# Patient Record
Sex: FEMALE | Race: WHITE | Employment: PART TIME | URBAN - METROPOLITAN AREA
[De-identification: names, ages, dates, MRNs, and addresses within clinical notes are randomized per-mention and may not be internally consistent; named-entity substitution may affect disease eponyms.]

---

## 2017-02-16 ENCOUNTER — HOSPITAL ENCOUNTER (OUTPATIENT)
Dept: MAMMOGRAPHY | Age: 62
Discharge: HOME | End: 2017-02-16
Attending: OBSTETRICS & GYNECOLOGY | Admitting: OBSTETRICS & GYNECOLOGY

## 2017-02-16 DIAGNOSIS — Z12.31 VISIT FOR SCREENING MAMMOGRAM: ICD-10-CM

## 2018-03-12 ENCOUNTER — HOSPITAL ENCOUNTER (OUTPATIENT)
Dept: MAMMOGRAPHY | Age: 63
Discharge: HOME | End: 2018-03-12
Attending: FAMILY MEDICINE | Admitting: FAMILY MEDICINE

## 2018-03-12 DIAGNOSIS — Z12.31 ENCOUNTER FOR SCREENING MAMMOGRAM FOR MALIGNANT NEOPLASM OF BREAST: ICD-10-CM

## 2018-05-21 ENCOUNTER — HOSPITAL ENCOUNTER (OUTPATIENT)
Dept: MRI IMAGING | Age: 63
Discharge: HOME | End: 2018-05-21

## 2018-05-21 DIAGNOSIS — M51.36 OTHER INTERVERTEBRAL DISC DEGENERATION, LUMBAR REGION: ICD-10-CM

## 2018-05-21 DIAGNOSIS — M50.223 HERNIATED NUCLEUS PULPOSUS, C6-7: ICD-10-CM

## 2018-05-21 DIAGNOSIS — M51.36 DEGENERATION OF INTERVERTEBRAL DISC OF LUMBAR REGION: ICD-10-CM

## 2018-05-21 DIAGNOSIS — M51.34 DDD (DEGENERATIVE DISC DISEASE), THORACIC: ICD-10-CM

## 2018-06-06 ENCOUNTER — HOSPITAL ENCOUNTER (OUTPATIENT)
Dept: OTHER | Age: 63
Discharge: HOME | End: 2018-06-06
Attending: NEUROLOGICAL SURGERY | Admitting: NEUROLOGICAL SURGERY

## 2018-06-06 DIAGNOSIS — M54.2 CERVICALGIA: ICD-10-CM

## 2019-02-12 ENCOUNTER — HOSPITAL ENCOUNTER (OUTPATIENT)
Dept: CT IMAGING | Age: 64
Discharge: HOME | End: 2019-02-12
Payer: COMMERCIAL

## 2019-02-12 DIAGNOSIS — Z98.1 ARTHRODESIS STATUS: ICD-10-CM

## 2019-02-12 PROCEDURE — 72125 CT NECK SPINE W/O DYE: CPT

## 2019-03-05 ENCOUNTER — HOSPITAL ENCOUNTER (OUTPATIENT)
Age: 64
Discharge: HOME | End: 2019-03-05
Payer: COMMERCIAL

## 2019-03-05 ENCOUNTER — HOSPITAL ENCOUNTER (OUTPATIENT)
Dept: GENERAL RADIOLOGY | Age: 64
Discharge: HOME | End: 2019-03-05
Payer: COMMERCIAL

## 2019-03-05 DIAGNOSIS — M54.6 THORACIC SPINE PAIN: ICD-10-CM

## 2019-03-05 PROCEDURE — 72072 X-RAY EXAM THORAC SPINE 3VWS: CPT

## 2019-03-15 ENCOUNTER — HOSPITAL ENCOUNTER (OUTPATIENT)
Dept: WOMENS IMAGING | Age: 64
Discharge: HOME | End: 2019-03-15
Payer: COMMERCIAL

## 2019-03-15 DIAGNOSIS — Z12.31 ENCOUNTER FOR SCREENING MAMMOGRAM FOR MALIGNANT NEOPLASM OF BREAST: ICD-10-CM

## 2019-03-15 PROCEDURE — 77067 SCR MAMMO BI INCL CAD: CPT

## 2019-03-15 PROCEDURE — 77063 BREAST TOMOSYNTHESIS BI: CPT

## 2019-03-19 ENCOUNTER — HOSPITAL ENCOUNTER (OUTPATIENT)
Dept: PHYSICAL THERAPY | Age: 64
Setting detail: THERAPIES SERIES
Discharge: HOME | End: 2019-03-19
Payer: COMMERCIAL

## 2019-03-19 PROCEDURE — 97162 PT EVAL MOD COMPLEX 30 MIN: CPT | Performed by: PHYSICAL THERAPIST

## 2019-03-19 PROCEDURE — 97110 THERAPEUTIC EXERCISES: CPT | Performed by: PHYSICAL THERAPIST

## 2019-03-19 PROCEDURE — 97530 THERAPEUTIC ACTIVITIES: CPT | Performed by: PHYSICAL THERAPIST

## 2019-03-22 ENCOUNTER — HOSPITAL ENCOUNTER (OUTPATIENT)
Dept: PHYSICAL THERAPY | Age: 64
Setting detail: THERAPIES SERIES
Discharge: HOME | End: 2019-03-22
Payer: COMMERCIAL

## 2019-03-22 PROCEDURE — 97140 MANUAL THERAPY 1/> REGIONS: CPT

## 2019-03-22 PROCEDURE — 97110 THERAPEUTIC EXERCISES: CPT

## 2019-03-25 ENCOUNTER — HOSPITAL ENCOUNTER (OUTPATIENT)
Dept: PHYSICAL THERAPY | Age: 64
Setting detail: THERAPIES SERIES
Discharge: HOME | End: 2019-03-25
Payer: COMMERCIAL

## 2019-03-25 PROCEDURE — 97110 THERAPEUTIC EXERCISES: CPT

## 2019-03-25 PROCEDURE — 97140 MANUAL THERAPY 1/> REGIONS: CPT

## 2019-03-29 ENCOUNTER — HOSPITAL ENCOUNTER (OUTPATIENT)
Dept: PHYSICAL THERAPY | Age: 64
Setting detail: THERAPIES SERIES
Discharge: HOME | End: 2019-03-29
Payer: COMMERCIAL

## 2019-03-29 PROCEDURE — 97140 MANUAL THERAPY 1/> REGIONS: CPT

## 2019-03-29 PROCEDURE — 97110 THERAPEUTIC EXERCISES: CPT

## 2019-04-01 ENCOUNTER — HOSPITAL ENCOUNTER (OUTPATIENT)
Dept: PHYSICAL THERAPY | Age: 64
Setting detail: THERAPIES SERIES
Discharge: HOME | End: 2019-04-01
Payer: COMMERCIAL

## 2019-04-01 ENCOUNTER — OFFICE VISIT (OUTPATIENT)
Dept: ORTHOPEDIC SURGERY | Age: 64
End: 2019-04-01
Payer: COMMERCIAL

## 2019-04-01 VITALS
DIASTOLIC BLOOD PRESSURE: 57 MMHG | HEIGHT: 68 IN | BODY MASS INDEX: 29.57 KG/M2 | SYSTOLIC BLOOD PRESSURE: 110 MMHG | RESPIRATION RATE: 10 BRPM | HEART RATE: 88 BPM | WEIGHT: 195.11 LBS

## 2019-04-01 DIAGNOSIS — M17.0 PRIMARY OSTEOARTHRITIS OF BOTH KNEES: Primary | ICD-10-CM

## 2019-04-01 DIAGNOSIS — M25.562 ACUTE PAIN OF BOTH KNEES: ICD-10-CM

## 2019-04-01 DIAGNOSIS — M22.41 CHONDROMALACIA OF BOTH PATELLAE: ICD-10-CM

## 2019-04-01 DIAGNOSIS — M22.42 CHONDROMALACIA OF BOTH PATELLAE: ICD-10-CM

## 2019-04-01 DIAGNOSIS — M25.561 ACUTE PAIN OF BOTH KNEES: ICD-10-CM

## 2019-04-01 PROCEDURE — 97140 MANUAL THERAPY 1/> REGIONS: CPT

## 2019-04-01 PROCEDURE — 97110 THERAPEUTIC EXERCISES: CPT

## 2019-04-01 PROCEDURE — MISCD110 LATERAL STABILIZER: Performed by: FAMILY MEDICINE

## 2019-04-01 PROCEDURE — 99243 OFF/OP CNSLTJ NEW/EST LOW 30: CPT | Performed by: FAMILY MEDICINE

## 2019-04-01 PROCEDURE — 20610 DRAIN/INJ JOINT/BURSA W/O US: CPT | Performed by: FAMILY MEDICINE

## 2019-04-01 RX ORDER — BETAMETHASONE SODIUM PHOSPHATE AND BETAMETHASONE ACETATE 3; 3 MG/ML; MG/ML
24 INJECTION, SUSPENSION INTRA-ARTICULAR; INTRALESIONAL; INTRAMUSCULAR; SOFT TISSUE ONCE
Status: COMPLETED | OUTPATIENT
Start: 2019-04-01 | End: 2019-04-01

## 2019-04-01 RX ORDER — MELOXICAM 15 MG/1
TABLET ORAL
COMMUNITY
Start: 2019-03-05 | End: 2019-07-03 | Stop reason: CLARIF

## 2019-04-01 RX ORDER — TOLTERODINE 2 MG/1
CAPSULE, EXTENDED RELEASE ORAL
COMMUNITY
Start: 2019-03-12 | End: 2022-07-18

## 2019-04-01 RX ORDER — TIZANIDINE HYDROCHLORIDE 2 MG/1
2 CAPSULE, GELATIN COATED ORAL
COMMUNITY
End: 2019-09-25 | Stop reason: ALTCHOICE

## 2019-04-01 RX ORDER — DICLOFENAC SODIUM 75 MG/1
75 TABLET, DELAYED RELEASE ORAL 2 TIMES DAILY
Qty: 60 TABLET | Refills: 3 | Status: SHIPPED | OUTPATIENT
Start: 2019-04-01 | End: 2019-11-13 | Stop reason: ALTCHOICE

## 2019-04-01 RX ADMIN — BETAMETHASONE SODIUM PHOSPHATE AND BETAMETHASONE ACETATE 24 MG: 3; 3 INJECTION, SUSPENSION INTRA-ARTICULAR; INTRALESIONAL; INTRAMUSCULAR; SOFT TISSUE at 09:28

## 2019-04-01 NOTE — FLOWSHEET NOTE
[facility],  [address] [facility]  Phone: [phone]  Fax [phone]        Physical Therapy Daily Treatment Note  Date:  2019    Patient Name:  Tsering Etienne    :  1955  MRN: [id number]  Restrictions/Precautions:    Medical/Treatment Diagnosis Information:  Diagnosis: M54.6 Thoracic spine  Treatment Diagnosis: dec ROM, dec strength, pain, dec function  Insurance/Certification information:  PT Insurance Information: BCBS 30 vpcy; 70/30  Physician Information:  Referring Practitioner: Dr. Ruth Booth of care signed (Y/N): faxed 3/19/19    Date of Patient follow up with Physician:     G-Code (if applicable):      Date G-Code Applied:  3/19/19  PT G-Codes  Functional Assessment Tool Used: Mod Oswestry LB  Score: 17.7%    Progress Note: [x]  Yes  []  No  Next due by: 19     Latex Allergy:  [x]NO      []YES  Preferred Language for Healthcare:   [x]English       []other:    Visit # Insurance Allowable   5 30     Pain level:  0-10/10     SUBJECTIVE:  Pt reports that she is not having the sharp stabbing pain any longer. She is still pretty sore in her R thoracic spine waking up, but it is not wrapping from the front to the back anymore either. Gets sore when she uses her arms for a long time at work. OBJECTIVE:   Observation: very tender to palpation throughout thoracic spine, sits with shoulders elevated.    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      Exercises/Interventions:   Therapeutic Ex Sets/sec Reps Notes         Trunk rotation in sitting    Thoracic flex and ext in sitting    scap retraction    shrugs    Supine pec stretch - arms at 45 30\" 3 + HEP   Lat stretch - R,L 15\" 3 + HEP   SA reach standing ball on wall R,L 5\" 10 Better tolerance than supine + HEP   Supine UE ext iso  Standing scap retraction - back to wall   5\"   10 Could not activate well  Better tolerance   B UE ER supine- back to wall 3\" 20 Yellow TT- gave orange TB for HEP   TT row 2 10 Yellow  + HEP   TT lat pull down 2 10 Yellow  + HEP                                 Pulleys flex, abd 5\"x10 ea  indep LORENZANA   Breathing/rib/palpation assessment 5'     Manual Intervention      Assess thoracic spine  ERSR   FRSL T9 MET 8' total  Also showed anatomy, explained mechanism to reduce fears of harming cervical spine   Inf scap mob supine GII   Pt reports this is a lot of pressure   Thoracic mobs 3'  GII (pt reports this is a lot of pressure)- prone   Rib mobilizations- sidelying rib 5 inhalation correction, intercostal stretch with deep breathing   attempted   STM thoracic PSM R  Vickie-scapular musclature (sidelying) x8'     MFR thoracolumbar fascia 5'     scap mobs- all 5'     NMR re-education      T-spine Ext- foam roll      Chin tucks       Diaphragmatic breathing   Difficulty performing, uses scalenes, SCM, and pec minor heavily                                   Therapeutic Exercise and NMR EXR  [x] (70786) Provided verbal/tactile cueing for activities related to strengthening, flexibility, endurance, ROM  for improvements in cervical, postural, scapular, scapulothoracic and UE control with self care, reaching, carrying, lifting, house/yardwork, driving/computer work.    [] (52988) Provided verbal/tactile cueing for activities related to improving balance, coordination, kinesthetic sense, posture, motor skill, proprioception  to assist with cervical, scapular, scapulothoracic and UE control with self care, reaching, carrying, lifting, house/yardwork, driving/computer work.     Therapeutic Activities:    [] (27528 or 33141) Provided verbal/tactile cueing for activities related to improving balance, coordination, kinesthetic sense, posture, motor skill, proprioception and motor activation to allow for proper function of cervical, scapular, scapulothoracic and UE control with self care, carrying, lifting, driving/computer work. Home Exercise Program:    [x] (63804) Reviewed/Progressed HEP activities related to strengthening, flexibility, endurance, ROM of cervical, scapular, scapulothoracic and UE control with self care, reaching, carrying, lifting, house/yardwork, driving/computer work 3/19/19 written HEP given  [] (92739) Reviewed/Progressed HEP activities related to improving balance, coordination, kinesthetic sense, posture, motor skill, proprioception of cervical, scapular, scapulothoracic and UE control with self care, reaching, carrying, lifting, house/yardwork, driving/computer work      Manual Treatments:  PROM / STM / Oscillations-Mobs:  G-I, II, III, IV (PA's, Inf., Post.)  [x] (37731) Provided manual therapy to mobilize soft tissue/joints of cervical/CT, scapular GHJ and UE for the purpose of decreasing headache, modulating pain, promoting relaxation,  increasing ROM, reducing/eliminating soft tissue swelling/inflammation/restriction, improving soft tissue extensibility and allowing for proper ROM for normal function with self care, reaching, carrying, lifting, house/yardwork, driving/computer work    Modalities:  CP x15 min to T and C spine    Charges:  Timed Code Treatment Minutes: 40   Total Treatment Minutes: 55     [] EVAL (LOW) 66208 (typically 20 minutes face-to-face)  [] EVAL (MOD) 38965 (typically 30 minutes face-to-face)  [] EVAL (HIGH) 06353 (typically 45 minutes face-to-face)  [] RE-EVAL   [x] MJ(46913) x  2   [] IONTO  [] NMR (93734) x      [] VASO  [x] Manual (68758) x  1    [] Other:  [] TA (37091) x       [] [facility] T[facility] (25501)  [] ES(attended) (55057)      [] ES (un) (40795):       GOALS:  Patient stated goal: reduction in pain and return to playing golf; Therapist goals for Patient:   Short Term Goals: To be achieved in: 2 weeks  1. Independent in HEP and progression per patient tolerance, in order to prevent re-injury.    2. Patient will have a decrease in pain to facilitate improvement in movement, function, and ADLs as indicated by Functional Deficits. Long Term Goals: To be achieved in: 10 weeks  1. Disability index score of 10% or less for the NDI to assist with reaching prior level of function. 2. Patient will demonstrate increased AROM to [facility] cervical/thoracic spine to allow for proper joint functioning as indicated by patients Functional Deficits. 3. Patient will demonstrate an increase in postural awareness and control and activation of  core stabilizers to allow for proper functional mobility as indicated by patients Functional Deficits. 4. Patient will return to all functional activities without increased symptoms or restriction. 5. Pt will be able to go through golf swing w/o full motion and min pain.(patient specific functional goal)       Progression Towards Functional goals:  [] Patient is progressing as expected towards functional goals listed. [] Progression is slowed due to complexities listed. [] Progression has been slowed due to co-morbidities. [x] Plan just implemented, too soon to assess goals progression  [] Other:     ASSESSMENT:  Pt is still sensitive to touch, but improved since beginning PT. Less time required to reduce fascial restrictions this visit. She needs cues for MT/rhomboid activation with HEP still, will likely review NV again to ensure proper form at home. Treatment/Activity Tolerance:  [] Patient tolerated treatment well [] Patient limited by fatique  [x] Patient limited by pain  [] Patient limited by other medical complications  [] Other:     Prognosis: [x] Good [] Fair  [] Poor    Patient Requires Follow-up: [x] Yes  [] No    PLAN: Breathing re-training, improve 2nd rib mobility, update HEP if supine strengthening performed correctly.    [x] Continue per plan of care [] Alter current plan (see comments)  [] Plan of care initiated [] Hold pending MD visit [] Discharge    Electronically signed by: Amparo Serrano PT , DPT, OMT-C

## 2019-04-01 NOTE — PROGRESS NOTES
appropriate. Review of Systems  Pertinent items are noted in HPI  Review of systems reviewed from Patient History Form dated on 4/1/2019 and available in the patient's chart under the Media tab. Vital Signs  Vitals:    04/01/19 0859   BP: (!) 110/57   Pulse: 88   Resp: 10       General Exam:     Constitutional: Patient is adequately groomed with no evidence of malnutrition  DTRs: Deep tendon reflexes are intact  Mental Status: The patient is oriented to time, place and person. The patient's mood and affect are appropriate. Lymphatic: The lymphatic examination bilaterally reveals all areas to be without enlargement or induration. Vascular: Examination reveals no swelling or calf tenderness. Peripheral pulses are palpable and 2+. Neurological: The patient has good coordination. There is no weakness or sensory deficit. Knee Examination  Inspection:  There is no high-grade deformity other she does have bilaterally patellofemoral crepitation and trace knee joint effusions. Palpation:  She does have tenderness over the medial and lateral patellofemoral facet bilaterally slightly worse on the right. She does have tenderness clinically. Lateral compartment of her right knee and medial compartment of her left knee with some mild crepitation. Rang of Motion:  She is stiff in the terminal 10 of extension with flexion limited to about 110-120. Hamstrings are tight. Fair quad tone. Strength: 4 out of 5 with flexion as extension. Special Tests:  She does have pain primarily reproduced with the patellar cut testing. Negative apprehension testing. She does have more pain with crepitation with lateral Jose's on the right knee and medial Jose's on the left. I do not sense a high-grade click. I do not sense high-grade instability of screening testing is fairly benign. Skin: There are no rashes, ulcerations or lesions. Distal neurovascular exam is intact.     Gait: mild altalgia and some discomfort with positional change. Reflex symmetrically preserved    Additional Comments:     Additional Examinations:  Right Lower Extremity: Examination of the right lower extremity does not show any tenderness, deformity or injury. Range of motion is unremarkable. There is no gross instability. There are no rashes, ulcerations or lesions. Strength and tone are normal.  Left Lower Extremity: Examination of the left lower extremity does not show any tenderness, deformity or injury. Range of motion is unremarkable. There is no gross instability. There are no rashes, ulcerations or lesions. Strength and tone are normal.  Examination of the biateral hip reveals intact skin. The patient demonstrates full painless range of motion with regards to flexion, abduction, internal and external rotation. There is not tenderness about the greater trochanter. There is a negative straight leg raise against resistance. Strength is 5/5 thorough out all planes. Diagnostic Test Findings: bilateral knee AP and PA weight-bearing sunrise and lateral films were obtained today and show fairly prominent nearly bone-on-bone changes to the lateral compartment of her right knee and medial compartment of her left knee with left greater than right knee patellofemoral arthropathy with spurring. Assessment :  #1.   3+ months status post progressively worsening recurrent advanced right knee lateral anterior compartment osteoarthritis with bone-on-bone changes lateral compartment right knee with synovitis   #2.  3+ months status post for grossly worsening advanced left knee medial and anterior compartment osteoarthritis of bone-on-bone changes medial compartment left knee with synovitis    Impression:  Encounter Diagnoses   Name Primary?     Primary osteoarthritis of both knees Yes    Chondromalacia of both patellae     Acute pain of both knees        Office Procedures:  Orders Placed This Encounter   Procedures   

## 2019-04-04 ENCOUNTER — HOSPITAL ENCOUNTER (OUTPATIENT)
Dept: PHYSICAL THERAPY | Age: 64
Setting detail: THERAPIES SERIES
Discharge: HOME | End: 2019-04-04
Payer: COMMERCIAL

## 2019-04-04 PROCEDURE — 97140 MANUAL THERAPY 1/> REGIONS: CPT

## 2019-04-04 PROCEDURE — G8978 MOBILITY CURRENT STATUS: HCPCS

## 2019-04-04 PROCEDURE — G8979 MOBILITY GOAL STATUS: HCPCS

## 2019-04-04 PROCEDURE — 97110 THERAPEUTIC EXERCISES: CPT

## 2019-04-04 PROCEDURE — 97161 PT EVAL LOW COMPLEX 20 MIN: CPT

## 2019-04-04 NOTE — FLOWSHEET NOTE
[facility],  [address]  Phone: [phone]  Fax [phone]    Physical Therapy Daily Treatment Note  Date:  2019    Patient Name:  Ga Pickett    :  1955  MRN: [id number]  Restrictions/Precautions:    Medical/Treatment Diagnosis Information:  · Diagnosis: M17.0 (ICD-10-CM) - Primary osteoarthritis of both knees; M22.41, M22.42 (ICD-10-CM) - Chondromalacia of both patellae; M25.561, M25.562 (ICD-10-CM) - Acute pain of both knees  · Treatment Diagnosis: M25.561, M25.562 Pain in right, left knees; M17.11, M17.12 Knee OA R,L  Insurance/Certification information:  PT Insurance Information: BCBS 30 vpcy; 70/30  Physician Information:  Referring Practitioner: Dr. Pablito Acosta of care signed (Y/N):     Date of Patient follow up with Physician:     G-Code (if applicable):      Date G-Code Applied:  19  PT G-Codes  Functional Assessment Tool Used: LEFS  Score: 25  Functional Limitation: Mobility: Walking and moving around  Mobility: Walking and Moving Around Current Status (): At least 20 percent but less than 40 percent impaired, limited or restricted  Mobility: Walking and Moving Around Goal Status ():  At least 1 percent but less than 20 percent impaired, limited or restricted    Progress Note: [x]  Yes  []  No  Next due by: Visit #10       Latex Allergy:  [x]NO      []YES  Preferred Language for Healthcare:   [x]English       []other:    Visit # Insurance Allowable Requires auth   1 30 (soft limit)    []no        [x]yes: after 30       Pain level: 0-6/10     SUBJECTIVE:  See eval    OBJECTIVE: See eval  Observation:   Test measurements:      RESTRICTIONS/PRECAUTIONS: B knee OA; cervical disc replacement 2018 C3-C7        Exercises/Interventions:     Therapeutic Ex Sets/sec Reps Notes   Pt ed: findings of exam; rec's for POC 5'     HS stretch standing 30\"x2 R,L  HEP   gastroc stretch standing  30\"x2 R,L  HEP   Rectus stretch standing- foot on chair 30\"x2 R,L  HEP   Standing hip abd 2x10 R,L  HEP UE support   Tandem balance 1' R,L  HEP   Supine h/l hip abd isometric 10\"x10  Lime TB HEP   bridge 5\"x10  HEP   S/l clamshell 2x10 R,L  HEP                     Manual Intervention      STM B distal IT band and distal rectus 10'                                   NMR re-education                                              Therapeutic Exercise and NMR EXR  [x] (46270) Provided verbal/tactile cueing for activities related to strengthening, flexibility, endurance, ROM for improvements in LE, proximal hip, and core control with self care, mobility, lifting, ambulation.  [] (75116) Provided verbal/tactile cueing for activities related to improving balance, coordination, kinesthetic sense, posture, motor skill, proprioception  to assist with LE, proximal hip, and core control in self care, mobility, lifting, ambulation and eccentric single leg control.      NMR and Therapeutic Activities:    [] (85834 or 96174) Provided verbal/tactile cueing for activities related to improving balance, coordination, kinesthetic sense, posture, motor skill, proprioception and motor activation to allow for proper function of core, proximal hip and LE with self care and ADLs  [] (90241) Gait Re-education- Provided training and instruction to the patient for proper LE, core and proximal hip recruitment and positioning and eccentric body weight control with ambulation re-education including up and down stairs     Home Exercise Program:    [x] (40384) Reviewed/Progressed HEP activities related to strengthening, flexibility, endurance, ROM of core, proximal hip and LE for functional self-care, mobility, lifting and ambulation/stair navigation   [] (96894)Reviewed/Progressed HEP activities related to improving balance, coordination, kinesthetic sense, posture, motor skill, proprioception of core, proximal hip and LE for self care, mobility, lifting, and ambulation/stair navigation      Manual Treatments:  PROM / STM / Oscillations-Mobs:  G-I, II, III, IV (PA's, Inf., Post.)  [x] (25739) Provided manual therapy to mobilize LE, proximal hip and/or LS spine soft tissue/joints for the purpose of modulating pain, promoting relaxation,  increasing ROM, reducing/eliminating soft tissue swelling/inflammation/restriction, improving soft tissue extensibility and allowing for proper ROM for normal function with self care, mobility, lifting and ambulation. Modalities:  CP x 15' B knees    Charges:  Timed Code Treatment Minutes: 30   Total Treatment Minutes: 75     [x] EVAL (LOW) 49823 (typically 20 minutes face-to-face)  [] EVAL (MOD) 08545 (typically 30 minutes face-to-face)  [] EVAL (HIGH) 83716 (typically 45 minutes face-to-face)  [] RE-EVAL     [x] SB(11282) x  1   [] IONTO  [] NMR (49284) x      [] VASO  [x] Manual (68766) x  1    [] Other:  [] TA x       [] Mech Traction (48254)  [] ES(attended) (40545)      [] ES (un) (62543):     GOALS: Patient stated goal: get back to golfing, get back to gym, walking 10,000-15,000 steps/day      Therapist goals for Patient:   Short Term Goals: To be achieved in: 2 weeks  1. Independent in HEP and progression per patient tolerance, in order to prevent re-injury. 2. Patient will have a decrease in pain to facilitate improvement in movement, function, and ADLs as indicated by Functional Deficits.     Long Term Goals: To be achieved in: 8 weeks  1. Disability index score of 15% or less for the LEFS to assist with reaching prior level of function. 2. Patient will demonstrate increased ankle AROM to 15 deg bilaterally and improve HS flexibility by 10-15 deg bilaterally to improve mechanics during gait, stair ambulation and squatting. 3. Patient will demonstrate an increase in Strength to at least 4+/5 to 5/5 for bilateral hip musculature.    4. Patient will return to squatting, kneeling, stairs, and heavy HH chores with at least 50-75% improved pain. 5. Pt will have improved balance in SLS to at least 10\" bilaterally in order to reduce risk of falling. 6. Pt will be able to DL squat to 100 degrees with <2/10 knee pain. Progression Towards Functional goals:  [] Patient is progressing as expected towards functional goals listed. [] Progression is slowed due to complexities listed. [] Progression has been slowed due to co-morbidities.   [x] Plan just implemented, too soon to assess goals progression  [] Other:     ASSESSMENT:  See eval    Treatment/Activity Tolerance:  [x] Patient tolerated treatment well [] Patient limited by fatique  [] Patient limited by pain  [] Patient limited by other medical complications  [] Other:     Prognosis: [x] Good [] Fair  [] Poor    Patient Requires Follow-up: [x] Yes  [] No    PLAN: See eval  [] Continue per plan of care [] Alter current plan (see comments)  [x] Plan of care initiated [] Hold pending MD visit [] Discharge    Electronically signed by: Chris Prasad, PT, DPT

## 2019-04-04 NOTE — PLAN OF CARE
[employer] and also working at [employer] one day/week. Relevant Medical History:B knee OA; cervical disc replacement July 2018 C3-C7; L knee arthroscopy (meniscectomy 2011). Per chart review of OV with Jonas Roblero MD on 4/1/19:  Diagnostic Test Findings: bilateral knee AP and PA weight-bearing sunrise and lateral films were obtained today and show fairly prominent nearly bone-on-bone changes to the lateral compartment of her right knee and medial compartment of her left knee with left greater than right knee patellofemoral arthropathy with spurring. Functional Disability Index:PT G-Codes  Functional Assessment Tool Used: LEFS  Score: 25  Functional Limitation: Mobility: Walking and moving around  Mobility: Walking and Moving Around Current Status (): At least 20 percent but less than 40 percent impaired, limited or restricted  Mobility: Walking and Moving Around Goal Status (): At least 1 percent but less than 20 percent impaired, limited or restricted    Pain Scale: 0-6/10  Easing factors: ice, brace, cortisone injections  Provocative factors:kneeling to take care of dogs; descending stairs, standing, walking, driving (stepping on gas and brake pedals), vaccuuming    Type: []Constant   [x]Intermittent  []Radiating []Localized []other:     Numbness/Tingling: denies    Occupation/School:[id number] 1 day; works for [employer]- 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      OBJECTIVE:     ROM LEFT RIGHT   HIP Flex     HIP Abd     HIP Ext     HIP IR     HIP ER     Knee ext 0 0   Knee Flex 135 135   Ankle PF     Ankle DF 10 10   Ankle In     Ankle Ev     Strength  LEFT RIGHT   HIP Flexors 5- 4+   HIP Abductors 4- 4-   HIP Ext NT NT   Hip ER NT NT   Knee EXT (quad) 5 with pain 5 with pain    Knee Flex (HS) 5 5   Ankle DF 5 5 Ankle PF 5 5   Ankle Inv 5 5   Ankle EV 5 5        Circumference  Mid apex  7 cm prox             Reflexes/Sensation:    [x]Dermatomes/Myotomes intact    [x]Reflexes equal and normal bilaterally- UE's trace, patella 1+ B, achilles 1+ bilaterally   [x]Other:- ankle clonus bilaterally    Joint mobility:   [x]Normal    []Hypo   []Hyper    Palpation: TTP distal and mid IT band R>L and R pes anserine    Functional Mobility/Transfers: DL squat 85 deg with anterior knee pain  SLS L 11\", R 3\" with LOB    Posture: forward shoulders and increased thoracic kyphosis    Bandages/Dressings/Incisions: none    Gait: (include devices/WB status) excessive hip drop in stance bilaterally; decreased eccentric DF control    Orthopedic Special Tests: - Rhomberg  HS 90-90 L lacking 35 deg, R lacking 30 deg                        [x] Patient history, allergies, meds reviewed. Medical chart reviewed. See intake form. Review Of Systems (ROS):  [x]Performed Review of systems (Integumentary, CardioPulmonary, Neurological) by intake and observation. Intake form has been scanned into medical record. Patient has been instructed to contact their primary care physician regarding ROS issues if not already being addressed at this time.       Co-morbidities/Complexities (which will affect course of rehabilitation)  []None           Arthritic conditions   []Rheumatoid arthritis (M05.9)  [x]Osteoarthritis (M19.91)   Cardiovascular conditions   []Hypertension (I10)  []Hyperlipidemia (E78.5)  []Angina pectoris (I20)  []Atherosclerosis (I70)   Musculoskeletal conditions   [x]Disc pathology   []Congenital spine pathologies   [x]Prior surgical intervention  []Osteoporosis (M81.8)  []Osteopenia (M85.8)   Endocrine conditions   []Hypothyroid (E03.9)  []Hyperthyroid Gastrointestinal conditions   []Constipation (V76.39)   Metabolic conditions   []Morbid obesity (E66.01)  []Diabetes type 1(E10.65) or 2 (E11.65)   []Neuropathy (G60.9)     Pulmonary conditions bending, and lifting   []Reduced ability to sleep   [x] Reduced ability or tolerance with driving and/or computer work   []Reduced ability to perform lifting, carrying tasks   [x]Reduced ability to squat   []Reduced ability to forward bend   [x]Reduced ability to ambulate prolonged functional periods/distances/surfaces   [x]Reduced ability to ascend/descend stairs   [x]Reduced ability to run, hop, cut or jump   []other:    Participation Restrictions   [x]Reduced participation in self care activities   [x]Reduced participation in home management activities   [x]Reduced participation in work activities   [x]Reduced participation in social activities. [x]Reduced participation in sport/recreation activities. Classification :    []Signs/symptoms consistent with post-surgical status including decreased ROM, strength and function.    []Signs/symptoms consistent with joint sprain/strain   []Signs/symptoms consistent with patella-femoral syndrome   [x]Signs/symptoms consistent with knee OA/hip OA   []Signs/symptoms consistent with internal derangement of knee/Hip   []Signs/symptoms consistent with functional hip weakness/NMR control      []Signs/symptoms consistent with tendinitis/tendinosis    []signs/symptoms consistent with pathology which may benefit from Dry needling      []other:      Prognosis/Rehab Potential:      []Excellent   [x]Good    []Fair   []Poor    Tolerance of evaluation/treatment:    []Excellent   [x]Good    []Fair   []Poor  Physical Therapy Evaluation Complexity Justification  [x] A history of present problem with:  [] no personal factors and/or comorbidities that impact the plan of care;  [x]1-2 personal factors and/or comorbidities that impact the plan of care  []3 personal factors and/or comorbidities that impact the plan of care  [x] An examination of body systems using standardized tests and measures addressing any of the following: body structures and functions (impairments), activity limitations, and/or participation restrictions;:  [] a total of 1-2 or more elements   [] a total of 3 or more elements   [x] a total of 4 or more elements   [x] A clinical presentation with:  [x] stable and/or uncomplicated characteristics   [] evolving clinical presentation with changing characteristics  [] unstable and unpredictable characteristics;   [x] Clinical decision making of [x] low, [] moderate, [] high complexity using standardized patient assessment instrument and/or measurable assessment of functional outcome. [x] EVAL (LOW) 18262 (typically 20 minutes face-to-face)  [] EVAL (MOD) 28163 (typically 30 minutes face-to-face)  [] EVAL (HIGH) 90149 (typically 45 minutes face-to-face)  [] RE-EVAL       PLAN:   Frequency/Duration:  1-2 days per week for 8 Weeks:  Interventions:  [x]  Therapeutic exercise including: strength training, ROM, for Lower extremity and core   [x]  NMR activation and proprioception for LE, Glutes and Core   [x]  Manual therapy as indicated for LE, Hip and spine to include: Dry Needling/IASTM, STM, PROM, Gr I-IV mobilizations, manipulation. [x] Modalities as needed that may include: thermal agents, E-stim, Biofeedback, US, iontophoresis as indicated  [x] Patient education on joint protection, postural re-education, activity modification, progression of HEP. HEP instruction: (see scanned forms)    GOALS:  Patient stated goal: get back to golfing, get back to gym, walking 10,000-15,000 steps/day     Therapist goals for Patient:   Short Term Goals: To be achieved in: 2 weeks  1. Independent in HEP and progression per patient tolerance, in order to prevent re-injury. 2. Patient will have a decrease in pain to facilitate improvement in movement, function, and ADLs as indicated by Functional Deficits. Long Term Goals: To be achieved in: 8 weeks  1. Disability index score of 15% or less for the LEFS to assist with reaching prior level of function.    2. Patient will demonstrate

## 2019-04-05 ENCOUNTER — HOSPITAL ENCOUNTER (OUTPATIENT)
Dept: PHYSICAL THERAPY | Age: 64
Setting detail: THERAPIES SERIES
Discharge: HOME | End: 2019-04-05
Payer: COMMERCIAL

## 2019-04-05 PROCEDURE — 97140 MANUAL THERAPY 1/> REGIONS: CPT

## 2019-04-05 PROCEDURE — 97110 THERAPEUTIC EXERCISES: CPT

## 2019-04-05 NOTE — FLOWSHEET NOTE
[facility], [address]  Phone: [phone]  Fax [phone]        Physical Therapy Daily Treatment Note  Date:  2019    Patient Name:  Gretchen Liang    :  1955  MRN: [id number]  Restrictions/Precautions:    Medical/Treatment Diagnosis Information:  Diagnosis: M54.6 Thoracic spine  Treatment Diagnosis: dec ROM, dec strength, pain, dec function  Insurance/Certification information:  PT Insurance Information: BCBS 30 vpcy; 70/30  Physician Information:  Referring Practitioner: Dr. Eleuterio Fajardo of care signed (Y/N): faxed 3/19/19    Date of Patient follow up with Physician:     G-Code (if applicable):      Date G-Code Applied:  3/19/19  PT G-Codes  Functional Assessment Tool Used: Mod Oswestry LB  Score: 17.7%    Progress Note: [x]  Yes  []  No  Next due by: 19     Latex Allergy:  [x]NO      []YES  Preferred Language for Healthcare:   [x]English       []other:    Visit # Insurance Allowable   6 (1 for another POC) 30     Pain level:  0-5/10     SUBJECTIVE:  Pt reports that she is not having the sharp stabbing pain any longer. She is still pretty sore in her R thoracic spine waking up, but it is not wrapping from the front to the back anymore either. Gets sore when she uses her arms for a long time at work. OBJECTIVE:   Observation: very tender to palpation throughout thoracic spine, sits with shoulders elevated.    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      Exercises/Interventions:   Therapeutic Ex Sets/sec Reps Notes         Trunk rotation in sitting    Thoracic flex and ext in sitting    scap retraction    shrugs    Supine pec stretch - arms at 45 30\" 3 + HEP   Lat stretch - R,L 15\" 3 + HEP   SA reach standing ball on wall R,L 5\" 10 Better tolerance than supine + HEP   Supine UE ext iso  Standing scap retraction - back to wall Could not cervical, scapular, scapulothoracic and UE control with self care, carrying, lifting, driving/computer work. Home Exercise Program:    [x] (27999) Reviewed/Progressed HEP activities related to strengthening, flexibility, endurance, ROM of cervical, scapular, scapulothoracic and UE control with self care, reaching, carrying, lifting, house/yardwork, driving/computer work 3/19/19 written HEP given  [] (78232) Reviewed/Progressed HEP activities related to improving balance, coordination, kinesthetic sense, posture, motor skill, proprioception of cervical, scapular, scapulothoracic and UE control with self care, reaching, carrying, lifting, house/yardwork, driving/computer work      Manual Treatments:  PROM / STM / Oscillations-Mobs:  G-I, II, III, IV (PA's, Inf., Post.)  [x] (66950) Provided manual therapy to mobilize soft tissue/joints of cervical/CT, scapular GHJ and UE for the purpose of decreasing headache, modulating pain, promoting relaxation,  increasing ROM, reducing/eliminating soft tissue swelling/inflammation/restriction, improving soft tissue extensibility and allowing for proper ROM for normal function with self care, reaching, carrying, lifting, house/yardwork, driving/computer work    Modalities:  CP x15 min to T and C spine    Charges:  Timed Code Treatment Minutes: 40   Total Treatment Minutes: 55     [] EVAL (LOW) 80050 (typically 20 minutes face-to-face)  [] EVAL (MOD) 02034 (typically 30 minutes face-to-face)  [] EVAL (HIGH) 12325 (typically 45 minutes face-to-face)  [] RE-EVAL   [x] LC(33557) x  2   [] IONTO  [] NMR (56136) x      [] VASO  [x] Manual (98223) x  1    [] Other:  [] TA (09102) x       [] [facility] (63953)  [] ES(attended) (92972)      [] ES (un) (38652):       GOALS:  Patient stated goal: reduction in pain and return to playing golf; Therapist goals for Patient:   Short Term Goals: To be achieved in: 2 weeks  1.  Independent in HEP and progression per patient tolerance, in order to prevent re-injury. 2. Patient will have a decrease in pain to facilitate improvement in movement, function, and ADLs as indicated by Functional Deficits. Long Term Goals: To be achieved in: 10 weeks  1. Disability index score of 10% or less for the NDI to assist with reaching prior level of function. 2. Patient will demonstrate [facility]/thoracic spine to allow for proper joint functioning as indicated by patients Functional Deficits. 3. Patient will demonstrate an increase in postural awareness and control and activation of  core stabilizers to allow for proper functional mobility as indicated by patients Functional Deficits. 4. Patient will return to all functional activities without increased symptoms or restriction. 5. Pt will be able to go through golf swing w/o full motion and min pain.(patient specific functional goal)       Progression Towards Functional goals:  [] Patient is progressing as expected towards functional goals listed. [x] Progression is slowed due to complexities listed. [] Progression has been slowed due to co-morbidities. [] Plan just implemented, too soon to assess goals progression  [] Other:     ASSESSMENT:  Pt is still sensitive to touch, but improved since beginning PT. Less time required to reduce fascial restrictions this visit. She needs cues for MT/rhomboid activation with HEP still, and review of purpose of scapular stabilization.  She did feel some radiating pain with prone PA mobilizations and pec stretch this visit, but it did not last.     Treatment/Activity Tolerance:  [] Patient tolerated treatment well [] Patient limited by fatique  [x] Patient limited by pain  [] Patient limited by other medical complications  [] Other:     Prognosis: [x] Good [] Fair  [] Poor    Patient Requires Follow-up: [x] Yes  [] No    PLAN: Cont scap stabilization, work on thoracic ext mobility  [x] Continue per plan of care [] Alter current plan (see comments)  [] Plan of care initiated [] Hold pending MD visit [] Discharge    Electronically signed by: Nikole Escudero PT , DPT, OMT-C

## 2019-04-08 ENCOUNTER — HOSPITAL ENCOUNTER (OUTPATIENT)
Dept: PHYSICAL THERAPY | Age: 64
Setting detail: THERAPIES SERIES
Discharge: HOME | End: 2019-04-08
Payer: COMMERCIAL

## 2019-04-08 PROCEDURE — 97110 THERAPEUTIC EXERCISES: CPT

## 2019-04-08 PROCEDURE — 97140 MANUAL THERAPY 1/> REGIONS: CPT

## 2019-04-08 NOTE — FLOWSHEET NOTE
[facility], [address]  Phone: [phone]  Fax [phone]        Physical Therapy Daily Treatment Note  Date:  2019    Patient Name:  Malaika Tracy    :  1955  MRN: [id number]  Restrictions/Precautions:    Medical/Treatment Diagnosis Information:  Diagnosis: M54.6 Thoracic spine  Treatment Diagnosis: dec ROM, dec strength, pain, dec function  Insurance/Certification information:  PT Insurance Information: BCBS 30 vpcy; 70/30  Physician Information:  Referring Practitioner: Dr. Gaurang Alford of care signed (Y/N): faxed 3/19/19    Date of Patient follow up with Physician:     G-Code (if applicable):      Date G-Code Applied:  3/19/19  PT G-Codes  Functional Assessment Tool Used: Mod Oswestry LB  Score: 17.7%    Progress Note: [x]  Yes  []  No  Next due by: 19     Latex Allergy:  [x]NO      []YES  Preferred Language for Healthcare:   [x]English       []other:    Visit # Insurance Allowable   7 (1 for another POC) 30     Pain level:  0-5/10     SUBJECTIVE:  Pt reports that she continues to be achy in the morning, but she is not having the stabbing pain. OBJECTIVE:   Observation: very tender to palpation throughout thoracic spine, sits with shoulders elevated.    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      Exercises/Interventions:   Therapeutic Ex Sets/sec Reps Notes         Trunk rotation in sitting    Thoracic flex and ext in sitting    scap retraction    shrugs    Supine pec stretch - arms at 45 30\" 3 + HEP   Lat stretch - R,L c cane 20\" 3 + HEP   SA reach standing ball on wall R,L Better tolerance than supine + HEP   Standing flexion, scaption, back to wall 2 10 Cues for cervical position, scap position   B UE ER - back to wall Red TB- gave orange TB for HEP   TT row 2 15 red  + HEP   TT lat pull down   red+ HEP   TT ER 2 10 Yellow R,L Home Exercise Program:    [x] (78627) Reviewed/Progressed HEP activities related to strengthening, flexibility, endurance, ROM of cervical, scapular, scapulothoracic and UE control with self care, reaching, carrying, lifting, house/yardwork, driving/computer work 3/19/19 written HEP given  [] (75375) Reviewed/Progressed HEP activities related to improving balance, coordination, kinesthetic sense, posture, motor skill, proprioception of cervical, scapular, scapulothoracic and UE control with self care, reaching, carrying, lifting, house/yardwork, driving/computer work      Manual Treatments:  PROM / STM / Oscillations-Mobs:  G-I, II, III, IV (PA's, Inf., Post.)  [x] (17249) Provided manual therapy to mobilize soft tissue/joints of cervical/CT, scapular GHJ and UE for the purpose of decreasing headache, modulating pain, promoting relaxation,  increasing ROM, reducing/eliminating soft tissue swelling/inflammation/restriction, improving soft tissue extensibility and allowing for proper ROM for normal function with self care, reaching, carrying, lifting, house/yardwork, driving/computer work    Modalities:  CP x15 min to T and C spine    Charges:  Timed Code Treatment Minutes: 53   Total Treatment Minutes: 68 (CP_     [] EVAL (LOW) 41286 (typically 20 minutes face-to-face)  [] EVAL (MOD) 73017 (typically 30 minutes face-to-face)  [] EVAL (HIGH) 93720 (typically 45 minutes face-to-face)  [] RE-EVAL   [x] TG(57065) x  3   [] IONTO  [] NMR (39656) x      [] VASO  [x] Manual (25739) x  1    [] Other:  [] TA (75647) x       [] [facility] (80643)  [] ES(attended) (64706)      [] ES (un) (47675):       GOALS:  Patient stated goal: reduction in pain and return to playing golf; Therapist goals for Patient:   Short Term Goals: To be achieved in: 2 weeks  1. Independent in HEP and progression per patient tolerance, in order to prevent re-injury.    2. Patient will have a decrease in pain to facilitate improvement in movement, function, and ADLs as indicated by Functional Deficits. Long Term Goals: To be achieved in: 10 weeks  1. Disability index score of 10% or less for the NDI to assist with reaching prior level of function. 2. Patient will demonstrate increased AROM to [facility] of cervical/thoracic spine to allow for proper joint functioning as indicated by patients Functional Deficits. 3. Patient will demonstrate an increase in postural awareness and control and activation of  core stabilizers to allow for proper functional mobility as indicated by patients Functional Deficits. 4. Patient will return to all functional activities without increased symptoms or restriction. 5. Pt will be able to go through golf swing w/o full motion and min pain.(patient specific functional goal)       Progression Towards Functional goals:  [] Patient is progressing as expected towards functional goals listed. [x] Progression is slowed due to complexities listed. [] Progression has been slowed due to co-morbidities. [] Plan just implemented, too soon to assess goals progression  [] Other:     ASSESSMENT:  Pt is still sensitive to touch, but improved since beginning PT. Less time required to reduce fascial restrictions this visit. She needs cues for MT/rhomboid activation instead of UT, Lev scap with UE motions, but she is understanding how to contract appropriately with less cues with each visit.      Treatment/Activity Tolerance:  [] Patient tolerated treatment well [] Patient limited by fatique  [x] Patient limited by pain  [] Patient limited by other medical complications  [] Other:     Prognosis: [x] Good [] Fair  [] Poor    Patient Requires Follow-up: [x] Yes  [] No    PLAN: Cont scap stabilization, work on thoracic ext mobility  [x] Continue per plan of care [] Alter current plan (see comments)  [] Plan of care initiated [] Hold pending MD visit [] Discharge    Electronically signed by: Kindra Chau, PT , DPT, OMT-C

## 2019-04-11 ENCOUNTER — HOSPITAL ENCOUNTER (OUTPATIENT)
Dept: PHYSICAL THERAPY | Age: 64
Setting detail: THERAPIES SERIES
Discharge: HOME | End: 2019-04-11
Payer: COMMERCIAL

## 2019-04-11 PROCEDURE — 97110 THERAPEUTIC EXERCISES: CPT

## 2019-04-11 PROCEDURE — 97140 MANUAL THERAPY 1/> REGIONS: CPT

## 2019-04-11 NOTE — FLOWSHEET NOTE
[facility], [address]  Phone: [phone]  Fax [phone]    Physical Therapy Daily Treatment Note  Date:  2019    Patient Name:  Maria Ines Forbes    :  1955  MRN: [id number]  Restrictions/Precautions:    Medical/Treatment Diagnosis Information:  · Diagnosis: M17.0 (ICD-10-CM) - Primary osteoarthritis of both knees; M22.41, M22.42 (ICD-10-CM) - Chondromalacia of both patellae; M25.561, M25.562 (ICD-10-CM) - Acute pain of both knees  · Treatment Diagnosis: M25.561, M25.562 Pain in right, left knees; M17.11, M17.12 Knee OA R,L  Insurance/Certification information:  PT Insurance Information: BCBS 30 vpcy; 70/30  Physician Information:  Referring Practitioner: Dr. Gentry Nyhan of care signed (Y/N):     Date of Patient follow up with Physician:     G-Code (if applicable):      Date G-Code Applied:  19  PT G-Codes  Functional Assessment Tool Used: LEFS  Score: 25  Functional Limitation: Mobility: Walking and moving around  Mobility: Walking and Moving Around Current Status (): At least 20 percent but less than 40 percent impaired, limited or restricted  Mobility: Walking and Moving Around Goal Status (): At least 1 percent but less than 20 percent impaired, limited or restricted    Progress Note: [x]  Yes  []  No  Next due by: Visit #10       Latex Allergy:  [x]NO      []YES  Preferred Language for Healthcare:   [x]English       []other:    Visit # Insurance Allowable Requires auth   2 (7 for another POC) 30 (soft limit)    []no        [x]yes: after 30       Pain level: 0-3/10     SUBJECTIVE:  Pt reports no soreness currently, but 3/10 when using her RLE on the gas/brake pedals driving over here.      OBJECTIVE:   Observation:TTP distal vastus lateralis bilaterally   Test measurements:      RESTRICTIONS/PRECAUTIONS: B knee OA; cervical disc replacement 2018 C3-C7        Exercises/Interventions:     Therapeutic Ex Sets/sec Reps Notes   Pt ed: findings of exam; rec's for POC      HS stretch standing 30\"x2 R,L  HEP   gastroc stretch incline board 30\"x2 R,L  HEP   Rectus stretch standing- foot on chair 30\"x2 R,L  HEP   Standing hip abd 2x10 R,L  HEP UE support   Tandem balance  SLS 1'   10\"x5 R,L  HEP   Supine h/l hip abd isometric   Lime TB HEP   Bridge c OVL at knees 5\"x10  HEP   S/l clamshell 2x10 R,L  HEP   LAQ 3\"x20     SAQ 3\"x20     Side step at 1/2 wall- OVL above knees 4x40'                 bike 3'     Manual Intervention      STM B distal IT band and distal rectus 8'     tib-fem distraction GIII + circles  15\"x3 R,L                             NMR re-education                                              Therapeutic Exercise and NMR EXR  [x] (65270) Provided verbal/tactile cueing for activities related to strengthening, flexibility, endurance, ROM for improvements in LE, proximal hip, and core control with self care, mobility, lifting, ambulation.  [] (74318) Provided verbal/tactile cueing for activities related to improving balance, coordination, kinesthetic sense, posture, motor skill, proprioception  to assist with LE, proximal hip, and core control in self care, mobility, lifting, ambulation and eccentric single leg control.      NMR and Therapeutic Activities:    [] (12962 or 69153) Provided verbal/tactile cueing for activities related to improving balance, coordination, kinesthetic sense, posture, motor skill, proprioception and motor activation to allow for proper function of core, proximal hip and LE with self care and ADLs  [] (54247) Gait Re-education- Provided training and instruction to the patient for proper LE, core and proximal hip recruitment and positioning and eccentric body weight control with ambulation re-education including up and down stairs     Home Exercise Program:    [x] (47197) Reviewed/Progressed HEP activities related to strengthening, flexibility, endurance, ROM of core, proximal hip and LE for functional self-care, mobility, lifting and ambulation/stair navigation   [] (11332)Reviewed/Progressed HEP activities related to improving balance, coordination, kinesthetic sense, posture, motor skill, proprioception of core, proximal hip and LE for self care, mobility, lifting, and ambulation/stair navigation      Manual Treatments:  PROM / STM / Oscillations-Mobs:  G-I, II, III, IV (PA's, Inf., Post.)  [x] (66870) Provided manual therapy to mobilize LE, proximal hip and/or LS spine soft tissue/joints for the purpose of modulating pain, promoting relaxation,  increasing ROM, reducing/eliminating soft tissue swelling/inflammation/restriction, improving soft tissue extensibility and allowing for proper ROM for normal function with self care, mobility, lifting and ambulation. Modalities:  CP x 15' B knees    Charges:  Timed Code Treatment Minutes: 38   Total Treatment Minutes: 56 (bike, ice)      [] EVAL (LOW) 02695 (typically 20 minutes face-to-face)  [] EVAL (MOD) 80559 (typically 30 minutes face-to-face)  [] EVAL (HIGH) 55465 (typically 45 minutes face-to-face)  [] RE-EVAL     [x] TD(72559) x  2   [] IONTO  [] NMR (75502) x      [] VASO  [x] Manual (40695) x  1    [] Other:  [] TA x       [] Mech Traction (57555)  [] ES(attended) (96781)      [] ES (un) (28481):     GOALS: Patient stated goal: get back to golfing, get back to gym, walking 10,000-15,000 steps/day      Therapist goals for Patient:   Short Term Goals: To be achieved in: 2 weeks  1. Independent in HEP and progression per patient tolerance, in order to prevent re-injury. 2. Patient will have a decrease in pain to facilitate improvement in movement, function, and ADLs as indicated by Functional Deficits.     Long Term Goals: To be achieved in: 8 weeks  1. Disability index score of 15% or less for the LEFS to assist with reaching prior level of function.    2. Patient will demonstrate

## 2019-04-12 ENCOUNTER — HOSPITAL ENCOUNTER (OUTPATIENT)
Dept: PHYSICAL THERAPY | Age: 64
Setting detail: THERAPIES SERIES
Discharge: HOME | End: 2019-04-12
Payer: COMMERCIAL

## 2019-04-12 PROCEDURE — 97140 MANUAL THERAPY 1/> REGIONS: CPT

## 2019-04-12 PROCEDURE — 97112 NEUROMUSCULAR REEDUCATION: CPT

## 2019-04-12 PROCEDURE — 97110 THERAPEUTIC EXERCISES: CPT

## 2019-04-12 NOTE — FLOWSHEET NOTE
[facility], [address]  Phone: [phone]  Fax [phone]        Physical Therapy Daily Treatment Note  Date:  2019    Patient Name:  Linda Aponte    :  1955  MRN: [id number]  Restrictions/Precautions:    Medical/Treatment Diagnosis Information:  Diagnosis: M54.6 Thoracic spine  Treatment Diagnosis: dec ROM, dec strength, pain, dec function  Insurance/Certification information:  PT Insurance Information: BCBS 30 vpcy; 70/30  Physician Information:  Referring Practitioner: Dr. Maida Gar of care signed (Y/N): faxed 3/19/19    Date of Patient follow up with Physician:     G-Code (if applicable):      Date G-Code Applied:  3/19/19  PT G-Codes  Functional Assessment Tool Used: Mod Oswestry LB  Score: 17.7%    Progress Note: [x]  Yes  []  No  Next due by: 19     Latex Allergy:  [x]NO      []YES  Preferred Language for Healthcare:   [x]English       []other:    Visit # Insurance Allowable   8 (2 for another POC) 30     Pain level:  0-3/10     SUBJECTIVE:  Pt reports that she continues to be achy in the morning, but she is not having the stabbing pain. Pain intensity is decreasing. Is eager to golf again. OBJECTIVE:   Observation: very tender to palpation throughout thoracic spine, sits with shoulders elevated.    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      Exercises/Interventions:   Therapeutic Ex Sets/sec Reps Notes         Trunk rotation in sitting    Thoracic flex and ext in sitting    scap retraction    shrugs     pec stretch - arms at 45 in anmol-stretch 30\" 3 + HEP   Lat stretch - R,L c cane + HEP   SA reach standing ball on wall R,L Better tolerance than supine + HEP   Standing B flexion, scaption, back to wall 2 10 Cues for cervical position, scap position  1#   B UE ER - back to wall Red TB- gave orange TB for HEP   TT row red + HEP   TT lat pull down   red+ HEP   TT ER Yellow R,L   B ER wall walk- lateral 2 6' Orange    Thread the needle- on wall 1 10 R,L    Cat/camel UE's on table  10x          Pulleys flex, abd 5\"x10 ea  indep LORENZANA   Breathing/rib/palpation assessment      Manual Intervention      Assess thoracic spine  ERSR   FRSL T9,T3  MET   Also showed anatomy, explained mechanism to reduce fears of harming cervical spine   Inf scap mob supine GII   Pt reports this is a lot of pressure   Thoracic mobs PA- seated 3'  GII (pt reports this is a lot of pressure)- prone   Rib mobilizations- sidelying rib 5 inhalation correction, intercostal stretch with deep breathing   attempted   STM thoracic PSM R  Vickie-scapular musclature (sidelying) x8'     MFR thoracolumbar fascia      scap mobs- all 5'     NMR re-education      T-spine Ext- foam roll      Chin tucks       Diaphragmatic breathing   Difficulty performing, uses scalenes, SCM, and pec minor heavily   D1 extension c TT R,L  D2 extension R,L 2x10   x15  Yellow TT   Squat- pelvic rotation R,L 15x  Difficulty- try with hands in doorway NV   Squat- trunk rotation R,L 15x     GCT resisted resisted R thoracic rot 2x10     GCT resisted L thoracic rot 2x10       Therapeutic Exercise and NMR EXR  [x] (54169) Provided verbal/tactile cueing for activities related to strengthening, flexibility, endurance, ROM  for improvements in cervical, postural, scapular, scapulothoracic and UE control with self care, reaching, carrying, lifting, house/yardwork, driving/computer work.    [] (24009) Provided verbal/tactile cueing for activities related to improving balance, coordination, kinesthetic sense, posture, motor skill, proprioception  to assist with cervical, scapular, scapulothoracic and UE control with self care, reaching, carrying, lifting, house/yardwork, driving/computer work.     Therapeutic Activities:    [] (08391 or ) Provided verbal/tactile cueing for activities related to improving balance, coordination, kinesthetic sense, posture, motor skill, proprioception and motor activation to allow for proper function of cervical, scapular, scapulothoracic and UE control with self care, carrying, lifting, driving/computer work.      Home Exercise Program:    [x] (55912) Reviewed/Progressed HEP activities related to strengthening, flexibility, endurance, ROM of cervical, scapular, scapulothoracic and UE control with self care, reaching, carrying, lifting, house/yardwork, driving/computer work 3/19/19 written HEP given  [] (45720) Reviewed/Progressed HEP activities related to improving balance, coordination, kinesthetic sense, posture, motor skill, proprioception of cervical, scapular, scapulothoracic and UE control with self care, reaching, carrying, lifting, house/yardwork, driving/computer work      Manual Treatments:  PROM / STM / Oscillations-Mobs:  G-I, II, III, IV (PA's, Inf., Post.)  [x] (02380) Provided manual therapy to mobilize soft tissue/joints of cervical/CT, scapular GHJ and UE for the purpose of decreasing headache, modulating pain, promoting relaxation,  increasing ROM, reducing/eliminating soft tissue swelling/inflammation/restriction, improving soft tissue extensibility and allowing for proper ROM for normal function with self care, reaching, carrying, lifting, house/yardwork, driving/computer work    Modalities:  CP x15 min to T and C spine    Charges:  Timed Code Treatment Minutes: 38   Total Treatment Minutes: 53 (C)     [] EVAL (LOW) 71227 (typically 20 minutes face-to-face)  [] EVAL (MOD) 48112 (typically 30 minutes face-to-face)  [] EVAL (HIGH) 69709 (typically 45 minutes face-to-face)  [] RE-EVAL   [x] WO(43662) x  1   [] IONTO  [x] NMR (88663) x  1   [] VASO  [x] Manual (48295) x  1    [] Other:  [] TA (11810) x       [] Mech Traction (04787)  [] ES(attended) (16950)      [] ES (un) (55548):       GOALS:  Patient stated goal: reduction in pain and return to playing golf;    Therapist goals for Patient:   Short Term Goals: To be achieved in: 2 weeks  1. Independent in HEP and progression per patient tolerance, in order to prevent re-injury. 2. Patient will have a decrease in pain to facilitate improvement in movement, function, and ADLs as indicated by Functional Deficits. Long Term Goals: To be achieved in: 10 weeks  1. Disability index score of 10% or less for the NDI to assist with reaching prior level of function. 2. Patient will demonstrate increased AROM to [facility] of cervical/thoracic spine to allow for proper joint functioning as indicated by patients Functional Deficits. 3. Patient will demonstrate an increase in postural awareness and control and activation of  core stabilizers to allow for proper functional mobility as indicated by patients Functional Deficits. 4. Patient will return to all functional activities without increased symptoms or restriction. 5. Pt will be able to go through golf swing w/o full motion and min pain.(patient specific functional goal)       Progression Towards Functional goals:  [] Patient is progressing as expected towards functional goals listed. [x] Progression is slowed due to complexities listed. [] Progression has been slowed due to co-morbidities. [] Plan just implemented, too soon to assess goals progression  [] Other:     ASSESSMENT:  Pt is improving with less pain overall and improved mobility. Better tolerance to PA thoracic spine mobilizations seated instead of prone. Was able to complete resisted backswing motion for golf without increased pain.       Treatment/Activity Tolerance:  [] Patient tolerated treatment well [] Patient limited by fatique  [x] Patient limited by pain  [] Patient limited by other medical complications  [] Other:     Prognosis: [x] Good [] Fair  [] Poor    Patient Requires Follow-up: [x] Yes  [] No    PLAN: Cont scap stabilization, work on thoracic ext mobility  [x] Continue per plan of care [] Alter current plan (see comments)  [] Plan of care initiated [] Hold pending MD visit [] Discharge    Electronically signed by: Daren Espino, PT , DPT, OMT-C

## 2019-04-15 ENCOUNTER — HOSPITAL ENCOUNTER (OUTPATIENT)
Dept: PHYSICAL THERAPY | Age: 64
Setting detail: THERAPIES SERIES
Discharge: HOME | End: 2019-04-15
Payer: COMMERCIAL

## 2019-04-15 PROCEDURE — 97140 MANUAL THERAPY 1/> REGIONS: CPT

## 2019-04-15 PROCEDURE — 97112 NEUROMUSCULAR REEDUCATION: CPT

## 2019-04-15 NOTE — FLOWSHEET NOTE
[facility], [address]  Phone: [phone]  Fax [phone]        Physical Therapy Daily Treatment Note  Date:  4/15/2019    Patient Name:  Jeronimo Jordan    :  1955  MRN: [id number]  Restrictions/Precautions:    Medical/Treatment Diagnosis Information:  Diagnosis: M54.6 Thoracic spine  Treatment Diagnosis: dec ROM, dec strength, pain, dec function  Insurance/Certification information:  PT Insurance Information: BCBS 30 vpcy; 70/30  Physician Information:  Referring Practitioner: Dr. Sullivan Self of care signed (Y/N): faxed 3/19/19    Date of Patient follow up with Physician:     G-Code (if applicable):      Date G-Code Applied:  3/19/19  PT G-Codes  Functional Assessment Tool Used: Mod Oswestry LB  Score: 17.7%    Progress Note: [x]  Yes  []  No  Next due by: 19     Latex Allergy:  [x]NO      []YES  Preferred Language for Healthcare:   [x]English       []other:    Visit # Insurance Allowable   9 - PN written (2 for another POC) 30     Pain level:  0-3/10     SUBJECTIVE:  Pt reports that she continues to be achy in the morning, but pain intensity is lower. She did have some rib pain wrapping to the front after potting some plants over the weekend. Is eager to start golfing. OBJECTIVE:   Observation: very tender to palpation throughout thoracic spine, sits with shoulders elevated.    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      Exercises/Interventions:   Therapeutic Ex Sets/sec Reps Notes          pec stretch - arms at 45 supine 60\" 3 + HEP   Lat stretch - R,L 40\" 2 + HEP   SA reach standing ball on wall R,L Better tolerance than supine + HEP   Standing B flexion, scaption, back to wall 2 10 Cues for cervical position, scap position  1#   B UE ER - back to wall Red TB- gave orange TB for HEP   TT row red  + HEP   TT lat pull down   red+ HEP TT ER 2 10 Yellow R,L   B ER c flex- supine to 90 deg 2 10 Emery TB   B ER wall walk- lateral 2 6' Orange    Open the book- s/l on table 1 10 R,L    Cat/camel UE's on table            Pulleys flex, abd 5\"x10 ea  indep LORENZANA   Breathing/rib/palpation assessment      Manual Intervention      Assess thoracic spine  ERSR   FRSL T9,T3  MET   Also showed anatomy, explained mechanism to reduce fears of harming cervical spine   Inf scap mob supine GII   Pt reports this is a lot of pressure   Thoracic mobs PA- seated 3'  GII (pt reports this is a lot of pressure)- prone   Rib mobilizations- sidelying rib 5 inhalation correction, intercostal stretch with deep breathing   attempted   STM thoracic PSM R  Vickie-scapular musclature (sidelying) x8'     MFR thoracolumbar fascia      scap mobs- all 3'     NMR re-education      T-spine Ext- foam roll      Chin tucks       Diaphragmatic breathing   Difficulty performing, uses scalenes, SCM, and pec minor heavily   D1 extension c TT R,L  D2 extension R,L 2x10   2x10  Yellow TT   Squat- pelvic rotation R,L 15x  Hands in doorway   Squat- trunk rotation R,L 15x     GCT resisted resisted R thoracic rot 2x10     GCT resisted L thoracic rot 2x10       Therapeutic Exercise and NMR EXR  [x] (50147) Provided verbal/tactile cueing for activities related to strengthening, flexibility, endurance, ROM  for improvements in cervical, postural, scapular, scapulothoracic and UE control with self care, reaching, carrying, lifting, house/yardwork, driving/computer work.    [] (50455) Provided verbal/tactile cueing for activities related to improving balance, coordination, kinesthetic sense, posture, motor skill, proprioception  to assist with cervical, scapular, scapulothoracic and UE control with self care, reaching, carrying, lifting, house/yardwork, driving/computer work.     Therapeutic Activities:    [] (27193 or 65654) Provided verbal/tactile cueing for activities related to improving balance, coordination, kinesthetic sense, posture, motor skill, proprioception and motor activation to allow for proper function of cervical, scapular, scapulothoracic and UE control with self care, carrying, lifting, driving/computer work.      Home Exercise Program:    [x] (83768) Reviewed/Progressed HEP activities related to strengthening, flexibility, endurance, ROM of cervical, scapular, scapulothoracic and UE control with self care, reaching, carrying, lifting, house/yardwork, driving/computer work 3/19/19 written HEP given  [] (46286) Reviewed/Progressed HEP activities related to improving balance, coordination, kinesthetic sense, posture, motor skill, proprioception of cervical, scapular, scapulothoracic and UE control with self care, reaching, carrying, lifting, house/yardwork, driving/computer work      Manual Treatments:  PROM / STM / Oscillations-Mobs:  G-I, II, III, IV (PA's, Inf., Post.)  [x] (33488) Provided manual therapy to mobilize soft tissue/joints of cervical/CT, scapular GHJ and UE for the purpose of decreasing headache, modulating pain, promoting relaxation,  increasing ROM, reducing/eliminating soft tissue swelling/inflammation/restriction, improving soft tissue extensibility and allowing for proper ROM for normal function with self care, reaching, carrying, lifting, house/yardwork, driving/computer work    Modalities:  CP x15 min to T and C spine    Charges:  Timed Code Treatment Minutes: 35   Total Treatment Minutes: 60 (CP, rest breaks/indep ex)     [] EVAL (LOW) 43460 (typically 20 minutes face-to-face)  [] EVAL (MOD) 35430 (typically 30 minutes face-to-face)  [] EVAL (HIGH) 07981 (typically 45 minutes face-to-face)  [] RE-EVAL   [x] EL(76409) x      [] IONTO  [x] NMR (50314) x  1   [] VASO  [x] Manual (30638) x  1    [] Other:  [] TA (86375) x       [] Mech Traction (23856)  [] ES(attended) (00715)      [] ES (un) (35280):       GOALS:  Patient stated goal: reduction in pain and return to playing golf;    Therapist goals for Patient:   Short Term Goals: To be achieved in: 2 weeks  1. Independent in HEP and progression per patient tolerance, in order to prevent re-injury. 2. Patient will have a decrease in pain to facilitate improvement in movement, function, and ADLs as indicated by Functional Deficits. Long Term Goals: To be achieved in: 10 weeks  1. Disability index score of 10% or less for the NDI to assist with reaching prior level of function. 2. Patient will demonstrate increased AROM to [facility] of cervical/thoracic spine to allow for proper joint functioning as indicated by patients Functional Deficits. 3. Patient will demonstrate an increase in postural awareness and control and activation of  core stabilizers to allow for proper functional mobility as indicated by patients Functional Deficits. 4. Patient will return to all functional activities without increased symptoms or restriction. 5. Pt will be able to go through golf swing w/o full motion and min pain.(patient specific functional goal)       Progression Towards Functional goals:  [] Patient is progressing as expected towards functional goals listed. [x] Progression is slowed due to complexities listed. [] Progression has been slowed due to co-morbidities. [] Plan just implemented, too soon to assess goals progression  [] Other:     ASSESSMENT:  Pt is improving with less pain overall and improved mobility. Better tolerance to PA thoracic spine mobilizations seated instead of prone. She is tolerating thoracic rotations without increased pain, so advised pt to try swinging her club- 50% of normal force/speed in backyard first. Still needing intermittent cues for scap control.      Treatment/Activity Tolerance:  [] Patient tolerated treatment well [] Patient limited by fatique  [x] Patient limited by pain  [] Patient limited by other medical complications  [] Other:     Prognosis: [x] Good [] Fair  [] Poor    Patient Requires Follow-up: [x] Yes  [] No    PLAN: Cont scap stabilization, work on thoracic ext mobility  [x] Continue per plan of care [] Alter current plan (see comments)  [] Plan of care initiated [] Hold pending MD visit [] Discharge    Electronically signed by: Bernard Johnson, PT , DPT, OMT-C

## 2019-04-18 ENCOUNTER — HOSPITAL ENCOUNTER (OUTPATIENT)
Dept: PHYSICAL THERAPY | Age: 64
Setting detail: THERAPIES SERIES
Discharge: HOME | End: 2019-04-18
Payer: COMMERCIAL

## 2019-04-18 PROCEDURE — 97110 THERAPEUTIC EXERCISES: CPT

## 2019-04-18 PROCEDURE — 97140 MANUAL THERAPY 1/> REGIONS: CPT

## 2019-04-18 NOTE — FLOWSHEET NOTE
session   Test measurements:  DF 15 deg  MMT hip flexion L 5/5, R 4+/5    RESTRICTIONS/PRECAUTIONS: B knee OA; cervical disc replacement July 2018 C3-C7        Exercises/Interventions:     Therapeutic Ex Sets/sec Reps Notes   Pt ed: findings of exam; rec's for POC      HS stretch standing 30\"x2 R,L  HEP   gastroc stretch incline board 30\"x2 R,L  HEP   Rectus stretch standing- foot on chair 30\"x2 R,L  HEP   Standing hip abd 2x10 R,L  HEP UE support   Tandem balance on airex  SLS 1'     HEP   Supine h/l hip abd isometric   Lime TB HEP   Bridge c OVL at knees  HEP   S/l clamshell  HEP   LAQ     SAQ     Side step GVL above knees 4x40'           See AT note      bike 3'     Manual Intervention      STM B distal IT band and distal rectus 12'     tib-fem distraction GIII + circles                               NMR re-education                                              Therapeutic Exercise and NMR EXR  [x] (34689) Provided verbal/tactile cueing for activities related to strengthening, flexibility, endurance, ROM for improvements in LE, proximal hip, and core control with self care, mobility, lifting, ambulation.  [] (59528) Provided verbal/tactile cueing for activities related to improving balance, coordination, kinesthetic sense, posture, motor skill, proprioception  to assist with LE, proximal hip, and core control in self care, mobility, lifting, ambulation and eccentric single leg control.      NMR and Therapeutic Activities:    [] (35782 or 61410) Provided verbal/tactile cueing for activities related to improving balance, coordination, kinesthetic sense, posture, motor skill, proprioception and motor activation to allow for proper function of core, proximal hip and LE with self care and ADLs  [] (55427) Gait Re-education- Provided training and instruction to the patient for proper LE, core and proximal hip recruitment and positioning and eccentric body weight control with ambulation re-education including up and down index score of 15% or less for the LEFS to assist with reaching prior level of function. 2. Patient will demonstrate increased ankle AROM to 15 deg bilaterally and improve HS flexibility by 10-15 deg bilaterally to improve mechanics during gait, stair ambulation and squatting. 3. Patient will demonstrate an increase in Strength to at least 4+/5 to 5/5 for bilateral hip musculature. 4. Patient will return to squatting, kneeling, stairs, and heavy HH chores with at least 50-75% improved pain. 5. Pt will have improved balance in SLS to at least 10\" bilaterally in order to reduce risk of falling. 6. Pt will be able to DL squat to 100 degrees with <2/10 knee pain. Progression Towards Functional goals:  [] Patient is progressing as expected towards functional goals listed. [] Progression is slowed due to complexities listed. [] Progression has been slowed due to co-morbidities. [x] Plan just implemented, too soon to assess goals progression  [] Other:     ASSESSMENT:  Pt has attended PT 3 times, including IE d/t focusing on another POC in PT. However, she has already made some strength gains in hip rotators and has improved ankle ROM. She will benefit from continued PT to improve proximal stability further. Planning to shift focus to this POC within the next 1-2 weeks, seeing pt 2x/week instead of 1x/week.      Treatment/Activity Tolerance:  [x] Patient tolerated treatment well [] Patient limited by fatique  [] Patient limited by pain  [] Patient limited by other medical complications  [] Other:     Prognosis: [x] Good [] Fair  [] Poor    Patient Requires Follow-up: [x] Yes  [] No    PLAN: Add weight for SAQ, LAQ NV, update HEP  [x] Continue per plan of care [] Alter current plan (see comments)  [] Plan of care initiated [] Hold pending MD visit [] Discharge    Electronically signed by: Fito Oliveira, PT, DPT

## 2019-04-18 NOTE — FLOWSHEET NOTE
[facility], [address] [place]  Phone: [phone]  Fax [phone]      ATHLETIC TRAINING [address]  Date:  2019    Patient Name:  May Osei    :  1955  MRN: [id number]  Restrictions/Precautions:    Medical/Treatment Diagnosis Information:  ·  B knee OA, CMP, pain  ·  B knee pain  Physician Information:   Dr. Robles Painter Post-op  8 wks  12 wks 16 wks 20 wks   24 wks                            Activity Log                                                  DOS/DOI:                                                    Date: 19    ATC communication: R>L pain, also being seen in PT for T-spine pain d/t hx of c-spine surgery (disc replacement) 2018, return to golf  Works PT at [employer], [place] Has gym membership   Bike    Elliptical    Treadmill    Airdyne        Gastroc stretch    Soleus stretch    Hamstring stretch    ITB stretch    Hip Flexor stretch    Quad stretch    Adductor stretch        Weight Shifting sp                              fp                              tp    Lateral walking (with/w/o TB)        Balance: PEP/Adina board                   SLS          Star excursion load/explode          Extremity reach UE/LE        Leg Press Oswaldo. 70# ball add 2x10                     Ecc.                      Inv. Calf Press Oswaldo. 70# 2x10                      Ecc.                        Inv.        YANELI   Flex               ABd 30# R/L 2x10              ADd              TKE 45# R/L 20x5\"              Ext        Steps Up w/PT              Up and Over               Down               Lateral               Rotation        Squats  mini                  wall                 BOSU         Lunges:  Lunge to Balance                   Balance to Lunge                   Walking        Knee Extension Bilat. Ecc.                               Inv. Hamstring Curls Bilat. Ecc.                               Inv.        Soleus Press Bilat. Ecc.                           Inv.                             Ladders                Square               Jump/Hop  Low                      Med.                      High                                                            Modality CP B knees 15'   Initials                             JLW   Time spent one on one (workers comp)    Time spent with PT assistant

## 2019-04-19 ENCOUNTER — HOSPITAL ENCOUNTER (OUTPATIENT)
Dept: PHYSICAL THERAPY | Age: 64
Setting detail: THERAPIES SERIES
Discharge: HOME | End: 2019-04-19
Payer: COMMERCIAL

## 2019-04-19 PROCEDURE — 97140 MANUAL THERAPY 1/> REGIONS: CPT

## 2019-04-19 PROCEDURE — 97112 NEUROMUSCULAR REEDUCATION: CPT

## 2019-04-19 PROCEDURE — 97110 THERAPEUTIC EXERCISES: CPT

## 2019-04-19 PROCEDURE — G8979 MOBILITY GOAL STATUS: HCPCS

## 2019-04-19 PROCEDURE — G8978 MOBILITY CURRENT STATUS: HCPCS

## 2019-04-19 NOTE — FLOWSHEET NOTE
[facility],  [address] [facility] [place]  Phone: [phone]  Fax [phone]        Physical Therapy Daily Treatment Note  Date:  2019    Patient Name:  Tsering Etienne    :  1955  MRN: [id number]  Restrictions/Precautions:    Medical/Treatment Diagnosis Information:  Diagnosis: M54.6 Thoracic spine  Treatment Diagnosis: dec ROM, dec strength, pain, dec function  Insurance/Certification information:  PT Insurance Information: BCBS 30 vpcy; 70/30  Physician Information:  Referring Practitioner: Dr. Ruth Booth of care signed (Y/N): faxed 3/19/19    Date of Patient follow up with Physician:     G-Code (if applicable):      Date G-Code Applied:  3/19/19  PT G-Codes  Functional Assessment Tool Used: Mod Oswestry LB  Score: 17.7%    Progress Note: [x]  Yes  []  No  Next due by: 19     Latex Allergy:  [x]NO      []YES  Preferred Language for Healthcare:   [x]English       []other:    Visit # Insurance Allowable   10 - PN written (2 for another POC) 30     Pain level:  0-3/10     SUBJECTIVE:  Pt reports that she feels 80% better at this time since beginning therapy. She still has achiness in the morning rated 2-3/10, but the sharp/shooting pain is much less frequent. She becomes sore at her upper thoracic spine when she uses her arms more for work, but it dissipates. She does find that ice helps her pain, but she has been icing less and doing okay with this. OBJECTIVE:   Observation: very tender to palpation throughout thoracic spine, sits with shoulders elevated.    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      Exercises/Interventions:   Therapeutic Ex Sets/sec Reps Notes   Self TPR - tennis ball on wall 5'      pec stretch - arms at 45 supine + HEP   Lat stretch - R,L + HEP   SA reach standing ball on wall R,L Better tolerance than supine + HEP   Standing B flexion, scaption, back to wall 2 10 Cues for cervical position, scap position, used mirror + HEP   B UE ER - back to wall Red TB- gave orange TB for HEP   TT row red  + HEP   TT lat pull down   red+ HEP   TT ER 2 10 Yellow R,L + HEP   B ER c flex- supine to 90 deg 2 10 [place] TB   B ER wall walk- lateral   Orange    Open the book- s/l on table NV     Prone MT, row, sh ext over SWB (on chair) 15x ea  Needed rest breaks with MT to reset shoulder blades         Pulleys flex, abd 5\"x10 ea  indep LORENZANA   Breathing/rib/palpation assessment      Manual Intervention      Assess thoracic spine  ERSR   FRSL T9,T3  MET   Also showed anatomy, explained mechanism to reduce fears of harming cervical spine   Inf scap mob supine GII   Pt reports this is a lot of pressure   Thoracic mobs PA- seated   GII (pt reports this is a lot of pressure)- prone   Rib mobilizations- sidelying rib 5 inhalation correction, intercostal stretch with deep breathing   attempted   STM thoracic PSM R  Vickie-scapular musclature (sidelying) x8'     MFR thoracolumbar fascia      scap mobs- all 3'     NMR re-education      T-spine Ext- foam roll      Chin tucks       Diaphragmatic breathing   Difficulty performing, uses scalenes, SCM, and pec minor heavily   D1 extension c TT R,L  D2 extension R,L 2x10   2x10  Yellow TT   Squat- pelvic rotation R,L 15x  Hands in doorway   Squat- trunk rotation R,L 15x  Hips on wall   GCT resisted resisted R thoracic rot x15     GCT resisted L thoracic rot x15       Therapeutic Exercise and NMR EXR  [x] (03872) Provided verbal/tactile cueing for activities related to strengthening, flexibility, endurance, ROM  for improvements in cervical, postural, scapular, scapulothoracic and UE control with self care, reaching, carrying, lifting, house/yardwork, driving/computer work.    [] (03994) Provided verbal/tactile cueing for activities related to improving balance, coordination, kinesthetic sense, posture, motor skill, proprioception  to assist with cervical, scapular, scapulothoracic and UE control with self care, reaching, carrying, lifting, house/yardwork, driving/computer work. Therapeutic Activities:    [] (32812 or 89555) Provided verbal/tactile cueing for activities related to improving balance, coordination, kinesthetic sense, posture, motor skill, proprioception and motor activation to allow for proper function of cervical, scapular, scapulothoracic and UE control with self care, carrying, lifting, driving/computer work.      Home Exercise Program:    [x] (76423) Reviewed/Progressed HEP activities related to strengthening, flexibility, endurance, ROM of cervical, scapular, scapulothoracic and UE control with self care, reaching, carrying, lifting, house/yardwork, driving/computer work 3/19/19 written HEP given  [] (26499) Reviewed/Progressed HEP activities related to improving balance, coordination, kinesthetic sense, posture, motor skill, proprioception of cervical, scapular, scapulothoracic and UE control with self care, reaching, carrying, lifting, house/yardwork, driving/computer work      Manual Treatments:  PROM / STM / Oscillations-Mobs:  G-I, II, III, IV (PA's, Inf., Post.)  [x] (73714) Provided manual therapy to mobilize soft tissue/joints of cervical/CT, scapular GHJ and UE for the purpose of decreasing headache, modulating pain, promoting relaxation,  increasing ROM, reducing/eliminating soft tissue swelling/inflammation/restriction, improving soft tissue extensibility and allowing for proper ROM for normal function with self care, reaching, carrying, lifting, house/yardwork, driving/computer work    Modalities:  CP x15 min to T and C spine    Charges:  Timed Code Treatment Minutes: 60   Total Treatment Minutes: 82 (CP, rest breaks/indep ex)     [] EVAL (LOW) 08506 (typically 20 minutes face-to-face)  [] EVAL (MOD) 70165 (typically 30 minutes face-to-face)  [] EVAL (HIGH) 07939 (typically 45 minutes with UE movements. This UT and lev scap over-use is lending to continued upper thoracic soreness after working for several hours. She does have much improved trigger points in her thoracic spine and improved PA mobility of her thoracic spine. At this time, feel that she may decrease her PT freq to 1-2x/week x 4 weeks. Treatment/Activity Tolerance:  [x] Patient tolerated treatment well [] Patient limited by fatique  [] Patient limited by pain  [] Patient limited by other medical complications  [] Other:     Prognosis: [x] Good [] Fair  [] Poor    Patient Requires Follow-up: [x] Yes  [] No    PLAN: Cont scap stabilization, decrease freq to 1x/week, but may still see 2x/week if more sore.    [x] Continue per plan of care [] Alter current plan (see comments)  [] Plan of care initiated [] Hold pending MD visit [] Discharge    Electronically signed by: Concha King, PT , DPT, OMT-C

## 2019-04-19 NOTE — PLAN OF CARE
[facility], [address], [address] [place] [address]  Phone: [phone]  Fax [phone]     Physical Therapy Re-Certification Plan of Care    Dear Referring Practitioner: Dr. Morgan Jarrett,    We had the pleasure of treating the following patient for physical therapy services at [address] [place] [address]. A summary of our findings can be found in the updated assessment below. This includes our plan of care. If you have any questions or concerns regarding these findings, please do not hesitate to contact me at the office phone number checked above. Thank you for the referral.     Physician Signature:________________________________Date:__________________  By signing above, therapists plan is approved by physician      Patient: Albaro Staples   : 1955   MRN: [id number]  Referring Physician: Referring Practitioner: Dr. Morgan Jarrett      Evaluation Date: 2019      Medical Diagnosis Information:  · Diagnosis: M17.0 (ICD-10-CM) - Primary osteoarthritis of both knees; M22.41, M22.42 (ICD-10-CM) - Chondromalacia of both patellae; M25.561, M25.562 (ICD-10-CM) - Acute pain of both knees   · Treatment Diagnosis: M25.561, M25.562 Pain in right, left knees; M17.11, M17.12 Knee OA R,L   Insurance information: PT Insurance Information: BC 30 vpcy;     Date Range:3/19/19-19  Total visits:10      G-Codes: (if applicable) PT G-Codes  Functional Assessment Tool Used: Quick DASH  Score: 9  Functional Limitation: Mobility: Walking and moving around  Mobility: Walking and Moving Around Current Status (): At least 1 percent but less than 20 percent impaired, limited or restricted  Mobility: Walking and Moving Around Goal Status (): At least 1 percent but less than 20 percent impaired, limited or restricted   Functional Index used:    SUBJECTIVE:  Pt reports that she feels 80% better at this time since beginning therapy.  She still has achiness in the morning rated 2-3/10, but the sharp/shooting pain is much less frequent. She becomes sore at her upper thoracic spine when she uses her arms more for work, but it dissipates. She does find that ice helps her pain, but she has been icing less and doing okay with this. Current Pain Scale: 0-3/10    Type: []Constant   [x]Intermitment  []Radiating []Localized  []other:     Functional Limitations: [x]Lifting/reaching []Grooming  [x]Carrying []ADL's  []Driving [x]Sports/Recreations   []Other:      OBJECTIVE:   CERV ROM       Thoracic Flexion WNL     thoracic Extension WNL             Thoracic rotation R 75%, L 50%             ROM Left Right   Shoulder Flex 154 152   Shoulder Abd 160 155   Shoulder ER T1 T1   Shoulder IR T7 T10 (improves to T7 with stretching)                   Strength  Left Right   Shoulder Flex 5 4+   Shoulder Scap 5 4+   Shoulder ER 4 4   Shoulder IR 5 5   Wrist ext and flex NT NT   Elbow ext and flex NT NT   Reflexes Left Right   C5-6 Biceps WNL WNL   C5-6 Brachioradialis WNL WNL   C7-8 Triceps WNL WNL        Joint mobility: PA mob T8-2, post R GHJ   []Normal    [x]Hypo   []Hyper    Palpation: TTP infra-spinatus, lev scap, medial scapular border    Orthopedic Tests: none    OTHER:      ASSESSMENT: Pt is improving with less pain overall and improved mobility. She has improved strength as well, but still would benefit from scapular re-training as she demonstrates R shoulder hiking with UE movements. This UT and lev scap over-use is lending to continued upper thoracic soreness after working for several hours. She does have much improved trigger points in her thoracic spine and improved PA mobility of her thoracic spine. At this time, feel that she may decrease her PT freq to 1-2x/week x 4 weeks.          Response to Treatment:   [x]Patient is responding well to treatment and improvement is noted with regards  to goals   []Patient should continue to improve in reasonable time if they continue HEP   []Patient has plateaued and is no longer responding to skilled PT intervention    []Patient is getting worse and would benefit from return to referring MD   []Patient unable to adhere to initial POC      Functional deficiencies which affect ADL's and Reduce overall functional level:     [x]decreased RC/scapular strength and neuromuscular control - Reduced overall  functional level with carrying /lifting   [x]decreased UE ROM/joint mobility- Reduced overall functional level with  carrying /lifting    []pain/difficulty with driving and/or computer work- Reduced overall functional  level    [x]pain at end of day with ADL tasks- Reduced overall functional level   [x]pain/difficulty with lifting/reaching/carrying - Reduced overall functional level  with carrying and lifting   [x]unable to perform sport/recreational activity due to pain and dysfunction   []other:       Prognosis/Rehab Potential:    []Excellent   [x]Good    []Fair   []Poor: Toleration of evaluation or treatment:    []Excellent   [x]Good    []Fair   []Poor     New or Updated Goals (if applicable):  [x] No change to goals established upon initial eval/last progress note:  New Goals:    GOALS:   Patient stated goal: reduction in pain and return to playing golf;     Therapist goals for Patient:   Short Term Goals: To be achieved in: 2 weeks  1. Independent in HEP and progression per patient tolerance, in order to prevent re-injury. met  2. Patient will have a decrease in pain to facilitate improvement in movement, function, and ADLs as indicated by Functional Deficits. progress     Long Term Goals: To be achieved in: 10 weeks  1. Disability index score of 10% or less for the NDI to assist with reaching prior level of function. Gave Q DASH in error- 9% disability for this scale  2.  Patient will demonstrate increased AROM to [facility] of cervical/thoracic spine to allow for proper joint functioning as indicated by patients Functional

## 2019-04-22 ENCOUNTER — OFFICE VISIT (OUTPATIENT)
Dept: ORTHOPEDIC SURGERY | Age: 64
End: 2019-04-22
Payer: COMMERCIAL

## 2019-04-22 ENCOUNTER — APPOINTMENT (OUTPATIENT)
Dept: PHYSICAL THERAPY | Age: 64
End: 2019-04-22
Payer: COMMERCIAL

## 2019-04-22 VITALS
BODY MASS INDEX: 29.57 KG/M2 | WEIGHT: 195.11 LBS | SYSTOLIC BLOOD PRESSURE: 103 MMHG | DIASTOLIC BLOOD PRESSURE: 58 MMHG | HEIGHT: 68 IN | HEART RATE: 85 BPM

## 2019-04-22 DIAGNOSIS — M25.561 CHRONIC PAIN OF BOTH KNEES: ICD-10-CM

## 2019-04-22 DIAGNOSIS — G89.29 CHRONIC PAIN OF BOTH KNEES: ICD-10-CM

## 2019-04-22 DIAGNOSIS — M22.42 CHONDROMALACIA OF BOTH PATELLAE: ICD-10-CM

## 2019-04-22 DIAGNOSIS — M25.562 CHRONIC PAIN OF BOTH KNEES: ICD-10-CM

## 2019-04-22 DIAGNOSIS — M22.41 CHONDROMALACIA OF BOTH PATELLAE: ICD-10-CM

## 2019-04-22 DIAGNOSIS — M17.0 BILATERAL PRIMARY OSTEOARTHRITIS OF KNEE: ICD-10-CM

## 2019-04-22 PROCEDURE — 99213 OFFICE O/P EST LOW 20 MIN: CPT | Performed by: FAMILY MEDICINE

## 2019-04-23 NOTE — PROGRESS NOTES
Chief Complaint  Knee Pain (fu jes knee, injections helped  pain is slowly coming back, feels therapy & bracing is helping)      Follow-up recurrent left knee pain with underlying left greater than right knee patellofemoral arthropathy with prominent nearly bone-on-bone changes lateral compartment of right knee and medial compartment left knee with difficulty walking and rating pseudo-buckling    History of Present Illness:  Lashonda Prasad is a 59 y.o. female who is a very pleasant white female who still works part-time at [employer] who is a very nice patient of  Dr. Bethany Yoon being seen today in Consultation from Dr. Shabana Gonzalez for Evaluation of Worsening Pain to Her Right Greater Than Left Knee. It sounds as if she may have had a previous knee arthroscopy on the left about 5-6 years ago for meniscal tear treated surgically by Dr Christiano Gomez. She does state that she was doing reasonably well up until sustaining several falls when she tripped starting in December 2018. She does not recall the exact mechanism of injury but shortly thereafter \"increasing achy pain primarily about the anterior portion of her knee. She has had swelling and her right knee has persisted and hurting her to the point where she is having pain in the range of 3-4 out of 10 at rest with more substantial 78 out of 10 pain with positional changes prolonged standing walking pivoting and going up and down stairs. Since early March 2019, her symptoms have become more consistent in nature. she did see her primary care doctor who recommended anti-inflammatories and icing and today's orthopedic consultation. She has not had recent weightbearing images. She is not really complaining of true locking or catching and may have had some mild swelling. She is not having pain at night. She is being seen today for orthopedic and sports consultation with weightbearing imaging.     She was seen initially in the office on 4/1/2019 started on aggressive conservative treatment for her knees bilaterally. She is seen back today stating that her knee symptoms initially had improved substantially following her initial phases of treatment to the point where she was nearly 100% improved for a couple of weeks but has regressed somewhat to about a 75-80% improvement. She's had 3 sessions of therapy thus far and has been reasonably compliant with her home-based exercises. She is continuing with her brace and is tolerated her diclofenac. Her ability to change positions walk distances and going up and downstairs has improved and she is not complaining of locking catching or true instability symptoms. She has tolerated her diclofenac. She is pleased with her initial progress. She would like to avoid surgical intervention if at all possible. Medical History     Patient's medications, allergies, past medical, surgical, social and family histories were reviewed and updated as appropriate. Review of Systems  Pertinent items are noted in HPI  Review of systems reviewed from Patient History Form dated on 4/1/2019 and available in the patient's chart under the Media tab. Vital Signs  Vitals:    04/22/19 0928   BP: (!) 103/58   Pulse: 85       General Exam:     Constitutional: Patient is adequately groomed with no evidence of malnutrition  DTRs: Deep tendon reflexes are intact  Mental Status: The patient is oriented to time, place and person. The patient's mood and affect are appropriate. Lymphatic: The lymphatic examination bilaterally reveals all areas to be without enlargement or induration. Vascular: Examination reveals no swelling or calf tenderness. Peripheral pulses are palpable and 2+. Neurological: The patient has good coordination. There is no weakness or sensory deficit. Knee Examination  Inspection:  There is no high-grade deformity other she does have bilaterally patellofemoral crepitation and trace knee joint effusions.     Palpation: She does have less prominent tenderness over the medial and lateral patellofemoral facet bilaterally slightly worse on the right. She does haveless prominent tenderness clinically over the Lateral compartment of her right knee and medial compartment of her left knee with some mild crepitation. Rang of Motion:  She is stiff in the terminal 5 of extension with flexion limited to about 120. Hamstrings are mildlytight. Fair quad tone. Strength: 4 out of 5 with flexion as extension. Special Tests:  She does have this prominentpain primarily reproduced with the patellar rindtesting. Negative apprehension testing. She does have less prominent pain with crepitation with lateral Jose's on the right knee and medial Jose's on the left. I do not sense a high-grade click. I do not sense high-grade instability of screening testing is fairly benign. Skin: There are no rashes, ulcerations or lesions. Distal neurovascular exam is intact. Gait: improved altalgia and less discomfort with positional change. Reflex symmetrically preserved    Additional Comments:     Additional Examinations:  Right Lower Extremity: Examination of the right lower extremity does not show any tenderness, deformity or injury. Range of motion is unremarkable. There is no gross instability. There are no rashes, ulcerations or lesions. Strength and tone are normal.  Left Lower Extremity: Examination of the left lower extremity does not show any tenderness, deformity or injury. Range of motion is unremarkable. There is no gross instability. There are no rashes, ulcerations or lesions. Strength and tone are normal.  Examination of the biateral hip reveals intact skin. The patient demonstrates full painless range of motion with regards to flexion, abduction, internal and external rotation. There is not tenderness about the greater trochanter. There is a negative straight leg raise against resistance.   Strength is 5/5 thorough out all planes. Diagnostic Test Findings: bilateral knee AP and PA weight-bearing sunrise and lateral films were obtained today and show fairly prominent nearly bone-on-bone changes to the lateral compartment of her right knee and medial compartment of her left knee with left greater than right knee patellofemoral arthropathy with spurring. Assessment :  #1.   4+ months status post substantially improved recurrent advanced right knee lateral anterior compartment osteoarthritis with bone-on-bone changes lateral compartment right knee with improving synovitis   #2.  4+ months status post improvingadvanced left knee medial and anterior compartment osteoarthritis of bone-on-bone changes medial compartment left knee with improving synovitis    Impression:  Encounter Diagnoses   Name Primary?  Chronic pain of both knees     Bilateral primary osteoarthritis of knee     Chondromalacia of both patellae        Office Procedures:  Orders Placed This Encounter   Procedures    EUFLEXXA INJ PER DOSE     RIGHT KNEE     Standing Status:   Future     Standing Expiration Date:   4/21/2020       Treatment Plan:  Treatment options were discussed with Sofia Vyas. We did once againreview her plain films and exam findings. She does have quite prominent arthritic changes to the right knee lateral compartment and left knee medial compartment does have patellofemoral arthropathy particularly on the left knee. Her right knee was previously. We discussed further workup with imaging however given her improvement over last 3 weeks, we elected to hold off on this will continue with supervised therapy. She will continue with her knee bracing as well as her diclofenac 75 mg 1 pill twice daily. We may consider Visco supplementation to her right knee but we will see if her insurance will call for this. She describes her current symptoms as tolerable but irritating. Denies mechanical or instability symptoms.   Icing

## 2019-04-29 ENCOUNTER — APPOINTMENT (OUTPATIENT)
Dept: PHYSICAL THERAPY | Age: 64
End: 2019-04-29
Payer: COMMERCIAL

## 2019-05-02 ENCOUNTER — APPOINTMENT (OUTPATIENT)
Dept: PHYSICAL THERAPY | Age: 64
End: 2019-05-02
Payer: COMMERCIAL

## 2019-05-03 ENCOUNTER — HOSPITAL ENCOUNTER (OUTPATIENT)
Dept: PHYSICAL THERAPY | Age: 64
Setting detail: THERAPIES SERIES
Discharge: HOME | End: 2019-05-03
Payer: COMMERCIAL

## 2019-05-03 PROCEDURE — 97110 THERAPEUTIC EXERCISES: CPT

## 2019-05-03 PROCEDURE — 97140 MANUAL THERAPY 1/> REGIONS: CPT

## 2019-05-06 ENCOUNTER — APPOINTMENT (OUTPATIENT)
Dept: PHYSICAL THERAPY | Age: 64
End: 2019-05-06
Payer: COMMERCIAL

## 2019-05-08 ENCOUNTER — HOSPITAL ENCOUNTER (OUTPATIENT)
Dept: PHYSICAL THERAPY | Age: 64
Setting detail: THERAPIES SERIES
Discharge: HOME | End: 2019-05-08
Payer: COMMERCIAL

## 2019-05-08 PROCEDURE — 97140 MANUAL THERAPY 1/> REGIONS: CPT

## 2019-05-08 PROCEDURE — 97110 THERAPEUTIC EXERCISES: CPT

## 2019-05-08 NOTE — FLOWSHEET NOTE
assist with cervical, scapular, scapulothoracic and UE control with self care, reaching, carrying, lifting, house/yardwork, driving/computer work. Therapeutic Activities:    [] (18684 or 70847) Provided verbal/tactile cueing for activities related to improving balance, coordination, kinesthetic sense, posture, motor skill, proprioception and motor activation to allow for proper function of cervical, scapular, scapulothoracic and UE control with self care, carrying, lifting, driving/computer work.      Home Exercise Program:    [x] (24306) Reviewed/Progressed HEP activities related to strengthening, flexibility, endurance, ROM of cervical, scapular, scapulothoracic and UE control with self care, reaching, carrying, lifting, house/yardwork, driving/computer work 3/19/19 written HEP given  [] (84333) Reviewed/Progressed HEP activities related to improving balance, coordination, kinesthetic sense, posture, motor skill, proprioception of cervical, scapular, scapulothoracic and UE control with self care, reaching, carrying, lifting, house/yardwork, driving/computer work      Manual Treatments:  PROM / STM / Oscillations-Mobs:  G-I, II, III, IV (PA's, Inf., Post.)  [x] (19624) Provided manual therapy to mobilize soft tissue/joints of cervical/CT, scapular GHJ and UE for the purpose of decreasing headache, modulating pain, promoting relaxation,  increasing ROM, reducing/eliminating soft tissue swelling/inflammation/restriction, improving soft tissue extensibility and allowing for proper ROM for normal function with self care, reaching, carrying, lifting, house/yardwork, driving/computer work    Modalities:  CP x10 min to T and C spine    Charges:  Timed Code Treatment Minutes: 40   Total Treatment Minutes: 50 (CP, rest breaks/indep ex)     [] EVAL (LOW) 80643 (typically 20 minutes face-to-face)  [] EVAL (MOD) 54256 (typically 30 minutes face-to-face)  [] EVAL (HIGH) 66419 (typically 45 minutes face-to-face)  [] RE-EVAL [x] YZ(11631) x  2   [] IONTO  [] NMR (71006) x      [] VASO  [x] Manual (05010) x  1    [] Other:  [] TA (49829) x       [] Mech Traction (50132)  [] ES(attended) (76778)      [] ES (un) (09392):       GOALS:  Patient stated goal: reduction in pain and return to playing golf; Therapist goals for Patient:   Short Term Goals: To be achieved in: 2 weeks  1. Independent in HEP and progression per patient tolerance, in order to prevent re-injury. met  2. Patient will have a decrease in pain to facilitate improvement in movement, function, and ADLs as indicated by Functional Deficits. progress    Long Term Goals: To be achieved in: 10 weeks  1. Disability index score of 10% or less for the NDI to assist with reaching prior level of function. Gave Q DASH in error- 9% disability for this scale  2. Patient will demonstrate increased AROM to [facility] of cervical/thoracic spine to allow for proper joint functioning as indicated by patients Functional Deficits. progressing  3. Patient will demonstrate an increase in postural awareness and control and activation of  core stabilizers to allow for proper functional mobility as indicated by patients Functional Deficits. Intermittent cues  4. Patient will return to all functional activities without increased symptoms or restriction. progressing  5. Pt will be able to go through golf swing w/o full motion and min pain.(patient specific functional goal)   Not met    Progression Towards Functional goals:  [x] Patient is progressing as expected towards functional goals listed. [x] Progression is slowed due to complexities listed. [] Progression has been slowed due to co-morbidities. [] Plan just implemented, too soon to assess goals progression  [] Other:     ASSESSMENT:  Pt is improving with less pain overall and improved mobility. She has improved strength as well, but still would benefit from scapular re-training as she demonstrates R shoulder hiking with UE movements.  This UT and lev scap over-use is lending to continued upper thoracic soreness after working for several hours. She does have much improved trigger points in her thoracic spine and improved PA mobility of her thoracic spine. At this time, feel that she may decrease her PT freq to 1-2x/week x 4 weeks.      Treatment/Activity Tolerance:  [x] Patient tolerated treatment well [] Patient limited by fatique  [] Patient limited by pain  [] Patient limited by other medical complications  [] Other:     Prognosis: [x] Good [] Fair  [] Poor    Patient Requires Follow-up: [x] Yes  [] No    PLAN: May see pt 1x/week over the next two weeks (can choose thoracic vs knee case PRN)  [x] Continue per plan of care [] Alter current plan (see comments)  [] Plan of care initiated [] Hold pending MD visit [] Discharge    Electronically signed by: Domingo Man, PT , DPT, OMT-C

## 2019-05-09 ENCOUNTER — HOSPITAL ENCOUNTER (OUTPATIENT)
Dept: PHYSICAL THERAPY | Age: 64
Setting detail: THERAPIES SERIES
Discharge: HOME | End: 2019-05-09
Payer: COMMERCIAL

## 2019-05-09 PROCEDURE — 97110 THERAPEUTIC EXERCISES: CPT

## 2019-05-09 PROCEDURE — 97140 MANUAL THERAPY 1/> REGIONS: CPT

## 2019-05-09 NOTE — FLOWSHEET NOTE
[facility], [address]  Phone: [phone]  Fax [phone]    Physical Therapy Daily Treatment Note  Date:  2019    Patient Name:  Leonette Spurling    :  1955  MRN: [id number]  Restrictions/Precautions:    Medical/Treatment Diagnosis Information:  · Diagnosis: M17.0 (ICD-10-CM) - Primary osteoarthritis of both knees; M22.41, M22.42 (ICD-10-CM) - Chondromalacia of both patellae; M25.561, M25.562 (ICD-10-CM) - Acute pain of both knees  · Treatment Diagnosis: M25.561, M25.562 Pain in right, left knees; M17.11, M17.12 Knee OA R,L  Insurance/Certification information:  PT Insurance Information: BC[employer] 30 vpcy; 70/30  Physician Information:  Referring Practitioner: Dr. Deric Pino of care signed (Y/N):     Date of Patient follow up with Physician:     G-Code (if applicable):      Date G-Code Applied:  19  PT G-Codes  Functional Assessment Tool Used: LEFS  Score: 25  Functional Limitation: Mobility: Walking and moving around  Mobility: Walking and Moving Around Current Status (): At least 20 percent but less than 40 percent impaired, limited or restricted  Mobility: Walking and Moving Around Goal Status (): At least 1 percent but less than 20 percent impaired, limited or restricted    Progress Note: [x]  Yes  []  No  Next due by: Visit #10       Latex Allergy:  [x]NO      []YES  Preferred Language for Healthcare:   [x]English       []other:    Visit # Insurance Allowable Requires auth   4 (12 for another POC) 30 (soft limit)    []no        [x]yes: after 30       Pain level: 0-1/10     SUBJECTIVE:  Pt reports that her knees are doing much better, has not re-started her exercise program since getting back from her trip.       OBJECTIVE:   Observation:  Test measurements:  DF 15 deg  MMT hip flexion L 5/5, R 4+/5, hip abduction 4-/5 B, hip ER 4/5 R, 5-/5 L    RESTRICTIONS/PRECAUTIONS: B knee OA; (33792) Reviewed/Progressed HEP activities related to strengthening, flexibility, endurance, ROM of core, proximal hip and LE for functional self-care, mobility, lifting and ambulation/stair navigation   [] (15411)Reviewed/Progressed HEP activities related to improving balance, coordination, kinesthetic sense, posture, motor skill, proprioception of core, proximal hip and LE for self care, mobility, lifting, and ambulation/stair navigation      Manual Treatments:  PROM / STM / Oscillations-Mobs:  G-I, II, III, IV (PA's, Inf., Post.)  [x] (44377) Provided manual therapy to mobilize LE, proximal hip and/or LS spine soft tissue/joints for the purpose of modulating pain, promoting relaxation,  increasing ROM, reducing/eliminating soft tissue swelling/inflammation/restriction, improving soft tissue extensibility and allowing for proper ROM for normal function with self care, mobility, lifting and ambulation. Modalities:  CP x 10' B knees    Charges:  Timed Code Treatment Minutes: 41   Total Treatment Minutes: 70 ( ice)      [] EVAL (LOW) 63488 (typically 20 minutes face-to-face)  [] EVAL (MOD) 79064 (typically 30 minutes face-to-face)  [] EVAL (HIGH) 43990 (typically 45 minutes face-to-face)  [] RE-EVAL     [x] WG(55882) x  3   [] IONTO  [] NMR (15695) x      [] VASO  [] Manual (62517) x       [] Other:  [] TA x       [] Mech Traction (83360)  [] ES(attended) (70631)      [] ES (un) (11193):     GOALS: Patient stated goal: get back to golfing, get back to gym, walking 10,000-15,000 steps/day      Therapist goals for Patient:   Short Term Goals: To be achieved in: 2 weeks  1. Independent in HEP and progression per patient tolerance, in order to prevent re-injury. 2. Patient will have a decrease in pain to facilitate improvement in movement, function, and ADLs as indicated by Functional Deficits.     Long Term Goals: To be achieved in: 8 weeks  1.  Disability index score of 15% or less for the LEFS to assist with reaching prior level of function. 2. Patient will demonstrate increased ankle AROM to 15 deg bilaterally and improve HS flexibility by 10-15 deg bilaterally to improve mechanics during gait, stair ambulation and squatting. 3. Patient will demonstrate an increase in Strength to at least 4+/5 to 5/5 for bilateral hip musculature. 4. Patient will return to squatting, kneeling, stairs, and heavy HH chores with at least 50-75% improved pain. 5. Pt will have improved balance in SLS to at least 10\" bilaterally in order to reduce risk of falling. 6. Pt will be able to DL squat to 100 degrees with <2/10 knee pain. Progression Towards Functional goals:  [] Patient is progressing as expected towards functional goals listed. [] Progression is slowed due to complexities listed. [] Progression has been slowed due to co-morbidities. [x] Plan just implemented, too soon to assess goals progression  [] Other:     ASSESSMENT:  Pt is doing quite well, but still lacks good strength in her hip stabilizers. Updated pt's HEP and pt will work more indep on this case. She is to be seen 1x/week over the next two weeks for her thoracic case or her knee case on a PRN basis.      Treatment/Activity Tolerance:  [x] Patient tolerated treatment well [] Patient limited by fatique  [] Patient limited by pain  [] Patient limited by other medical complications  [] Other:     Prognosis: [x] Good [] Fair  [] Poor    Patient Requires Follow-up: [x] Yes  [] No    PLAN: pt to work more indep on HEP, may be seen PRN over the next 2 weeks  [x] Continue per plan of care [] Alter current plan (see comments)  [] Plan of care initiated [] Hold pending MD visit [] Discharge    Electronically signed by: Lary Garcia, PT, DPT

## 2019-05-13 ENCOUNTER — HOSPITAL ENCOUNTER (OUTPATIENT)
Dept: PHYSICAL THERAPY | Age: 64
Setting detail: THERAPIES SERIES
Discharge: HOME | End: 2019-05-13
Payer: COMMERCIAL

## 2019-05-13 PROCEDURE — 97110 THERAPEUTIC EXERCISES: CPT

## 2019-05-13 PROCEDURE — 97140 MANUAL THERAPY 1/> REGIONS: CPT

## 2019-05-13 NOTE — FLOWSHEET NOTE
[facility], [address]  Phone: [phone]  Fax [phone]        Physical Therapy Daily Treatment Note  Date:  2019    Patient Name:  Jarrett Tan    :  1955  MRN: [id number]  Restrictions/Precautions:    Medical/Treatment Diagnosis Information:  Diagnosis: M54.6 Thoracic spine  Treatment Diagnosis: dec ROM, dec strength, pain, dec function  Insurance/Certification information:  PT Insurance Information: BCBS 30 vpcy; 70/30  Physician Information:  Referring Practitioner: Dr. Lorenzo Calles of care signed (Y/N): faxed 3/19/19    Date of Patient follow up with Physician:     G-Code (if applicable):      Date G-Code Applied:  3/19/19  PT G-Codes  Functional Assessment Tool Used: Mod Oswestry LB  Score: 17.7%    Progress Note: [x]  Yes  []  No  Next due by: 19     Latex Allergy:  [x]NO      []YES  Preferred Language for Healthcare:   [x]English       []other:    Visit # Insurance Allowable   13 (4 for another POC) 30     Pain level:  0-2/10     SUBJECTIVE:  Pt reports that she had some sharp pain when rotating a few times in her R thoracic spine and still has mild soreness in the morning. She has been working a lot and been more busy, so she hasn't been completing her HEP as much. OBJECTIVE:   Observation: very tender to palpation throughout thoracic spine, sits with shoulders elevated.    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      Exercises/Interventions:   Therapeutic Ex Sets/sec Reps Notes   Self TPR - tennis ball on wall       pec stretch - arms at 45 supine + HEP   Lat stretch - R,L + HEP   SA reach + ball f/e, add/abd R,L Better tolerance than supine + HEP   Standing B flexion, scaption, back to wall Cues for cervical position, scap position, used mirror + HEP   B UE ER + flexion 3\" 20 Orange TB   TT row red  + HEP   TT lat pull down   red+ HEP   TT ER iso walkouts 1 10 Yellow R,L + HEP   TT horizontal abduction R,L 2 10 yellow   TT flexion R,L 2 10 yellow   Standing flexion wall walk  10 yellow  TB   B ER wall walk- lateral   Orange    Open the book- s/l on table R,L 5\" 10     Prone MT, row, sh ext over SWB (on chair) 2x15 ea  2# row and ext         Pulleys flex, abd 5\"x10 ea  indep LORENZANA   Breathing/rib/palpation assessment      Manual Intervention      Shoulder IR stretch 15\"x3     Assess thoracic spine  ERSL T7, T4     Also showed anatomy, explained mechanism to reduce fears of harming cervical spine   Inf scap mob supine GII   Pt reports this is a lot of pressure   Thoracic mobs PA- seated 2'  GII (pt reports this is a lot of pressure)- prone   Rib mobilizations- sidelying rib 5 inhalation correction, intercostal stretch with deep breathing   attempted   STM thoracic PSM R  Vickie-scapular musclature (sidelying) x8'     MFR thoracolumbar fascia     scap mobs- all 3'    NMR re-education      PNF manual resisted  NV 10x ea  Mod resist   rhyth stab c ball on wall NV  Pain in t-spine/rib    Better adam   Diaphragmatic breathing   Difficulty performing, uses scalenes, SCM, and pec minor heavily   D1 extension c TT R,L  D2 extension R,L  Yellow TT   Squat- pelvic rotation R,L  Hands in doorway   Squat- trunk rotation R,L  Hips on wall   GCT resisted resisted R thoracic rot     GCT resisted L thoracic rot       Therapeutic Exercise and NMR EXR  [x] (04181) Provided verbal/tactile cueing for activities related to strengthening, flexibility, endurance, ROM  for improvements in cervical, postural, scapular, scapulothoracic and UE control with self care, reaching, carrying, lifting, house/yardwork, driving/computer work.    [] (87240) Provided verbal/tactile cueing for activities related to improving balance, coordination, kinesthetic sense, posture, motor skill, proprioception  to assist with cervical, scapular, scapulothoracic and UE control with self care, reaching, carrying, lifting, house/yardwork, driving/computer work. Therapeutic Activities:    [] (24413 or 87475) Provided verbal/tactile cueing for activities related to improving balance, coordination, kinesthetic sense, posture, motor skill, proprioception and motor activation to allow for proper function of cervical, scapular, scapulothoracic and UE control with self care, carrying, lifting, driving/computer work.      Home Exercise Program:    [x] (75319) Reviewed/Progressed HEP activities related to strengthening, flexibility, endurance, ROM of cervical, scapular, scapulothoracic and UE control with self care, reaching, carrying, lifting, house/yardwork, driving/computer work 3/19/19 written HEP given  [] (50978) Reviewed/Progressed HEP activities related to improving balance, coordination, kinesthetic sense, posture, motor skill, proprioception of cervical, scapular, scapulothoracic and UE control with self care, reaching, carrying, lifting, house/yardwork, driving/computer work      Manual Treatments:  PROM / STM / Oscillations-Mobs:  G-I, II, III, IV (PA's, Inf., Post.)  [x] (48953) Provided manual therapy to mobilize soft tissue/joints of cervical/CT, scapular GHJ and UE for the purpose of decreasing headache, modulating pain, promoting relaxation,  increasing ROM, reducing/eliminating soft tissue swelling/inflammation/restriction, improving soft tissue extensibility and allowing for proper ROM for normal function with self care, reaching, carrying, lifting, house/yardwork, driving/computer work    Modalities:  CP x10 min to T and C spine    Charges:  Timed Code Treatment Minutes: 40   Total Treatment Minutes: 55 (CP, rest breaks/indep ex)     [] EVAL (LOW) 02209 (typically 20 minutes face-to-face)  [] EVAL (MOD) 58590 (typically 30 minutes face-to-face)  [] EVAL (HIGH) 19494 (typically 45 minutes face-to-face)  [] RE-EVAL   [x] RQ(84915) x  2   [] IONTO  [] NMR (03650) x      [] VASO  [x] soreness after working for several hours. She does have much improved trigger points in her thoracic spine and improved PA mobility of her thoracic spine. At this time, feel that she may decrease her PT freq to 1-2x/week x 4 weeks.      Treatment/Activity Tolerance:  [x] Patient tolerated treatment well [] Patient limited by fatique  [] Patient limited by pain  [] Patient limited by other medical complications  [] Other:     Prognosis: [x] Good [] Fair  [] Poor    Patient Requires Follow-up: [x] Yes  [] No    PLAN: May see pt 1x/week over the next two weeks (can choose thoracic vs knee case PRN)  [x] Continue per plan of care [] Alter current plan (see comments)  [] Plan of care initiated [] Hold pending MD visit [] Discharge    Electronically signed by: Kindra Chau, PT , DPT, OMT-C

## 2019-05-16 ENCOUNTER — APPOINTMENT (OUTPATIENT)
Dept: PHYSICAL THERAPY | Age: 64
End: 2019-05-16
Payer: COMMERCIAL

## 2019-05-21 ENCOUNTER — APPOINTMENT (OUTPATIENT)
Dept: PHYSICAL THERAPY | Age: 64
End: 2019-05-21
Payer: COMMERCIAL

## 2019-05-22 ENCOUNTER — HOSPITAL ENCOUNTER (OUTPATIENT)
Dept: PHYSICAL THERAPY | Age: 64
Setting detail: THERAPIES SERIES
Discharge: HOME | End: 2019-05-22
Payer: COMMERCIAL

## 2019-05-22 ENCOUNTER — APPOINTMENT (OUTPATIENT)
Dept: PHYSICAL THERAPY | Age: 64
End: 2019-05-22
Payer: COMMERCIAL

## 2019-05-22 PROCEDURE — 97110 THERAPEUTIC EXERCISES: CPT

## 2019-05-22 PROCEDURE — 97140 MANUAL THERAPY 1/> REGIONS: CPT

## 2019-05-22 NOTE — FLOWSHEET NOTE
[facility], [address]  Phone: [phone]  Fax [phone]        Physical Therapy Daily Treatment Note  Date:  2019    Patient Name:  Nick Baldwin    :  1955  MRN: [id number]  Restrictions/Precautions:    Medical/Treatment Diagnosis Information:  Diagnosis: M54.6 Thoracic spine  Treatment Diagnosis: dec ROM, dec strength, pain, dec function  Insurance/Certification information:  PT Insurance Information: BCBS 30 vpcy; 70/  Physician Information:  Referring Practitioner: Dr. Dimitri Pearson of care signed (Y/N): faxed 3/19/19    Date of Patient follow up with Physician:     G-Code (if applicable):      Date G-Code Applied:  3/19/19  PT G-Codes  Functional Assessment Tool Used: Mod Oswestry LB  Score: 17.7%    Progress Note: [x]  Yes  []  No  Next due by: 19     Latex Allergy:  [x]NO      []YES  Preferred Language for Healthcare:   [x]English       []other:    Visit # Insurance Allowable   13 (4 for another POC) 30     Pain level:  0-2/10     SUBJECTIVE:  Pt reports that she is sore and stiff today, but it's because she worked yesterday then played 18 holes of golf, then babysat her 3 yo granddaughter. She hasn't been able to get her HEP in except for stretching. She does not feel that her rib is out of place, but that her thoracic spine is just generally tender. OBJECTIVE:   Observation: very tender to palpation throughout thoracic spine, sits with shoulders elevated.   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      Exercises/Interventions:   Therapeutic Ex Sets/sec Reps Notes   Self TPR - tennis ball on wall       pec stretch - arms at 45 supine With AT today+ HEP   Lat stretch - R,L With AT today+ HEP   SA reach + ball f/e, add/abd R,L Better tolerance than supine + HEP   Standing B flexion, scaption, back to wall Cues for cervical position, scap position, used mirror + HEP   B UE ER + flexion 3\" 20 Orange TB   TT row With AT today red  + HEP   TT lat pull down   red+ HEP With AT today   TT ER iso walkouts 1 10 Yellow R,L + HEP   TT horizontal abduction R,L 2 10 yellow   TT flexion R,L 2 10 yellow   Standing flexion wall walk  10 yellow  TB   B ER wall walk- lateral   Orange    Open the book- s/l on table R,L Resume NV?    Prone MT, row, sh ext over SWB (on chair)  Middle trap 2x15 ea  2# row and ext  1# MT   SL ER 3x10 2# R/L   Pulleys flex, abd   indep LORENZANA   Breathing/rib/palpation assessment 2' (ok today)     Manual Intervention      Shoulder IR stretch   forgot   Assess thoracic spine  ERSL T7, T4     Also showed anatomy, explained mechanism to reduce fears of harming cervical spine   Inf scap mob supine GII   Pt reports this is a lot of pressure   Thoracic mobs PA- prone 2'  GII (pt reports this is a lot of pressure)- prone   Rib mobilizations- sidelying rib 5 inhalation correction, intercostal stretch with deep breathing      STM thoracic PSM R prone  Vickie-scapular musclature (sidelying) x10'     MFR thoracolumbar fascia with deep breathing 4'    scap mobs- all 3'    NMR re-education      PNF manual resisted scap 10x ea  Mod resist   rhyth stab c ball on wall NV  Pain in t-spine/rib    Better adam   Diaphragmatic breathing   Difficulty performing, uses scalenes, SCM, and pec minor heavily   D1 extension c TT R,L  D2 extension R,L x10 ea R/L red D2 ext, green D1 ext   Squat- pelvic rotation R,L  Hands in doorway   Squat- trunk rotation R,L 15x Hips on wall   GCT resisted resisted R thoracic rot x15 Green ttubes   GCT resisted L thoracic rot x15 \" \"     Therapeutic Exercise and NMR EXR  [x] (37444) Provided verbal/tactile cueing for activities related to strengthening, flexibility, endurance, ROM  for improvements in cervical, postural, scapular, scapulothoracic and UE control with self care, reaching, carrying, lifting, house/yardwork, driving/computer work.    [] (31776) Provided verbal/tactile cueing for activities related to improving balance, coordination, kinesthetic sense, posture, motor skill, proprioception  to assist with cervical, scapular, scapulothoracic and UE control with self care, reaching, carrying, lifting, house/yardwork, driving/computer work. Therapeutic Activities:    [] (74176 or 97062) Provided verbal/tactile cueing for activities related to improving balance, coordination, kinesthetic sense, posture, motor skill, proprioception and motor activation to allow for proper function of cervical, scapular, scapulothoracic and UE control with self care, carrying, lifting, driving/computer work.      Home Exercise Program:    [x] (20333) Reviewed/Progressed HEP activities related to strengthening, flexibility, endurance, ROM of cervical, scapular, scapulothoracic and UE control with self care, reaching, carrying, lifting, house/yardwork, driving/computer work 3/19/19 written HEP given  [] (08680) Reviewed/Progressed HEP activities related to improving balance, coordination, kinesthetic sense, posture, motor skill, proprioception of cervical, scapular, scapulothoracic and UE control with self care, reaching, carrying, lifting, house/yardwork, driving/computer work      Manual Treatments:  PROM / STM / Oscillations-Mobs:  G-I, II, III, IV (PA's, Inf., Post.)  [x] (61450) Provided manual therapy to mobilize soft tissue/joints of cervical/CT, scapular GHJ and UE for the purpose of decreasing headache, modulating pain, promoting relaxation,  increasing ROM, reducing/eliminating soft tissue swelling/inflammation/restriction, improving soft tissue extensibility and allowing for proper ROM for normal function with self care, reaching, carrying, lifting, house/yardwork, driving/computer work    Modalities:  CP x10 min to T and C spine and B knees    Charges:  Timed Code Treatment Minutes: 40   Total Treatment Minutes: 55 (CP, rest breaks)     [] EVAL (LOW) 19947 (typically 20 minutes face-to-face)  [] EVAL (MOD) 52540 (typically 30 minutes face-to-face)  [] EVAL (HIGH) 60142 (typically 45 minutes face-to-face)  [] RE-EVAL   [x] CN(35847) x  2   [] IONTO  [] NMR (59150) x      [] VASO  [x] Manual (93536) x  1    [] Other:  [] TA (44389) x       [] Mech Traction (08045)  [] ES(attended) (57615)      [] ES (un) (15207):       GOALS:  Patient stated goal: reduction in pain and return to playing golf; Therapist goals for Patient:   Short Term Goals: To be achieved in: 2 weeks  1. Independent in HEP and progression per patient tolerance, in order to prevent re-injury. met  2. Patient will have a decrease in pain to facilitate improvement in movement, function, and ADLs as indicated by Functional Deficits. progress    Long Term Goals: To be achieved in: 10 weeks  1. Disability index score of 10% or less for the NDI to assist with reaching prior level of function. Gave Q DASH in error- 9% disability for this scale  2. Patient will demonstrate increased AROM to [facility] of cervical/thoracic spine to allow for proper joint functioning as indicated by patients Functional Deficits. progressing  3. Patient will demonstrate an increase in postural awareness and control and activation of  core stabilizers to allow for proper functional mobility as indicated by patients Functional Deficits. Intermittent cues  4. Patient will return to all functional activities without increased symptoms or restriction. progressing  5. Pt will be able to go through golf swing w/o full motion and min pain.(patient specific functional goal)   Not met    Progression Towards Functional goals:  [x] Patient is progressing as expected towards functional goals listed. [x] Progression is slowed due to complexities listed. [] Progression has been slowed due to co-morbidities.   [] Plan just

## 2019-05-22 NOTE — FLOWSHEET NOTE
[facility], [address]  Phone: [phone]  Fax [phone]    ATHLETIC TRAINING [address]  Date:  2019    Patient Name:  Bertram William    :  1955  MRN: [id number]  Restrictions/Precautions:    Medical/Treatment Diagnosis Information:  ·   Diagnosis: M54.6 Thoracic spine  · Treatment Diagnosis: dec ROM, dec strength, pain, dec function    Physician Information:  Dock Spatz Post-op  2wks    4 wks 6 wks 8 wks   3wks 6 wks 9 wks 12 wks                        Activity Log                                                          DOS/DOI:                                                         Date: 19   ATC Commun Golfer    Cue for Terminal Neck Extension    True Stretch  Upper/Mid/Lower Pec stretch  Lat stretch   [place]      Chop                         Chest                         ER Flip        Long/Short lever  Flex. Scap.                                 ABd.         punch        Body/Cardio Blade Flex/Ext                                    IR/ER                                    ABd/ADd        Push-up      Plus                           Wall                         Table                        Floor        Ball on Wall                Tramp        Theraband    Rows/Ext GTB 3x10 ea                         IR                          ER                          No money YTB 15x w/ball on occipital Cue for TNE                         Horiz. ABd YTB 15x w/ball on occipital Cue for TNE                         Biceps                          Triceps                          Punches                          Lat Pulldowns GTB 3x10   Cable Column   Rows                               Ext.                                Lat. Pulldown                               Biceps                               Triceps        Modality CP Thoracic BL Knees 15' Initials EP   Time spent one on one (workers comp)    Time spent with PT assistant

## 2019-05-28 ENCOUNTER — HOSPITAL ENCOUNTER (OUTPATIENT)
Dept: PHYSICAL THERAPY | Age: 64
Setting detail: THERAPIES SERIES
Discharge: HOME | End: 2019-05-28
Payer: COMMERCIAL

## 2019-05-28 ENCOUNTER — TELEPHONE (OUTPATIENT)
Dept: ORTHOPEDIC SURGERY | Age: 64
End: 2019-05-28

## 2019-05-28 PROCEDURE — 97140 MANUAL THERAPY 1/> REGIONS: CPT

## 2019-05-28 PROCEDURE — 97110 THERAPEUTIC EXERCISES: CPT

## 2019-05-28 NOTE — TELEPHONE ENCOUNTER
LVM ON DAUGHTERS VOICEMAIL WHO IS ON PATIENTS HIPPA RELASE FORM. PATIENTS VOICEMAIL BOX IS FULL. RASHID, HER SPECIALTY PHARMACY HAS TRIED TO CONTACT HER TO COLLECT CONSENT/COPAY FOR EUFLEXXA INJECTIONS. THEY HAVE NOT BEEN ABLE TO REACH PATIENT. I TOLD DAUGHTER IN MESSAGE THAT UNTIL THAT IS DONE, WE CANNOT SCHEDULE HER FOR THOSE INJECTIONS. TOLD HER TO CALL ME WITH ANY QUESTIONS, BUT I LEFT THE PHONE NUMBER FOR RASHID IN MY VOICEMAIL.

## 2019-05-28 NOTE — FLOWSHEET NOTE
[facility], [address] [place]  Phone: [phone]  Fax [phone]        Physical Therapy Daily Treatment Note  Date:  2019    Patient Name:  Tsering Etienne    :  1955  MRN: [id number]  Restrictions/Precautions:    Medical/Treatment Diagnosis Information:  Diagnosis: M54.6 Thoracic spine  Treatment Diagnosis: dec ROM, dec strength, pain, dec function  Insurance/Certification information:  PT Insurance Information: BCBS 30 vpcy; 70/30  Physician Information:  Referring Practitioner: Dr. Ruth Booth of care signed (Y/N): faxed 3/19/19    Date of Patient follow up with Physician:     G-Code (if applicable):      Date G-Code Applied:  3/19/19  PT G-Codes  Functional Assessment Tool Used: Mod Oswestry LB  Score: 17.7%    Progress Note: [x]  Yes  []  No  Next due by: 19     Latex Allergy:  [x]NO      []YES  Preferred Language for Healthcare:   [x]English       []other:    Visit # Insurance Allowable   13 (4 for another POC) 30     Pain level:  0-2/10     SUBJECTIVE:  Pt states that after LV, she did have some inc'd soreness in her R shoulder. She isn't sure if it was from PT, or from doing PT right after playing 18 holes of golf. OBJECTIVE:   Observation: pt 10 min late to tx very tender to palpation throughout thoracic spine, sits with shoulders elevated.   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      Exercises/Interventions:   Therapeutic Ex Sets/sec Reps Notes   Pt ed: theracane-give pt info NV      Self TPR - tennis ball on wall       pec stretch on 3 towel roll - arms at 45 supine 60\" 3 + HEP   Lat stretch on 3 towel roll - R,L  QL stretch (quadruped) 15\"  15\" 2  2 R/L + HEP   SA reach + ball f/e, add/abd R,L Better tolerance than supine + HEP   Standing B flexion, scaption, back to wall 2 10 Cues for cervical position, scap position, used mirror + HEP   B UE ER + flexion 3\" 20 Orange TB   TT row  red  + HEP   TT lat pull down   red+ HEP With AT today   TT ER iso walkouts Yellow R,L + HEP   TT horizontal abduction R,L yellow   TT flexion R,L yellow   Standing flexion wall walk  10 yellow  TB   B ER wall walk- lateral  2 trips OVL    Open the book- s/l on table R,L 5\" 10     Prone MT, row, sh ext over SWB (on chair)  Middle trap 2x15 ea  2# row and ext  1# MT   SL ER 3x10 2# R/L   Pulleys flex, abd   indep LORENZANA   Breathing/rib/palpation assessment 2' (ok today)     Manual Intervention      Shoulder IR stretch   forgot   Assess thoracic spine  ERSL T7, T4     Also showed anatomy, explained mechanism to reduce fears of harming cervical spine   Inf scap mob supine GII   Pt reports this is a lot of pressure    GII (pt reports this is a lot of pressure)- prone   Rib mobilizations- sidelying rib 5 inhalation correction, intercostal stretch with deep breathing      XMF biceps tendon R SH  Post GHJ mobs gr II  Inf gr III 2'  x20  x20    Fair adam   STM thoracic PSM R prone  Vickie-scapular musclature (sidelying) x10'     MFR thoracolumbar fascia with deep breathing 6'    scap mobs- all 3' Diff to relax   NMR re-education      PNF manual resisted scap 10x ea  Mod resist   rhyth stab c ball on wall NV  Pain in t-spine/rib    Better adam   Diaphragmatic breathing   Difficulty performing, uses scalenes, SCM, and pec minor heavily   D1 extension c TT R,L  D2 extension R,L x10 ea R/L NV red D2 ext, green D1 ext   Squat- pelvic rotation R,L  Hands in doorway   Squat- trunk rotation R,L 15x Hips on wall   GCT resisted resisted R thoracic rot x15 Green ttubes-sitting   GCT resisted L thoracic rot x15 \" \"-sitting     Therapeutic Exercise and NMR EXR  [x] (98601) Provided verbal/tactile cueing for activities related to strengthening, flexibility, endurance, ROM  for improvements in cervical, postural, scapular, scapulothoracic and UE control with self care, reaching, carrying, lifting, house/yardwork, driving/computer work.    [] (27903) Provided verbal/tactile cueing for activities related to improving balance, coordination, kinesthetic sense, posture, motor skill, proprioception  to assist with cervical, scapular, scapulothoracic and UE control with self care, reaching, carrying, lifting, house/yardwork, driving/computer work. Therapeutic Activities:    [] (90072 or 01732) Provided verbal/tactile cueing for activities related to improving balance, coordination, kinesthetic sense, posture, motor skill, proprioception and motor activation to allow for proper function of cervical, scapular, scapulothoracic and UE control with self care, carrying, lifting, driving/computer work.      Home Exercise Program:    [x] (71479) Reviewed/Progressed HEP activities related to strengthening, flexibility, endurance, ROM of cervical, scapular, scapulothoracic and UE control with self care, reaching, carrying, lifting, house/yardwork, driving/computer work 3/19/19 written HEP given  [] (93995) Reviewed/Progressed HEP activities related to improving balance, coordination, kinesthetic sense, posture, motor skill, proprioception of cervical, scapular, scapulothoracic and UE control with self care, reaching, carrying, lifting, house/yardwork, driving/computer work      Manual Treatments:  PROM / STM / Oscillations-Mobs:  G-I, II, III, IV (PA's, Inf., Post.)  [x] (42011) Provided manual therapy to mobilize soft tissue/joints of cervical/CT, scapular GHJ and UE for the purpose of decreasing headache, modulating pain, promoting relaxation,  increasing ROM, reducing/eliminating soft tissue swelling/inflammation/restriction, improving soft tissue extensibility and allowing for proper ROM for normal function with self care, reaching, carrying, lifting, house/yardwork, driving/computer work Modalities:  CP x15 min to T and C spine and R shoulder     Charges:  Timed Code Treatment Minutes: 45   Total Treatment Minutes: 65 (CP, rest breaks)     [] EVAL (LOW) 41401 (typically 20 minutes face-to-face)  [] EVAL (MOD) 88146 (typically 30 minutes face-to-face)  [] EVAL (HIGH) 92153 (typically 45 minutes face-to-face)  [] RE-EVAL   [x] VU(38587) x  2   [] IONTO  [] NMR (48229) x      [] VASO  [x] Manual (35317) x  1    [] Other:  [] TA (77600) x       [] Mech Traction (37231)  [] ES(attended) (02876)      [] ES (un) (10861):       GOALS:  Patient stated goal: reduction in pain and return to playing golf; Therapist goals for Patient:   Short Term Goals: To be achieved in: 2 weeks  1. Independent in HEP and progression per patient tolerance, in order to prevent re-injury. met  2. Patient will have a decrease in pain to facilitate improvement in movement, function, and ADLs as indicated by Functional Deficits. progress    Long Term Goals: To be achieved in: 10 weeks  1. Disability index score of 10% or less for the NDI to assist with reaching prior level of function. Gave Q DASH in error- 9% disability for this scale  2. Patient will demonstrate increased AROM to [facility] of cervical/thoracic spine to allow for proper joint functioning as indicated by patients Functional Deficits. progressing  3. Patient will demonstrate an increase in postural awareness and control and activation of  core stabilizers to allow for proper functional mobility as indicated by patients Functional Deficits. Intermittent cues  4. Patient will return to all functional activities without increased symptoms or restriction. progressing  5. Pt will be able to go through golf swing w/o full motion and min pain.(patient specific functional goal)   Not met    Progression Towards Functional goals:  [x] Patient is progressing as expected towards functional goals listed. [x] Progression is slowed due to complexities listed.   [] Progression has been slowed due to co-morbidities. [] Plan just implemented, too soon to assess goals progression  [] Other:     ASSESSMENT:  Pt had inc'd R shoulder pain, seemingly along biceps tendon this date, therefore held some there-ex. Pt was also late to tx, therefore did not get to all there-ex. Progressed flexibility of pecs/lats onto towel rolls and instructed pt to try with a pool noodle for home as this stretch seemed to alleviate some of her thoracic discomfort. Educate don theracane but forgot to give info at conclusion of tx.      Treatment/Activity Tolerance:  [x] Patient tolerated treatment well [] Patient limited by fatique  [] Patient limited by pain  [] Patient limited by other medical complications  [] Other:     Prognosis: [x] Good [] Fair  [] Poor    Patient Requires Follow-up: [x] Yes  [] No    PLAN: May see pt 1x/week over the next two weeks (can choose thoracic vs knee case PRN)  [x] Continue per plan of care [] Alter current plan (see comments)  [] Plan of care initiated [] Hold pending MD visit [] Discharge    Electronically signed by: Adrianna Ordonez, PT , DPT, OMT-C

## 2019-05-29 ENCOUNTER — APPOINTMENT (OUTPATIENT)
Dept: PHYSICAL THERAPY | Age: 64
End: 2019-05-29
Payer: COMMERCIAL

## 2019-06-05 ENCOUNTER — TELEPHONE (OUTPATIENT)
Dept: ORTHOPEDIC SURGERY | Age: 64
End: 2019-06-05

## 2019-06-11 ENCOUNTER — TELEPHONE (OUTPATIENT)
Dept: ORTHOPEDIC SURGERY | Age: 64
End: 2019-06-11

## 2019-06-11 NOTE — TELEPHONE ENCOUNTER
05/21/2019 EUFLEXXA  (SERIES OF 3)  BILATERAL KNEES. [address] CASE ID: [id number]. DATES: 04/21/2019 - 06/20/2019  (APPROVAL SCANNED UNDER MEDIA TAB)    MUST OBTAIN FROM [facility], [facility].    6/11/2019.   PER BETY @ [facility], DRUG WAS DELIVERED ON 6/4/19 AND SIGNED BY \"EFRAIN\" @ 10:35AM.  AP

## 2019-06-19 ENCOUNTER — OFFICE VISIT (OUTPATIENT)
Dept: ORTHOPEDIC SURGERY | Age: 64
End: 2019-06-19
Payer: COMMERCIAL

## 2019-06-19 VITALS
SYSTOLIC BLOOD PRESSURE: 68 MMHG | WEIGHT: 195.11 LBS | HEIGHT: 68 IN | BODY MASS INDEX: 29.57 KG/M2 | HEART RATE: 81 BPM | DIASTOLIC BLOOD PRESSURE: 44 MMHG

## 2019-06-19 DIAGNOSIS — M22.42 CHONDROMALACIA OF BOTH PATELLAE: ICD-10-CM

## 2019-06-19 DIAGNOSIS — M22.41 CHONDROMALACIA OF BOTH PATELLAE: ICD-10-CM

## 2019-06-19 DIAGNOSIS — M17.0 BILATERAL PRIMARY OSTEOARTHRITIS OF KNEE: Primary | ICD-10-CM

## 2019-06-19 DIAGNOSIS — G89.29 CHRONIC PAIN OF BOTH KNEES: ICD-10-CM

## 2019-06-19 DIAGNOSIS — M25.562 CHRONIC PAIN OF BOTH KNEES: ICD-10-CM

## 2019-06-19 DIAGNOSIS — M25.561 CHRONIC PAIN OF BOTH KNEES: ICD-10-CM

## 2019-06-19 PROCEDURE — 20610 DRAIN/INJ JOINT/BURSA W/O US: CPT | Performed by: FAMILY MEDICINE

## 2019-06-19 PROCEDURE — 99213 OFFICE O/P EST LOW 20 MIN: CPT | Performed by: FAMILY MEDICINE

## 2019-06-19 RX ORDER — HYALURONATE SODIUM 10 MG/ML
40 SYRINGE (ML) INTRAARTICULAR ONCE
Status: COMPLETED | OUTPATIENT
Start: 2019-06-19 | End: 2019-06-19

## 2019-06-19 RX ADMIN — Medication 40 MG: at 13:03

## 2019-06-19 NOTE — PROGRESS NOTES
Chief Complaint  Knee Pain (EUFLEXXA #1 BILATERAL KNEES)      Follow-up recurrent left knee pain with underlying left greater than right knee patellofemoral arthropathy with prominent nearly bone-on-bone changes lateral compartment of right knee and medial compartment left knee with difficulty walking and rating pseudo-buckling    History of Present Illness:  Jarrett Tan is a 59 y.o. female who is a very pleasant white female who still works part-time at [employer] who is a very nice patient of  Dr. Thu Luna being seen today in Consultation from Dr. Moreno Allen for Evaluation of Worsening Pain to Her Right Greater Than Left Knee. It sounds as if she may have had a previous knee arthroscopy on the left about 5-6 years ago for meniscal tear treated surgically by Dr Sasha Diaz. She does state that she was doing reasonably well up until sustaining several falls when she tripped starting in December 2018. She does not recall the exact mechanism of injury but shortly thereafter \"increasing achy pain primarily about the anterior portion of her knee. She has had swelling and her right knee has persisted and hurting her to the point where she is having pain in the range of 3-4 out of 10 at rest with more substantial 78 out of 10 pain with positional changes prolonged standing walking pivoting and going up and down stairs. Since early March 2019, her symptoms have become more consistent in nature. she did see her primary care doctor who recommended anti-inflammatories and icing and today's orthopedic consultation. She has not had recent weightbearing images. She is not really complaining of true locking or catching and may have had some mild swelling. She is not having pain at night. She is being seen today for orthopedic and sports consultation with weightbearing imaging. She was seen initially in the office on 4/1/2019 started on aggressive conservative treatment for her knees bilaterally.   She is seen tendon reflexes are intact  Mental Status: The patient is oriented to time, place and person. The patient's mood and affect are appropriate. Lymphatic: The lymphatic examination bilaterally reveals all areas to be without enlargement or induration. Vascular: Examination reveals no swelling or calf tenderness. Peripheral pulses are palpable and 2+. Neurological: The patient has good coordination. There is no weakness or sensory deficit. Knee Examination  Inspection:  There is no high-grade deformity other she does have bilaterally patellofemoral crepitation and trace knee joint effusions. Palpation:  She does have some persistent tenderness over the medial and lateral patellofemoral facet bilaterally slightly worse on the right. She does have less prominent tenderness clinically over the Lateral compartment of her right knee and medial compartment of her left knee with some mild crepitation. Rang of Motion:  She is stiff in the terminal 5 of extension with flexion limited to about 110-120. Hamstrings are mildlytight. Fair quad tone. Strength: 4 out of 5 with flexion as extension. Special Tests:  She does have somewhat less prominent pain primarily reproduced with the patellar grind testing. Negative apprehension testing. She does have less prominent pain with crepitation with lateral Jose's on the right knee and medial Jose's on the left. I do not sense a high-grade click. I do not sense high-grade instability of screening testing is fairly benign. Skin: There are no rashes, ulcerations or lesions. Distal neurovascular exam is intact. Gait: improved altalgia and less discomfort with positional change. Reflex symmetrically preserved    Additional Comments:     Additional Examinations:  Right Lower Extremity: Examination of the right lower extremity does not show any tenderness, deformity or injury. Range of motion is unremarkable. There is no gross instability.   There are no rashes, ulcerations or lesions. Strength and tone are normal.  Left Lower Extremity: Examination of the left lower extremity does not show any tenderness, deformity or injury. Range of motion is unremarkable. There is no gross instability. There are no rashes, ulcerations or lesions. Strength and tone are normal.  Examination of the biateral hip reveals intact skin. The patient demonstrates full painless range of motion with regards to flexion, abduction, internal and external rotation. There is not tenderness about the greater trochanter. There is a negative straight leg raise against resistance. Strength is 5/5 thorough out all planes. Diagnostic Test Findings: bilateral knee AP and PA weight-bearing sunrise and lateral films were reviewed from 4/1/2019 and show fairly prominent nearly bone-on-bone changes to the lateral compartment of her right knee and medial compartment of her left knee with left greater than right knee patellofemoral arthropathy with spurring. Assessment :  #1.   6+ months status post partially improved recurrent advanced right knee lateral anterior compartment osteoarthritis with bone-on-bone changes lateral compartment right knee with improving synovitis with right knee Euflexxa No. 1 #2.  4+ months status post improving advanced left knee medial and anterior compartment osteoarthritis of bone-on-bone changes medial compartment left knee with improving synovitis with left knee Euflexxa No. 1    Impression:  Encounter Diagnoses   Name Primary?  Bilateral primary osteoarthritis of knee Yes    Chondromalacia of both patellae     Chronic pain of both knees        Office Procedures:  Orders Placed This Encounter   Procedures    NH ARTHROCENTESIS ASPIR&/INJ MAJOR JT/BURSA W/O US       Treatment Plan:  Treatment options were discussed with Alejo Lewis. We did once again review her plain films and exam findings.   She does have quite prominent arthritic changes to the

## 2019-06-21 ENCOUNTER — TELEPHONE (OUTPATIENT)
Dept: ORTHOPEDIC SURGERY | Age: 64
End: 2019-06-21

## 2019-06-21 NOTE — TELEPHONE ENCOUNTER
Patient called in with questions regarding Chago Montoya. She states she is very sore after getting her first set of injections on Wednesday. Patient went straight to work after getting shots and made sure to get in her 10,000 steps for the day. She then did the same thing on Thursday, getting in 8,000 steps. I told patient that we usually advise people to stay somewhat active, but to not overdo it. I explained that since this is her first time trying these injections, she may have overdone it with her first round of shots. I told her to make sure she is icing her knee regularly and taking her nsaids as prescribed over the next several days. I told her to back off from some of her activities through the weekend to see if that doesn't help with her discomfort. She will call if she is still having issues next week.

## 2019-07-03 ENCOUNTER — OFFICE VISIT (OUTPATIENT)
Dept: ORTHOPEDIC SURGERY | Age: 64
End: 2019-07-03
Payer: COMMERCIAL

## 2019-07-03 VITALS — HEIGHT: 68 IN | BODY MASS INDEX: 29.57 KG/M2 | WEIGHT: 195.11 LBS

## 2019-07-03 DIAGNOSIS — M25.561 CHRONIC PAIN OF BOTH KNEES: ICD-10-CM

## 2019-07-03 DIAGNOSIS — M17.0 BILATERAL PRIMARY OSTEOARTHRITIS OF KNEE: Primary | ICD-10-CM

## 2019-07-03 DIAGNOSIS — G89.29 CHRONIC PAIN OF BOTH KNEES: ICD-10-CM

## 2019-07-03 DIAGNOSIS — M22.41 CHONDROMALACIA OF BOTH PATELLAE: ICD-10-CM

## 2019-07-03 DIAGNOSIS — M22.42 CHONDROMALACIA OF BOTH PATELLAE: ICD-10-CM

## 2019-07-03 DIAGNOSIS — M25.562 CHRONIC PAIN OF BOTH KNEES: ICD-10-CM

## 2019-07-03 PROCEDURE — 20610 DRAIN/INJ JOINT/BURSA W/O US: CPT | Performed by: FAMILY MEDICINE

## 2019-07-03 RX ORDER — HYALURONATE SODIUM 10 MG/ML
40 SYRINGE (ML) INTRAARTICULAR ONCE
Status: COMPLETED | OUTPATIENT
Start: 2019-07-03 | End: 2019-07-03

## 2019-07-03 RX ADMIN — Medication 40 MG: at 15:06

## 2019-07-17 ENCOUNTER — OFFICE VISIT (OUTPATIENT)
Dept: ORTHOPEDIC SURGERY | Age: 64
End: 2019-07-17
Payer: COMMERCIAL

## 2019-07-17 VITALS — BODY MASS INDEX: 29.57 KG/M2 | HEIGHT: 68 IN | WEIGHT: 195.11 LBS

## 2019-07-17 DIAGNOSIS — G89.29 CHRONIC PAIN OF BOTH KNEES: ICD-10-CM

## 2019-07-17 DIAGNOSIS — M17.0 BILATERAL PRIMARY OSTEOARTHRITIS OF KNEE: Primary | ICD-10-CM

## 2019-07-17 DIAGNOSIS — M22.41 CHONDROMALACIA OF BOTH PATELLAE: ICD-10-CM

## 2019-07-17 DIAGNOSIS — M25.561 CHRONIC PAIN OF BOTH KNEES: ICD-10-CM

## 2019-07-17 DIAGNOSIS — M22.42 CHONDROMALACIA OF BOTH PATELLAE: ICD-10-CM

## 2019-07-17 DIAGNOSIS — M25.562 CHRONIC PAIN OF BOTH KNEES: ICD-10-CM

## 2019-07-17 PROCEDURE — 20610 DRAIN/INJ JOINT/BURSA W/O US: CPT | Performed by: FAMILY MEDICINE

## 2019-07-17 RX ORDER — HYALURONATE SODIUM 10 MG/ML
40 SYRINGE (ML) INTRAARTICULAR ONCE
Status: COMPLETED | OUTPATIENT
Start: 2019-07-17 | End: 2019-07-17

## 2019-07-17 RX ORDER — DICLOFENAC SODIUM 75 MG/1
75 TABLET, DELAYED RELEASE ORAL 2 TIMES DAILY
Qty: 60 TABLET | Refills: 3 | Status: SHIPPED | OUTPATIENT
Start: 2019-07-17 | End: 2019-09-25

## 2019-07-17 RX ADMIN — Medication 40 MG: at 15:27

## 2019-07-17 NOTE — PROGRESS NOTES
CC:  FU Knee Osteoarthritis with Viscosupplementation       HPI: Manolo is a very pleasant 81-year-old white female who still works part-time at [employer] and is very nice patient of Dr. Tatiana Pruett who is being seen today to continue with her Euflexxa injections. She did have a little bit of increased soreness following her first Euflexxa injection last week but her second injection last week was much better and she is beginning to notice improvement in her overall pain symptoms. She is continue with her diclofenac 75 mg 1 pill twice daily. She has been reasonably compliant with her home-based exercises and has been icing. This is her first attempt at [employer]. PE: no substantial change in exam.    Assessment:  Knee Osteoarthritis with viscosupplementation      PROCEDURE NOTE:    PRE-PROCEDURE DIAGNOSIS: DJD knee    POST-PROCEDURE DIAGNOSIS: DJD knee    Injection #3 of Euflexxa bilaterally. PROCEDURE:  With the patient's permission, her bilaterally knee was prepped in standard sterile fashion with Betadine and Alcohol and the prefilled 2cc injection of Euflexxa was injected intra-articularly into the bilaterally knee via a superolateral approach without difficulty. The patient tolerated this well without difficulty. A band-aid was applied. POST-PROCEDURE INSTRUCTIONS GIVEN TO PATIENT: The patient was advised to ice the knee for 15-20 minutes to relieve any injection site related pain. FOLLOW-UP: as directed. We will continue with her diclofenac 75 mg 1 pill twice daily as well as her home-based exercise program and icing. Activity modification was discussed. Guidelines for getting back into golf was discussed. She may utilize her brace. This is her first attempt at [employer], would like to see her back in 2 to 3 months for follow-up. She will contact us in the interim with questions or concerns.

## 2019-09-25 ENCOUNTER — OFFICE VISIT (OUTPATIENT)
Dept: ORTHOPEDIC SURGERY | Age: 64
End: 2019-09-25
Payer: COMMERCIAL

## 2019-09-25 VITALS
HEIGHT: 68 IN | HEART RATE: 70 BPM | SYSTOLIC BLOOD PRESSURE: 113 MMHG | DIASTOLIC BLOOD PRESSURE: 69 MMHG | WEIGHT: 195.11 LBS | BODY MASS INDEX: 29.57 KG/M2

## 2019-09-25 DIAGNOSIS — M22.41 CHONDROMALACIA OF BOTH PATELLAE: ICD-10-CM

## 2019-09-25 DIAGNOSIS — M22.42 CHONDROMALACIA OF BOTH PATELLAE: ICD-10-CM

## 2019-09-25 DIAGNOSIS — M25.562 CHRONIC PAIN OF BOTH KNEES: ICD-10-CM

## 2019-09-25 DIAGNOSIS — G89.29 CHRONIC PAIN OF BOTH KNEES: ICD-10-CM

## 2019-09-25 DIAGNOSIS — M17.0 BILATERAL PRIMARY OSTEOARTHRITIS OF KNEE: Primary | ICD-10-CM

## 2019-09-25 DIAGNOSIS — M25.561 CHRONIC PAIN OF BOTH KNEES: ICD-10-CM

## 2019-09-25 PROCEDURE — 99213 OFFICE O/P EST LOW 20 MIN: CPT | Performed by: FAMILY MEDICINE

## 2019-09-25 NOTE — PROGRESS NOTES
any tenderness, deformity or injury. Range of motion is unremarkable. There is no gross instability. There are no rashes, ulcerations or lesions. Strength and tone are normal.        Diagnostic Test Findings:       Assessment & Plan:    Encounter Diagnoses   Name Primary?  Bilateral primary osteoarthritis of knee Yes    Chondromalacia of both patellae     Chronic pain of both knees        No orders of the defined types were placed in this encounter. Treatment Plan:  Treatment options were discussed Justino Love. We did review her previous plain films and exam findings. She once again is known to have significant degenerative changes to the medial compartment on the left and lateral compartment on the right with patellofemoral arthropathy but clinically at this point is doing reasonably well. 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 Icing and activity modification was discussed. She will continue with her home-based exercise program.  She does believe the Euflexxa helped her substantially and would be eligible in about 4 months to get this once again which she will consider. Icing and activity modification was discussed. She will contact us in the interim with questions or concerns. This dictation was performed with a verbal recognition program (DRAGON) and it was checked for errors. It is possible that there are still dictated errors within this office note. If so, please bring any errors to my attention for an addendum. All efforts were made to ensure that this office note is accurate.

## 2019-11-13 ENCOUNTER — OFFICE VISIT (OUTPATIENT)
Dept: ORTHOPEDIC SURGERY | Age: 64
End: 2019-11-13
Payer: COMMERCIAL

## 2019-11-13 VITALS — BODY MASS INDEX: 29.1 KG/M2 | WEIGHT: 192 LBS | HEIGHT: 68 IN

## 2019-11-13 DIAGNOSIS — G89.29 CHRONIC PAIN OF BOTH KNEES: ICD-10-CM

## 2019-11-13 DIAGNOSIS — M25.561 CHRONIC PAIN OF BOTH KNEES: ICD-10-CM

## 2019-11-13 DIAGNOSIS — M25.562 CHRONIC PAIN OF BOTH KNEES: ICD-10-CM

## 2019-11-13 DIAGNOSIS — M22.42 CHONDROMALACIA OF BOTH PATELLAE: ICD-10-CM

## 2019-11-13 DIAGNOSIS — M17.0 BILATERAL PRIMARY OSTEOARTHRITIS OF KNEE: Primary | ICD-10-CM

## 2019-11-13 DIAGNOSIS — M22.41 CHONDROMALACIA OF BOTH PATELLAE: ICD-10-CM

## 2019-11-13 PROCEDURE — 99214 OFFICE O/P EST MOD 30 MIN: CPT | Performed by: FAMILY MEDICINE

## 2019-11-13 RX ORDER — DICLOFENAC SODIUM 75 MG/1
75 TABLET, DELAYED RELEASE ORAL 2 TIMES DAILY WITH MEALS
Qty: 60 TABLET | Refills: 3 | Status: SHIPPED | OUTPATIENT
Start: 2019-11-13 | End: 2020-09-16

## 2019-11-14 ENCOUNTER — TELEPHONE (OUTPATIENT)
Dept: ORTHOPEDIC SURGERY | Age: 64
End: 2019-11-14

## 2019-11-20 ENCOUNTER — OFFICE VISIT (OUTPATIENT)
Dept: ORTHOPEDIC SURGERY | Age: 64
End: 2019-11-20
Payer: COMMERCIAL

## 2019-11-20 VITALS — WEIGHT: 192.02 LBS | HEIGHT: 68 IN | BODY MASS INDEX: 29.1 KG/M2

## 2019-11-20 DIAGNOSIS — M22.42 CHONDROMALACIA OF BOTH PATELLAE: ICD-10-CM

## 2019-11-20 DIAGNOSIS — G89.29 CHRONIC PAIN OF BOTH KNEES: ICD-10-CM

## 2019-11-20 DIAGNOSIS — S83.271A COMPLEX TEAR OF LATERAL MENISCUS OF RIGHT KNEE AS CURRENT INJURY, INITIAL ENCOUNTER: ICD-10-CM

## 2019-11-20 DIAGNOSIS — M22.41 CHONDROMALACIA OF BOTH PATELLAE: ICD-10-CM

## 2019-11-20 DIAGNOSIS — M25.561 CHRONIC PAIN OF BOTH KNEES: ICD-10-CM

## 2019-11-20 DIAGNOSIS — M17.0 BILATERAL PRIMARY OSTEOARTHRITIS OF KNEE: Primary | ICD-10-CM

## 2019-11-20 DIAGNOSIS — M25.562 CHRONIC PAIN OF BOTH KNEES: ICD-10-CM

## 2019-11-20 PROCEDURE — 20610 DRAIN/INJ JOINT/BURSA W/O US: CPT | Performed by: FAMILY MEDICINE

## 2019-11-20 PROCEDURE — 99213 OFFICE O/P EST LOW 20 MIN: CPT | Performed by: FAMILY MEDICINE

## 2019-11-20 RX ORDER — BETAMETHASONE SODIUM PHOSPHATE AND BETAMETHASONE ACETATE 3; 3 MG/ML; MG/ML
24 INJECTION, SUSPENSION INTRA-ARTICULAR; INTRALESIONAL; INTRAMUSCULAR; SOFT TISSUE ONCE
Status: COMPLETED | OUTPATIENT
Start: 2019-11-20 | End: 2019-11-20

## 2019-11-20 RX ADMIN — BETAMETHASONE SODIUM PHOSPHATE AND BETAMETHASONE ACETATE 24 MG: 3; 3 INJECTION, SUSPENSION INTRA-ARTICULAR; INTRALESIONAL; INTRAMUSCULAR; SOFT TISSUE at 14:14

## 2019-12-18 DIAGNOSIS — M25.562 CHRONIC PAIN OF BOTH KNEES: ICD-10-CM

## 2019-12-18 DIAGNOSIS — M25.561 CHRONIC PAIN OF BOTH KNEES: ICD-10-CM

## 2019-12-18 DIAGNOSIS — G89.29 CHRONIC PAIN OF BOTH KNEES: ICD-10-CM

## 2019-12-18 DIAGNOSIS — M22.42 CHONDROMALACIA OF BOTH PATELLAE: ICD-10-CM

## 2019-12-18 DIAGNOSIS — M17.0 BILATERAL PRIMARY OSTEOARTHRITIS OF KNEE: Primary | ICD-10-CM

## 2019-12-18 DIAGNOSIS — M22.41 CHONDROMALACIA OF BOTH PATELLAE: ICD-10-CM

## 2020-01-13 ENCOUNTER — TELEPHONE (OUTPATIENT)
Dept: ORTHOPEDIC SURGERY | Age: 65
End: 2020-01-13

## 2020-01-13 NOTE — TELEPHONE ENCOUNTER
LVM FOR PATIENT THAT WE RECEIVED HER EUFLEXXA MEDICATION IN THE MAIL FROM THE [facility]. TOLD HER WE COULD SCHEDULE HER FOR NEXT TWO INJECTIONS WHEN SHE IS IN OFFICE FOR NEXT APPT ON 1/22 OR SHE COULD CALL ME DIRECTLY [phone] TO SCHEDULE THEM AHEAD OF TIME.

## 2020-01-20 ENCOUNTER — TELEPHONE (OUTPATIENT)
Dept: ORTHOPEDIC SURGERY | Age: 65
End: 2020-01-20

## 2020-01-20 NOTE — TELEPHONE ENCOUNTER
12/28/2019  EUFLEXXA  (SERIES OF 3)   BILATERAL KNEE. [facility].      12/28/2019  APPROVED CASE #  [id number]. DATES: 11/30/2019 - 01/29/2020. PER FAX FROM [facility]. DRUG WILL BE FILLED AND DELIVERED BY [facility]. 1/20/2020. DRUG SHIPPED ON 1/9/2020 AND WAS DELIVERED VIA [facility] TO THE [facility] OFFICE ON 1/10/2020 AND WAS RECEIVED BY \"ALEJANDRO\" @ 9:44AM, PER KOFFI @ [facility].   AP

## 2020-01-21 NOTE — PROGRESS NOTES
Chief Complaint  Knee Pain (Euflexxa #1 Bilateral Knees)    Hodan Rosales is a 62 y.o. female who is a very pleasant white female who still works part-time at [employer] who is a very nice patient of  Dr. Reji Franklin is being seen by today for recurrent worsening of her right knee pain with underlying osteoarthritis. He is known to have grade IV chondromalacia to the weightbearing portion of her lateral compartment of her knee as well as partial pseudo-extrusion of the lateral meniscus with a chronic complex flap tear with synovitis      History of Present Illness for Follow Up Patient:      Leonarda Scruggs is being seen in follow up today to review MRI results for ongoing right knee pain. Leonarda Scruggs is 12 weeks out from aggravation of right knee after a twisting injury. The patient rates their current pain as a 5 out of 10 on the pain scale but can increase with activities such as going from a seated to standing position or having to go up and down stairs. Treatment to date includes: rest, ice, NSAID- Diclofeac, physical therapy, home exercises, knee bracing, cortisone injection on 4/1/2019 and her first attempt at visco supplementation completed on 7/17/2019 to treat this problem. Her MRI was obtained at [facility] on 11/15/2019 did show evidence of grade IV chondromalacia of the weightbearing and posterior nonweightbearing portion of the lateral compartment with full-thickness chondral loss and some stress edema but no evidence of insufficiency fracture. She did have a lateral meniscal pseudo-extrusion with complex flap tear to the anterior horn to posterior root with chronic notch synovitis and ACL inflammation likely related to her twisting injury about a month ago. Denies locking catching or true instability symptoms and she would like to avoid surgery if at all possible. Prior to her twisting injury 3 months ago, she states her knee was doing very well after completing Visco supplementation in July 2019 bilaterally. She did get a benefit from her previous round of Visco supplementation has been considering this once again. His mom also been working on her home-based exercise program.            Attest: I have reviewed and attest the documentation of the HPI documented by my . I will make any changes if necessary. Enc Date: 1/22/2020  Time: 2:35 PM  Provider: Sal Santiago MD        Social History     Tobacco Use    Smoking status: Former Smoker     Last attempt to quit: 12/8/2004     Years since quitting: 15.1    Smokeless tobacco: Never Used   Substance Use Topics    Alcohol use: Yes     Comment: monthly    Drug use: No        Review of Systems  Pertinent items are noted in HPI  Review of systems reviewed from Patient History Form dated on 1/22/2020 and available in the patient's chart under the Media tab. Vital Signs     Ht 5' 7.99\" (1.727 m)   Wt 192 lb 0.3 oz (87.1 kg)   BMI 29.20 kg/m²       General Exam:   Constitutional: Patient is adequately groomed with no evidence of malnutrition  DTRs: Deep tendon reflexes are intact  Mental Status: The patient is oriented to time, place and person. The patient's mood and affect are appropriate. Lymphatic: The lymphatic examination bilaterally reveals all areas to be without enlargement or induration. Vascular: Examination reveals no swelling or calf tenderness. Peripheral pulses are palpable and 2+. Neurological: The patient has good coordination. There is no weakness or sensory deficit.       Knee Examination  Inspection:  There is no high-grade deformity other she does have bilaterally patellofemoral crepitation and trace knee joint effusions.     Palpation:  She does have ongoing tenderness over the medial and lateral patellofemoral facet bilaterally primarily on the right knee today.  This does seem to be more anterior in nature today. She does have less prominent tenderness clinically over the Lateral compartment of her right knee and and posterior nonweightbearing lateral femorotibial    compartment with full-thickness chondral loss and stress osseous edema.  Partial    pseudoextrusion of the lateral meniscus with complex flap tear from anterior horn to posterior    horn. 2. Chronic notch synovitis with inflamed ACL.       Thank you for the opportunity to provide your interpretation.               Riccardo Stark M.D.       A: Guero 11/18/2019 8:43 AM   T: REINA 11/18/2019 8:11 AM         Assessment & Plan:    Encounter Diagnoses   Name Primary?  Bilateral primary osteoarthritis of knee Yes    Chondromalacia of both patellae     Chronic pain of both knees        Orders Placed This Encounter   Procedures    DC ARTHROCENTESIS ASPIR&/INJ MAJOR JT/BURSA W/O US         Treatment Plan:  Treatment options were discussed with Gabo Vazquez. She does have quite prominent arthritic changes to the right knee lateral compartment and left knee medial compartment does have patellofemoral arthropathy particularly on the left knee compared with right knee. Baltazar Mcintosh was doing reasonably well following completion of her Visco supplementation series in July 2019 up until stepping in a hole at the end of October 2019 injuring her right knee which has responded fairly well to conservative treatment. We previously discussed surgical intervention however with her primarily grade 4 lateral compartment arthritic change and complex tear laterally, I suspect that arthroscopic debridement and chondroplasty would provide little in the way of longstanding relief. After discussion of pros and cons of Visco supplementation, she did receive her first injection of Euflexxa to her knees bilaterally. This was performed using the standard prefilled 2 cc syringe. She will continue with her diclofenac 75 mg 1 pill twice daily as well as her knee brace and patella protection program.  She will be seen back each next 2 weeks to finish up her Euflexxa series bilaterally.   She will contact us in the interim with questions or concerns.            This dictation was performed with a verbal recognition program (DRAGON) and it was checked for errors. It is possible that there are still dictated errors within this office note. If so, please bring any errors to my attention for an addendum. All efforts were made to ensure that this office note is accurate.

## 2020-01-22 ENCOUNTER — OFFICE VISIT (OUTPATIENT)
Dept: ORTHOPEDIC SURGERY | Age: 65
End: 2020-01-22
Payer: COMMERCIAL

## 2020-01-22 VITALS — WEIGHT: 192.02 LBS | BODY MASS INDEX: 29.1 KG/M2 | HEIGHT: 68 IN

## 2020-01-22 PROCEDURE — 99213 OFFICE O/P EST LOW 20 MIN: CPT | Performed by: FAMILY MEDICINE

## 2020-01-22 PROCEDURE — 20610 DRAIN/INJ JOINT/BURSA W/O US: CPT | Performed by: FAMILY MEDICINE

## 2020-01-22 RX ORDER — HYALURONATE SODIUM 10 MG/ML
40 SYRINGE (ML) INTRAARTICULAR ONCE
Status: COMPLETED | OUTPATIENT
Start: 2020-01-22 | End: 2020-01-22

## 2020-01-22 RX ADMIN — Medication 40 MG: at 13:22

## 2020-01-29 ENCOUNTER — OFFICE VISIT (OUTPATIENT)
Dept: ORTHOPEDIC SURGERY | Age: 65
End: 2020-01-29
Payer: COMMERCIAL

## 2020-01-29 VITALS — BODY MASS INDEX: 29.1 KG/M2 | HEIGHT: 68 IN | WEIGHT: 192.02 LBS

## 2020-01-29 PROCEDURE — 20610 DRAIN/INJ JOINT/BURSA W/O US: CPT | Performed by: FAMILY MEDICINE

## 2020-01-29 RX ORDER — HYALURONATE SODIUM 10 MG/ML
40 SYRINGE (ML) INTRAARTICULAR ONCE
Status: COMPLETED | OUTPATIENT
Start: 2020-01-29 | End: 2020-01-29

## 2020-01-29 RX ADMIN — Medication 40 MG: at 13:52

## 2020-02-05 ENCOUNTER — OFFICE VISIT (OUTPATIENT)
Dept: ORTHOPEDIC SURGERY | Age: 65
End: 2020-02-05
Payer: COMMERCIAL

## 2020-02-05 VITALS — BODY MASS INDEX: 29.1 KG/M2 | HEIGHT: 68 IN | WEIGHT: 192.02 LBS

## 2020-02-05 PROCEDURE — 20610 DRAIN/INJ JOINT/BURSA W/O US: CPT | Performed by: FAMILY MEDICINE

## 2020-02-05 RX ORDER — HYALURONATE SODIUM 10 MG/ML
40 SYRINGE (ML) INTRAARTICULAR ONCE
Status: COMPLETED | OUTPATIENT
Start: 2020-02-05 | End: 2020-02-05

## 2020-02-05 RX ADMIN — Medication 40 MG: at 13:37

## 2020-02-05 NOTE — PROGRESS NOTES
CC:  FU Knee Osteoarthritis with Viscosupplementation       HPI: Taz Olson is a very pleasant 26-year-old white female who still works part-time at [employer] who is a recreational golfer as well and is very nice patient of Dr. Hortencia López who is being seen today to continue with her Euflexxa injections. She did have a little bit of increased soreness following her first 2 Euflexxa injections over the last couple of weeks and has noticed a great deal of improvement since starting Visco supplementation. She is continue with her diclofenac 75 mg 1 pill twice daily. She has been reasonably compliant with her home-based exercises and has been icing. This is her second attempt at [facility]. PE: no substantial change in exam.    Assessment:  Knee Osteoarthritis with viscosupplementation      PROCEDURE NOTE:    PRE-PROCEDURE DIAGNOSIS: DJD knee    POST-PROCEDURE DIAGNOSIS: DJD knee    Injection #3 of Euflexxa bilaterally. PROCEDURE:  With the patient's permission, her bilaterally knee was prepped in standard sterile fashion with Betadine and Alcohol and the prefilled 2cc injection of Euflexxa was injected intra-articularly into the bilaterally knee via a superolateral approach without difficulty. The patient tolerated this well without difficulty. A band-aid was applied. POST-PROCEDURE INSTRUCTIONS GIVEN TO PATIENT: The patient was advised to ice the knee for 15-20 minutes to relieve any injection site related pain. FOLLOW-UP: as directed. We will continue with her diclofenac 75 mg 1 pill twice daily as well as her home-based exercise program and icing. Activity modification was discussed. Guidelines for getting back into golf was discussed. She may utilize her brace. She is aware that she can have repeat Visco supplementation in 6 months if necessary or potentially even an interim steroid injection if needed. She will contact us in the interim with questions or concerns.

## 2020-08-14 ENCOUNTER — TELEPHONE (OUTPATIENT)
Dept: ORTHOPEDIC SURGERY | Age: 65
End: 2020-08-14

## 2020-09-09 ENCOUNTER — TELEPHONE (OUTPATIENT)
Dept: ORTHOPEDIC SURGERY | Age: 65
End: 2020-09-09

## 2020-09-09 NOTE — TELEPHONE ENCOUNTER
LVM for patient that euflexxa injections have been approved for BILATERAL knees. Received medication from specialty pharmacy in mail and ready to schedule her for shots. Told patient they could call central scheduling at [phone] at their convenience to schedule those appointments.  Also told them if they had any problems or questions, they could call me directly at [phone]

## 2020-09-16 RX ORDER — DICLOFENAC SODIUM 75 MG/1
TABLET, DELAYED RELEASE ORAL
Qty: 60 TABLET | Refills: 2 | Status: SHIPPED | OUTPATIENT
Start: 2020-09-16

## 2020-10-14 ENCOUNTER — TELEPHONE (OUTPATIENT)
Dept: ORTHOPEDIC SURGERY | Age: 65
End: 2020-10-14

## 2020-10-21 ENCOUNTER — OFFICE VISIT (OUTPATIENT)
Dept: ORTHOPEDIC SURGERY | Age: 65
End: 2020-10-21
Payer: COMMERCIAL

## 2020-10-21 VITALS — WEIGHT: 192 LBS | HEIGHT: 68 IN | BODY MASS INDEX: 29.1 KG/M2

## 2020-10-21 PROCEDURE — 99214 OFFICE O/P EST MOD 30 MIN: CPT | Performed by: FAMILY MEDICINE

## 2020-10-21 PROCEDURE — 20610 DRAIN/INJ JOINT/BURSA W/O US: CPT | Performed by: FAMILY MEDICINE

## 2020-10-21 RX ORDER — HYALURONATE SODIUM 10 MG/ML
40 SYRINGE (ML) INTRAARTICULAR ONCE
Status: COMPLETED | OUTPATIENT
Start: 2020-10-21 | End: 2020-10-21

## 2020-10-21 RX ORDER — DICLOFENAC SODIUM 75 MG/1
75 TABLET, DELAYED RELEASE ORAL 2 TIMES DAILY
Qty: 60 TABLET | Refills: 3 | Status: SHIPPED | OUTPATIENT
Start: 2020-10-21 | End: 2021-11-15

## 2020-10-21 RX ADMIN — Medication 40 MG: at 13:07

## 2020-10-28 ENCOUNTER — OFFICE VISIT (OUTPATIENT)
Dept: ORTHOPEDIC SURGERY | Age: 65
End: 2020-10-28
Payer: COMMERCIAL

## 2020-10-28 VITALS — HEIGHT: 68 IN | WEIGHT: 192 LBS | BODY MASS INDEX: 29.1 KG/M2

## 2020-10-28 PROCEDURE — 20610 DRAIN/INJ JOINT/BURSA W/O US: CPT | Performed by: FAMILY MEDICINE

## 2020-10-28 RX ORDER — HYALURONATE SODIUM 10 MG/ML
40 SYRINGE (ML) INTRAARTICULAR ONCE
Status: COMPLETED | OUTPATIENT
Start: 2020-10-28 | End: 2020-10-28

## 2020-10-28 RX ADMIN — Medication 40 MG: at 10:50

## 2020-10-28 NOTE — PROGRESS NOTES
CC:  FU Knee Osteoarthritis with Viscosupplementation       HPI: Dayne Pantoja is a very pleasant 36-year-old white female who still works part-time at [employer] for [employer] 3 days/week and is very nice patient of Dr. Michelle Riley who is being seen today to continue with her Euflexxa injections. She is doing very well at this time and is here for her second Euflexxa injection. Her overall pain is doing much better but she does have some discomfort if she is carrying like him up and down stairs. Certainly this is tolerable at most only 1-2 out of 10. She is continue with her diclofenac 75 mg 1 pill twice daily. This is her third round of viscosupplementation that she is attempting and it has helped her substantially. Once again she has done an excellent job losing weight and has lost 50 pounds over the past year. PE: no substantial change in exam.    Assessment:  Knee Osteoarthritis with viscosupplementation      PROCEDURE NOTE:    PRE-PROCEDURE DIAGNOSIS: DJD knee    POST-PROCEDURE DIAGNOSIS: DJD knee    Injection #2 of Euflexxa bilaterally. PROCEDURE:  With the patient's permission, her bilaterally knee was prepped in standard sterile fashion with Betadine and Alcohol and the prefilled 2cc injection of Euflexxa was injected intra-articularly into the bilaterally knee via a superolateral approach without difficulty. The patient tolerated this well without difficulty. A band-aid was applied. POST-PROCEDURE INSTRUCTIONS GIVEN TO PATIENT: The patient was advised to ice the knee for 15-20 minutes to relieve any injection site related pain. FOLLOW-UP: as directed. We will continue with her diclofenac 75 mg 1 pill twice daily as well as her home-based exercise program and icing. Activity modification was discussed. Golfing guidelines were discussed once again. She may utilize her brace. She will be seen back next week to complete her Euflexxa series.   This is her third attempt at [facility]. She will contact us in the interim with questions or concerns.

## 2020-10-29 ENCOUNTER — HOSPITAL ENCOUNTER (OUTPATIENT)
Dept: WOMENS IMAGING | Age: 65
Discharge: HOME | End: 2020-10-29
Payer: COMMERCIAL

## 2020-10-29 PROCEDURE — 77067 SCR MAMMO BI INCL CAD: CPT

## 2020-10-30 ENCOUNTER — TELEPHONE (OUTPATIENT)
Dept: WOMENS IMAGING | Age: 65
End: 2020-10-30

## 2020-11-03 PROBLEM — R07.9 CHEST PAIN: Status: RESOLVED | Noted: 2020-11-03 | Resolved: 2020-11-03

## 2020-11-04 ENCOUNTER — OFFICE VISIT (OUTPATIENT)
Dept: ORTHOPEDIC SURGERY | Age: 65
End: 2020-11-04
Payer: COMMERCIAL

## 2020-11-04 ENCOUNTER — HOSPITAL ENCOUNTER (OUTPATIENT)
Dept: WOMENS IMAGING | Age: 65
End: 2020-11-04
Payer: COMMERCIAL

## 2020-11-04 ENCOUNTER — HOSPITAL ENCOUNTER (OUTPATIENT)
Dept: WOMENS IMAGING | Age: 65
Discharge: HOME | End: 2020-11-04
Payer: COMMERCIAL

## 2020-11-04 VITALS — HEIGHT: 68 IN | WEIGHT: 192 LBS | BODY MASS INDEX: 29.1 KG/M2

## 2020-11-04 PROCEDURE — G0279 TOMOSYNTHESIS, MAMMO: HCPCS

## 2020-11-04 PROCEDURE — 20610 DRAIN/INJ JOINT/BURSA W/O US: CPT | Performed by: FAMILY MEDICINE

## 2020-11-04 RX ORDER — HYALURONATE SODIUM 10 MG/ML
40 SYRINGE (ML) INTRAARTICULAR ONCE
Status: COMPLETED | OUTPATIENT
Start: 2020-11-04 | End: 2020-11-04

## 2020-11-04 RX ADMIN — Medication 40 MG: at 11:09

## 2020-11-04 NOTE — PROGRESS NOTES
CC:  FU Knee Osteoarthritis with Viscosupplementation       HPI: Kira Clark is a very pleasant 52-year-old white female who still works part-time at [employer] for [employer] 3 days/week and is very nice patient of Dr. Brett Snider who is being seen today to continue with her Euflexxa injections. She is doing very well at this time and is here for her second Euflexxa injection. Her overall pain is doing much better but she does have some discomfort if she is carrying like him up and down stairs. Certainly this is tolerable at most only 1-2 out of 10. She is continue with her diclofenac 75 mg 1 pill twice daily. This is her third round of viscosupplementation that she is attempting and it has helped her substantially. Once again she has done an excellent job losing weight and has lost 50 pounds over the past year. PE: no substantial change in exam.    Assessment:  Knee Osteoarthritis with viscosupplementation      PROCEDURE NOTE:    PRE-PROCEDURE DIAGNOSIS: DJD knee    POST-PROCEDURE DIAGNOSIS: DJD knee    Injection #3 of Euflexxa bilaterally. PROCEDURE:  With the patient's permission, her bilaterally knee was prepped in standard sterile fashion with Betadine and Alcohol and the prefilled 2cc injection of Euflexxa was injected intra-articularly into the bilaterally knee via a superolateral approach without difficulty. The patient tolerated this well without difficulty. A band-aid was applied. POST-PROCEDURE INSTRUCTIONS GIVEN TO PATIENT: The patient was advised to ice the knee for 15-20 minutes to relieve any injection site related pain. FOLLOW-UP: as directed. We will continue with her diclofenac 75 mg 1 pill twice daily as well as her home-based exercise program and icing. Activity modification was discussed. Golfing guidelines were discussed once again. She may utilize her brace. She is aware that she can have repeat viscosupplementation in 6 months. She will contact us in the interim with questions or concerns.

## 2021-02-24 ENCOUNTER — HOSPITAL ENCOUNTER (OUTPATIENT)
Dept: PHYSICAL THERAPY | Age: 66
Setting detail: THERAPIES SERIES
Discharge: HOME | End: 2021-02-24
Payer: COMMERCIAL

## 2021-02-24 PROCEDURE — 97110 THERAPEUTIC EXERCISES: CPT | Performed by: PHYSICAL THERAPIST

## 2021-02-24 PROCEDURE — 97140 MANUAL THERAPY 1/> REGIONS: CPT | Performed by: PHYSICAL THERAPIST

## 2021-02-24 PROCEDURE — 97161 PT EVAL LOW COMPLEX 20 MIN: CPT | Performed by: PHYSICAL THERAPIST

## 2021-02-24 NOTE — PLAN OF CARE
[facility], [facility]  [address] [place] [address], [address] [place] [address]  Phone: [phone]  Fax [phone]   Physical Therapy Certification    Dear Referring Practitioner: aZmzam Ramos NP,    We had the pleasure of evaluating the following patient for physical therapy services at [address] [place] [address]. A summary of our findings can be found in the initial assessment below. This includes our plan of care. If you have any questions or concerns regarding these findings, please do not hesitate to contact me at the office phone number checked above. Thank you for the referral.       Physician Signature:_______________________________Date:__________________  By signing above (or electronic signature), therapists plan is approved by physician    Patient: Shelia Weiss   : 1955   MRN: [id number]  Referring Physician: Referring Practitioner: Zamzam Ramos NP      Evaluation Date: 2021      Medical Diagnosis Information:  Diagnosis: cervical radiculopathy M54.12   Treatment Diagnosis: cervical radiculopathy M54.12                                         Insurance information: PT Insurance Information: [place] 30 visits, no auth 80/20 co-ins      Precautions/ Contra-indications: anterior cervical discectomy and fusion of C4-5, C5-6 and C6-7 in 2018 with Dr Will Letters Triggered by Intake questionnaire (Past 2 wk assessment):   [x] No, Questionnaire did not trigger screening.   [] Yes, Patient intake triggered further evaluation      [] C-SSRS Screening completed  [] PCP notified via Plan of Care  [] Emergency services notified     Latex Allergy:  [x]NO      []YES  Preferred Language for Healthcare:   [x]English       []other:    SUBJECTIVE: Patient stated complaint: Pt reports onset of neck and L arm pain and tingling without known MANOJ approx 3 months ago.   She does have Hx cervical discectomy and fusion C4-5-6-7 in 2018 and was in a cervical collar for 3 months following. She did not have and follow up PT at the time. Finally saw NP on 2/22/21 and was placed on steroid pack that she started yesterday. She does not want to have to undergo another surgery.     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  Functional Disability Index: NDI 38% disability    Height: 5'8\" Weight: 178  Pain Scale: 6-10/10  Easing factors: changing position, sometimes heat or ice  Provocative factors: sleeping, self care, position changes, lifting, standing, walking, driving, looking down, reading, overhead activity     Type: [x]Constant   []Intermittent  []Radiating []Localized []other:     Numbness/Tingling: L forearm and in to 4th/5th fingers    Occupation/School: part time with [employer] and caring for [employer]     Living Status/Prior Level of Function: Independent with ADLs and IADLs, enjoys walking, caring for grandkids and golf,  Lives alone with her dog in a home with stairs    OBJECTIVE:     CERV ROM LEFT RIGHT   Cervical Flexion 35 w/ incr radic    Cervical Extension 33 CTJ pain    Cervical SB 10 w/ pain 20    Cervical rotation 40 46        ROM Left Right   Shoulder scaption 145 135   Shoulder Abd     Shoulder ER     Shoulder IR               Strength  Left Right   UE mytomes 5/5 except triceps 4/5 5/5                            Reflexes Left Right   C5-6 Biceps 2+ 2+   C5-6 Brachioradialis 2+ 2+   C7-8 Triceps 2+ 2+     Reflexes/Sensation:    [x]Dermatomes/Myotomes intact    [x]Reflexes equal and normal bilaterally LE reflexes 1+ B   []Other: (-) clonus, hoffmans    Joint mobility:    []Normal    [x]Hypo--hypo upper thoracic spine, R 1st rib   []Hyper    Palpation: tenderness B UT, LS, scalenes, SP at C6-7-T1    Functional Mobility/Transfers: indep with log roll maneuver for supine<-->sit    Posture: mild fwd head, rounded shoulders B    Bandages/Dressings/Incisions: NA    Gait: (include devices/WB status): WNL     Orthopedic Special Tests: mild + vertebral artery to L (mild blurry vision), (-) to R                       [x] Patient history, allergies, meds reviewed. Medical chart reviewed. See intake form. Review Of Systems (ROS):  [x]Performed Review of systems (Integumentary, CardioPulmonary, Neurological) by intake and observation. Intake form has been scanned into medical record. Patient has been instructed to contact their primary care physician regarding ROS issues if not already being addressed at this time. Co-morbidities/Complexities (which will affect course of rehabilitation):   []None           Arthritic conditions   []Rheumatoid arthritis (M05.9)  [x]Osteoarthritis (M19.91)   Cardiovascular conditions   []Hypertension (I10)  []Hyperlipidemia (E78.5)  []Angina pectoris (I20)  []Atherosclerosis (I70)  []CVA Musculoskeletal conditions   [x]Disc pathology   []Congenital spine pathologies   [x]Prior surgical intervention  []Osteoporosis (M81.8)  []Osteopenia (M85.8)   Endocrine conditions   []Hypothyroid (E03.9)  []Hyperthyroid Gastrointestinal conditions   []Constipation (B54.43)   Metabolic conditions   []Morbid obesity (E66.01)  []Diabetes type 1(E10.65) or 2 (E11.65)   []Neuropathy (G60.9)     Pulmonary conditions   []Asthma (J45)  []Coughing   []COPD (J44.9)   Psychological Disorders  []Anxiety (F41.9)  []Depression (F32.9)   []Other:   []Other:          Barriers to/and or personal factors that will affect rehab potential:              []Age  []Sex   []Smoker              []Motivation/Lack of Motivation                        [x]Co-Morbidities--prior multi-level cervical fusion              []Cognitive Function, education/learning barriers              []Environmental, home barriers              []profession/work barriers  []past PT/medical experience  []other:  Justification:     Falls Risk Assessment (30 days):   [x] Falls Risk assessed and no intervention required.   [] Falls Risk assessed and pain with headaches (cervicogenic)    [x]Signs/symptoms consistent with nerve root involvement including myotome & dermatome dysfunction   []sign/symptoms consistent with facet dysfunction of cervical and thoracic spine    []signs/symptoms consistent suggesting central cord compression/UMN syndromes   [x]signs/symptoms consistent with discogenic cervical pain   []signs/symptoms consistent with rib dysfunction   []signs/symptoms consistent with postural dysfunction   []signs/symptoms consistent with shoulder pathology    []signs/symptoms consistent with post-surgical status including decreased ROM, strength and function.    []signs/symptoms consistent with pathology which may benefit from Dry Needling   []signs/symptoms which may limit the use of advanced manual therapy techniques: (Elevated CV risk profile, recent trauma, intolerance to end range positions, prior TIA, visual issues, UE neurological compromise )     Prognosis/Rehab Potential:      []Excellent   [x]Good    []Fair   []Poor    Tolerance of evaluation/treatment:    []Excellent   [x]Good    []Fair   []Poor    Physical Therapy Evaluation Complexity Justification  [x] A history of present problem with:  [] no personal factors and/or comorbidities that impact the plan of care;  [x]1-2 personal factors and/or comorbidities that impact the plan of care  []3 personal factors and/or comorbidities that impact the plan of care  [x] An examination of body systems using standardized tests and measures addressing any of the following: body structures and functions (impairments), activity limitations, and/or participation restrictions;:  [x] a total of 1-2 or more elements   [] a total of 3 or more elements   [] a total of 4 or more elements   [x] A clinical presentation with:  [x] stable and/or uncomplicated characteristics   [] evolving clinical presentation with changing characteristics  [] unstable and unpredictable characteristics;   [x] Clinical decision making of [x] low, [] moderate, [] high complexity using standardized patient assessment instrument and/or measurable assessment of functional outcome. [x] EVAL (LOW) 46939 (typically 20 minutes face-to-face)  [] EVAL (MOD) 81740 (typically 30 minutes face-to-face)  [] EVAL (HIGH) 10491 (typically 45 minutes face-to-face)  [] RE-EVAL     PLAN:   Frequency/Duration:  1-2 days per week for 8 Weeks:  Interventions:  [x]  Therapeutic exercise including: strength training, ROM, for cervical spine,scapula, core and Upper extremity, including postural re-education. [x]  NMR activation and proprioception for Deep cervical flexors, periscapular and RC muscles and Core, including postural re-education. [x]  Manual therapy as indicated for C/T spine, ribs, Soft tissue to include: Dry Needling/IASTM, STM, PROM, Gr I-IV mobilizations, manipulation. [x] Modalities as needed that may include: thermal agents, E-stim, Biofeedback, US, iontophoresis as indicated  [x] Patient education on joint protection, postural re-education, activity modification, progression of HEP. HEP instruction: see flowsheet for [address] [place] [address]    GOALS:  Patient stated goal: decreased pain with daily activities  [] Progressing: [] Met: [] Not Met: [] Adjusted    Therapist goals for Patient:   Short Term Goals: To be achieved in: 2 weeks  1. Independent in HEP and progression per patient tolerance, in order to prevent re-injury. [] Progressing: [] Met: [] Not Met: [] Adjusted  2. Patient will have a decrease in pain to facilitate improvement in movement, function, and ADLs as indicated by Functional Deficits. [] Progressing: [] Met: [] Not Met: [] Adjusted    Long Term Goals: To be achieved in: 8 weeks  1. Disability index score of 20% or less for the NDI to assist with reaching prior level of function. [] Progressing: [] Met: [] Not Met: [] Adjusted  2.  Patient will demonstrate increased AROM to [facility] of cervical/thoracic spine to allow for proper 98.1

## 2021-03-02 ENCOUNTER — HOSPITAL ENCOUNTER (OUTPATIENT)
Dept: PHYSICAL THERAPY | Age: 66
Setting detail: THERAPIES SERIES
Discharge: HOME | End: 2021-03-02
Payer: COMMERCIAL

## 2021-03-02 PROCEDURE — 97140 MANUAL THERAPY 1/> REGIONS: CPT

## 2021-03-02 PROCEDURE — 97110 THERAPEUTIC EXERCISES: CPT

## 2021-03-02 NOTE — FLOWSHEET NOTE
[facility], [address]  Phone: [phone]  Fax [phone]        Date:  3/2/2021    Patient Name:  Pippa Mcknight    :  1955  MRN: [id number]  Restrictions/Precautions:    Medical/Treatment Diagnosis Information:  · Diagnosis: cervical radiculopathy M54.12  · Treatment Diagnosis: cervical radiculopathy Q81.42  Insurance/Certification information:  PT Insurance Information: [place] 30 visits, no auth 80/20 co-ins  Physician Information:  Referring Practitioner: Cleotilde Pallas, NP  Has the plan of care been signed (Y/N):        []  Yes  [x]  No     Date of Patient follow up with Physician: 6 weeks      Is this a Progress Report:     []  Yes  [x]  No        If Yes:  Date Range for reporting period:  Beginnin21  Ending:     Progress report will be due (10 Rx or 30 days whichever is less): 59       Recertification will be due (POC Duration  / 90 days whichever is less): 8 weeks      Visit # Insurance Allowable Auth Required   In-person 1 30 []  Yes []  No    Telehealth   []  Yes []  No    Total            Functional Scale: NDI 38%    Date assessed:  21      Therapy Diagnosis/Practice Pattern:F, conservative      Number of Comorbidities:  []0     [x]1-2    []3+    Latex Allergy:  [x]NO      []YES  Preferred Language for Healthcare:   [x]English       []other:      Pain level:  6-10/10     SUBJECTIVE:  Pt states she felt no worse after IE but no change yet. She is having some tingling in her L forearm and hand entering PT. She is very stressed and knows this aggravates her symptoms. She can't get comfortable sleeping at night despite trying 3 pillows over the past few mos. She used to sleep on her L side but now mostly on her R d/t L arm pain.     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 Pt ed:   Supine pillow position (create cervical supp/roll with pillow but allow shoulders to \"rest\" off pillow; sidelying: work on neutral C-spine position without SB (can look at self in mirror or phone camera to get idea.)  Squatting/lifting cardboxes for work: neutral c-spine \"chin tuck\" instead of looking up/down repetitively from squatted position on ground (try chair, stool, etc to sit rather than kneel);will need demo and practice reps NV       x10'    Manual Intervention (96574) 25' total    STM B UT, LS. Scalenes, SO, B cerv PSs  SO release    Upper thoracic PA's-supine approx T5-4-3-2 x12'    1'x2    3\"x3 ea           Attempted each segment gr III but too sore, worse c/o tingling into L C5-6 derms so stopped   Post GHJ mob gr III R, L   PROM flex 15\"x4 ea  5\"x5 ea                        NMR re-education (15934)  CUES NEEDED                                                Therapeutic Activity (43696)          Demo lifting mechanics, simulate neutral c-spine posture for looking up/down from seated/squatted position NV                              Therapeutic Exercise and NMR EXR  [x] (86099) Provided verbal/tactile cueing for activities related to strengthening, flexibility, endurance, ROM  for improvements in scapular, scapulothoracic and UE control with self care, reaching, carrying, lifting, house/yardwork, driving/computer work.     [x] (77405) Provided verbal/tactile cueing for activities related to improving balance, coordination, kinesthetic sense, posture, motor skill, proprioception  to assist with  scapular, scapulothoracic and UE control with self care, reaching, carrying, lifting, house/yardwork, driving/computer work. Therapeutic Activities:    [] (59471 or 09980) Provided verbal/tactile cueing for activities related to improving balance, coordination, kinesthetic sense, posture, motor skill, proprioception and motor activation to allow for proper function of scapular, scapulothoracic and UE control with self care, carrying, lifting, driving/computer work.      Home Exercise Program:    [x] (09932) Reviewed/Progressed HEP activities related to strengthening, flexibility, endurance, ROM of scapular, scapulothoracic and UE control with self care, reaching, carrying, lifting, house/yardwork, driving/computer work  [] (60637) Reviewed/Progressed HEP activities related to improving balance, coordination, kinesthetic sense, posture, motor skill, proprioception of scapular, scapulothoracic and UE control with self care, reaching, carrying, lifting, house/yardwork, driving/computer work      Manual Treatments:  PROM / STM / Oscillations-Mobs:  G-I, II, III, IV (PA's, Inf., Post.)  [x] (18449) Provided manual therapy to mobilize soft tissue/joints of cervical/CT, scapular GHJ and UE for the purpose of modulating pain, promoting relaxation,  increasing ROM, reducing/eliminating soft tissue swelling/inflammation/restriction, improving soft tissue extensibility and allowing for proper ROM for normal function with self care, reaching, carrying, lifting, house/yardwork, driving/computer work    Modalities:  MHP to TL spine and c-spine c10 min long-sitting on wedge (2 pillows to help keep c-spine relaxed but in neutral)    Charges:  Timed Code Treatment Minutes: 45   Total Treatment Minutes: 55 (heat)     BWC time in/time out:     [] EVAL (LOW) 04948   [] EVAL (MOD) 59351   [] EVAL (HIGH) 17792   [] RE-EVAL     [x] RE(97795) Plan just implemented, too soon to assess goals progression  [] Other:     ASSESSMENT:  Pt had worse paresthesia in LUE with attempted upper to mid t-spine mobs (supine) so stopped, but light SOBR alleviated these symptoms, and simply laying supine without pillow under scaps to allow more neutral neck position was enough to abolish these s/s during tx. Added t-spine mobility via towel roll; pt adam well at neck/shoulders but lower to mid t-spine became sore after ~5 min. She will attempt to add this for HEP but will need to start slowly. Can add N flossing NV if radicular s/s are improving. Overall Progression Towards Functional goals/ Treatment Progress Update:  [] Patient is progressing as expected towards functional goals listed. [] Progression is slowed due to complexities/Impairments listed. [] Progression has been slowed due to co-morbidities.   [x] Plan just implemented, too soon to assess goals progression <30days   [] Goals require adjustment due to lack of progress  [] Patient is not progressing as expected and requires additional follow up with physician  [] Other    Prognosis for POC: [x] Good [] Fair  [] Poor      Patient requires continued skilled intervention: [x] Yes  [] No    Treatment/Activity Tolerance:  [x] Patient able to complete treatment  [] Patient limited by fatigue  [] Patient limited by pain    [] Patient limited by other medical complications  [] Other:         Return to Play: (if applicable)   []  Stage 1: Intro to Strength   []  Stage 2: Return to Run and Strength   []  Stage 3: Return to Jump and Strength   []  Stage 4: Dynamic Strength and Agility   []  Stage 5: Sport Specific Training     []  Ready to Return to Play, Meets All Above Stages   []  Not Ready for Return to Sports   Comments:                               PLAN:  [x] Continue per plan of care [] Alter current plan (see comments above)  [] Plan of care initiated [] Hold pending MD visit [] Discharge      Electronically signed by:  Betsy Bhat, PT , DPT  Note: If patient does not return for scheduled/ recommended follow up visits, this note will serve as a discharge from care along with most recent update on progress. Access Code: [email or web address]   URL: [email or web address]   Date: 02/24/2021   Prepared by: Elizabeth Space     Exercises   · Supine Cervical Retraction with Towel - 10 reps - 1-2 sets - 2 seconds hold - 2x daily - 7x weekly   · Supine Scapular Retraction - 10 reps - 1-2 sets - 2 seconds hold - 2x daily - 7x weekly   · Seated Scapular Retraction - 10 reps - 1-2 sets - 2 seconds hold - 2x daily - 7x weekly

## 2021-03-05 ENCOUNTER — HOSPITAL ENCOUNTER (OUTPATIENT)
Dept: PHYSICAL THERAPY | Age: 66
Setting detail: THERAPIES SERIES
Discharge: HOME | End: 2021-03-05
Payer: COMMERCIAL

## 2021-03-05 PROCEDURE — 97110 THERAPEUTIC EXERCISES: CPT

## 2021-03-05 PROCEDURE — 97140 MANUAL THERAPY 1/> REGIONS: CPT

## 2021-03-05 NOTE — FLOWSHEET NOTE
[facility], [address] [place]  Phone: [phone]  Fax [phone]        Date:  3/5/2021    Patient Name:  Deborah Dominique    :  1955  MRN: [id number]  Restrictions/Precautions:    Medical/Treatment Diagnosis Information:  · Diagnosis: cervical radiculopathy M54.12  · Treatment Diagnosis: cervical radiculopathy D23.89  Insurance/Certification information:  PT Insurance Information: [place] 30 visits, no auth 80/20 co-ins  Physician Information:  Referring Practitioner: Darrell Siemens, NP  Has the plan of care been signed (Y/N):        []  Yes  [x]  No     Date of Patient follow up with Physician: 6 weeks      Is this a Progress Report:     []  Yes  [x]  No        If Yes:  Date Range for reporting period:  Beginnin21  Ending:     Progress report will be due (10 Rx or 30 days whichever is less): 30       Recertification will be due (POC Duration  / 90 days whichever is less): 8 weeks      Visit # Insurance Allowable Auth Required   In-person 1 30 []  Yes []  No    Telehealth   []  Yes []  No    Total            Functional Scale: NDI 38%    Date assessed:  21      Therapy Diagnosis/Practice Pattern:F, conservative      Number of Comorbidities:  []0     [x]1-2    []3+    Latex Allergy:  [x]NO      []YES  Preferred Language for Healthcare:   [x]English       []other:      Pain level:  6-10/10     SUBJECTIVE:  Pt states she felt slightly more sore in mid to lower thoracic spine after LV, but ok today (baseline.) She enters PT with some tingling in L lat arm, forearm and whole L hand).     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    Exercises/Interventions:     Therapeutic Ex (69129) Sets/sec/reps Notes/CUES   Supine chin tuck  HEP   Seated cerv retrac + scap set  Seated no money 2\" x10   HEP no N/T     Supine 2 towel roll vert  With pec S  With pec minor \"butterfly\" S  With scap pro/retrac  With lat S \"backstroke\" 1'  30\"x2  15\"x2  x10  10\"x5 alt R/L ea Not as sore today in lower t-spine   Supine N flossing: median bias  x20 R, L Did supine for neutral neck/unloaded  (can give for HEP if no worse at NV)   practice seated posture,  \" standing posture at wall,   \" Sitting \"squat to lift\" box with neutral c-spine to mimic work duties NV                                  Pt ed:   Supine pillow position (create cervical supp/roll with pillow but allow shoulders to \"rest\" off pillow; sidelying: work on neutral C-spine position without SB (can look at self in mirror or phone camera to get idea.)  Squatting/lifting cardboxes for work: neutral c-spine \"chin tuck\" instead of looking up/down repetitively from squatted position on ground (try chair, stool, etc to sit rather than kneel); Manual Intervention (44155) 30' total    STM B UT, LS. Scalenes, SO, B cerv PSs  SO release     x15'    1'x2                  Post GHJ mob gr III R, L   PROM flex 15\"x4 ea  5\"x5 ea    Prone T-spine mobs gr I-II approx T4-T10  STM thoracic PSs 10' Sore R T9-10 but adam stil                  NMR re-education (18825)  CUES NEEDED                                                Therapeutic Activity (79919)                                         Therapeutic Exercise and NMR EXR  [x] (86011) Provided verbal/tactile cueing for activities related to strengthening, flexibility, endurance, ROM  for improvements in scapular, scapulothoracic and UE control with self care, reaching, carrying, lifting, house/yardwork, driving/computer work.     [x] (48310) Provided verbal/tactile cueing for activities related to improving balance, coordination, kinesthetic sense, posture, motor skill, proprioception  to assist with  scapular, scapulothoracic and UE control with self care, reaching, carrying, GOALS:  Patient stated goal: decreased pain with daily activities  [] Progressing: [] Met: [] Not Met: [] Adjusted    Therapist goals for Patient:   Short Term Goals: To be achieved in: 2 weeks  1. Independent in HEP and progression per patient tolerance, in order to prevent re-injury. [] Progressing: [] Met: [] Not Met: [] Adjusted  2. Patient will have a decrease in pain to facilitate improvement in movement, function, and ADLs as indicated by Functional Deficits. [] Progressing: [] Met: [] Not Met: [] Adjusted    Long Term Goals: To be achieved in: 8 weeks  1. Disability index score of 20% or less for the NDI to assist with reaching prior level of function. [] Progressing: [] Met: [] Not Met: [] Adjusted  2. Patient will demonstrate increased AROM to [facility] of cervical/thoracic spine to allow for proper joint functioning as indicated by patients Functional Deficits. [] Progressing: [] Met: [] Not Met: [] Adjusted  3. Patient will demonstrate an increase in postural awareness and control and activation of  Deep cervical stabilizers to allow for proper functional mobility as indicated by patients Functional Deficits. [] Progressing: [] Met: [] Not Met: [] Adjusted   4. Patient will return to turning head to check blind spots when driving without increased symptoms or restriction. [] Progressing: [] Met: [] Not Met: [] Adjusted  5. Pt will be indep with lifting up to 20# with good mechanics in order to safely  grandkids and card boxes as needed for work. [] Progressing: [] Met: [] Not Met: [] Adjusted     Progression Towards Functional goals:  [] Patient is progressing as expected towards functional goals listed. [] Progression is slowed due to complexities listed. [] Progression has been slowed due to co-morbidities.   [x] Plan just implemented, too soon to assess goals progression  [] Other:     ASSESSMENT:  Pt able to tolerate prone position for gentle mobs, and following this, she was more comfortable in supine with chest stretching on towel roll. She needs continued education and reassurance that her pain patterns are result of long-standing mm imbalance, fact that she didn't have time to build up stability of CT spine/scaps following her neck fusion, and fact that she could improve her body mechanics and posture with all her activities currently. Will work to add in functional examples for home and work, and need to remind pt that inc'd lifting (cards for work/grandchildren) can also be aggravating her s/s. She did tolerate supine N flossing and reported no N/T in L hand leaving PT. Overall Progression Towards Functional goals/ Treatment Progress Update:  [] Patient is progressing as expected towards functional goals listed. [] Progression is slowed due to complexities/Impairments listed. [] Progression has been slowed due to co-morbidities.   [x] Plan just implemented, too soon to assess goals progression <30days   [] Goals require adjustment due to lack of progress  [] Patient is not progressing as expected and requires additional follow up with physician  [] Other    Prognosis for POC: [x] Good [] Fair  [] Poor      Patient requires continued skilled intervention: [x] Yes  [] No    Treatment/Activity Tolerance:  [x] Patient able to complete treatment  [] Patient limited by fatigue  [] Patient limited by pain    [] Patient limited by other medical complications  [] Other:         Return to Play: (if applicable)   []  Stage 1: Intro to Strength   []  Stage 2: Return to Run and Strength   []  Stage 3: Return to Jump and Strength   []  Stage 4: Dynamic Strength and Agility   []  Stage 5: Sport Specific Training     []  Ready to Return to Play, Meets All Above Stages   []  Not Ready for Return to Sports   Comments:                               PLAN:  [x] Continue per plan of care [] Alter current plan (see comments above)  [] Plan of care initiated [] Hold pending MD visit [] Discharge      Electronically signed by:  Lionel Paulino, PT , DPT  Note: If patient does not return for scheduled/ recommended follow up visits, this note will serve as a discharge from care along with most recent update on progress. Access Code: [email or web address]   URL: [email or web address]   Date: 02/24/2021   Prepared by: Marta Colorado     Exercises   · Supine Cervical Retraction with Towel - 10 reps - 1-2 sets - 2 seconds hold - 2x daily - 7x weekly   · Supine Scapular Retraction - 10 reps - 1-2 sets - 2 seconds hold - 2x daily - 7x weekly   · Seated Scapular Retraction - 10 reps - 1-2 sets - 2 seconds hold - 2x daily - 7x weekly

## 2021-03-08 ENCOUNTER — HOSPITAL ENCOUNTER (OUTPATIENT)
Dept: PHYSICAL THERAPY | Age: 66
Setting detail: THERAPIES SERIES
Discharge: HOME | End: 2021-03-08
Payer: COMMERCIAL

## 2021-03-08 PROCEDURE — 97110 THERAPEUTIC EXERCISES: CPT | Performed by: PHYSICAL THERAPIST

## 2021-03-08 PROCEDURE — 97140 MANUAL THERAPY 1/> REGIONS: CPT | Performed by: PHYSICAL THERAPIST

## 2021-03-08 NOTE — FLOWSHEET NOTE
[facility], [address]  Phone: [phone]  Fax [phone]        Date:  3/8/2021    Patient Name:  Satya Singh    :  1955  MRN: [id number]  Restrictions/Precautions:    Medical/Treatment Diagnosis Information:  · Diagnosis: cervical radiculopathy M54.12  · Treatment Diagnosis: cervical radiculopathy M61.58  Insurance/Certification information:  PT Insurance Information: [place] 30 visits, no auth 80/20 co-ins  Physician Information:  Referring Practitioner: Delmi Salas NP  Has the plan of care been signed (Y/N):        [x]  Yes  []  No     Date of Patient follow up with Physician: 6 weeks      Is this a Progress Report:     []  Yes  [x]  No        If Yes:  Date Range for reporting period:  Beginnin21  Ending:     Progress report will be due (10 Rx or 30 days whichever is less): 83       Recertification will be due (POC Duration  / 90 days whichever is less): 8 weeks      Visit # Insurance Allowable Auth Required   In-person 4 30 []  Yes []  No    Telehealth   []  Yes []  No    Total            Functional Scale: NDI 38%    Date assessed:  21      Therapy Diagnosis/Practice Pattern:F, conservative      Number of Comorbidities:  []0     [x]1-2    []3+    Latex Allergy:  [x]NO      []YES  Preferred Language for Healthcare:   [x]English       []other:      Pain level:  eval 6-10/10 Current pain 5-6/10    SUBJECTIVE:  Pt reports she was trying to do the towel stretch at home and started to hove thoracic pain. Not sure if she was doing something incorrectly?     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    Exercises/Interventions:     Therapeutic Ex (50868) Sets/sec/reps Notes/CUES   Supine chin tuck  HEP   Seated cerv retrac + scap set  Seated no money 2\" x10   HEP no N/T     Supine 2 towel roll vert  With pec S  With pec minor \"butterfly\" S  With scap pro/retrac  With lat S \"backstroke\"  W/ B UE overhead w/ TA 1'  30\"x2  15\"x2  x10  10\"x5 alt R/L ea  x3--t-spine pain Not as sore today in lower t-spine   Supine N flossing: median bias  x20 R, L Did supine for neutral neck/unloaded  (can give for HEP if no worse at NV)   Prone scap set w/ mini row x15         practice seated posture,  \" standing posture at wall,   \" Sitting \"squat to lift\" box with neutral c-spine to mimic work duties x1'  NV  NV  NV    Standing mini squat  Standing mini squat w/ TT row x10  RTT x10 Hip hinge cues and decr fwd knee position                            Pt ed:     Squatting/lifting cardboxes for work: neutral c-spine \"chin tuck\" instead of looking up/down repetitively from squatted position on ground, try kneeling pad vs trying to kneel on hard floor (l); placement for towel roll w/ supine HEP and engaging abs to minimize thoracic extension                       x10'    Manual Intervention (33627) 25' total    STM B UT, LS. Scalenes, SO, B cerv PSs  SO release     x12'    1'x2                  Post GHJ mob gr III R, L   PROM flex 15\"x4 ea  5\"x5 ea    Prone T-spine mobs gr I-II approx T2-T10  STM thoracic PSs 8' Sore R T9-10 but adam stil                  NMR re-education (91241)  CUES NEEDED                                                Therapeutic Activity (32673)                                         Therapeutic Exercise and NMR EXR  [x] (09772) Provided verbal/tactile cueing for activities related to strengthening, flexibility, endurance, ROM  for improvements in scapular, scapulothoracic and UE control with self care, reaching, carrying, lifting, house/yardwork, driving/computer work.     [x] (31673) Provided verbal/tactile cueing for activities related to improving balance, coordination, kinesthetic sense, posture, motor skill, proprioception  to assist with scapular, scapulothoracic and UE control with self care, reaching, carrying, lifting, house/yardwork, driving/computer work. Therapeutic Activities:    [] (90899 or 71687) Provided verbal/tactile cueing for activities related to improving balance, coordination, kinesthetic sense, posture, motor skill, proprioception and motor activation to allow for proper function of scapular, scapulothoracic and UE control with self care, carrying, lifting, driving/computer work.      Home Exercise Program:    [x] (47479) Reviewed/Progressed HEP activities related to strengthening, flexibility, endurance, ROM of scapular, scapulothoracic and UE control with self care, reaching, carrying, lifting, house/yardwork, driving/computer work  [] (68098) Reviewed/Progressed HEP activities related to improving balance, coordination, kinesthetic sense, posture, motor skill, proprioception of scapular, scapulothoracic and UE control with self care, reaching, carrying, lifting, house/yardwork, driving/computer work      Manual Treatments:  PROM / STM / Oscillations-Mobs:  G-I, II, III, IV (PA's, Inf., Post.)  [x] (09650) Provided manual therapy to mobilize soft tissue/joints of cervical/CT, scapular GHJ and UE for the purpose of modulating pain, promoting relaxation,  increasing ROM, reducing/eliminating soft tissue swelling/inflammation/restriction, improving soft tissue extensibility and allowing for proper ROM for normal function with self care, reaching, carrying, lifting, house/yardwork, driving/computer work    Modalities:  MHP to TL spine and c-spine x10 min long-sitting on wedge (2 pillows to help keep c-spine relaxed but in neutral)    Charges:  Timed Code Treatment Minutes: 45   Total Treatment Minutes: 55 (heat)     [facility] time in/time out:     [] EVAL (LOW) 37016   [] EVAL (MOD) 28024   [] EVAL (HIGH) 87574   [] RE-EVAL     [x] IE(76458) x 1    [] IONTO  [] NMR (22070) x     [] VASO  [x] Manual (77193) x  2    [] Other:  [] TA x [] [facility] Traction (31137)  [] ES(attended) (33348)      [] ES (un) (30142):     GOALS:  Patient stated goal: decreased pain with daily activities  [] Progressing: [] Met: [] Not Met: [] Adjusted    Therapist goals for Patient:   Short Term Goals: To be achieved in: 2 weeks  1. Independent in HEP and progression per patient tolerance, in order to prevent re-injury. [] Progressing: [] Met: [] Not Met: [] Adjusted  2. Patient will have a decrease in pain to facilitate improvement in movement, function, and ADLs as indicated by Functional Deficits. [] Progressing: [] Met: [] Not Met: [] Adjusted    Long Term Goals: To be achieved in: 8 weeks  1. Disability index score of 20% or less for the NDI to assist with reaching prior level of function. [] Progressing: [] Met: [] Not Met: [] Adjusted  2. Patient will demonstrate increased AROM to [facility] of cervical/thoracic spine to allow for proper joint functioning as indicated by patients Functional Deficits. [] Progressing: [] Met: [] Not Met: [] Adjusted  3. Patient will demonstrate an increase in postural awareness and control and activation of  Deep cervical stabilizers to allow for proper functional mobility as indicated by patients Functional Deficits. [] Progressing: [] Met: [] Not Met: [] Adjusted   4. Patient will return to turning head to check blind spots when driving without increased symptoms or restriction. [] Progressing: [] Met: [] Not Met: [] Adjusted  5. Pt will be indep with lifting up to 20# with good mechanics in order to safely  grandkids and card boxes as needed for work. [] Progressing: [] Met: [] Not Met: [] Adjusted     Progression Towards Functional goals:  [] Patient is progressing as expected towards functional goals listed. [] Progression is slowed due to complexities listed. [] Progression has been slowed due to co-morbidities.   [x] Plan just implemented, too soon to assess goals progression  [] Other:     ASSESSMENT: difficulty with progression to seated and standing TE with cues as noted above. Mild increase in tinging L UE with initial seated posture due to UT compensations. Difficulty with hip hinge and knee position with initiated squatting w/ and w/o TB row. Able to initiate prone scap work without radic sx reported. Overall Progression Towards Functional goals/ Treatment Progress Update:  [] Patient is progressing as expected towards functional goals listed. [] Progression is slowed due to complexities/Impairments listed. [] Progression has been slowed due to co-morbidities. [x] Plan just implemented, too soon to assess goals progression <30days   [] Goals require adjustment due to lack of progress  [] Patient is not progressing as expected and requires additional follow up with physician  [] Other    Prognosis for POC: [x] Good [] Fair  [] Poor    Patient requires continued skilled intervention: [x] Yes  [] No    Treatment/Activity Tolerance:  [x] Patient able to complete treatment  [] Patient limited by fatigue  [] Patient limited by pain    [] Patient limited by other medical complications  [] Other:     Return to Play: (if applicable)   []  Stage 1: Intro to Strength   []  Stage 2: Return to Run and Strength   []  Stage 3: Return to Jump and Strength   []  Stage 4: Dynamic Strength and Agility   []  Stage 5: Sport Specific Training     []  Ready to Return to Play, Meets All Above Stages   []  Not Ready for Return to Sports   Comments:                       PLAN:  [x] Continue per plan of care [] Alter current plan (see comments above)  [] Plan of care initiated [] Hold pending MD visit [] Discharge      Electronically signed by:  Harley Carbone PT ,   Note: If patient does not return for scheduled/ recommended follow up visits, this note will serve as a discharge from care along with most recent update on progress. Access Code: [email or web address]   URL: [email or web address]   Date: 02/24/2021 Prepared by: Pleasant Dandy     Exercises   · Supine Cervical Retraction with Towel - 10 reps - 1-2 sets - 2 seconds hold - 2x daily - 7x weekly   · Supine Scapular Retraction - 10 reps - 1-2 sets - 2 seconds hold - 2x daily - 7x weekly   · Seated Scapular Retraction - 10 reps - 1-2 sets - 2 seconds hold - 2x daily - 7x weekly

## 2021-03-10 ENCOUNTER — HOSPITAL ENCOUNTER (OUTPATIENT)
Dept: PHYSICAL THERAPY | Age: 66
Setting detail: THERAPIES SERIES
Discharge: HOME | End: 2021-03-10
Payer: COMMERCIAL

## 2021-03-10 PROCEDURE — 97140 MANUAL THERAPY 1/> REGIONS: CPT | Performed by: PHYSICAL THERAPIST

## 2021-03-10 PROCEDURE — 97110 THERAPEUTIC EXERCISES: CPT | Performed by: PHYSICAL THERAPIST

## 2021-03-10 NOTE — FLOWSHEET NOTE
[facility], [address] [place]  Phone: [phone]  Fax [phone]        Date:  3/10/2021    Patient Name:  Pippa Mcknight    :  1955  MRN: [id number]  Restrictions/Precautions:    Medical/Treatment Diagnosis Information:  · Diagnosis: cervical radiculopathy M54.12  · Treatment Diagnosis: cervical radiculopathy E41.53  Insurance/Certification information:  PT Insurance Information: [place] 30 visits, no auth 80/20 co-ins  Physician Information:  Referring Practitioner: Cleotilde Pallas, NP  Has the plan of care been signed (Y/N):        [x]  Yes  []  No     Date of Patient follow up with Physician: 6 weeks      Is this a Progress Report:     []  Yes  [x]  No        If Yes:  Date Range for reporting period:  Beginnin21  Ending:     Progress report will be due (10 Rx or 30 days whichever is less):        Recertification will be due (POC Duration  / 90 days whichever is less): 8 weeks      Visit # Insurance Allowable Auth Required   In-person 5 30 []  Yes []  No    Telehealth   []  Yes []  No    Total            Functional Scale: NDI 38%    Date assessed:  21      Therapy Diagnosis/Practice Pattern:F, conservative      Number of Comorbidities:  []0     [x]1-2    []3+    Latex Allergy:  [x]NO      []YES  Preferred Language for Healthcare:   [x]English       []other:      Pain level:  eval 6-10/10 Current pain 7/10 neck, 8/10 LB    SUBJECTIVE:  Pt reports she felt fine following LV, but then had to do a lot of lifting boxes at work and then watched her 2 y/o and 6 mo/o grandchildren yesterday, including lifting both at the same time at one point, and she is really hurting today. She is getting antsy to try golfing with the weather getting nicer.     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    Exercises/Interventions:     Therapeutic Ex (46861) Sets/sec/reps Notes/CUES   Supine chin tuck  HEP   Seated cerv retrac + scap set  Seated no money 2\" x10   HEP no N/T     Supine 2 towel roll vert  With pec S  With pec minor \"butterfly\" S  With scap pro/retrac  With lat S \"backstroke\"  W/ B UE overhead w/ TA  All for HEP   Supine N flossing: median bias  x20 R, L Did supine for neutral neck/unloaded  (can give for HEP if no worse at [place])   Prone scap set w/ mini row  Prone scap set w/ sm B shoulder ext 2x10  x15 VTC's for form  VTC's for form        practice seated posture,  \" standing posture at wall,   \" Sitting \"squat to lift\" box with neutral c-spine to mimic work duties   NV  NV  NV    Standing mini squat  Standing mini squat w/ TT row  TT mini row w/o squat  TT high row  TT anti rotation side step 2 steps x10  RTT x10  RTT x10  RTT x15  RTT x10 R/x7 L Hip hinge cues and decr fwd knee position      N/T L chest and UE w/ step to L so stopped early                            Pt ed:     Asking for assistance with lifting at work vs lifting heavy boxes indep, Squatting/lifting cardboxes for work: neutral c-spine \"chin tuck\" instead of looking up/down repetitively from squatted position on ground,  (l);                        x10'    Manual Intervention (67341) 30' total    STM B UT, LS.  Scalenes, SO, B cerv PSs  SO release     x12'    1'x2                  Post GHJ mob gr III R, L   PROM flex 15\"x4 ea      Prone T-spine mobs gr I-II approx T2-T10  STM thoracic PSs 8' Sore R T9-10 but adam stil   STM R>L lumbar x6'              NMR re-education (49260)  CUES NEEDED                                                Therapeutic Activity (65383)                                         Therapeutic Exercise and NMR EXR  [x] (26896) Provided verbal/tactile cueing for activities related to strengthening, flexibility, endurance, ROM  for improvements in scapular, scapulothoracic and UE control with self care, reaching, carrying, lifting, house/yardwork, driving/computer work. [x] (52741) Provided verbal/tactile cueing for activities related to improving balance, coordination, kinesthetic sense, posture, motor skill, proprioception  to assist with  scapular, scapulothoracic and UE control with self care, reaching, carrying, lifting, house/yardwork, driving/computer work. Therapeutic Activities:    [] (59592 or 58902) Provided verbal/tactile cueing for activities related to improving balance, coordination, kinesthetic sense, posture, motor skill, proprioception and motor activation to allow for proper function of scapular, scapulothoracic and UE control with self care, carrying, lifting, driving/computer work.      Home Exercise Program:    [x] (40532) Reviewed/Progressed HEP activities related to strengthening, flexibility, endurance, ROM of scapular, scapulothoracic and UE control with self care, reaching, carrying, lifting, house/yardwork, driving/computer work  [] (76731) Reviewed/Progressed HEP activities related to improving balance, coordination, kinesthetic sense, posture, motor skill, proprioception of scapular, scapulothoracic and UE control with self care, reaching, carrying, lifting, house/yardwork, driving/computer work      Manual Treatments:  PROM / STM / Oscillations-Mobs:  G-I, II, III, IV (PA's, Inf., Post.)  [x] (63110) Provided manual therapy to mobilize soft tissue/joints of cervical/CT, scapular GHJ and UE for the purpose of modulating pain, promoting relaxation,  increasing ROM, reducing/eliminating soft tissue swelling/inflammation/restriction, improving soft tissue extensibility and allowing for proper ROM for normal function with self care, reaching, carrying, lifting, house/yardwork, driving/computer work    Modalities:  MHP to TL spine and c-spine x10 min long-sitting on wedge (2 pillows to help keep c-spine relaxed but in neutral)    Charges:  Timed Code Treatment Minutes: 50   Total Treatment Minutes: 60 (heat)     [facility] time in/time out:     [] EVAL (LOW) 41096   [] EVAL (MOD) 71309   [] EVAL (HIGH) 67136   [] RE-EVAL     [x] DB(72920) x 1    [] IONTO  [] NMR (61853) x     [] VASO  [x] Manual (52027) x  2    [] Other:  [] TA x      [] Mech Traction (01794)  [] ES(attended) (47489)      [] ES (un) (48370):     GOALS:  Patient stated goal: decreased pain with daily activities  [] Progressing: [] Met: [] Not Met: [] Adjusted    Therapist goals for Patient:   Short Term Goals: To be achieved in: 2 weeks  1. Independent in HEP and progression per patient tolerance, in order to prevent re-injury. [] Progressing: [] Met: [] Not Met: [] Adjusted  2. Patient will have a decrease in pain to facilitate improvement in movement, function, and ADLs as indicated by Functional Deficits. [] Progressing: [] Met: [] Not Met: [] Adjusted    Long Term Goals: To be achieved in: 8 weeks  1. Disability index score of 20% or less for the NDI to assist with reaching prior level of function. [] Progressing: [] Met: [] Not Met: [] Adjusted  2. Patient will demonstrate increased AROM to [facility] of cervical/thoracic spine to allow for proper joint functioning as indicated by patients Functional Deficits. [] Progressing: [] Met: [] Not Met: [] Adjusted  3. Patient will demonstrate an increase in postural awareness and control and activation of  Deep cervical stabilizers to allow for proper functional mobility as indicated by patients Functional Deficits. [] Progressing: [] Met: [] Not Met: [] Adjusted   4. Patient will return to turning head to check blind spots when driving without increased symptoms or restriction. [] Progressing: [] Met: [] Not Met: [] Adjusted  5.  Pt will be indep with lifting up to 20# with good mechanics in order to safely  grandkids and card boxes as needed for work. [] Progressing: [] Met: [] Not Met: [] Adjusted     Progression Towards Functional goals:  [] Patient is progressing as expected towards functional goals listed. [] Progression is slowed due to complexities listed. [] Progression has been slowed due to co-morbidities. [x] Plan just implemented, too soon to assess goals progression  [] Other:     ASSESSMENT:  Increased tightness and pain reported entering clinic, which did improve some following manuals. Pt continues to struggle with progression to standing TE due to reported tingling in L UE. Improved head posture when standing, but ongoing cues for squat form and limited due to R>L knee OA. Overall Progression Towards Functional goals/ Treatment Progress Update:  [] Patient is progressing as expected towards functional goals listed. [] Progression is slowed due to complexities/Impairments listed. [] Progression has been slowed due to co-morbidities.   [x] Plan just implemented, too soon to assess goals progression <30days   [] Goals require adjustment due to lack of progress  [] Patient is not progressing as expected and requires additional follow up with physician  [] Other    Prognosis for POC: [x] Good [] Fair  [] Poor    Patient requires continued skilled intervention: [x] Yes  [] No    Treatment/Activity Tolerance:  [x] Patient able to complete treatment  [] Patient limited by fatigue  [] Patient limited by pain    [] Patient limited by other medical complications  [] Other:     Return to Play: (if applicable)   []  Stage 1: Intro to Strength   []  Stage 2: Return to Run and Strength   []  Stage 3: Return to Jump and Strength   []  Stage 4: Dynamic Strength and Agility   []  Stage 5: Sport Specific Training     []  Ready to Return to Play, Meets All Above Stages   []  Not Ready for Return to Sports   Comments:                       PLAN:  [x] Continue per plan of care [] Alter current plan (see comments above)  [] Plan of care initiated [] Hold pending MD visit [] Discharge      Electronically signed by:  Ghanshyam Wen PT ,   Note: If patient does not return for scheduled/ recommended follow up visits, this note will serve as a discharge from care along with most recent update on progress. Access Code: [email or web address]   URL: [email or web address]   Date: 02/24/2021   Prepared by: Ghanshyam Wen     Exercises   · Supine Cervical Retraction with Towel - 10 reps - 1-2 sets - 2 seconds hold - 2x daily - 7x weekly   · Supine Scapular Retraction - 10 reps - 1-2 sets - 2 seconds hold - 2x daily - 7x weekly   · Seated Scapular Retraction - 10 reps - 1-2 sets - 2 seconds hold - 2x daily - 7x weekly

## 2021-03-15 ENCOUNTER — HOSPITAL ENCOUNTER (OUTPATIENT)
Dept: PHYSICAL THERAPY | Age: 66
Setting detail: THERAPIES SERIES
Discharge: HOME | End: 2021-03-15
Payer: COMMERCIAL

## 2021-03-15 PROCEDURE — 97140 MANUAL THERAPY 1/> REGIONS: CPT

## 2021-03-15 PROCEDURE — 97110 THERAPEUTIC EXERCISES: CPT

## 2021-03-15 NOTE — FLOWSHEET NOTE
[facility], [address] [place]  Phone: [phone]  Fax [phone]        Date:  3/15/2021    Patient Name:  Melba Padilla    :  1955  MRN: [id number]  Restrictions/Precautions:    Medical/Treatment Diagnosis Information:  · Diagnosis: cervical radiculopathy M54.12  · Treatment Diagnosis: cervical radiculopathy C08.12  Insurance/Certification information:  PT Insurance Information: [place] 30 visits, no auth 80/20 co-ins  Physician Information:  Referring Practitioner: Cat Figueroa NP  Has the plan of care been signed (Y/N):        [x]  Yes  []  No     Date of Patient follow up with Physician: 6 weeks      Is this a Progress Report:     []  Yes  [x]  No        If Yes:  Date Range for reporting period:  Beginnin21  Ending:     Progress report will be due (10 Rx or 30 days whichever is less): 3/15/89       Recertification will be due (POC Duration  / 90 days whichever is less): 8 weeks      Visit # Insurance Allowable Auth Required   In-person 6 30 []  Yes []  No    Telehealth   []  Yes []  No    Total            Functional Scale: NDI 38%    Date assessed:  21      Therapy Diagnosis/Practice Pattern:F, conservative      Number of Comorbidities:  []0     [x]1-2    []3+    Latex Allergy:  [x]NO      []YES  Preferred Language for Healthcare:   [x]English       []other:      Pain level:  eval 6-10/10 Current pain 7/10 neck, 8/10 LB    SUBJECTIVE:  Pt reports that she continues to be very sore in her low back and her n/t increased after LV.  She cannot have a cervical MRI until she has completed 6 weeks of PT.     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    Exercises/Interventions:     Therapeutic Ex (68464) Sets/sec/reps Notes/CUES   Supine chin tuck 5\" x10 HEP   Seated cerv retrac + scap set  supine no money RTB 2\" x10  5\" x10 HEP no N/T     Standing DNF c small play ball 5\"x10 Cues not to flex fully   Supine 2 towel roll vert  With pec S  With pec minor \"butterfly\" S  With scap pro/retrac  With lat S \"backstroke\"  W/ B UE overhead w/ TA  All for HEP   Supine N flossing: median bias  x20 R,  Did supine for neutral neck/unloaded  (can give for HEP if no worse at NV)   Prone scap set w/ mini row  Prone scap set w/ sm B shoulder ext  VTC's for form  VTC's for form        practice seated posture,  \" standing posture at wall,   \" Sitting \"squat to lift\" box with neutral c-spine to mimic work duties   NV  NV  NV    Standing mini squat  Standing mini squat w/ TT row  TT mini row w/o squat  TT high row  TT anti rotation side step 2 steps x10    RTT 2x10  RTT x15   Hip hinge cues and decr fwd knee position      N/T L chest and UE w/ step to L so stopped early                            Pt ed:     Asking for assistance with lifting at work vs lifting heavy boxes indep, Squatting/lifting cardboxes for work: neutral c-spine \"chin tuck\" instead of looking up/down repetitively from squatted position on ground,  (l); Reviewed x 2'    Manual Intervention (90330) 25' total    STM B UT, LS.  Scalenes, SO, B cerv PSs  SO release     x12'    1'x2                  Post GHJ mob gr III R, L   PROM flex  scap mobs L- raven inf glide and inf/medial for UT and lev scap stretch   10xea    Prone T-spine mobs gr I-II approx T2-T10  STM thoracic PSs 8' Sore R T9-10 but adam stil   STM R>L lumbar     2nd rib mob L 15\"x3 Poor adam, very sore   C2 rotation mobilization R,L 15\"x3 ea    NMR re-education (08287)  CUES NEEDED                                                Therapeutic Activity (25667)                                         Therapeutic Exercise and NMR EXR  [x] (20928) Provided verbal/tactile cueing for activities related to strengthening, flexibility, endurance, ROM  for improvements in scapular, scapulothoracic and UE control with self care, reaching, carrying, lifting, house/yardwork, driving/computer work. [x] (46299) Provided verbal/tactile cueing for activities related to improving balance, coordination, kinesthetic sense, posture, motor skill, proprioception  to assist with  scapular, scapulothoracic and UE control with self care, reaching, carrying, lifting, house/yardwork, driving/computer work. Therapeutic Activities:    [] (19511 or 85004) Provided verbal/tactile cueing for activities related to improving balance, coordination, kinesthetic sense, posture, motor skill, proprioception and motor activation to allow for proper function of scapular, scapulothoracic and UE control with self care, carrying, lifting, driving/computer work.      Home Exercise Program:    [x] (58322) Reviewed/Progressed HEP activities related to strengthening, flexibility, endurance, ROM of scapular, scapulothoracic and UE control with self care, reaching, carrying, lifting, house/yardwork, driving/computer work  [] (96193) Reviewed/Progressed HEP activities related to improving balance, coordination, kinesthetic sense, posture, motor skill, proprioception of scapular, scapulothoracic and UE control with self care, reaching, carrying, lifting, house/yardwork, driving/computer work      Manual Treatments:  PROM / STM / Oscillations-Mobs:  G-I, II, III, IV (PA's, Inf., Post.)  [x] (84098) Provided manual therapy to mobilize soft tissue/joints of cervical/CT, scapular GHJ and UE for the purpose of modulating pain, promoting relaxation,  increasing ROM, reducing/eliminating soft tissue swelling/inflammation/restriction, improving soft tissue extensibility and allowing for proper ROM for normal function with self care, reaching, carrying, lifting, house/yardwork, driving/computer work    Modalities:  MHP to TL spine and c-spine x15 min long-sitting on wedge (2 pillows to help keep c-spine relaxed but in neutral)    Charges:  Timed Code Treatment Minutes: 43   Total Treatment Minutes: 60 (heat, rest breaks)     [facility] time in/time out:     [] EVAL (LOW) 66849   [] EVAL (MOD) 12941   [] EVAL (HIGH) 72931   [] RE-EVAL     [x] EW(91860) x 1    [] IONTO  [] NMR (09061) x     [] VASO  [x] Manual (55195) x  2    [] Other:  [] TA x      [] Mech Traction (65730)  [] ES(attended) (39459)      [] ES (un) (01437):     GOALS:  Patient stated goal: decreased pain with daily activities  [] Progressing: [] Met: [] Not Met: [] Adjusted    Therapist goals for Patient:   Short Term Goals: To be achieved in: 2 weeks  1. Independent in HEP and progression per patient tolerance, in order to prevent re-injury. [] Progressing: [] Met: [] Not Met: [] Adjusted  2. Patient will have a decrease in pain to facilitate improvement in movement, function, and ADLs as indicated by Functional Deficits. [] Progressing: [] Met: [] Not Met: [] Adjusted    Long Term Goals: To be achieved in: 8 weeks  1. Disability index score of 20% or less for the NDI to assist with reaching prior level of function. [] Progressing: [] Met: [] Not Met: [] Adjusted  2. Patient will demonstrate increased AROM to [facility] of cervical/thoracic spine to allow for proper joint functioning as indicated by patients Functional Deficits. [] Progressing: [] Met: [] Not Met: [] Adjusted  3. Patient will demonstrate an increase in postural awareness and control and activation of  Deep cervical stabilizers to allow for proper functional mobility as indicated by patients Functional Deficits.   [] Progressing: [] Met: [] Not Met: [] Adjusted   4. Patient will return to turning head to check blind spots when driving without increased symptoms or restriction. [] Progressing: [] Met: [] Not Met: [] Adjusted  5. Pt will be indep with lifting up to 20# with good mechanics in order to safely  grandkids and card boxes as needed for work. [] Progressing: [] Met: [] Not Met: [] Adjusted     Progression Towards Functional goals:  [] Patient is progressing as expected towards functional goals listed. [] Progression is slowed due to complexities listed. [] Progression has been slowed due to co-morbidities. [x] Plan just implemented, too soon to assess goals progression  [] Other:     ASSESSMENT:  Pt had aggravation of n/t with testing of UE motion and cervical motion. Was very sore with light STM to lev scap and MT's. Reported a good stretch with scapular mobilizations in this area. No aggravation of n/t with DNF activation or supine strengthening, but had aggravation of n/t with TB row standing after ~15 reps. Overall Progression Towards Functional goals/ Treatment Progress Update:  [] Patient is progressing as expected towards functional goals listed. [] Progression is slowed due to complexities/Impairments listed. [] Progression has been slowed due to co-morbidities.   [x] Plan just implemented, too soon to assess goals progression <30days   [] Goals require adjustment due to lack of progress  [] Patient is not progressing as expected and requires additional follow up with physician  [] Other    Prognosis for POC: [x] Good [] Fair  [] Poor    Patient requires continued skilled intervention: [x] Yes  [] No    Treatment/Activity Tolerance:  [x] Patient able to complete treatment  [] Patient limited by fatigue  [] Patient limited by pain    [] Patient limited by other medical complications  [] Other:     Return to Play: (if applicable)   []  Stage 1: Intro to Strength   []  Stage 2: Return to Run and Strength   []  Stage 3: Return to Jump and Strength   []  Stage 4: Dynamic Strength and Agility   []  Stage 5: Sport Specific Training     []  Ready to Return to Play, Meets All Above Stages   []  Not Ready for Return to Sports   Comments:                       PLAN:contiue cervical and scapular stabilization  [x] Continue per plan of care [] Alter current plan (see comments above)  [] Plan of care initiated [] Hold pending MD visit [] Discharge      Electronically signed by:  Tiffany Grossman, PT , DPT  Note: If patient does not return for scheduled/ recommended follow up visits, this note will serve as a discharge from care along with most recent update on progress. Access Code: [email or web address]: [employer]/Date: 03/15/2021Prepared by: Nikita Santoro   Supine Cervical Retraction with Towel - 2 x daily - 7 x weekly - 10 reps - 1-2 sets - 2 seconds hold   Supine Scapular Retraction - 2 x daily - 7 x weekly - 10 reps - 1-2 sets - 2 seconds hold   Seated Scapular Retraction - 2 x daily - 7 x weekly - 10 reps - 1-2 sets - 2 seconds hold   Supine Bilateral Shoulder External Rotation with Resistance - 1 x daily - 7 x weekly - 10 reps - 5s hold

## 2021-03-17 ENCOUNTER — HOSPITAL ENCOUNTER (OUTPATIENT)
Dept: PHYSICAL THERAPY | Age: 66
Setting detail: THERAPIES SERIES
Discharge: HOME | End: 2021-03-17
Payer: COMMERCIAL

## 2021-03-17 PROCEDURE — 97140 MANUAL THERAPY 1/> REGIONS: CPT | Performed by: PHYSICAL THERAPIST

## 2021-03-17 PROCEDURE — 97110 THERAPEUTIC EXERCISES: CPT | Performed by: PHYSICAL THERAPIST

## 2021-03-17 NOTE — FLOWSHEET NOTE
[facility],  [address]  Phone: [phone]  Fax [phone]        Date:  3/17/2021    Patient Name:  Christoph Austin    :  1955  MRN: [id number]  Restrictions/Precautions:    Medical/Treatment Diagnosis Information:  · Diagnosis: cervical radiculopathy M54.12  · Treatment Diagnosis: cervical radiculopathy I41.38  Insurance/Certification information:  PT Insurance Information: [place] 30 visits, no auth 80/20 co-ins  Physician Information:  Referring Practitioner: Regina Lester NP  Has the plan of care been signed (Y/N):        [x]  Yes  []  No     Date of Patient follow up with Physician: 6 weeks      Is this a Progress Report:     []  Yes  [x]  No        If Yes:  Date Range for reporting period:  Beginnin21  Ending:     Progress report will be due (10 Rx or 30 days whichever is less): 21       Recertification will be due (POC Duration  / 90 days whichever is less): 8 weeks      Visit # Insurance Allowable Auth Required   In-person 7 30 []  Yes []  No    Telehealth   []  Yes []  No    Total            Functional Scale: NDI 38%    Date assessed:  21      Therapy Diagnosis/Practice Pattern:F, conservative      Number of Comorbidities:  []0     [x]1-2    []3+    Latex Allergy:  [x]NO      []YES  Preferred Language for Healthcare:   [x]English       []other:      Pain level:  eval 6-10/10 Current pain 7/10 neck, 8/10 LB    SUBJECTIVE:  Pt reports continued high pain levels in her neck and back, that do not change much with PT.  NT is the same in the L arm and chest.  She finished her month of steroids last Wednesday without much notable improvements.      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    Exercises/Interventions:     Therapeutic Ex (95530) Sets/sec/reps Notes/CUES   Supine chin tuck 5\" x10 HEP   Seated cerv retrac + scap set  supine no money RTB  Supine TB shoulder ext w/ TA/WY hip abd  Supine SA punches 2\" x10  5\" x10  RTT 2x10    2x10 B 0# HEP no N/T  Cues for upper abs/decr thoracic compensations    Form cues   Standing DNF c small play ball  Cues not to flex fully   Supine 2 towel roll vert  With pec S  With pec minor \"butterfly\" S  With scap pro/retrac  With lat S \"backstroke\"  W/ B UE overhead w/ TA  All for HEP   Supine N flossing: median bias  x20 R,  Did supine for neutral neck/unloaded  (can give for HEP if no worse at NV)   Prone scap set w/ mini row  Prone scap set w/ sm B shoulder ext 2x10 decr VTC's for form  VTC's for form        practice seated posture,  \" standing posture at wall,   \" Sitting \"squat to lift\" box with neutral c-spine to mimic work duties   NV  NV  NV    Standing mini squat  Standing mini squat w/ TT row  TT mini row w/o squat  TT high row  TT anti rotation side step 2 steps x10       Hip hinge cues and decr fwd knee position      N/T L chest and UE w/ step to L so stopped early                            Pt ed: POC and MD follow up due to minimal progress to date    Asking for assistance with lifting at work vs lifting heavy boxes indep, Squatting/lifting cardboxes for work: neutral c-spine \"chin tuck\" instead of looking up/down repetitively from squatted position on ground,  (l);    x3'                    '    Manual Intervention (15649) 27' total    STM B UT, LS.  Scalenes, SO, B cerv PSs, STM L pec +pec minor S  SO release     x15'    1'x2                  Post GHJ mob gr III R, L   PROM flex  scap mobs L- raven inf glide and inf/medial for UT and lev scap stretch   a    Prone T-spine mobs gr I-II approx T2-T10  STM thoracic PSs 8' Sore R T9-10 but adam stil   STM R>L lumbar     2nd rib mob L  Poor adam, very sore   C2 rotation mobilization R,L 15\"x3 ea    NMR re-education (26426)  CUES NEEDED                                                Therapeutic Activity (88821)                                         Therapeutic Exercise and NMR EXR  [x] (03943) Provided verbal/tactile cueing for activities related to strengthening, flexibility, endurance, ROM  for improvements in scapular, scapulothoracic and UE control with self care, reaching, carrying, lifting, house/yardwork, driving/computer work. [x] (37629) Provided verbal/tactile cueing for activities related to improving balance, coordination, kinesthetic sense, posture, motor skill, proprioception  to assist with  scapular, scapulothoracic and UE control with self care, reaching, carrying, lifting, house/yardwork, driving/computer work. Therapeutic Activities:    [] (27578 or 52471) Provided verbal/tactile cueing for activities related to improving balance, coordination, kinesthetic sense, posture, motor skill, proprioception and motor activation to allow for proper function of scapular, scapulothoracic and UE control with self care, carrying, lifting, driving/computer work.      Home Exercise Program:    [x] (05945) Reviewed/Progressed HEP activities related to strengthening, flexibility, endurance, ROM of scapular, scapulothoracic and UE control with self care, reaching, carrying, lifting, house/yardwork, driving/computer work  [] (17410) Reviewed/Progressed HEP activities related to improving balance, coordination, kinesthetic sense, posture, motor skill, proprioception of scapular, scapulothoracic and UE control with self care, reaching, carrying, lifting, house/yardwork, driving/computer work      Manual Treatments:  PROM / STM / Oscillations-Mobs:  G-I, II, III, IV (PA's, Inf., Post.)  [x] (99292) Provided manual therapy to mobilize soft tissue/joints of cervical/CT, scapular GHJ and UE for the purpose of modulating pain, promoting relaxation,  increasing ROM, reducing/eliminating soft tissue swelling/inflammation/restriction, improving soft tissue extensibility and allowing for proper ROM for normal function with self care, reaching, carrying, lifting, house/yardwork, driving/computer work    Modalities:  MHP to TL spine and c-spine x15 min long-sitting on wedge (2 pillows to help keep c-spine relaxed but in neutral)    Charges:  Timed Code Treatment Minutes: 45   Total Treatment Minutes: 60 (heat, position transitions)     [facility] time in/time out:     [] EVAL (LOW) 38557   [] EVAL (MOD) 36530   [] EVAL (HIGH) 71870   [] RE-EVAL     [x] TH(88714) x 1    [] IONTO  [] NMR (20267) x     [] VASO  [x] Manual (91233) x  2    [] Other:  [] TA x      [] Mech Traction (86678)  [] ES(attended) (04597)      [] ES (un) (82078):     GOALS:  Patient stated goal: decreased pain with daily activities  [] Progressing: [] Met: [] Not Met: [] Adjusted    Therapist goals for Patient:   Short Term Goals: To be achieved in: 2 weeks  1. Independent in HEP and progression per patient tolerance, in order to prevent re-injury. [] Progressing: [] Met: [] Not Met: [] Adjusted  2. Patient will have a decrease in pain to facilitate improvement in movement, function, and ADLs as indicated by Functional Deficits. [] Progressing: [] Met: [] Not Met: [] Adjusted    Long Term Goals: To be achieved in: 8 weeks  1. Disability index score of 20% or less for the NDI to assist with reaching prior level of function. [] Progressing: [] Met: [] Not Met: [] Adjusted  2. Patient will demonstrate increased AROM to [facility] of cervical/thoracic spine to allow for proper joint functioning as indicated by patients Functional Deficits. [] Progressing: [] Met: [] Not Met: [] Adjusted  3.  Patient will demonstrate an increase in postural awareness and control and activation of  Deep cervical stabilizers to allow for proper functional mobility as indicated by patients Functional Deficits. [] Progressing: [] Met: [] Not Met: [] Adjusted   4. Patient will return to turning head to check blind spots when driving without increased symptoms or restriction. [] Progressing: [] Met: [] Not Met: [] Adjusted  5. Pt will be indep with lifting up to 20# with good mechanics in order to safely  grandkids and card boxes as needed for work. [] Progressing: [] Met: [] Not Met: [] Adjusted     Progression Towards Functional goals:  [] Patient is progressing as expected towards functional goals listed. [x] Progression is slowed due to complexities listed. [] Progression has been slowed due to co-morbidities. [] Plan just implemented, too soon to assess goals progression  [] Other:     ASSESSMENT:  Kept TE to supine and prone due to difficulty with standing progressions due to reported increased pain and NT with prior visits. Decreased UT cues with prone row. Cues for upper ab control to decrease thoracic compensations in supine decreased mid-back soreness. Pt encouraged to follow up with MD in the next week or two due to minimal progress to date and difficulty with progression to seated/standing TE due to increased NT and pain in gravity loaded positions. Overall Progression Towards Functional goals/ Treatment Progress Update:  [] Patient is progressing as expected towards functional goals listed. [x] Progression is slowed due to complexities/Impairments listed. [] Progression has been slowed due to co-morbidities.   [] Plan just implemented, too soon to assess goals progression <30days   [] Goals require adjustment due to lack of progress  [] Patient is not progressing as expected and requires additional follow up with physician  [] Other    Prognosis for POC: [x] Good [] Fair  [] Poor    Patient requires continued skilled intervention: [x] Yes  [] No    Treatment/Activity Tolerance:  [x] Patient able to complete treatment  [] Patient limited by fatigue  [] Patient limited by pain    [] Patient limited by other medical complications  [] Other:     Return to Play: (if applicable)   []  Stage 1: Intro to Strength   []  Stage 2: Return to Run and Strength   []  Stage 3: Return to Jump and Strength   []  Stage 4: Dynamic Strength and Agility   []  Stage 5: Sport Specific Training     []  Ready to Return to Play, Meets All Above Stages   []  Not Ready for Return to Sports   Comments:                       PLAN:contiue cervical and scapular stabilization 1-2x/week, check in w/ MD regarding medication and/or next steps  [x] Continue per plan of care [] Alter current plan (see comments above)  [] Plan of care initiated [] Hold pending MD visit [] Discharge      Electronically signed by:  Yasmani Hanks PT , DPT  Note: If patient does not return for scheduled/ recommended follow up visits, this note will serve as a discharge from care along with most recent update on progress. Access Code: [email or web address]: [employer]/Date: 03/15/2021Prepared by: Nikita Santoro   Supine Cervical Retraction with Towel - 2 x daily - 7 x weekly - 10 reps - 1-2 sets - 2 seconds hold   Supine Scapular Retraction - 2 x daily - 7 x weekly - 10 reps - 1-2 sets - 2 seconds hold   Seated Scapular Retraction - 2 x daily - 7 x weekly - 10 reps - 1-2 sets - 2 seconds hold   Supine Bilateral Shoulder External Rotation with Resistance - 1 x daily - 7 x weekly - 10 reps - 5s hold

## 2021-03-19 ENCOUNTER — APPOINTMENT (OUTPATIENT)
Dept: PHYSICAL THERAPY | Age: 66
End: 2021-03-19
Payer: COMMERCIAL

## 2021-03-23 ENCOUNTER — HOSPITAL ENCOUNTER (OUTPATIENT)
Dept: PHYSICAL THERAPY | Age: 66
Setting detail: THERAPIES SERIES
Discharge: HOME | End: 2021-03-23
Payer: COMMERCIAL

## 2021-03-23 NOTE — FLOWSHEET NOTE
[facility],  [address], [address] [place] [address]        Physical Therapy  Cancellation/No-show Note  Patient Name:  Saadia Adams  :  1955   Date:  3/23/2021  Cancelled visits to date: 0  No-shows to date: 1    For today's appointment patient:  []  Cancelled  []  Rescheduled appointment  [x]  No-show     Reason given by patient:  []  Patient ill  []  Conflicting appointment  []  No transportation    []  Conflict with work  []  No reason given  [x]  Other: pt called to follow up and she had the wrong appt time in her mind. R/S for Friday.     Comments:      Electronically signed by:  Elizabeth Diaz PT

## 2021-03-26 ENCOUNTER — HOSPITAL ENCOUNTER (OUTPATIENT)
Dept: PHYSICAL THERAPY | Age: 66
Setting detail: THERAPIES SERIES
Discharge: HOME | End: 2021-03-26
Payer: COMMERCIAL

## 2021-03-26 PROCEDURE — G0283 ELEC STIM OTHER THAN WOUND: HCPCS

## 2021-03-26 PROCEDURE — 97140 MANUAL THERAPY 1/> REGIONS: CPT

## 2021-03-26 NOTE — PROGRESS NOTES
[facility], [address], [address] [place] [address]  Phone: [phone]  Fax [phone]     Physical Therapy Re-Certification Plan of Care    Dear  Mandy Talley NP,    We had the pleasure of treating the following patient for physical therapy services at [address] [place] [address]. A summary of our findings can be found in the updated assessment below. This includes our plan of care. If you have any questions or concerns regarding these findings, please do not hesitate to contact me at the office phone number checked above. Thank you for the referral.     Physician Signature:________________________________Date:__________________  By signing above, therapists plan is approved by physician      Patient: Al Nogueira   : 1955   MRN: [id number]  Referring Physician:   Mandy Talley NP     Evaluation Date: 3/26/2021      Medical Diagnosis Information:  ·   cervical radiculopathy M54.12   ·   cervical radiculopathy M54.12  Insurance information:  [employer]    Date Range: 21-3/25/21  Total visits: 8    G-Codes: (if applicable)     Functional Scale: NDI 38%                                      Date assessed:  21  NDI 48%                                                                                  3/221                      SUBJECTIVE: Pt reports near constant tingling in the back of her arm down to her palm and shooting pain into her chest, pain at the medial border of her scapula. She has been very active this week- babysitting 4 days, helping her parents, helped a friend by taking out her trash and bringing in food and water, and she has been working at night and work always aggravates her symptoms. She is frustrated because she does not seem to be making progress- her symptoms continue to interfere with her ADL's and her job.      Current Pain Scale: 7-8/10    Type: X Constant   -Intermitment  X Radiating -Localized  -other:     Functional Limitations: X Lifting/reaching -Grooming X Carrying X ADL's  X Driving -Sports/Recreations   -Other:      OBJECTIVE:   CERV ROM LEFT RIGHT   Cervical Flexion 30     Cervical Extension 20 CTJ pain- incr     Cervical SB 5 w/ pain 10    Cervical rotation To mid clavicle To ACJ           ROM Left Right   Shoulder scaption 145 135   Shoulder Abd  prod N/T     Shoulder ER       Shoulder IR          UE mytomes 5/5 except triceps 4/5 5/5                                           Reflexes Left Right   C5-6 Biceps 2+ 2+   C5-6 Brachioradialis 2+ 2+   C7-8 Triceps 2+ 2+         Joint mobility:    -Normal    X Hypo OA joint L, 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 She cannot tolerate manual therapy interventions and has difficulty with progression to seated/standing TE due to increased NT and pain in gravity loaded positions.  Today's session was stopped short d/t all activities causing increased n/t in her LUE, pain in the scapula, or shooting pain in her chest.      Response to Treatment:   -Patient is responding well to treatment and improvement is noted with regards  to goals   -Patient should continue to improve in reasonable time if they continue HEP   -Patient has plateaued and is no longer responding to skilled PT intervention    X Patient is getting worse and would benefit from return to referring MD   -Patient unable to adhere to initial POC      Functional deficiencies which affect ADL's and reduce overall function:        XNoted cervical/thoracic/GHJ/joint hypomobility- Reduced overall functional level with carrying /lifting    -Noted Headache pain with/without dizziness - reduced overall function   -Decreased cervical/upper thoracic mobility- proper joint functioning as indicated by patients Functional Deficits. []? Progressing: []? Met: [x]? Not Met: []? Adjusted  3. Patient will demonstrate an increase in postural awareness and control and activation of  Deep cervical stabilizers to allow for proper functional mobility as indicated by patients Functional Deficits. [x]? Progressing: []? Met: []? Not Met: []? Adjusted   4. Patient will return to turning head to check blind spots when driving without increased symptoms or restriction. []? Progressing: []? Met: [x]? Not Met: []? Adjusted  5. Pt will be indep with lifting up to 20# with good mechanics in order to safely  grandkids and card boxes as needed for work. []? Progressing: []? Met: [x]? Not Met: []? Adjusted     Rehab Potential:   -Excellent   - Good  - Fair   X Poor    Plan of Care:  X Continue Current Therapy Intervention    Frequency/Duration:  1 days per week for 2 Weeks: in order to qualify for imaging for cervical/thoracic spine  HEP instruction:    1. Therapeutic exercsise including: strength training, ROM, NMR and proprioception for the shoulder, cervical, and upper extremity  2. Manual therapy as indicated including Dry Needling/IASTM, STM, PROM, Gr I-IV mobilizations, spinal mobilization/manipulation. 3. Modalities as needed including: thermal agents, E-stim, US, iontophoresis as indicated. 4. Patient education on spine protection, activity modification, progression of HEP.       Electronically signed by:  Dequan St PT, DPT

## 2021-03-26 NOTE — FLOWSHEET NOTE
regarding MRI approval (when is considered 6 weeks)    Asking for assistance with lifting at work vs lifting heavy boxes indep, Squatting/lifting cardboxes for work: neutral c-spine \"chin tuck\" instead of looking up/down repetitively from squatted position on ground, using step stool instead of reaching higher at work. 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  (l);    x5'                    '    Manual Intervention (84204) 27' total    STM B UT, LS. Scalenes, SO, B cerv PSs, STM L pec   SO release  1st rib mobs L   x15'    1'x2                  Post GHJ mob gr III R, L   PROM flex  scap mobs L- arven inf glide and inf/medial for UT and lev scap stretch   5\"x5 eaa    Prone T-spine mobs gr I-II approx T2-T10  STM thoracic PSs  Sore R T9-10 but adam stil   STM R>L lumbar     2nd rib mob L  Poor adam, very sore   OA nodding  contract-relax L rotation  15\"x3 ea  15\"x3    Supine thoracic \"distraction\" T2-4  \" sidelyiing 20\"x5    NMR re-education (38209)  CUES NEEDED                                                Therapeutic Activity (31595)                                         Therapeutic Exercise and NMR EXR  [x] (73204) Provided verbal/tactile cueing for activities related to strengthening, flexibility, endurance, ROM  for improvements in scapular, scapulothoracic and UE control with self care, reaching, carrying, lifting, house/yardwork, driving/computer work. [x] (60063) Provided verbal/tactile cueing for activities related to improving balance, coordination, kinesthetic sense, posture, motor skill, proprioception  to assist with  scapular, scapulothoracic and UE control with self care, reaching, carrying, lifting, house/yardwork, driving/computer work.     Therapeutic Activities:    [] (50585 or 30828) Provided verbal/tactile cueing for activities related to improving balance, coordination, kinesthetic sense, posture, motor skill, proprioception and motor activation to allow for proper function of scapular, scapulothoracic and UE control with self care, carrying, lifting, driving/computer work. Home Exercise Program:    [x] (22650) Reviewed/Progressed HEP activities related to strengthening, flexibility, endurance, ROM of scapular, scapulothoracic and UE control with self care, reaching, carrying, lifting, house/yardwork, driving/computer work  [] (22226) Reviewed/Progressed HEP activities related to improving balance, coordination, kinesthetic sense, posture, motor skill, proprioception of scapular, scapulothoracic and UE control with self care, reaching, carrying, lifting, house/yardwork, driving/computer work      Manual Treatments:  PROM / STM / Oscillations-Mobs:  G-I, II, III, IV (PA's, Inf., Post.)  [x] (99557) Provided manual therapy to mobilize soft tissue/joints of cervical/CT, scapular GHJ and UE for the purpose of modulating pain, promoting relaxation,  increasing ROM, reducing/eliminating soft tissue swelling/inflammation/restriction, improving soft tissue extensibility and allowing for proper ROM for normal function with self care, reaching, carrying, lifting, house/yardwork, driving/computer work    Modalities:  MHP to TL spine and c-spine x15 min long-sitting on wedge (2 pillows to help keep c-spine relaxed but in neutral) + IFC L scapula    Charges:  Timed Code Treatment Minutes: 30   Total Treatment Minutes: 50 (heat, position transitions)     BWC time in/time out:     [] EVAL (LOW) 40067   [] EVAL (MOD) 90880   [] EVAL (HIGH) 17645   [] RE-EVAL     [x] QG(37163) x     [] IONTO  [] NMR (34395) x     [] VASO  [x] Manual (74985) x  2    [] Other:  [] TA x      [] Mech Traction (12461)  [] ES(attended) (02371)      [] ES (un) (80061):     GOALS:  Patient stated goal: decreased pain with daily activities  [] Progressing: [] Met: [x] Not Met: [] Adjusted    Therapist goals for Patient:   Short Term Goals: To be achieved in: 2 weeks  1.  Independent in HEP and progression per patient tolerance, in order to prevent re-injury. [] Progressing: [x] Met: [] Not Met: [] Adjusted  2. Patient will have a decrease in pain to facilitate improvement in movement, function, and ADLs as indicated by Functional Deficits. [] Progressing: [] Met: [x] Not Met: [] Adjusted    Long Term Goals: To be achieved in: 8 weeks  1. Disability index score of 20% or less for the NDI to assist with reaching prior level of function. [] Progressing: [] Met: [x] Not Met: [] Adjusted  2. Patient will demonstrate increased AROM to [facility] of cervical/thoracic spine to allow for proper joint functioning as indicated by patients Functional Deficits. [] Progressing: [] Met: [x] Not Met: [] Adjusted  3. Patient will demonstrate an increase in postural awareness and control and activation of  Deep cervical stabilizers to allow for proper functional mobility as indicated by patients Functional Deficits. [x] Progressing: [] Met: [] Not Met: [] Adjusted   4. Patient will return to turning head to check blind spots when driving without increased symptoms or restriction. [] Progressing: [] Met: [x] Not Met: [] Adjusted  5. Pt will be indep with lifting up to 20# with good mechanics in order to safely  grandkids and card boxes as needed for work. [] Progressing: [] Met: [x] Not Met: [] Adjusted     Progression Towards Functional goals:  [] Patient is progressing as expected towards functional goals listed. [x] Progression is slowed due to complexities listed. [] Progression has been slowed due to co-morbidities. [] Plan just implemented, too soon to assess goals progression  [] Other:     ASSESSMENT:    Pt encouraged to follow up with MD in the next week or two due to minimal progress to date. She cannot tolerate manual therapy interventions and has difficulty with progression to seated/standing TE due to increased NT and pain in gravity loaded positions.  Today's session was stopped short d/t all activities causing increased n/t in her LUE, pain in the scapula, or shooting pain in her chest.     Overall Progression Towards Functional goals/ Treatment Progress Update:  [] Patient is progressing as expected towards functional goals listed. [x] Progression is slowed due to complexities/Impairments listed. [] Progression has been slowed due to co-morbidities. [] Plan just implemented, too soon to assess goals progression <30days   [] Goals require adjustment due to lack of progress  [] Patient is not progressing as expected and requires additional follow up with physician  [] Other    Prognosis for POC: [x] Good [] Fair  [] Poor    Patient requires continued skilled intervention: [x] Yes  [] No    Treatment/Activity Tolerance:  [x] Patient able to complete treatment  [] Patient limited by fatigue  [] Patient limited by pain    [] Patient limited by other medical complications  [] Other:     Return to Play: (if applicable)   []  Stage 1: Intro to Strength   []  Stage 2: Return to Run and Strength   []  Stage 3: Return to Jump and Strength   []  Stage 4: Dynamic Strength and Agility   []  Stage 5: Sport Specific Training     []  Ready to Return to Play, Meets All Above Stages   []  Not Ready for Return to Sports   Comments:                       PLAN:contiue cervical and scapular stabilization 1-2x/week, check in w/ MD regarding medication and/or next steps  [x] Continue per plan of care [] Alter current plan (see comments above)  [] Plan of care initiated [] Hold pending MD visit [] Discharge      Electronically signed by:  Tiffany Grossman, PT , DPT  Note: If patient does not return for scheduled/ recommended follow up visits, this note will serve as a discharge from care along with most recent update on progress. Access Code: [email or web address]: [employer]/Date: 03/15/2021Prepared by: Nikita Santoro   Supine Cervical Retraction with Towel - 2 x daily - 7 x weekly - 10 reps -

## 2021-03-31 ENCOUNTER — APPOINTMENT (OUTPATIENT)
Dept: PHYSICAL THERAPY | Age: 66
End: 2021-03-31
Payer: COMMERCIAL

## 2021-04-02 ENCOUNTER — HOSPITAL ENCOUNTER (OUTPATIENT)
Dept: PHYSICAL THERAPY | Age: 66
Setting detail: THERAPIES SERIES
Discharge: HOME | End: 2021-04-02
Payer: COMMERCIAL

## 2021-04-02 ENCOUNTER — APPOINTMENT (OUTPATIENT)
Dept: PHYSICAL THERAPY | Age: 66
End: 2021-04-02
Payer: COMMERCIAL

## 2021-04-02 PROCEDURE — 97140 MANUAL THERAPY 1/> REGIONS: CPT

## 2021-04-02 PROCEDURE — 97110 THERAPEUTIC EXERCISES: CPT

## 2021-04-02 NOTE — FLOWSHEET NOTE
[facility],  [address] [place]  Phone: [phone]  Fax [phone]        Date:  2021    Patient Name:  Maribel Calix    :  1955  MRN: [id number]  Restrictions/Precautions:    Medical/Treatment Diagnosis Information:  · Diagnosis: cervical radiculopathy M54.12  · Treatment Diagnosis: cervical radiculopathy N48.56  Insurance/Certification information:  PT Insurance Information: [place] 30 visits, no auth 80/ co-ins  Physician Information:  Referring Practitioner: Lester Bonilla NP  Has the plan of care been signed (Y/N):        [x]  Yes  []  No     Date of Patient follow up with Physician: 6 weeks      Is this a Progress Report:     []  Yes  [x]  No        If Yes:  Date Range for reporting period:  Beginnin21  Ending: 3/26/21    Progress report will be due (10 Rx or 30 days whichever is less):     Recertification will be due (POC Duration  / 90 days whichever is less): 8 weeks      Visit # Insurance Allowable Auth Required   In-person 9 30 []  Yes []  No    Telehealth   []  Yes []  No    Total            Functional Scale: NDI 38%    Date assessed:  21  NDI 48%      3/26/21      Therapy Diagnosis/Practice Pattern:F, conservative      Number of Comorbidities:  []0     [x]1-2    []3+    Latex Allergy:  [x]NO      []YES  Preferred Language for Healthcare:   [x]English       []other:       Pain level:  eval 6-10/10 Current pain 7/10 neck, 8/10 LB    SUBJECTIVE:  Pt no pain in the mid-back today, but tingling from her inner arm down into her hand. She has intermittent tingling into the chest still. She has not called her doctor or her insurance company.      OBJECTIVE:    Observation:   Test measurements:    CERV ROM LEFT RIGHT   Cervical Flexion 30 increase NT     Cervical Extension 20 CTJ pain- incr     Cervical SB 20 with increase LUE tingling 20   Cervical rotation To mid clavicle To ACJ         ROM Left Right   Shoulder scaption 145 135   Shoulder Abd  prod N/T     Shoulder ER       Shoulder IR         UE mytomes 5/5 except wrist ext 4/5, thumb ext 4/5  5/5                                           Reflexes Left Right   C5-6 Biceps 2+ 2+   C5-6 Brachioradialis 2+ 2+   C7-8 Triceps 2+ 2+       RESTRICTIONS/PRECAUTIONS: anterior cervical discectomy and fusion of C4-5, C5-6 and C6-7 in 2018 with Dr Jericho Barboza, prior Hx thoracic rib vs disc 2019    Exercises/Interventions:     Therapeutic Ex (83683) Sets/sec/reps Notes/CUES   Supine chin tuck HEP   Seated cerv retrac + scap set  supine no money YTB  Supine TB shoulder ext w/ TA/MI hip abd  Supine SA punches 5\" x10YTT 2x10 R,L [id number] B 0#HEP no N/T  Cues for upper abs/decr thoracic compensations    Form cues   Standing DNF c small play ball  Produced pain in scapula   Supine 2 towel roll vert  With pec S  With pec minor \"butterfly\" S  With scap pro/retrac  With lat S \"backstroke\"  W/ B UE overhead w/ TA 30\"x2 All for HEP   Supine N flossing: median bias   Did supine for neutral neck/unloaded  (can give for HEP if no worse at NV)   Prone scap set w/ mini row  Prone scap set w/ sm B shoulder ext  decr VTC's for form  VTC's for form        practice seated posture,  \" standing posture at wall,   \" Sitting \"squat to lift\" box with neutral c-spine to mimic work duties   NV  NV  NV    Standing mini squat  Standing mini squat w/ TT row  TT mini row w/o squat  TT high row  TT anti rotation side step 2 steps  Hip hinge cues and decr fwd knee position      N/T L chest and UE w/ step to L so stopped early        SWB thoracic cat/camel in kneeling 10x HEP             Pulleys flexion R,L     Pt ed: POC and MD follow up due to minimal progress to date, call insu company regarding MRI approval (when is considered 6 weeks)    Asking for assistance with lifting at work vs lifting heavy boxes indep, Squatting/lifting cardboxes for work: neutral c-spine \"chin tuck\" instead of looking up/down repetitively from squatted position on ground, using step stool instead of reaching higher at work. 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  (l);    x5'                    '    Manual Intervention (11876) 15' total    STM B UT, LS. Scalenes, SO, B cerv PSs, STM L pec   SO release  1st rib mobs L                     Post GHJ mob gr III R, L   PROM flex  scap mobs L- raven inf glide and inf/medial for UT and lev scap stretch   5\"x5 eaa    Prone T-spine mobs gr I-II approx T5-T10 central, unilateral R, L T5-10  STM thoracic PSs 8' Sore R T9-10 but adam stil   STM R>L lumbar     2nd rib mob L  Poor adam, very sore   OA nodding  contract-relax L rotation      Seated thoracic ext mob     Supine thoracic \"distraction\" T2-4  \" sidelyiing     NMR re-education (07864)  CUES NEEDED                                                Therapeutic Activity (66070)                                         Therapeutic Exercise and NMR EXR  [x] (60517) Provided verbal/tactile cueing for activities related to strengthening, flexibility, endurance, ROM  for improvements in scapular, scapulothoracic and UE control with self care, reaching, carrying, lifting, house/yardwork, driving/computer work. [x] (64745) Provided verbal/tactile cueing for activities related to improving balance, coordination, kinesthetic sense, posture, motor skill, proprioception  to assist with  scapular, scapulothoracic and UE control with self care, reaching, carrying, lifting, house/yardwork, driving/computer work. Therapeutic Activities:    [] (05183 or 77244) Provided verbal/tactile cueing for activities related to improving balance, coordination, kinesthetic sense, posture, motor skill, proprioception and motor activation to allow for proper function of scapular, scapulothoracic and UE control with self care, carrying, lifting, driving/computer work.      Home Exercise Program:    [x] (59870) Reviewed/Progressed HEP activities related to strengthening, flexibility, endurance, ROM of scapular, scapulothoracic and UE control with self care, reaching, carrying, lifting, house/yardwork, driving/computer work  [] (55156) Reviewed/Progressed HEP activities related to improving balance, coordination, kinesthetic sense, posture, motor skill, proprioception of scapular, scapulothoracic and UE control with self care, reaching, carrying, lifting, house/yardwork, driving/computer work      Manual Treatments:  PROM / STM / Oscillations-Mobs:  G-I, II, III, IV (PA's, Inf., Post.)  [x] (65324) Provided manual therapy to mobilize soft tissue/joints of cervical/CT, scapular GHJ and UE for the purpose of modulating pain, promoting relaxation,  increasing ROM, reducing/eliminating soft tissue swelling/inflammation/restriction, improving soft tissue extensibility and allowing for proper ROM for normal function with self care, reaching, carrying, lifting, house/yardwork, driving/computer work    Modalities:  MHP to TL spine and c-spine x15 min long-sitting on wedge (2 pillows to help keep c-spine relaxed but in neutral) + IFC L scapula    Charges:  Timed Code Treatment Minutes: 30   Total Treatment Minutes: 50 (heat, position transitions)     BWC time in/time out:     [] EVAL (LOW) 64670   [] EVAL (MOD) 56278   [] EVAL (HIGH) 91824   [] RE-EVAL     [x] SR(51454) x  1   [] IONTO  [] NMR (64111) x     [] VASO  [x] Manual (25571) x  1    [] Other:  [] TA x      [] Mech Traction (80335)  [] ES(attended) (74491)      [] ES (un) (38623):     GOALS:  Patient stated goal: decreased pain with daily activities  [] Progressing: [] Met: [x] Not Met: [] Adjusted    Therapist goals for Patient:   Short Term Goals: To be achieved in: 2 weeks  1. Independent in HEP and progression per patient tolerance, in order to prevent re-injury. [] Progressing: [x] Met: [] Not Met: [] Adjusted  2.  Patient will have a decrease in pain to facilitate improvement in movement, function, and ADLs as indicated by Functional Deficits. [] Progressing: [] Met: [x] Not Met: [] Adjusted    Long Term Goals: To be achieved in: 8 weeks  1. Disability index score of 20% or less for the NDI to assist with reaching prior level of function. [] Progressing: [] Met: [x] Not Met: [] Adjusted  2. Patient will demonstrate increased AROM to [facility] of cervical/thoracic spine to allow for proper joint functioning as indicated by patients Functional Deficits. [] Progressing: [] Met: [x] Not Met: [] Adjusted  3. Patient will demonstrate an increase in postural awareness and control and activation of  Deep cervical stabilizers to allow for proper functional mobility as indicated by patients Functional Deficits. [x] Progressing: [] Met: [] Not Met: [] Adjusted   4. Patient will return to turning head to check blind spots when driving without increased symptoms or restriction. [] Progressing: [] Met: [x] Not Met: [] Adjusted  5. Pt will be indep with lifting up to 20# with good mechanics in order to safely  grandkids and card boxes as needed for work. [] Progressing: [] Met: [x] Not Met: [] Adjusted     Progression Towards Functional goals:  [] Patient is progressing as expected towards functional goals listed. [x] Progression is slowed due to complexities listed. [] Progression has been slowed due to co-morbidities. [] Plan just implemented, too soon to assess goals progression  [] Other:     ASSESSMENT:    Pt encouraged to follow up with MD in the next week or two due to minimal progress to date. She will also call her insurance company to inquire about her MRI. Worked on her t-spine only with MT as recently she has just had worsening of n/t symptoms with cervical MT. She only reported an increase in n/t with position changes from supine to sit and from transitioning from the floor for thoracic mobility.  However, her symptoms were not affected positively by weekly - 10 reps - 1-2 sets - 2 seconds hold   Seated Scapular Retraction - 2 x daily - 7 x weekly - 10 reps - 1-2 sets - 2 seconds hold   Supine Bilateral Shoulder External Rotation with Resistance - 1 x daily - 7 x weekly - 10 reps - 5s hold   Sidelying Thoracic Rotation with Open Book - 1 x daily - 7 x weekly - 10 reps - 5s hold   Kneeling Thoracic Extension Stretch with Swiss Ball - 1 x daily - 7 x weekly - 10 reps

## 2021-04-05 DIAGNOSIS — M22.41 CHONDROMALACIA OF BOTH PATELLAE: ICD-10-CM

## 2021-04-05 DIAGNOSIS — G89.29 CHRONIC PAIN OF BOTH KNEES: ICD-10-CM

## 2021-04-05 DIAGNOSIS — M25.562 CHRONIC PAIN OF BOTH KNEES: ICD-10-CM

## 2021-04-05 DIAGNOSIS — M17.0 BILATERAL PRIMARY OSTEOARTHRITIS OF KNEE: Primary | ICD-10-CM

## 2021-04-05 DIAGNOSIS — M22.42 CHONDROMALACIA OF BOTH PATELLAE: ICD-10-CM

## 2021-04-05 DIAGNOSIS — M25.561 CHRONIC PAIN OF BOTH KNEES: ICD-10-CM

## 2021-04-07 ENCOUNTER — APPOINTMENT (OUTPATIENT)
Dept: PHYSICAL THERAPY | Age: 66
End: 2021-04-07
Payer: COMMERCIAL

## 2021-05-10 ENCOUNTER — TELEPHONE (OUTPATIENT)
Dept: ORTHOPEDIC SURGERY | Age: 66
End: 2021-05-10

## 2021-05-19 ENCOUNTER — OFFICE VISIT (OUTPATIENT)
Dept: ORTHOPEDIC SURGERY | Age: 66
End: 2021-05-19
Payer: COMMERCIAL

## 2021-05-19 DIAGNOSIS — M17.0 BILATERAL PRIMARY OSTEOARTHRITIS OF KNEE: Primary | ICD-10-CM

## 2021-05-19 DIAGNOSIS — M22.42 CHONDROMALACIA OF BOTH PATELLAE: ICD-10-CM

## 2021-05-19 DIAGNOSIS — G89.29 CHRONIC PAIN OF BOTH KNEES: ICD-10-CM

## 2021-05-19 DIAGNOSIS — M25.562 CHRONIC PAIN OF BOTH KNEES: ICD-10-CM

## 2021-05-19 DIAGNOSIS — M25.561 CHRONIC PAIN OF BOTH KNEES: ICD-10-CM

## 2021-05-19 DIAGNOSIS — M22.41 CHONDROMALACIA OF BOTH PATELLAE: ICD-10-CM

## 2021-05-19 PROCEDURE — 20610 DRAIN/INJ JOINT/BURSA W/O US: CPT | Performed by: FAMILY MEDICINE

## 2021-05-19 RX ORDER — HYALURONATE SODIUM 10 MG/ML
40 SYRINGE (ML) INTRAARTICULAR ONCE
Status: COMPLETED | OUTPATIENT
Start: 2021-05-19 | End: 2021-05-19

## 2021-05-19 RX ADMIN — Medication 40 MG: at 08:31

## 2021-05-19 NOTE — PROGRESS NOTES
Chief Complaint  Knee Pain (EUFLEXXA #1 Delphina Cables)    Kimberley Welch is a 60 y.o. 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 Ronan Espinozaz is being seen by today for recurrent worsening of her right knee pain with underlying osteoarthritis. He is known to have grade IV chondromalacia to the weightbearing portion of her lateral compartment of her knee as well as partial pseudo-extrusion of the lateral meniscus with a chronic complex flap tear with synovitis      History of Present Illness for Follow Up Patient:      Aquilino Diaz is being seen in for follow up today to review MRI results for ongoing right knee pain. Aquilino Diaz is 12 weeks out from aggravation of right knee after a twisting injury. The patient rates their current pain as a 5 out of 10 on the pain scale but can increase with activities such as going from a seated to standing position or having to go up and down stairs. Treatment to date includes: rest, ice, NSAID- Diclofeac, physical therapy, home exercises, knee bracing, cortisone injection on 4/1/2019 and her first attempt at visco supplementation completed on 7/17/2019 to treat this problem. Her MRI was obtained at [facility] on 11/15/2019 did show evidence of grade IV chondromalacia of the weightbearing and posterior nonweightbearing portion of the lateral compartment with full-thickness chondral loss and some stress edema but no evidence of insufficiency fracture. She did have a lateral meniscal pseudo-extrusion with complex flap tear to the anterior horn to posterior root with chronic notch synovitis and ACL inflammation likely related to her twisting injury about a month ago. Denies locking catching or true instability symptoms and she would like to avoid surgery if at all possible.   Prior to her twisting injury 3 months ago, she states her knee was doing very well after completing Visco supplementation in July 2019 bilaterally. She did get a benefit from her previous round of Visco supplementation has been considering this once again. His mom also been working on her home-based exercise program.     Makenna Kemp was last seen in the office on 2/5/2020 was continued viscosupplementation to her knees bilaterally. She is done outstanding up until a few weeks ago when she began noticed increasing pain and discomfort to the anterior portion of her knees. There is no interim history of injury or no activity prior to coming symptomatic. Most of her pain is retropatellar in nature. She has done a great job with weight loss losing 45 pounds via the keto diet this year which has helped her knee substantially. She does continue with her diclofenac and has been working on her exercise program.  She does have more of an achy discomfort with overuse at 3 to 4-10 but at times does not have any pain at all and is sleeping comfortably. Denies locking catching or instability symptoms. She would be interested in repeating Visco supplementation which is helped her substantially in the past.  She would like to avoid surgery if at all possible. Makenna Kemp was last seen in the office on 11/4/2021 completed viscosupplementation to her knees bilaterally with Euflexxa. She was doing very well but over the last few weeks since the end of April 2021, she has noticed increasing discomfort primarily the anterior portion of her knees. There is no history of injury or new activity prior to becoming symptomatic although she has been having quite a bit of difficulty with her neck and back and is seeing the physicians at [address] [place] [address] for this who had previously done surgery on her. Is in the process of MRI work-ups and possible epidurals and is trying to do her home-based exercises. The soreness in her back may have altered her gait which she believes is causing soreness into her knees.   Pain symptoms range between a 2-4 out of 10 with stair climbing distance and walking and reports no rest or night pain of significance. She is try to do her home-based exercise program and is continue with her diclofenac. Attest: I have reviewed and attest the documentation of the HPI documented by my . I will make any changes if necessary. Enc Date: 2021  Time: 9:04 AM  Provider: Fnata Tate MD        Social History     Tobacco Use    Smoking status: Former Smoker     Quit date: 2004     Years since quittin.4    Smokeless tobacco: Never Used   Substance Use Topics    Alcohol use: Yes     Comment: monthly    Drug use: No        Review of Systems  Pertinent items are noted in HPI  Review of systems reviewed from Patient History Form dated on 2020 and available in the patient's chart under the Media tab. Vital Signs     There were no vitals taken for this visit. General Exam:   Constitutional: Patient is adequately groomed with no evidence of malnutrition  DTRs: Deep tendon reflexes are intact  Mental Status: The patient is oriented to time, place and person. The patient's mood and affect are appropriate. Lymphatic: The lymphatic examination bilaterally reveals all areas to be without enlargement or induration. Vascular: Examination reveals no swelling or calf tenderness. Peripheral pulses are palpable and 2+. Neurological: The patient has good coordination. There is no weakness or sensory deficit.       Knee Examination  Inspection:  There is no high-grade deformity other she does have bilaterally patellofemoral crepitation and trace knee joint effusions.     Palpation:  She does have only mild tenderness over the medial and lateral patellofemoral facet bilaterally primarily on the right knee today.  This does seem to be more anterior in nature today. She does have less prominent tenderness clinically over the Lateral compartment of her right knee and medial compartment of her left knee with some mild crepitation.     Rang of Motion:  She is stiff in the terminal 5 of extension with flexion limited to about 110-120.  Hamstrings are mildly tight.  Fair quad tone.     Strength: 4 out of 5 with flexion as extension.     Special Tests:  She does have mild recurrent pain primarily reproduced with the patellar grind testing on the right today.  Negative apprehension testing.  She does have moderate pain with crepitation with lateral Jose's on the right knee and medial Jose's on the left.  I do not sense a high-grade click.  I do not sense high-grade instability of screening testing is fairly benign.     Skin: There are no rashes, ulcerations or lesions.  Distal neurovascular exam is intact.     Gait: Reasonable gait with less pain with positional change.     Reflex symmetrically preserved     Additional Comments:      Additional Examinations:  Right Lower Extremity: Examination of the right lower extremity does not show any tenderness, deformity or injury.  Range of motion is unremarkable.  There is no gross instability.  There are no rashes, ulcerations or lesions.  Strength and tone are normal.  Left Lower Extremity: Examination of the left lower extremity does not show any tenderness, deformity or injury.  Range of motion is unremarkable.  There is no gross instability.  There are no rashes, ulcerations or lesions.  Strength and tone are normal.  Examination of the biateral hip reveals intact skin.  The patient demonstrates full painless range of motion with regards to flexion, abduction, internal and external rotation.  There is not tenderness about the greater trochanter. America Callander is a negative straight leg raise against resistance.  Strength is 5/5 thorough out all planes.        Diagnostic Test Findings: bilateral knee AP and PA weight-bearing sunrise and lateral films were reviewed from 4/1/2019 and show fairly prominent nearly bone-on-bone changes to the lateral compartment of her right knee and medial compartment of her left knee with left greater than right knee patellofemoral arthropathy with spurring.     MRI right knee obtained at [facility] 11/15/2019 as listed above  Narrative   Site: [employer] [place] #: [id number] #: [id number] Procedure: MR Right Knee w/o Contrast ; Reason for Exam: dx;MMT, OSTEOARTHRITIS, CHONDROMALACIA OF BOTH PATELLA   This document is confidential medical information.  Unauthorized disclosure or use of this information is prohibited by law. If you are not the intended recipient of this document, please advise us by calling immediately [phone].       [employer] NADIA AguirreEddie Castro [address]           Patient Name: Karel Cancino   Case ID: [id number]   Patient : 1955   Referring Physician: Radha Marroquin MD   Exam Date: 11/15/2019   Exam Description: MR Right Knee w/o Contrast            HISTORY:  Evaluate meniscal tear.       TECHNICAL FACTORS:  Long- and short-axis fat- and water-weighted images were performed.       COMPARISON:  None.       FINDINGS:  No acute fracture.  Full-thickness chondral loss of weightbearing and posterior    nonweightbearing lateral femorotibial compartment with stress osseous edema.  Pseudoextruded    and partially macerated lateral meniscus with complex flap tear from anterior horn to posterior    horn.  Medial meniscus intact.  No high-grade chondromalacia in the medial femorotibial    compartment.       Moderate ACL inflammation.  PCL intact.  Medial and lateral collateral ligament complexes    intact.       Mild lateral subluxation of patella.  No high-grade chondromalacia in the patellofemoral    articulation.  Quadriceps and patellar tendons intact.       Moderate-large amount of joint effusion.  Moderate gastrocnemius/semimembranous reflection cyst    with partial dehiscence.  No acute muscle strain.       CONCLUSION:   1. Grade 4 chondromalacia of weightbearing and posterior nonweightbearing lateral femorotibial    compartment with full-thickness chondral loss and stress osseous edema.  Partial    pseudoextrusion of the lateral meniscus with complex flap tear from anterior horn to posterior    horn. 2. Chronic notch synovitis with inflamed ACL.       Thank you for the opportunity to provide your interpretation.               Riccardo Stark M.D.       A: Guero 11/18/2019 8:43 AM   T: REINA 11/18/2019 8:11 AM         Assessment & Plan:    Encounter Diagnoses   Name Primary?  Bilateral primary osteoarthritis of knee Yes    Chondromalacia of both patellae     Chronic pain of both knees        Orders Placed This Encounter   Procedures    79614 - CT DRAIN/INJECT LARGE JOINT/BURSA         Treatment Plan:  Treatment options were discussed with Roney Gomez. She does have quite prominent arthritic changes to the right knee lateral compartment and left knee medial compartment does have patellofemoral arthropathy particularly on the left knee compared with right knee. Sulaiman Fournier was doing reasonably well following completion of her Visco supplementation series completed on 11/4/2020 but more recently has become a little bit achy. She does not believe her current symptoms require steroid injection after discussion of pros and cons of Visco supplementation, she did receive her first injection of Euflexxa to her knees bilaterally. This was performed using a standard prefilled 2 cc syringe. She was given a refill on her diclofenac 75 mg 1 pill twice daily use of her knee brace and home-based exercises. Once again she is done an outstanding job with weight loss losing 45 pounds this year with use of the keto diet. Continued attempts at weight loss were encouraged. She will be seen back each next 2 weeks to finish up her Euflexxa injection series. She is fairly opposed to any type of surgical intervention and continues to deny mechanical symptoms.   She once again is also in the process of being worked up for low back and neck pain at [facility]. He has had previous cervical fusions. She will be seen back each next 2 weeks but will contact us in the interim with questions or concerns.          This dictation was performed with a verbal recognition program (DRAGON) and it was checked for errors. It is possible that there are still dictated errors within this office note. If so, please bring any errors to my attention for an addendum. All efforts were made to ensure that this office note is accurate.

## 2021-05-20 NOTE — TELEPHONE ENCOUNTER
05/20/2021  PER LACIE @ [facility] , DRUG WAS DELIVERED VIA UPS TO THE [facility] OFFICE ON 5/14/2021 @ 9:46AM AND SIGNED BY \"VALERI\".   AP

## 2021-05-26 ENCOUNTER — OFFICE VISIT (OUTPATIENT)
Dept: ORTHOPEDIC SURGERY | Age: 66
End: 2021-05-26
Payer: COMMERCIAL

## 2021-05-26 DIAGNOSIS — M22.42 CHONDROMALACIA OF BOTH PATELLAE: ICD-10-CM

## 2021-05-26 DIAGNOSIS — M22.41 CHONDROMALACIA OF BOTH PATELLAE: ICD-10-CM

## 2021-05-26 DIAGNOSIS — G89.29 CHRONIC PAIN OF BOTH KNEES: ICD-10-CM

## 2021-05-26 DIAGNOSIS — M25.561 CHRONIC PAIN OF BOTH KNEES: ICD-10-CM

## 2021-05-26 DIAGNOSIS — M25.562 CHRONIC PAIN OF BOTH KNEES: ICD-10-CM

## 2021-05-26 DIAGNOSIS — M17.0 BILATERAL PRIMARY OSTEOARTHRITIS OF KNEE: Primary | ICD-10-CM

## 2021-05-26 PROCEDURE — 20610 DRAIN/INJ JOINT/BURSA W/O US: CPT | Performed by: FAMILY MEDICINE

## 2021-05-26 RX ORDER — HYALURONATE SODIUM 10 MG/ML
40 SYRINGE (ML) INTRAARTICULAR ONCE
Status: COMPLETED | OUTPATIENT
Start: 2021-05-26 | End: 2021-05-26

## 2021-05-26 RX ADMIN — Medication 40 MG: at 08:30

## 2021-05-26 NOTE — PROGRESS NOTES
CC:  FU Knee Osteoarthritis with Viscosupplementation       HPI: Handy Wayne is a very pleasant 46-year-old white female who still works part-time at [employer] for 1calendar 3 days/week and is very nice patient of Dr. Gogo Matson who is being seen today to continue with her Euflexxa injections. She is doing very well at this time and is here for her second Euflexxa injection. Her overall pain is doing much better but she does have some discomfort if she is carrying like him up and down stairs. Certainly this is tolerable at most only 1-2 out of 10. She is continue with her diclofenac 75 mg 1 pill twice daily. This is her fourth round of viscosupplementation that she is attempting and it has helped her substantially. Once again she has done an excellent job losing weight and has lost 50 pounds over the past year. PE: no substantial change in exam.    Assessment:  Knee Osteoarthritis with viscosupplementation      PROCEDURE NOTE:    PRE-PROCEDURE DIAGNOSIS: DJD knee    POST-PROCEDURE DIAGNOSIS: DJD knee    Injection #2 of Euflexxa bilaterally. PROCEDURE:  With the patient's permission, her bilaterally knee was prepped in standard sterile fashion with Betadine and Alcohol and the prefilled 2cc injection of Euflexxa was injected intra-articularly into the bilaterally knee via a superolateral approach without difficulty. The patient tolerated this well without difficulty. A band-aid was applied. POST-PROCEDURE INSTRUCTIONS GIVEN TO PATIENT: The patient was advised to ice the knee for 15-20 minutes to relieve any injection site related pain. FOLLOW-UP: as directed. We will continue with her diclofenac 75 mg 1 pill twice daily as well as her home-based exercise program and icing. Activity modification was discussed. Golfing guidelines were discussed once again. She may utilize her brace. She will be seen back next week to complete her Euflexxa series.   This is her third attempt at [employer]. She will contact us in the interim with questions or concerns.

## 2021-06-02 ENCOUNTER — OFFICE VISIT (OUTPATIENT)
Dept: ORTHOPEDIC SURGERY | Age: 66
End: 2021-06-02
Payer: COMMERCIAL

## 2021-06-02 VITALS — BODY MASS INDEX: 29.1 KG/M2 | HEIGHT: 68 IN | WEIGHT: 192 LBS

## 2021-06-02 DIAGNOSIS — M25.562 CHRONIC PAIN OF BOTH KNEES: ICD-10-CM

## 2021-06-02 DIAGNOSIS — M25.561 CHRONIC PAIN OF BOTH KNEES: ICD-10-CM

## 2021-06-02 DIAGNOSIS — M17.0 BILATERAL PRIMARY OSTEOARTHRITIS OF KNEE: Primary | ICD-10-CM

## 2021-06-02 DIAGNOSIS — G89.29 CHRONIC PAIN OF BOTH KNEES: ICD-10-CM

## 2021-06-02 DIAGNOSIS — M22.42 CHONDROMALACIA OF BOTH PATELLAE: ICD-10-CM

## 2021-06-02 DIAGNOSIS — M22.41 CHONDROMALACIA OF BOTH PATELLAE: ICD-10-CM

## 2021-06-02 PROCEDURE — 20610 DRAIN/INJ JOINT/BURSA W/O US: CPT | Performed by: FAMILY MEDICINE

## 2021-06-02 RX ORDER — HYALURONATE SODIUM 10 MG/ML
40 SYRINGE (ML) INTRAARTICULAR ONCE
Status: COMPLETED | OUTPATIENT
Start: 2021-06-02 | End: 2021-06-02

## 2021-06-02 RX ORDER — DICLOFENAC SODIUM 75 MG/1
75 TABLET, DELAYED RELEASE ORAL 2 TIMES DAILY
Qty: 60 TABLET | Refills: 3 | Status: SHIPPED | OUTPATIENT
Start: 2021-06-02 | End: 2021-11-15

## 2021-06-02 RX ADMIN — Medication 40 MG: at 09:46

## 2021-06-02 NOTE — PROGRESS NOTES
intervals. This is her third attempt at [employer]. She will contact us in the interim with questions or concerns.

## 2021-10-29 ENCOUNTER — HOSPITAL ENCOUNTER (OUTPATIENT)
Age: 66
Discharge: HOME | End: 2021-10-29
Payer: MEDICARE

## 2021-10-29 ENCOUNTER — HOSPITAL ENCOUNTER (OUTPATIENT)
Dept: GENERAL RADIOLOGY | Age: 66
Discharge: HOME | End: 2021-10-29
Payer: MEDICARE

## 2021-10-29 DIAGNOSIS — M54.50 RIGHT LOW BACK PAIN, UNSPECIFIED CHRONICITY, UNSPECIFIED WHETHER SCIATICA PRESENT: ICD-10-CM

## 2021-10-29 PROCEDURE — 72100 X-RAY EXAM L-S SPINE 2/3 VWS: CPT

## 2021-11-01 ENCOUNTER — HOSPITAL ENCOUNTER (OUTPATIENT)
Dept: PHYSICAL THERAPY | Age: 66
Setting detail: THERAPIES SERIES
Discharge: HOME | End: 2021-11-01
Payer: MEDICARE

## 2021-11-01 PROCEDURE — 97161 PT EVAL LOW COMPLEX 20 MIN: CPT | Performed by: PHYSICAL THERAPIST

## 2021-11-01 PROCEDURE — 97110 THERAPEUTIC EXERCISES: CPT | Performed by: PHYSICAL THERAPIST

## 2021-11-01 NOTE — FLOWSHEET NOTE
[facility],  [address]  Phone: [phone]  Fax [phone]    Physical Therapy Treatment Note/ Progress Report:           Date:  2021    Patient Name:  Marcia Baumann    :  1955  MRN: [id number]  Restrictions/Precautions:    Medical/Treatment Diagnosis Information:  · Diagnosis: M54.50 Lumbar pain with radiculopathy down R leg. · Treatment Diagnosis: M54.50 Lumbar pain with radiculopathy down R leg. Insurance/Certification information:  PT Insurance Information: Medicare  Physician Information:  Referring Practitioner: Tasha Crowell DO  Has the plan of care been signed (Y/N):        []  Yes  [x]  No     Date of Patient follow up with Physician: No future visit scheduled.        Is this a Progress Report:     []  Yes  [x]  No        If Yes:  Date Range for reporting period:  Beginning 21  Ending    Progress report will be due (10 Rx or 30 days whichever is less):        Recertification will be due (POC Duration  / 90 days whichever is less): 21         Visit # Insurance Allowable Auth Required   In-person 1 BMN []  Yes []  No    Telehealth   []  Yes []  No    Total            Functional Scale: Modified Oswestry 18% disability   Date assessed:  21      Therapy Diagnosis/Practice Pattern: F      Number of Comorbidities:  []0     [x]1-2    []3+    Latex Allergy:  [x]NO      []YES  Preferred Language for Healthcare:   [x]English       []other:      Pain level:  9/10    SUBJECTIVE:  See eval    OBJECTIVE: See eval   Observation:    Test measurements:      RESTRICTIONS/PRECAUTIONS: Osteoarthritis  Working at [employer]    Exercises/Interventions:     Therapeutic Ex (67783) Sets/sec Notes/CUES   Hook-lying TA contraction 5'' x 10 HEP, tactile cues to initiate and maintain contraction, minimize ppt   Piriformis S 30'' x 2, R/L HEP, cues to decrease ROM for pain   Supine SKTC  Supine DKTB 20'' x 2, R/L  20'' x 1 HEP  HEP   Supine Lower Trunk Rotation on SB 2 x 10 HEP   Supine marches w/ TA 2 x 10, R/L HEP                       Pt ed: eval findings, HEP, HP/CP at home, exercise/golfing with pain, anatomy of spine 10'              Manual Intervention (94219) 2'    PA mobs on R sacral base 2' Increased pain                            NMR re-education (32025)  CUES NEEDED                                                    Therapeutic Exercise and NMR EXR  [x] (21327) Provided verbal/tactile cueing for activities related to strengthening, flexibility, endurance, ROM  for improvements in proximal hip and core control with self care, mobility, lifting and ambulation.  [] (07191) Provided verbal/tactile cueing for activities related to improving balance, coordination, kinesthetic sense, posture, motor skill, proprioception  to assist with core control in self care, mobility, lifting, and ambulation.      Therapeutic Activities:    [] (42759 or 76861) Provided verbal/tactile cueing for activities related to improving balance, coordination, kinesthetic sense, posture, motor skill, proprioception and motor activation to allow for proper function  with self care and ADLs  [] (47701) Provided training and instruction to the patient for proper core and proximal hip recruitment and positioning with ambulation re-education     Home Exercise Program:    [x] (45377) Reviewed/Progressed HEP activities related to strengthening, flexibility, endurance, ROM of core, proximal hip and LE for functional self-care, mobility, lifting and ambulation   [] (64900) Reviewed/Progressed HEP activities related to improving balance, coordination, kinesthetic sense, posture, motor skill, proprioception of core, proximal hip and LE for self care, mobility, lifting, and ambulation      Manual Treatments:  PROM / STM / Oscillations-Mobs:  G-I, II, III, IV (PA's, Inf., Post.)  [x] (14803) Provided manual therapy to mobilize proximal hip and LS spine soft tissue/joints for the purpose of modulating pain, promoting relaxation,  increasing ROM, reducing/eliminating soft tissue swelling/inflammation/restriction, improving soft tissue extensibility and allowing for proper ROM for normal function with self care, mobility, lifting and ambulation. Modalities:       Charges  Timed Code Treatment Minutes: 30   Total Treatment Minutes: 50, eval, HP     Medicare total (add KX at $2000)  156.23  11/01/21       [x] EVAL (LOW) 05043   [] EVAL (MOD) 73015   [] EVAL (HIGH) 95472   [] RE-EVAL     [x] BJ(79877) x2     [] IONTO  [] NMR (34876) x     [] VASO  [] Manual (91446) x     [] Other:  [] TA x      [] Mech Traction (94263)  [] ES(attended) (08705)     [] ES (un) (31791):     Goals:   Patient stated goal: Pt would like to get back to golfing and taking care of grandchildren without pain or restriction. [] Progressing: [] Met: [] Not Met: [] Adjusted    Therapist goals for Patient:   Short Term Goals: To be achieved in: 2 weeks  1. Independent in HEP and progression per patient tolerance, in order to prevent re-injury. [] Progressing: [] Met: [] Not Met: [] Adjusted  2. Patient will have a decrease in pain to facilitate improvement in movement, function, and ADLs as indicated by Functional Deficits. [] Progressing: [] Met: [] Not Met: [] Adjusted      Long Term Goals: To be achieved in: 6 weeks  1. Disability index score of 8% or less for the Modified Oswestry to assist with reaching prior level of function. [] Progressing: [] Met: [] Not Met: [] Adjusted  2. Patient will demonstrate increased AROM with lumbar ext to 20 deg without pain and lumbar side bending to symmetrically reaching knee joint line in order to be able to perform bed mobility and transfers without an increase in pain. [] Progressing: [] Met: [] Not Met: [] Adjusted  3.  Patient will demonstrate an increase in Strength with hip flex, ext and abd to 5/5 in order to be able to ascend/descend stairs and get into and out of a car without pain or restriction. [] Progressing: [] Met: [] Not Met: [] Adjusted  4. Patient will be able to perform 10 squats without pain in order to be able to pick something up off of the ground without pain or restriction. [] Progressing: [] Met: [] Not Met: [] Adjusted    Progression Towards Functional goals:  [] Patient is progressing as expected towards functional goals listed. [] Progression is slowed due to complexities listed. [] Progression has been slowed due to co-morbidities. [x] Plan just implemented, too soon to assess goals progression  [] Other:     Overall Progression Towards Functional goals/ Treatment Progress Update:  [] Patient is progressing as expected towards functional goals listed. [] Progression is slowed due to complexities/Impairments listed. [] Progression has been slowed due to co-morbidities.   [x] Plan just implemented, too soon to assess goals progression <30days   [] Goals require adjustment due to lack of progress  [] Patient is not progressing as expected and requires additional follow up with physician  [] Other    Prognosis for POC: [x] Good [] Fair  [] Poor      Patient requires continued skilled intervention: [x] Yes  [] No    Treatment/Activity Tolerance:  [x] Patient able to complete treatment  [] Patient limited by fatigue  [] Patient limited by pain    [] Patient limited by other medical complications  [] Other:     ASSESSMENT: see judy    Return to Play: (if applicable)   []  Stage 1: Intro to Strength   []  Stage 2: Return to Run and Strength   []  Stage 3: Return to Jump and Strength   []  Stage 4: Dynamic Strength and Agility   []  Stage 5: Sport Specific Training     []  Ready to Return to Play, Meets All Above Stages   []  Not Ready for Return to Sports   Comments:                         PLAN: See judy  [] Continue per plan of care [] Alter current plan (see comments above)  [x] Plan of care initiated [] Hold pending MD visit [] Discharge      Electronically signed by:  Pamela Figueroa, PT   MAXWELL Pittman  Therapist was present, directed the patient's care, made skilled judgement, and was responsible for assessment and treatment of the patient. Note: If patient does not return for scheduled/ recommended follow up visits, this note will serve as a discharge from care along with most recent update on progress. Access Code: [email or web address]  URL: [email or web address]  Date: 11/01/2021  Prepared by: Pamela Figueroa    Exercises  Supine Transversus Abdominis Bracing - Hands on Stomach - 1 x daily - 7 x weekly - 2 sets - 10 reps - 5 sec hold  Supine Figure 4 Piriformis Stretch - 1 x daily - 7 x weekly - 3 sets - 30 sec hold  Supine Single Knee to Chest Stretch - 1 x daily - 7 x weekly - 2 sets - 10 reps - 20 sec hold  Supine Double Knee to Chest - 1 x daily - 7 x weekly - 2 sets - 10 reps  Supine Lower Trunk Rotation with Swiss Ball - 1 x daily - 7 x weekly - 2 sets - 10 reps  Supine March - 1 x daily - 7 x weekly - 2 sets - 10 reps

## 2021-11-01 NOTE — PLAN OF CARE
[facility], [address]  Phone: [phone]  Fax [phone]    Physical Therapy Certification    Dear Referring Practitioner: Rick Layne DO,    We had the pleasure of evaluating the following patient for physical therapy services at [address] [place] [address]. A summary of our findings can be found in the initial assessment below. This includes our plan of care. If you have any questions or concerns regarding these findings, please do not hesitate to contact me at the office phone number checked above. Thank you for the referral.       Physician Signature:_______________________________Date:__________________  By signing above (or electronic signature), therapists plan is approved by physician    Patient: Nate Inman   : 1955   MRN: [id number]  Referring Physician: Referring Practitioner: Rick Layne DO      Evaluation Date: 2021      Medical Diagnosis Information:  Diagnosis: M54.50 Lumbar pain with radiculopathy down R leg. Treatment Diagnosis: M54.50 Lumbar pain with radiculopathy down R leg. Insurance information: PT Insurance Information: Medicare       Precautions/ Contra-indications: Osteoarthritis    C-SSRS Triggered by Intake questionnaire (Past 2 wk assessment):   [x] No, Questionnaire did not trigger screening.   [] Yes, Patient intake triggered further evaluation      [] C-SSRS Screening completed  [] PCP notified via Plan of Care  [] Emergency services notified     Latex Allergy:  [x]NO      []YES  Preferred Language for Healthcare:   [x]English       []other:    SUBJECTIVE: Patient stated complaint: Pt reports that she has been having LB pain for more than a year and it has been progressively getting worse. Pt notes she is having radicular symptoms going into both LEs, past the knee.  Pt notes she is getting shooting pain when she sits for prolonged periods of time. Pt reports that the LB pain is there most of the time, but is the worse with prolonged sitting, ascending/descending stairs and bending forward. Pt notes that she saw MD and x-rays were taken. Pt is unable to go golfing, take care of her grandchildren and perform bed mobility/transfers without pain. Pt has been using pain medications and ice at home sometimes, which slightly helps. Relevant Medical History: Osteoarthritis, cervical ACDF+fusion C4-5-6-7 2018 w/ Dr Dacia Cheung   Functional Disability Index/G-Codes:   Modified Oswestry 18% disability, assessed 11/01/21    Height: 5'8 BMI: 29.19  Pain Scale: 9/10  Easing factors: Laying down and using heat/ice helps with the pain  Provocative factors: Prolonged sitting, bending forward    Type: []Constant   [x]Intermittent  []Radiating []Localized []other:     Numbness/Tingling: Pt has NT bilat into LEs.      Occupation/School: [employer]    Living Status/Prior Level of Function: Independent with ADLs and IADLs, golf, taking care of grandchildren    OBJECTIVE:     ROM L R   Trunk FLEX 86 but never fully gets out of lumbar lordosis, LBP    LUMBAR EXT 12, with pain in LB    Sidebend Asymmetrical, can reach longterm down quad before pain Asymmetrical, can reach to joint line of knee   Rotation      LEFT RIGHT   HIP Flex     HIP Abd     HIP ER     HIP IR     Knee Flex     Knee ext     Hamstring FLEX + +   Piriformis  ++ ++             Strength  LEFT RIGHT   MfA     TrA     HIP Flexors 4+ 4- w/ pain   HIP Abductors 5- 5-   HIP ext  (Glute Max) 5- 4+   Hip IR     Knee EXT (quad) 5- 5-   Knee Flex (HS) 5- 4+     Reflexes/Sensation:    [x]Dermatomes/Myotomes intact    []UE Reflexes  NT    []Normal []Hypo      []Hyper   [x]LE Reflexes      []Normal [x]Hypo      []Hyper   []Babinski/Clonus/Hoffmans: NT   []Other:    Joint mobility:    []Normal    [x]Hypo- R sacral base hypomobile when compared to L sacral base, limited in nutation   []Hyper    Palpation: Pt tender over R PSIS/sacral base, L piriformis     Functional Mobility/Transfers: Pt is able to perform bed mobility and transfers indep, but with increased pain and time. Posture: Forward head, rounded shoulders, increased thoracic kyphosis, decreased lumbar lordosis, L convex curve scoliosis    Bandages/Dressings/Incisions: None    Gait: (include devices/WB status) Pt does not use ADs when ambulating. L knee decreased TKE in stance and varus shift    Orthopedic Special Tests: Repeated lumbar flexion: increased pain, but not radicular symptoms; Repeated lumbar extension: increased pain, more compared to flexion, but no increase in radicular symptoms; Long axis traction: increased pain bilat; SLR: neural tension started around 90 deg bilat. [x] Patient history, allergies, meds reviewed. Medical chart reviewed. See intake form. Review Of Systems (ROS):  [x]Performed Review of systems (Integumentary, CardioPulmonary, Neurological) by intake and observation. Intake form has been scanned into medical record. Patient has been instructed to contact their primary care physician regarding ROS issues if not already being addressed at this time.       Co-morbidities/Complexities (which will affect course of rehabilitation):  []None           Arthritic conditions   []Rheumatoid arthritis (M05.9)  [x]Osteoarthritis (M19.91)   Cardiovascular conditions   []Hypertension (I10)  []Hyperlipidemia (E78.5)  []Angina pectoris (I20)  []Atherosclerosis (I70)   Musculoskeletal conditions   [x]Disc pathology   []Congenital spine pathologies   [x]Prior surgical intervention  []Osteoporosis (M81.8)  []Osteopenia (M85.8)   Endocrine conditions   []Hypothyroid (E03.9)  []Hyperthyroid Gastrointestinal conditions   []Constipation (E59.86)   Metabolic conditions   []Morbid obesity (E66.01)  []Diabetes type 1(E10.65) or 2 (E11.65)   []Neuropathy (G60.9)     Pulmonary conditions to ascend/descend stairs   []other:       Participation Restrictions   [x]Reduced participation in self care activities   [x]Reduced participation in home management activities   [x]Reduced participation in work activities   [x]Reduced participation in social activities. [x]Reduced participation in sport/recreation activities. Classification:   [x]Signs/symptoms consistent with Lumbar instability/stabilization subgroup. []Signs/symptoms consistent with Lumbar mobilization/manipulation subgroup, myotomes and dermatomes intact. Meets manipulation criteria. []Signs/symptoms consistent with Lumbar direction specific/centralization subgroup   []Signs/symptoms consistent with Lumbar traction subgroup     []Signs/symptoms consistent with lumbar facet dysfunction   [x]Signs/symptoms consistent with lumbar stenosis type dysfunction   []Signs/symptoms consistent with nerve root involvement including myotome & dermatome dysfunction   []Signs/symptoms consistent with post-surgical status including: decreased ROM, strength and function.    []signs/symptoms consistent with pathology which may benefit from Dry needling     []other:      Prognosis/Rehab Potential:      []Excellent   [x]Good    []Fair   []Poor    Tolerance of evaluation/treatment:    []Excellent   [x]Good    []Fair   []Poor  Physical Therapy Evaluation Complexity Justification  [x] A history of present problem with:  [] no personal factors and/or comorbidities that impact the plan of care;  [x]1-2 personal factors and/or comorbidities that impact the plan of care  []3 personal factors and/or comorbidities that impact the plan of care  [x] An examination of body systems using standardized tests and measures addressing any of the following: body structures and functions (impairments), activity limitations, and/or participation restrictions;:  [x] a total of 1-2 or more elements   [] a total of 3 or more elements   [] a total of 4 or more elements   [x] A clinical presentation with:  [x] stable and/or uncomplicated characteristics   [] evolving clinical presentation with changing characteristics  [] unstable and unpredictable characteristics;   [x] Clinical decision making of [x] low, [] moderate, [] high complexity using standardized patient assessment instrument and/or measurable assessment of functional outcome. [x] EVAL (LOW) 26790 (typically 20 minutes face-to-face)  [] EVAL (MOD) 95381 (typically 30 minutes face-to-face)  [] EVAL (HIGH) 88363 (typically 45 minutes face-to-face)  [] RE-EVAL         PLAN: Begin PT focusing on: proximal hip mobilizations, LB mobs, LB core activation, proximal hip activation, and HEP    Frequency/Duration:  1-2 days per week for 4-6 Weeks:  Interventions:  [x]  Therapeutic exercise including: strength training, ROM, for LE, Glutes and core   [x]  NMR activation and proprioception for glutes , LE and Core   [x]  Manual therapy as indicated for Hip complex, LE and spine to include: Dry Needling/IASTM, STM, PROM, Gr I-IV mobilizations, manipulation. [x]  Modalities as needed that may include: thermal agents, E-stim, Biofeedback, US, iontophoresis as indicated  [x]  Patient education on joint protection, postural re-education, activity modification, progression of HEP. HEP instruction: see bottom of form    GOALS:  Patient stated goal: Pt would like to get back to golfing and taking care of grandchildren without pain or restriction. [] Progressing: [] Met: [] Not Met: [] Adjusted    Therapist goals for Patient:   Short Term Goals: To be achieved in: 2 weeks  1. Independent in HEP and progression per patient tolerance, in order to prevent re-injury. [] Progressing: [] Met: [] Not Met: [] Adjusted  2. Patient will have a decrease in pain to facilitate improvement in movement, function, and ADLs as indicated by Functional Deficits. [] Progressing: [] Met: [] Not Met: [] Adjusted      Long Term Goals:  To be achieved in: 6 weeks  1. Disability index score of 8% or less for the Modified Oswestry to assist with reaching prior level of function. [] Progressing: [] Met: [] Not Met: [] Adjusted  2. Patient will demonstrate increased AROM with lumbar ext to 20 deg without pain and lumbar side bending to symmetrically reaching knee joint line in order to be able to perform bed mobility and transfers without an increase in pain. [] Progressing: [] Met: [] Not Met: [] Adjusted  3. Patient will demonstrate an increase in Strength with hip flex, ext and abd to 5/5 in order to be able to ascend/descend stairs and get into and out of a car without pain or restriction. [] Progressing: [] Met: [] Not Met: [] Adjusted  4. Patient will be able to perform 10 squats without pain in order to be able to pick something up off of the ground without pain or restriction. [] Progressing: [] Met: [] Not Met: [] Adjusted       Electronically signed by:  Pool Fernandez, PT   Roberto Titus, [facility]  Therapist was present, directed the patient's care, made skilled judgement, and was responsible for assessment and treatment of the patient. Access Code: [email or web address]  URL: [email or web address]  Date: 11/01/2021  Prepared by: Pool Fernandez    Exercises  Supine Transversus Abdominis Bracing - Hands on Stomach - 1 x daily - 7 x weekly - 2 sets - 10 reps - 5 sec hold  Supine Figure 4 Piriformis Stretch - 1 x daily - 7 x weekly - 3 sets - 30 sec hold  Supine Single Knee to Chest Stretch - 1 x daily - 7 x weekly - 2 sets - 10 reps - 20 sec hold  Supine Double Knee to Chest - 1 x daily - 7 x weekly - 2 sets - 10 reps  Supine Lower Trunk Rotation with Swiss Ball - 1 x daily - 7 x weekly - 2 sets - 10 reps  Supine March - 1 x daily - 7 x weekly - 2 sets - 10 reps

## 2021-11-03 ENCOUNTER — HOSPITAL ENCOUNTER (OUTPATIENT)
Dept: PHYSICAL THERAPY | Age: 66
Setting detail: THERAPIES SERIES
Discharge: HOME | End: 2021-11-03
Payer: MEDICARE

## 2021-11-03 PROCEDURE — 97140 MANUAL THERAPY 1/> REGIONS: CPT | Performed by: PHYSICAL THERAPIST

## 2021-11-03 PROCEDURE — 97110 THERAPEUTIC EXERCISES: CPT | Performed by: PHYSICAL THERAPIST

## 2021-11-03 NOTE — FLOWSHEET NOTE
[facility], [address]  Phone: [phone]  Fax [phone]    Physical Therapy Treatment Note/ Progress Report:           Date:  11/3/2021    Patient Name:  Marcia Baumann    :  1955  MRN: [id number]  Restrictions/Precautions:    Medical/Treatment Diagnosis Information:  · Diagnosis: M54.50 Lumbar pain with radiculopathy down R leg. · Treatment Diagnosis: M54.50 Lumbar pain with radiculopathy down R leg. Insurance/Certification information:  PT Insurance Information: Medicare  Physician Information:  Referring Practitioner: Tasha Crowell DO  Has the plan of care been signed (Y/N):        []  Yes  [x]  No     Date of Patient follow up with Physician: No future visit scheduled. Is this a Progress Report:     []  Yes  [x]  No        If Yes:  Date Range for reporting period:  Beginning 21  Ending    Progress report will be due (10 Rx or 30 days whichever is less):        Recertification will be due (POC Duration  / 90 days whichever is less): 21         Visit # Insurance Allowable Auth Required   In-person 2  W Paul Colbert []  Yes []  No    Telehealth   []  Yes []  No    Total            Functional Scale: Modified Oswestry 18% disability   Date assessed:  21      Therapy Diagnosis/Practice Pattern: F      Number of Comorbidities:  []0     [x]1-2    []3+    Latex Allergy:  [x]NO      []YES  Preferred Language for Healthcare:   [x]English       []other:      Pain level:  9/10    SUBJECTIVE:  Pt reports that she is having an increase in pain and soreness, but is attributing this to being very active yesterday at work. Pt reported that she was on her feet for most of the day and had to carry boxes around, which increased her symptoms. Pt does report having radicular symptoms down into BLE. Pt notes she has not been doing exercises at home due to the pain.      OBJECTIVE: See activation to allow for proper function  with self care and ADLs  [] (96546) Provided training and instruction to the patient for proper core and proximal hip recruitment and positioning with ambulation re-education     Home Exercise Program:    [x] (99608) Reviewed/Progressed HEP activities related to strengthening, flexibility, endurance, ROM of core, proximal hip and LE for functional self-care, mobility, lifting and ambulation   [] (57949) Reviewed/Progressed HEP activities related to improving balance, coordination, kinesthetic sense, posture, motor skill, proprioception of core, proximal hip and LE for self care, mobility, lifting, and ambulation      Manual Treatments:  PROM / STM / Oscillations-Mobs:  G-I, II, III, IV (PA's, Inf., Post.)  [x] (50740) Provided manual therapy to mobilize proximal hip and LS spine soft tissue/joints for the purpose of modulating pain, promoting relaxation,  increasing ROM, reducing/eliminating soft tissue swelling/inflammation/restriction, improving soft tissue extensibility and allowing for proper ROM for normal function with self care, mobility, lifting and ambulation. Modalities:   CP x 10 min to lumbar    Charges  Timed Code Treatment Minutes: 40   Total Treatment Minutes: 50, CP     Medicare total (add KX at $2000)  241.57  11/03/21       [] EVAL (LOW) 42560   [] EVAL (MOD) 45371   [] EVAL (HIGH) 49236   [] RE-EVAL     [x] PEREZ(76989) x2     [] IONTO  [] NMR (91638) x     [] VASO  [x] Manual (63458) x1     [] Other:  [] TA x      [] Mech Traction (84890)  [] ES(attended) (73343)     [] ES (un) (45587):     Goals:   Patient stated goal: Pt would like to get back to golfing and taking care of grandchildren without pain or restriction. [] Progressing: [] Met: [] Not Met: [] Adjusted    Therapist goals for Patient:   Short Term Goals: To be achieved in: 2 weeks  1. Independent in HEP and progression per patient tolerance, in order to prevent re-injury.    [] Progressing: [] Met: [] Not Met: [] Adjusted  2. Patient will have a decrease in pain to facilitate improvement in movement, function, and ADLs as indicated by Functional Deficits. [] Progressing: [] Met: [] Not Met: [] Adjusted      Long Term Goals: To be achieved in: 6 weeks  1. Disability index score of 8% or less for the Modified Oswestry to assist with reaching prior level of function. [] Progressing: [] Met: [] Not Met: [] Adjusted  2. Patient will demonstrate increased AROM with lumbar ext to 20 deg without pain and lumbar side bending to symmetrically reaching knee joint line in order to be able to perform bed mobility and transfers without an increase in pain. [] Progressing: [] Met: [] Not Met: [] Adjusted  3. Patient will demonstrate an increase in Strength with hip flex, ext and abd to 5/5 in order to be able to ascend/descend stairs and get into and out of a car without pain or restriction. [] Progressing: [] Met: [] Not Met: [] Adjusted  4. Patient will be able to perform 10 squats without pain in order to be able to pick something up off of the ground without pain or restriction. [] Progressing: [] Met: [] Not Met: [] Adjusted    Progression Towards Functional goals:  [] Patient is progressing as expected towards functional goals listed. [] Progression is slowed due to complexities listed. [] Progression has been slowed due to co-morbidities. [x] Plan just implemented, too soon to assess goals progression  [] Other:     Overall Progression Towards Functional goals/ Treatment Progress Update:  [] Patient is progressing as expected towards functional goals listed. [] Progression is slowed due to complexities/Impairments listed. [] Progression has been slowed due to co-morbidities.   [x] Plan just implemented, too soon to assess goals progression <30days   [] Goals require adjustment due to lack of progress  [] Patient is not progressing as expected and requires additional follow up with physician  [] Other    Prognosis for POC: [x] Good [] Fair  [] Poor      Patient requires continued skilled intervention: [x] Yes  [] No    Treatment/Activity Tolerance:  [x] Patient able to complete treatment  [] Patient limited by fatigue  [] Patient limited by pain    [] Patient limited by other medical complications  [] Other:     ASSESSMENT: Pt was able to perform all exercises today, but had moderate complaints of pain and discomfort. Pt could not tolerate prone for long without an increase in pain and symptoms. Pt also could not complete mini squats, which were held due to pain. Pt needed increased cues on the SB to maintain proper posture and during heel taps to maintain neutral pelvis. Pt requested CP today after therapy. Return to Play: (if applicable)   []  Stage 1: Intro to Strength   []  Stage 2: Return to Run and Strength   []  Stage 3: Return to Jump and Strength   []  Stage 4: Dynamic Strength and Agility   []  Stage 5: Sport Specific Training     []  Ready to Return to Play, Meets All Above Stages   []  Not Ready for Return to Sports   Comments:                         PLAN: See eval  [x] Continue per plan of care [] Alter current plan (see comments above)  [] Plan of care initiated [] Hold pending MD visit [] Discharge      Electronically signed by:  Magda Reyna, PT   MAXWELL George  Therapist was present, directed the patient's care, made skilled judgement, and was responsible for assessment and treatment of the patient. Note: If patient does not return for scheduled/ recommended follow up visits, this note will serve as a discharge from care along with most recent update on progress. Access Code: [email or web address]  URL: [email or web address]  Date: 11/01/2021  Prepared by: Magda Reyna    Exercises  Supine Transversus Abdominis Bracing - Hands on Stomach - 1 x daily - 7 x weekly - 2 sets - 10 reps - 5 sec hold  Supine Figure 4 Piriformis Stretch - 1 x daily - 7 x weekly - 3 sets - 30 sec hold  Supine Single Knee to Chest Stretch - 1 x daily - 7 x weekly - 2 sets - 10 reps - 20 sec hold  Supine Double Knee to Chest - 1 x daily - 7 x weekly - 2 sets - 10 reps  Supine Lower Trunk Rotation with Swiss Ball - 1 x daily - 7 x weekly - 2 sets - 10 reps  Supine March - 1 x daily - 7 x weekly - 2 sets - 10 reps

## 2021-11-08 ENCOUNTER — HOSPITAL ENCOUNTER (OUTPATIENT)
Dept: PHYSICAL THERAPY | Age: 66
Setting detail: THERAPIES SERIES
Discharge: HOME | End: 2021-11-08
Payer: MEDICARE

## 2021-11-08 PROCEDURE — 97140 MANUAL THERAPY 1/> REGIONS: CPT | Performed by: PHYSICAL THERAPIST

## 2021-11-08 PROCEDURE — 97110 THERAPEUTIC EXERCISES: CPT | Performed by: PHYSICAL THERAPIST

## 2021-11-08 NOTE — FLOWSHEET NOTE
[facility], [address]  Phone: [phone]  Fax [phone]    Physical Therapy Treatment Note/ Progress Report:           Date:  2021    Patient Name:  Fabian Cedeño    :  1955  MRN: [id number]  Restrictions/Precautions:    Medical/Treatment Diagnosis Information:  · Diagnosis: M54.50 Lumbar pain with radiculopathy down R leg. · Treatment Diagnosis: M54.50 Lumbar pain with radiculopathy down R leg. Insurance/Certification information:  PT Insurance Information: Medicare  Physician Information:  Referring Practitioner: Gayle Wayne DO  Has the plan of care been signed (Y/N):        []  Yes  [x]  No     Date of Patient follow up with Physician: No future visit scheduled. Is this a Progress Report:     []  Yes  [x]  No        If Yes:  Date Range for reporting period:  Beginning 21  Ending    Progress report will be due (10 Rx or 30 days whichever is less):        Recertification will be due (POC Duration  / 90 days whichever is less): 21         Visit # Insurance Allowable Auth Required   In-person 3  [address] []  Yes []  No    Telehealth   []  Yes []  No    Total            Functional Scale: Modified Oswestry 18% disability   Date assessed:  21      Therapy Diagnosis/Practice Pattern: F      Number of Comorbidities:  []0     [x]1-2    []3+    Latex Allergy:  [x]NO      []YES  Preferred Language for Healthcare:   [x]English       []other:      Pain level:      SUBJECTIVE:  Pt reports that she feels the same as the last visit, no increase or decrease in pain or symptoms. Pt notes she has not been doing HEP consistently due to working and a busy weekend. Pt notes she was out on her feet a lot, which could be the reason as to why she was pretty painful over the weekend.      OBJECTIVE: See eval   Observation:    Test measurements:      RESTRICTIONS/PRECAUTIONS: Osteoarthritis  Working at [employer]    Exercises/Interventions:     Therapeutic Ex (66640) Sets/sec Notes/CUES   Hook-lying TA contraction  HEP, tactile cues to initiate and maintain contraction, minimize ppt   Piriformis S 30'' x 2, R/L HEP, cues to decrease ROM for pain   Supine SKTC  Supine SKTC w/ ITB bias  Supine DKTC w/ SB w/ TA 30'' x 2, R/L  30'' x 2, R/L   HEP    HEP, tactile cues for TA contraction   Supine Lower Trunk Rotation on SB  HEP   Supine marches w/ TA 2 x 10, R/L HEP   Hooklying Clams GTB 5'' x 2 x 10    Hip Abd Iso w/ Bridge 3'' x 2 x 10         Mini Squats Unable to perform b/c of pain   Standing Hip Abd/Ext 10x ea, R/L Cues to avoid leaning   Heel Raises 2 x 10 Knee extension cues   Heel Taps  Cues to maintain neutral pelvis   Resisted Walking 5x, forward/backward              Pt ed:  HEP, exercise/golfing with pain,  2'              Manual Intervention (19467) 12'    PA mobs on R sacral base 4' Increased pain   STM to R piriformis, glutes, QL, paraspinals 8' Try pillow under hips NV                       NMR re-education (68452)  CUES NEEDED   LAQ on SB  Marches on SB  SBA, BUE support for both   [employer] Press w/ GTB  Anti Rotation Press w/ step outs 2 x 10, R/L  2 x 10, R/L Cues to avoid trunk rotation                                          Therapeutic Exercise and NMR EXR  [x] (32475) Provided verbal/tactile cueing for activities related to strengthening, flexibility, endurance, ROM  for improvements in proximal hip and core control with self care, mobility, lifting and ambulation. [x] (41103) Provided verbal/tactile cueing for activities related to improving balance, coordination, kinesthetic sense, posture, motor skill, proprioception  to assist with core control in self care, mobility, lifting, and ambulation.      Therapeutic Activities:    [] (32448 or 03401) Provided verbal/tactile cueing for activities related to improving balance, coordination, kinesthetic sense, order to prevent re-injury. [] Progressing: [] Met: [] Not Met: [] Adjusted  2. Patient will have a decrease in pain to facilitate improvement in movement, function, and ADLs as indicated by Functional Deficits. [] Progressing: [] Met: [] Not Met: [] Adjusted      Long Term Goals: To be achieved in: 6 weeks  1. Disability index score of 8% or less for the Modified Oswestry to assist with reaching prior level of function. [] Progressing: [] Met: [] Not Met: [] Adjusted  2. Patient will demonstrate increased AROM with lumbar ext to 20 deg without pain and lumbar side bending to symmetrically reaching knee joint line in order to be able to perform bed mobility and transfers without an increase in pain. [] Progressing: [] Met: [] Not Met: [] Adjusted  3. Patient will demonstrate an increase in Strength with hip flex, ext and abd to 5/5 in order to be able to ascend/descend stairs and get into and out of a car without pain or restriction. [] Progressing: [] Met: [] Not Met: [] Adjusted  4. Patient will be able to perform 10 squats without pain in order to be able to pick something up off of the ground without pain or restriction. [] Progressing: [] Met: [] Not Met: [] Adjusted    Progression Towards Functional goals:  [] Patient is progressing as expected towards functional goals listed. [] Progression is slowed due to complexities listed. [] Progression has been slowed due to co-morbidities. [x] Plan just implemented, too soon to assess goals progression  [] Other:     Overall Progression Towards Functional goals/ Treatment Progress Update:  [] Patient is progressing as expected towards functional goals listed. [] Progression is slowed due to complexities/Impairments listed. [] Progression has been slowed due to co-morbidities.   [x] Plan just implemented, too soon to assess goals progression <30days   [] Goals require adjustment due to lack of progress  [] Patient is not progressing as expected and 10 reps - 5 sec hold  Supine Figure 4 Piriformis Stretch - 1 x daily - 7 x weekly - 3 sets - 30 sec hold  Supine Single Knee to Chest Stretch - 1 x daily - 7 x weekly - 2 sets - 10 reps - 20 sec hold  Supine Double Knee to Chest - 1 x daily - 7 x weekly - 2 sets - 10 reps  Supine Lower Trunk Rotation with Swiss Ball - 1 x daily - 7 x weekly - 2 sets - 10 reps  Supine March - 1 x daily - 7 x weekly - 2 sets - 10 reps

## 2021-11-12 ENCOUNTER — HOSPITAL ENCOUNTER (OUTPATIENT)
Dept: PHYSICAL THERAPY | Age: 66
Setting detail: THERAPIES SERIES
Discharge: HOME | End: 2021-11-12
Payer: MEDICARE

## 2021-11-12 PROCEDURE — 97110 THERAPEUTIC EXERCISES: CPT

## 2021-11-12 PROCEDURE — 97140 MANUAL THERAPY 1/> REGIONS: CPT

## 2021-11-12 PROCEDURE — 97112 NEUROMUSCULAR REEDUCATION: CPT

## 2021-11-12 NOTE — FLOWSHEET NOTE
[facility], [address]  Phone: [phone]  Fax [phone]    Physical Therapy Treatment Note/ Progress Report:           Date:  2021    Patient Name:  Renelle Romberg    :  1955  MRN: [id number]  Restrictions/Precautions:    Medical/Treatment Diagnosis Information:  · Diagnosis: M54.50 Lumbar pain with radiculopathy down R leg. · Treatment Diagnosis: M54.50 Lumbar pain with radiculopathy down R leg. Insurance/Certification information:  PT Insurance Information: Medicare  Physician Information:  Referring Practitioner: Domingo Muñoz DO  Has the plan of care been signed (Y/N):        []  Yes  [x]  No     Date of Patient follow up with Physician: No future visit scheduled. Is this a Progress Report:     []  Yes  [x]  No         If Yes:  Date Range for reporting period:  Beginning 21   Ending    Progress report will be due (10 Rx or 30 days whichever is less): 15/69/33       Recertification will be due (POC Duration  / 90 days whichever is less): 21         Visit # Insurance Allowable Auth Required   In-person [address] []  Yes []  No    Telehealth   []  Yes []  No    Total            Functional Scale: Modified Oswestry 18% disability   Date assessed:  21      Therapy Diagnosis/Practice Pattern: F      Number of Comorbidities:  []0     [x]1-2    []3+    Latex Allergy:  [x]NO      []YES  Preferred Language for Healthcare:   [x]English       []other:      Pain level:      SUBJECTIVE:  Pt notes that she has still been having a lot of pain on her L side of lumbar and hip. She reports that she has been working longer shifts due to the holidays and is on her feet the entire duration of the shift. Pt feels like there has not been much improvement. She has been trying to perform HEP consistently, but reports she does not always do them due to her being busy and in pain.     OBJECTIVE:  Observation:    Test measurements:      RESTRICTIONS/PRECAUTIONS: Osteoarthritis  Working at [employer]    Exercises/Interventions:     Therapeutic Ex (05622) Sets/sec Notes/CUES   Hook-lying TA contraction  HEP, tactile cues to initiate and maintain contraction, minimize ppt   Piriformis S 30'' x 2, R/L HEP, cues to decrease ROM for pain   Supine SKTC  Supine SKTC w/ ITB bias  Supine DKTC w/ SB w/ TA 30'' x 2, R/L  30'' x 2, R/L   HEP    HEP, tactile cues for TA contraction   Supine Lower Trunk Rotation on SB  HEP   Supine marches w/ TA w/ pressure cuff 3 x 10 HEP   Hooklying Clams GTB 5'' x 2 x 10    Hip Abd Iso w/ Bridge 3'' x 2 x 10    Prone press ups w/ sacral PA mob 1 x 10    Mini Squats Unable to perform b/c of pain   Standing Hip Abd/Ext 2 x 10 ea, R/L Cues to avoid leaning   Heel Raises  Knee extension cues   Heel Taps  Cues to maintain neutral pelvis   Resisted Walking               Pt ed:  HEP, exercise/golfing with pain,                Manual Intervention (42993) 14'    PA mobs on R sacral base 4' Increased pain   STM to R piriformis, glutes, QL, paraspinals 8' Try pillow under hips NV   Quad S 2'                   NMR re-education (59082)  CUES NEEDED   LAQ on SB  Marches on SB 2 x 10  2 x 10 SBA, UE support for both   Palloff Press w/ GTB  Anti Rotation Press w/ step outs 2 x 10, R/L   2 x 10, R/L Cues to avoid trunk rotation                                          Therapeutic Exercise and NMR EXR  [x] (36957) Provided verbal/tactile cueing for activities related to strengthening, flexibility, endurance, ROM  for improvements in proximal hip and core control with self care, mobility, lifting and ambulation. [x] (54452) Provided verbal/tactile cueing for activities related to improving balance, coordination, kinesthetic sense, posture, motor skill, proprioception  to assist with core control in self care, mobility, lifting, and ambulation.      Therapeutic Activities:    [] (37120 or 08180) Provided verbal/tactile cueing for activities related to improving balance, coordination, kinesthetic sense, posture, motor skill, proprioception and motor activation to allow for proper function  with self care and ADLs  [] (70475) Provided training and instruction to the patient for proper core and proximal hip recruitment and positioning with ambulation re-education     Home Exercise Program:    [x] (64674) Reviewed/Progressed HEP activities related to strengthening, flexibility, endurance, ROM of core, proximal hip and LE for functional self-care, mobility, lifting and ambulation   [] (94132) Reviewed/Progressed HEP activities related to improving balance, coordination, kinesthetic sense, posture, motor skill, proprioception of core, proximal hip and LE for self care, mobility, lifting, and ambulation      Manual Treatments:  PROM / STM / Oscillations-Mobs:  G-I, II, III, IV (PA's, Inf., Post.)  [x] (94929) Provided manual therapy to mobilize proximal hip and LS spine soft tissue/joints for the purpose of modulating pain, promoting relaxation,  increasing ROM, reducing/eliminating soft tissue swelling/inflammation/restriction, improving soft tissue extensibility and allowing for proper ROM for normal function with self care, mobility, lifting and ambulation. Modalities:   CP x 10 min to lumbar    Charges  Timed Code Treatment Minutes: 40   Total Treatment Minutes: 50, CP     Medicare total (add KX at $2000)  416.86  11/12/21       [] EVAL (LOW) 33305   [] EVAL (MOD) 80296   [] EVAL (HIGH) 58091   [] RE-EVAL      [x] DD(15189) x1     [] IONTO  [x] NMR (59070) x1     [] VASO  [x] Manual (65422) x1     [] Other:  [] TA x      [] Mech Traction (85422)  [] ES(attended) (63144)     [] ES (un) (89969):     Goals:   Patient stated goal: Pt would like to get back to golfing and taking care of grandchildren without pain or restriction.    [] Progressing: [] Met: [] Not Met: [] Adjusted    Therapist goals for Patient:   Short Term Goals: To be achieved in: 2 weeks  1. Independent in HEP and progression per patient tolerance, in order to prevent re-injury. [] Progressing: [] Met: [] Not Met: [] Adjusted  2. Patient will have a decrease in pain to facilitate improvement in movement, function, and ADLs as indicated by Functional Deficits. [] Progressing: [] Met: [] Not Met: [] Adjusted      Long Term Goals: To be achieved in: 6 weeks  1. Disability index score of 8% or less for the Modified Oswestry to assist with reaching prior level of function. [] Progressing: [] Met: [] Not Met: [] Adjusted  2. Patient will demonstrate increased AROM with lumbar ext to 20 deg without pain and lumbar side bending to symmetrically reaching knee joint line in order to be able to perform bed mobility and transfers without an increase in pain. [] Progressing: [] Met: [] Not Met: [] Adjusted  3. Patient will demonstrate an increase in Strength with hip flex, ext and abd to 5/5 in order to be able to ascend/descend stairs and get into and out of a car without pain or restriction. [] Progressing: [] Met: [] Not Met: [] Adjusted  4. Patient will be able to perform 10 squats without pain in order to be able to pick something up off of the ground without pain or restriction. [] Progressing: [] Met: [] Not Met: [] Adjusted    Progression Towards Functional goals:  [] Patient is progressing as expected towards functional goals listed. [] Progression is slowed due to complexities listed. [] Progression has been slowed due to co-morbidities. [x] Plan just implemented, too soon to assess goals progression  [] Other:     Overall Progression Towards Functional goals/ Treatment Progress Update:  [] Patient is progressing as expected towards functional goals listed. [] Progression is slowed due to complexities/Impairments listed. [] Progression has been slowed due to co-morbidities.   [x] Plan just implemented, too soon to assess goals Access Code: [email or web address]  URL: [email or web address]  Date: 11/01/2021  Prepared by: Yovani Martinez    Exercises  Supine Transversus Abdominis Bracing - Hands on Stomach - 1 x daily - 7 x weekly - 2 sets - 10 reps - 5 sec hold  Supine Figure 4 Piriformis Stretch - 1 x daily - 7 x weekly - 3 sets - 30 sec hold  Supine Single Knee to Chest Stretch - 1 x daily - 7 x weekly - 2 sets - 10 reps - 20 sec hold  Supine Double Knee to Chest - 1 x daily - 7 x weekly - 2 sets - 10 reps  Supine Lower Trunk Rotation with Swiss Ball - 1 x daily - 7 x weekly - 2 sets - 10 reps  Supine March - 1 x daily - 7 x weekly - 2 sets - 10 reps

## 2021-11-15 ENCOUNTER — OFFICE VISIT (OUTPATIENT)
Dept: ORTHOPEDIC SURGERY | Age: 66
End: 2021-11-15
Payer: MEDICARE

## 2021-11-15 ENCOUNTER — HOSPITAL ENCOUNTER (OUTPATIENT)
Dept: PHYSICAL THERAPY | Age: 66
Setting detail: THERAPIES SERIES
Discharge: HOME | End: 2021-11-15
Payer: MEDICARE

## 2021-11-15 VITALS — BODY MASS INDEX: 28.88 KG/M2 | WEIGHT: 184 LBS | HEIGHT: 67 IN

## 2021-11-15 DIAGNOSIS — M22.42 CHONDROMALACIA OF BOTH PATELLAE: ICD-10-CM

## 2021-11-15 DIAGNOSIS — M47.816 LUMBAR SPONDYLOSIS: ICD-10-CM

## 2021-11-15 DIAGNOSIS — M25.561 CHRONIC PAIN OF BOTH KNEES: ICD-10-CM

## 2021-11-15 DIAGNOSIS — M79.604 BILATERAL LEG PAIN: ICD-10-CM

## 2021-11-15 DIAGNOSIS — M22.41 CHONDROMALACIA OF BOTH PATELLAE: ICD-10-CM

## 2021-11-15 DIAGNOSIS — M54.50 ACUTE BILATERAL LOW BACK PAIN, UNSPECIFIED WHETHER SCIATICA PRESENT: ICD-10-CM

## 2021-11-15 DIAGNOSIS — M25.562 CHRONIC PAIN OF BOTH KNEES: ICD-10-CM

## 2021-11-15 DIAGNOSIS — M79.605 BILATERAL LEG PAIN: ICD-10-CM

## 2021-11-15 DIAGNOSIS — M17.0 BILATERAL PRIMARY OSTEOARTHRITIS OF KNEE: Primary | ICD-10-CM

## 2021-11-15 DIAGNOSIS — G89.29 CHRONIC PAIN OF BOTH KNEES: ICD-10-CM

## 2021-11-15 PROCEDURE — 99214 OFFICE O/P EST MOD 30 MIN: CPT | Performed by: FAMILY MEDICINE

## 2021-11-15 PROCEDURE — 97140 MANUAL THERAPY 1/> REGIONS: CPT | Performed by: PHYSICAL THERAPIST

## 2021-11-15 PROCEDURE — G8417 CALC BMI ABV UP PARAM F/U: HCPCS | Performed by: FAMILY MEDICINE

## 2021-11-15 PROCEDURE — 1090F PRES/ABSN URINE INCON ASSESS: CPT | Performed by: FAMILY MEDICINE

## 2021-11-15 PROCEDURE — 1123F ACP DISCUSS/DSCN MKR DOCD: CPT | Performed by: FAMILY MEDICINE

## 2021-11-15 PROCEDURE — G8484 FLU IMMUNIZE NO ADMIN: HCPCS | Performed by: FAMILY MEDICINE

## 2021-11-15 PROCEDURE — 97110 THERAPEUTIC EXERCISES: CPT | Performed by: PHYSICAL THERAPIST

## 2021-11-15 PROCEDURE — 3017F COLORECTAL CA SCREEN DOC REV: CPT | Performed by: FAMILY MEDICINE

## 2021-11-15 PROCEDURE — L0625 LO FLEX L1-BELOW L5 PRE OTS: HCPCS | Performed by: FAMILY MEDICINE

## 2021-11-15 PROCEDURE — 1036F TOBACCO NON-USER: CPT | Performed by: FAMILY MEDICINE

## 2021-11-15 PROCEDURE — 4040F PNEUMOC VAC/ADMIN/RCVD: CPT | Performed by: FAMILY MEDICINE

## 2021-11-15 PROCEDURE — 20610 DRAIN/INJ JOINT/BURSA W/O US: CPT | Performed by: FAMILY MEDICINE

## 2021-11-15 PROCEDURE — G8400 PT W/DXA NO RESULTS DOC: HCPCS | Performed by: FAMILY MEDICINE

## 2021-11-15 PROCEDURE — G8428 CUR MEDS NOT DOCUMENT: HCPCS | Performed by: FAMILY MEDICINE

## 2021-11-15 RX ORDER — BUPIVACAINE HYDROCHLORIDE 2.5 MG/ML
4 INJECTION, SOLUTION INFILTRATION; PERINEURAL ONCE
Status: COMPLETED | OUTPATIENT
Start: 2021-11-15 | End: 2021-11-15

## 2021-11-15 RX ORDER — LIDOCAINE HYDROCHLORIDE 10 MG/ML
2 INJECTION, SOLUTION INFILTRATION; PERINEURAL ONCE
Status: COMPLETED | OUTPATIENT
Start: 2021-11-15 | End: 2021-11-15

## 2021-11-15 RX ORDER — BETAMETHASONE SODIUM PHOSPHATE AND BETAMETHASONE ACETATE 3; 3 MG/ML; MG/ML
24 INJECTION, SUSPENSION INTRA-ARTICULAR; INTRALESIONAL; INTRAMUSCULAR; SOFT TISSUE ONCE
Status: COMPLETED | OUTPATIENT
Start: 2021-11-15 | End: 2021-11-15

## 2021-11-15 RX ORDER — METHYLPREDNISOLONE 4 MG/1
TABLET ORAL
Qty: 21 KIT | Refills: 0 | Status: ON HOLD | OUTPATIENT
Start: 2021-11-15 | End: 2022-01-06 | Stop reason: ALTCHOICE

## 2021-11-15 RX ADMIN — BUPIVACAINE HYDROCHLORIDE 10 MG: 2.5 INJECTION, SOLUTION INFILTRATION; PERINEURAL at 13:45

## 2021-11-15 RX ADMIN — LIDOCAINE HYDROCHLORIDE 2 ML: 10 INJECTION, SOLUTION INFILTRATION; PERINEURAL at 13:46

## 2021-11-15 RX ADMIN — BETAMETHASONE SODIUM PHOSPHATE AND BETAMETHASONE ACETATE 24 MG: 3; 3 INJECTION, SUSPENSION INTRA-ARTICULAR; INTRALESIONAL; INTRAMUSCULAR; SOFT TISSUE at 13:45

## 2021-11-15 NOTE — FLOWSHEET NOTE
[facility], [address] [place]  Phone: [phone]  Fax [phone]    Physical Therapy Treatment Note/ Progress Report:           Date:  11/15/2021    Patient Name:  Pete Paulino    :  1955  MRN: [id number]  Restrictions/Precautions:    Medical/Treatment Diagnosis Information:  · Diagnosis: M54.50 Lumbar pain with radiculopathy down R leg. · Treatment Diagnosis: M54.50 Lumbar pain with radiculopathy down R leg. Insurance/Certification information:  PT Insurance Information: Medicare  Physician Information:  Referring Practitioner: Desmond Rubinstein, DO  Has the plan of care been signed (Y/N):        []  Yes  [x]  No     Date of Patient follow up with Physician: No future visit scheduled. Is this a Progress Report:     []  Yes  [x]  No         If Yes:  Date Range for reporting period:  Beginning 21   Ending    Progress report will be due (10 Rx or 30 days whichever is less):        Recertification will be due (POC Duration  / 90 days whichever is less): 21         Visit # Insurance Allowable Auth Required   In-person 5 [facility] []  Yes []  No    Telehealth   []  Yes []  No    Total            Functional Scale: Modified Oswestry 18% disability   Date assessed:  21      Therapy Diagnosis/Practice Pattern: F      Number of Comorbidities:  []0     [x]1-2    []3+    Latex Allergy:  [x]NO      []YES  Preferred Language for Healthcare:   [x]English       []other:      Pain level:      SUBJECTIVE:  Pt reports that she is feeling about the same as the LV. She is still having a lot of pain at the end of the day or the end of the shift. Pt saw Dr. Hugh Dance before therapy today and was told to get an MRI. Pt was also given back brace to start wearing during her shift to try and give her better support.  She has still been trying to perform HEP consistently, but reports she does not always do them due to her being busy and in pain. She is still working and also caring for her elderly parents. OBJECTIVE:    Observation:    Test measurements:      RESTRICTIONS/PRECAUTIONS: Osteoarthritis  Working at [employer]    Exercises/Interventions:     Therapeutic Ex (30028) Sets/sec Notes/CUES   Hook-lying TA contraction  HEP, tactile cues to initiate and maintain contraction, minimize ppt   Piriformis S 30'' x 2, R/L HEP, cues to decrease ROM for pain   Supine SKTC  Supine SKTC w/ ITB bias  Supine DKTC w/ SB w/ TA    HEP    HEP, tactile cues for TA contraction   Supine Lower Trunk Rotation on SB  HEP   Supine marches w/ TA w/ pressure cuff 3 x 10 HEP   Hooklying Clams GTB 5'' x 2 x 10    Hip Abd Iso w/ Bridge 5'' x 2 x 10 GTB    Prone press ups w/ sacral PA mob     Mini Squats Unable to perform b/c of pain   Standing Hip Abd/Ext 2 x 10 ea, R/L Cues to avoid leaning   Heel Raises  Knee extension cues   Heel Taps  Cues to maintain neutral pelvis   Resisted Walking 5x, forward/backward, side to side, 10# Cues for smaller steps when leading with L LE coming in   6'' forward step ups 15x, R/L         Pt ed:  HEP, exercise/golfing with pain,                Manual Intervention (02046) 10'    PA mobs on R sacral base 4' Increased pain   STM to R piriformis, glutes, QL, paraspinals 6'  pillow under hips   Cruz Silva '                   NMR re-education (66408) CUES NEEDED   LAQ on SB  Marches on SB SBA, UE support for both   Palloff Press w/ GTB  Anti Rotation Press w/ step outs 2 x 10, R/L   2 x 10, R/L Cues to avoid trunk rotation   Cone Pickups 15x R/L                                      Therapeutic Exercise and NMR EXR  [x] (17351) Provided verbal/tactile cueing for activities related to strengthening, flexibility, endurance, ROM  for improvements in proximal hip and core control with self care, mobility, lifting and ambulation.   [x] (29584) Provided verbal/tactile cueing for activities related to improving balance, Goals:   Patient stated goal: Pt would like to get back to golfing and taking care of grandchildren without pain or restriction. [] Progressing: [] Met: [] Not Met: [] Adjusted    Therapist goals for Patient:   Short Term Goals: To be achieved in: 2 weeks  1. Independent in HEP and progression per patient tolerance, in order to prevent re-injury. [x] Progressing: [] Met: [] Not Met: [] Adjusted  2. Patient will have a decrease in pain to facilitate improvement in movement, function, and ADLs as indicated by Functional Deficits. [] Progressing: [] Met: [] Not Met: [] Adjusted      Long Term Goals: To be achieved in: 6 weeks  1. Disability index score of 8% or less for the Modified Oswestry to assist with reaching prior level of function. [] Progressing: [] Met: [] Not Met: [] Adjusted  2. Patient will demonstrate increased AROM with lumbar ext to 20 deg without pain and lumbar side bending to symmetrically reaching knee joint line in order to be able to perform bed mobility and transfers without an increase in pain. [] Progressing: [] Met: [] Not Met: [] Adjusted  3. Patient will demonstrate an increase in Strength with hip flex, ext and abd to 5/5 in order to be able to ascend/descend stairs and get into and out of a car without pain or restriction. [] Progressing: [] Met: [] Not Met: [] Adjusted  4. Patient will be able to perform 10 squats without pain in order to be able to pick something up off of the ground without pain or restriction. [] Progressing: [] Met: [] Not Met: [] Adjusted    Progression Towards Functional goals:  [] Patient is progressing as expected towards functional goals listed. [] Progression is slowed due to complexities listed. [] Progression has been slowed due to co-morbidities.   [x] Plan just implemented, too soon to assess goals progression  [] Other:     Overall Progression Towards Functional goals/ Treatment Progress Update:  [] Patient is progressing as expected towards functional goals listed. [] Progression is slowed due to complexities/Impairments listed. [] Progression has been slowed due to co-morbidities. [x] Plan just implemented, too soon to assess goals progression <30days   [] Goals require adjustment due to lack of progress  [] Patient is not progressing as expected and requires additional follow up with physician  [] Other    Prognosis for POC: [x] Good [] Fair  [] Poor      Patient requires continued skilled intervention: [x] Yes  [] No    Treatment/Activity Tolerance:  [x] Patient able to complete treatment  [] Patient limited by fatigue  [] Patient limited by pain    [] Patient limited by other medical complications  [] Other:     ASSESSMENT: Pt still had multiple complaints of pain throughout exercises today. Pt was able to perform all exercises presented to her, but became fatigued and requested to just ice her back and knees after the step ups. Pt became fatigued after the resisted walking and needed SBA, specifically when leading with her L LE in side stepping. Pt was able to progress to some standing exercises, as well as increase the resistance in resisted walking. Return to Play: (if applicable)   []  Stage 1: Intro to Strength   []  Stage 2: Return to Run and Strength   []  Stage 3: Return to Jump and Strength   []  Stage 4: Dynamic Strength and Agility   []  Stage 5: Sport Specific Training     []  Ready to Return to Play, Meets All Above Stages   []  Not Ready for Return to Sports   Comments:                         PLAN:   [x] Continue per plan of care [] Alter current plan (see comments above)  [] Plan of care initiated [] Hold pending MD visit [] Discharge      Electronically signed by:  Arianna Gordon PT,   Felisha Pi, SPT  Therapist was present, directed the patient's care, made skilled judgement, and was responsible for assessment and treatment of the patient.         Note: If patient does not return for scheduled/ recommended follow up visits, this note will serve as a discharge from care along with most recent update on progress. Access Code: [email or web address]  URL: [email or web address]  Date: 11/01/2021  Prepared by: Willie Ardon    Exercises  Supine Transversus Abdominis Bracing - Hands on Stomach - 1 x daily - 7 x weekly - 2 sets - 10 reps - 5 sec hold  Supine Figure 4 Piriformis Stretch - 1 x daily - 7 x weekly - 3 sets - 30 sec hold  Supine Single Knee to Chest Stretch - 1 x daily - 7 x weekly - 2 sets - 10 reps - 20 sec hold  Supine Double Knee to Chest - 1 x daily - 7 x weekly - 2 sets - 10 reps  Supine Lower Trunk Rotation with Swiss Ball - 1 x daily - 7 x weekly - 2 sets - 10 reps  Supine March - 1 x daily - 7 x weekly - 2 sets - 10 reps

## 2021-11-15 NOTE — PROGRESS NOTES
Chief Complaint  Lower Back Pain (CK BILATERAL KNEES/ LUMBAR SPINE PAIN)    Mesfin Rasmussen is a 60 y.o. 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 Payton Franklin is being seen by today for recurrent worsening of her right knee pain with underlying osteoarthritis. He is known to have grade IV chondromalacia to the weightbearing portion of her lateral compartment of her knee as well as partial pseudo-extrusion of the lateral meniscus with a chronic complex flap tear with synovitis we last completed viscosupplementation to her knees bilaterally on 6/2/2021. Patient also being evaluated for persistent mechanical low back pain with bilateral leg pain today. History of Present Illness for Follow Up Patient:      Jing Melgoza is being seen in for follow up today to review MRI results for ongoing right knee pain. Jing Melgoza is 12 weeks out from aggravation of right knee after a twisting injury. The patient rates their current pain as a 5 out of 10 on the pain scale but can increase with activities such as going from a seated to standing position or having to go up and down stairs. Treatment to date includes: rest, ice, NSAID- Diclofeac, physical therapy, home exercises, knee bracing, cortisone injection on 4/1/2019 and her first attempt at visco supplementation completed on 7/17/2019 to treat this problem. Her MRI was obtained at [facility] on 11/15/2019 did show evidence of grade IV chondromalacia of the weightbearing and posterior nonweightbearing portion of the lateral compartment with full-thickness chondral loss and some stress edema but no evidence of insufficiency fracture.   She did have a lateral meniscal pseudo-extrusion with complex flap tear to the anterior horn to posterior root with chronic notch synovitis and ACL inflammation likely related to her twisting injury about a month ago. Denies locking catching or true instability symptoms and she would like to avoid surgery if at all possible. Prior to her twisting injury 3 months ago, she states her knee was doing very well after completing Visco supplementation in July 2019 bilaterally. She did get a benefit from her previous round of Visco supplementation has been considering this once again. His mom also been working on her home-based exercise program.     Alberto Purvis was last seen in the office on 2/5/2020 was continued viscosupplementation to her knees bilaterally. She is done outstanding up until a few weeks ago when she began noticed increasing pain and discomfort to the anterior portion of her knees. There is no interim history of injury or no activity prior to coming symptomatic. Most of her pain is retropatellar in nature. She has done a great job with weight loss losing 45 pounds via the keto diet this year which has helped her knee substantially. She does continue with her diclofenac and has been working on her exercise program.  She does have more of an achy discomfort with overuse at 3 to 4-10 but at times does not have any pain at all and is sleeping comfortably. Denies locking catching or instability symptoms. She would be interested in repeating Visco supplementation which is helped her substantially in the past.  She would like to avoid surgery if at all possible. Alberto Purvis was last seen in the office on 11/4/2021 completed viscosupplementation to her knees bilaterally with Euflexxa. She was doing very well but over the last few weeks since the end of April 2021, she has noticed increasing discomfort primarily the anterior portion of her knees. There is no history of injury or new activity prior to becoming symptomatic although she has been having quite a bit of difficulty with her neck and back and is seeing the physicians at [address] [place] [address] for this who had previously done surgery on her.   Is in the process of MRI work-ups and possible epidurals and is trying to do her home-based exercises. The soreness in her back may have altered her gait which she believes is causing soreness into her knees. Pain symptoms range between a 2-4 out of 10 with stair climbing distance and walking and reports no rest or night pain of significance. She is try to do her home-based exercise program and is continue with her diclofenac. Matt Subramanian was last seen in the office on 6/2/2021 when we finished up bilateral knee Euflexxa for her knees. Did reasonably well throughout the summer but has become a little bit more sore and achy with regard to her knees. There is no interim history of injury and she is trying to perform her home-based exercise program. She does utilize her braces at times and has continued with her diclofenac. Her greater complaint is increasing mechanical back pain. She has been experiencing worsening back pain chronically but over the last month or so she has had increasing pain. There is no history of injury no activity prior to becoming symptomatic. She does complain of primarily pain in a bandlike fashion in her lower back with pain rating down into her legs primarily in the L5 distribution but suspicious for stenosis. She recently saw Dr. Chaya Valentin in the office who sent her for an x-ray of her lumbar spine which did show underlying lumbar degenerative disc changes and spondylitic spurring. She does fortunately not complain of neurogenic bowel or bladder symptoms and has had 4 sessions of therapy thus far which is been reasonably ineffective. She is being seen today for orthopedic and sports consultation with review of her recent imaging and treatment recommendations. Attest: I have reviewed and attest the documentation of the HPI documented by my . I will make any changes if necessary.      Enc Date: 11/15/2021  Time: 6:36 PM  Provider: Zulma Desir MD        Social History     Tobacco Use    Smoking status: Former Smoker     Quit date: 2004     Years since quittin.9    Smokeless tobacco: Never Used   Substance Use Topics    Alcohol use: Yes     Comment: monthly    Drug use: No        Review of Systems  Pertinent items are noted in HPI  Review of systems reviewed from Patient History Form dated on 2020 and available in the patient's chart under the Media tab. Vital Signs     Ht 5' 7\" (1.702 m)   Wt 184 lb (83.5 kg)   BMI 28.82 kg/m²       General Exam:   Constitutional: Patient is adequately groomed with no evidence of malnutrition  DTRs: Deep tendon reflexes are intact  Mental Status: The patient is oriented to time, place and person. The patient's mood and affect are appropriate. Lymphatic: The lymphatic examination bilaterally reveals all areas to be without enlargement or induration. Vascular: Examination reveals no swelling or calf tenderness. Peripheral pulses are palpable and 2+. Neurological: The patient has good coordination. There is no weakness or sensory deficit.       Knee Examination  Inspection:  There is no high-grade deformity other she does have bilaterally patellofemoral crepitation and trace knee joint effusions.     Palpation:  She does have now moderate tenderness over the medial and lateral patellofemoral facet bilaterally primarily on the right knee today.  This does seem to be more anterior in nature today. She does have less prominent tenderness clinically over the Lateral compartment of her right knee and medial compartment of her left knee with some mild crepitation.     Rang of Motion:  She is stiff in the terminal 5 of extension with flexion limited to about 110-120.  Hamstrings are mildly tight.  Fair quad tone.     Strength: 4 out of 5 with flexion as extension.     Special Tests:  She does have moderate recurrent pain primarily reproduced with the patellar grind testing on the right today.  Negative apprehension testing.  She does have moderate pain with crepitation with lateral Jose's on the right knee and medial Jose's on the left.  I do not sense a high-grade click.  I do not sense high-grade instability of screening testing is fairly benign.     Skin: There are no rashes, ulcerations or lesions. Distal neurovascular exam is intact.     Gait: Reasonable gait with less pain with positional change.     Reflex symmetrically preserved     Additional Comments: Lumbar spine evaluation does reveal reasonable overall alignment. No high-grade scoliosis. She does have clinical tenderness lumbar paraspinals left really worse than right. He does have lower facet tenderness and tenderness to the central gluteal region bilaterally. She can only forward flex about 30 to 40 degrees and does have painful extension in the range of 6-7 out of 10. This does produce some gluteal pain and pain to the lateral thighs but does not seem to radiate below the knees although she has had this primarily in the L5 distribution previously. There does not appear to be focal lower extremity motor deficits. Equivocal straight leg raising on the right and left.  DTRs 1+ knees and ankles.     Additional Examinations:  Right Lower Extremity: Examination of the right lower extremity does not show any tenderness, deformity or injury.  Range of motion is unremarkable.  There is no gross instability.  There are no rashes, ulcerations or lesions.  Strength and tone are normal.  Left Lower Extremity: Examination of the left lower extremity does not show any tenderness, deformity or injury.  Range of motion is unremarkable.  There is no gross instability.  There are no rashes, ulcerations or lesions.  Strength and tone are normal.  Examination of the biateral hip reveals intact skin.  The patient demonstrates full painless range of motion with regards to flexion, abduction, internal and external rotation.  There is not tenderness about the greater trochanter. Aravindery Mar is a negative straight leg raise against resistance.  Strength is 5/5 thorough out all planes.        Diagnostic Test Findings: bilateral knee AP and PA weight-bearing sunrise and lateral films were reviewed from 2019 and show fairly prominent nearly bone-on-bone changes to the lateral compartment of her right knee and medial compartment of her left knee with left greater than right knee patellofemoral arthropathy with spurring.         MRI right knee obtained at [facility] 11/15/2019 as listed above  Narrative   Site: [facility] [facility] #: [id number] #: [id number] Procedure: MR Right Knee w/o Contrast ; Reason for Exam: dx;MMT, OSTEOARTHRITIS, CHONDROMALACIA OF BOTH PATELLA   This document is confidential medical information.  Unauthorized disclosure or use of this information is prohibited by law. If you are not the intended recipient of this document, please advise us by calling immediately [phone].       [facility]   [address]           Patient Name: Julissa Phillip   Case ID: [id number]   Patient : 1955   Referring Physician: Hubert Montenegro MD   Exam Date: 11/15/2019   Exam Description: MR Right Knee w/o Contrast            HISTORY:  Evaluate meniscal tear.       TECHNICAL FACTORS:  Long- and short-axis fat- and water-weighted images were performed.       COMPARISON:  None.       FINDINGS:  No acute fracture.  Full-thickness chondral loss of weightbearing and posterior    nonweightbearing lateral femorotibial compartment with stress osseous edema.  Pseudoextruded    and partially macerated lateral meniscus with complex flap tear from anterior horn to posterior    horn.  Medial meniscus intact.  No high-grade chondromalacia in the medial femorotibial    compartment.       Moderate ACL inflammation.  PCL intact.  Medial and lateral collateral ligament complexes    intact.       Mild lateral subluxation of patella.  No high-grade chondromalacia in the patellofemoral articulation.  Quadriceps and patellar tendons intact.       Moderate-large amount of joint effusion.  Moderate gastrocnemius/semimembranous reflection cyst    with partial dehiscence.  No acute muscle strain.       CONCLUSION:   1. Grade 4 chondromalacia of weightbearing and posterior nonweightbearing lateral femorotibial    compartment with full-thickness chondral loss and stress osseous edema.  Partial    pseudoextrusion of the lateral meniscus with complex flap tear from anterior horn to posterior    horn.    2. Chronic notch synovitis with inflamed ACL.       Thank you for the opportunity to provide your interpretation.               Riccardo Stark M.D.       A: Guero 11/18/2019 8:43 AM   T: REINA 11/18/2019 8:11 AM      AP and lateral lumbar spine plain films were reviewed from Dr. Jac Guillen 10/29/2021 as listed above     EXAMINATION:   THREE XRAY VIEWS OF THE LUMBAR SPINE       10/29/2021 10:55 am       COMPARISON:   MRI lumbar spine May 21, 2018 and priors including lumbar spine radiographs   May 15, 2014.       HISTORY:   ORDERING SYSTEM PROVIDED HISTORY: Right low back pain, unspecified   chronicity, unspecified whether sciatica present       FINDINGS:   There is mild convex left curvature of the upper lumbar spine.  Lumbar   vertebra otherwise demonstrate normal height and alignment.  No fracture,   subluxation, or suspicious osseous lesion identified.  There is severe disc   height loss at L1-L2 with disc height loss and degenerative endplate   spurring.  Moderate disc height loss is seen at L2-L3 and L3-L4.  Mild disc   height loss is seen at the remainder lumbar levels.  There is multilevel mild   anterior endplate hypertrophic spurring.  There is multilevel facet   arthrosis.  Soft tissues are without acute process.  Degenerative changes   have progressed compared to prior, most pronounced at L1-L2.           Impression   Progressive multilevel degenerative changes which are most severe at L1-L2   where there is mild convex left curvature.  No acute osseous abnormality.              Assessment & Plan:    Encounter Diagnoses   Name Primary?  Bilateral primary osteoarthritis of knee Yes    Chondromalacia of both patellae     Chronic pain of both knees     Acute bilateral low back pain, unspecified whether sciatica present     Bilateral leg pain     Lumbar spondylosis        Orders Placed This Encounter   Procedures    MRI LUMBAR SPINE WO CONTRAST     Standing Status:   Future     Standing Expiration Date:   12/15/2021     Scheduling Instructions:      [address] [phone]      Fax # [phone]            AUTH: No pre-cert required     Order Specific Question:   Reason for exam:     Answer:   r/o L4-L5, L5-S1 hnp, spinal stenosis, spondyloysis    VT ARTHROCENTESIS ASPIR&/INJ MAJOR JT/BURSA W/O US    Bird and Denilson Extensor Lumbosacral Support Brace (Warm and Form)     Patient was prescribed a Bird and Denilson Extensor Lumbosacral Support Brace with a pocket. This orthosis is required for the following reasons:    Reduce pain by restricting mobility of the trunk  Facilitate healing following an injury to the spine or related soft tissues  Support weak spinal muscles    The patient was educated and fit by a healthcare professional with expert knowledge and specialization in brace application while under the direct supervision of the treating physician. Verbal and written instructions for the use of and application of this item were provided. They were instructed to contact the office immediately should the brace result in increased pain, decreased sensation, increased swelling or worsening of the condition. Treatment Plan:  Treatment options were discussed with Remington Obando. We did review her previous knee plain films, recent lumbar spine plain film imaging and current exam findings.  She does have quite prominent arthritic changes to the right knee lateral compartment and left knee medial compartment does have patellofemoral arthropathy particularly on the left knee compared with right knee. Edmund Mcqueen was doing reasonably well following completion of her Visco supplementation series completed on on 6/2/2021 but more recently has become a little bit achy. After discussing options, she did have steroid injections performed to her knees bilaterally today using 2 cc of Celestone, 2 cc of Marcaine, 1 cc Xylocaine. She will continue with her diclofenac 75 mg 1 pill twice daily. She will continue with her knee bracing and home-based exercises. She once again has lost 45 to 50 pounds and has done a good job keeping this off with dieting. We may consider viscosupplementation in a few weeks. Her more pressing concern recently has been increasing mechanical back pain with bilateral leg pain and does have degenerative changes on her plain films. I like for her to have an MRI to evaluate for bilateral L4-5/L5-S1 disc herniation with foraminal stenosis. I would like for her to continue with physical therapy for her lumbar spine we did place her in a warm-and-form belt. We will see her back post imaging. She will contact us in the interim with questions or concerns. CC: . Dr. Jose Ramon Tong      This dictation was performed with a verbal recognition program [facility] ) and it was checked for errors. It is possible that there are still dictated errors within this office note. If so, please bring any errors to my attention for an addendum. All efforts were made to ensure that this office note is accurate.

## 2021-11-19 ENCOUNTER — HOSPITAL ENCOUNTER (OUTPATIENT)
Dept: MRI IMAGING | Age: 66
Discharge: HOME | End: 2021-11-19
Payer: MEDICARE

## 2021-11-19 ENCOUNTER — HOSPITAL ENCOUNTER (OUTPATIENT)
Dept: PHYSICAL THERAPY | Age: 66
Setting detail: THERAPIES SERIES
Discharge: HOME | End: 2021-11-19
Payer: MEDICARE

## 2021-11-19 DIAGNOSIS — G89.29 CHRONIC PAIN OF BOTH KNEES: ICD-10-CM

## 2021-11-19 DIAGNOSIS — M22.41 CHONDROMALACIA OF BOTH PATELLAE: ICD-10-CM

## 2021-11-19 DIAGNOSIS — M25.562 CHRONIC PAIN OF BOTH KNEES: ICD-10-CM

## 2021-11-19 DIAGNOSIS — M54.50 ACUTE BILATERAL LOW BACK PAIN, UNSPECIFIED WHETHER SCIATICA PRESENT: ICD-10-CM

## 2021-11-19 DIAGNOSIS — M79.604 BILATERAL LEG PAIN: ICD-10-CM

## 2021-11-19 DIAGNOSIS — M79.605 BILATERAL LEG PAIN: ICD-10-CM

## 2021-11-19 DIAGNOSIS — M47.816 LUMBAR SPONDYLOSIS: ICD-10-CM

## 2021-11-19 DIAGNOSIS — M25.561 CHRONIC PAIN OF BOTH KNEES: ICD-10-CM

## 2021-11-19 DIAGNOSIS — M17.0 BILATERAL PRIMARY OSTEOARTHRITIS OF KNEE: ICD-10-CM

## 2021-11-19 DIAGNOSIS — M22.42 CHONDROMALACIA OF BOTH PATELLAE: ICD-10-CM

## 2021-11-19 PROCEDURE — 97110 THERAPEUTIC EXERCISES: CPT

## 2021-11-19 PROCEDURE — 97140 MANUAL THERAPY 1/> REGIONS: CPT

## 2021-11-19 PROCEDURE — 72148 MRI LUMBAR SPINE W/O DYE: CPT

## 2021-11-19 NOTE — FLOWSHEET NOTE
[facility],  [address]  Phone: [phone]  Fax [phone]    Physical Therapy Treatment Note/ Progress Report:           Date:  2021    Patient Name:  Yumiko Woodward    :  1955  MRN: [id number]  Restrictions/Precautions:    Medical/Treatment Diagnosis Information:  · Diagnosis: M54.50 Lumbar pain with radiculopathy down R leg. · Treatment Diagnosis: M54.50 Lumbar pain with radiculopathy down R leg. Insurance/Certification information:  PT Insurance Information: Medicare  Physician Information:  Referring Practitioner: Kevin Green DO  Has the plan of care been signed (Y/N):        []  Yes  [x]  No     Date of Patient follow up with Physician: No future visit scheduled. Is this a Progress Report:     []  Yes  [x]  No         If Yes:  Date Range for reporting period:  Beginning 21   Ending    Progress report will be due (10 Rx or 30 days whichever is less):        Recertification will be due (POC Duration  / 90 days whichever is less): 21         Visit # Insurance Allowable Auth Required   In-person [address] []  Yes []  No    Telehealth   []  Yes []  No    Total            Functional Scale: Modified Oswestry 18% disability   Date assessed:  21      Therapy Diagnosis/Practice Pattern: F      Number of Comorbidities:  []0     [x]1-2    []3+    Latex Allergy:  [x]NO      []YES  Preferred Language for Healthcare:   [x]English       []other:      Pain level:      SUBJECTIVE:  Pt notes that she has been pretty busy this morning and on her feet a lot, which has caused her to have increased soreness in her back. Pt had an MRI taken this morning and have not heard about the results quite yet. Pt notes that she was not able to perform stretching this morning, which could have led to her increase in soreness or tightness.     OBJECTIVE:    Observation:    Test measurements: RESTRICTIONS/PRECAUTIONS: Osteoarthritis  Working at [employer]    Exercises/Interventions:     Therapeutic Ex (35110) Sets/sec Notes/CUES   Hook-lying TA contraction  HEP, tactile cues to initiate and maintain contraction, minimize ppt   Piriformis S 30'' x 2, R/L HEP, cues to decrease ROM for pain   Supine SKTC  Supine SKTC w/ ITB bias  Supine DKTC w/ SB w/ TA   2 x 10 HEP    HEP, tactile cues for TA contraction   Supine Lower Trunk Rotation on SB 2 x 10 HEP   Supine marches w/ TA w/ pressure cuff 3 x 10 HEP   Hooklying Clams GTB 5'' x 2 x 10    Hip Abd Iso w/ Bridge 5'' x 2 x 10 GTB    Prone press ups w/ sacral PA mob     Mini Squats Unable to perform b/c of pain   Standing Hip Abd/Ext  Cues to avoid leaning   Heel Raises  Knee extension cues   Heel Taps  Cues to maintain neutral pelvis   Resisted Walking 5x, forward/backward, side to side, 10# Cues for smaller steps when leading with L LE coming in   6'' forward step ups 2  x10, R/L         Pt ed:  HEP, exercise/golfing with pain,                Manual Intervention (11467) 10'    PA mobs on R sacral base 5' Increased pain   STM to R piriformis, glutes, QL, paraspinals 5'  pillow under hips   Jerral Prima '                   NMR re-education (17850) CUES NEEDED   LAQ on SB  Marches on SB SBA, UE support for both   [employer] Press w/ GTB  Anti Rotation Press w/ step outs Cues to avoid trunk rotation   Cone Pickups w/ lunge  10x R/L    Half kneeling chops, reverse chops at CC 5#, 10x each, R/L, airex for comfort Cues to follow hands with eyes, more rotation                                Therapeutic Exercise and NMR EXR  [x] (32695) Provided verbal/tactile cueing for activities related to strengthening, flexibility, endurance, ROM  for improvements in proximal hip and core control with self care, mobility, lifting and ambulation.   [x] (13506) Provided verbal/tactile cueing for activities related to improving balance, coordination, kinesthetic sense, posture, motor skill, proprioception  to assist with core control in self care, mobility, lifting, and ambulation. Therapeutic Activities:    [] (24669 or 17200) Provided verbal/tactile cueing for activities related to improving balance, coordination, kinesthetic sense, posture, motor skill, proprioception and motor activation to allow for proper function  with self care and ADLs  [] (79707) Provided training and instruction to the patient for proper core and proximal hip recruitment and positioning with ambulation re-education     Home Exercise Program:    [x] (13679) Reviewed/Progressed HEP activities related to strengthening, flexibility, endurance, ROM of core, proximal hip and LE for functional self-care, mobility, lifting and ambulation   [] (35604) Reviewed/Progressed HEP activities related to improving balance, coordination, kinesthetic sense, posture, motor skill, proprioception of core, proximal hip and LE for self care, mobility, lifting, and ambulation      Manual Treatments:  PROM / STM / Oscillations-Mobs:  G-I, II, III, IV (PA's, Inf., Post.)  [x] (69027) Provided manual therapy to mobilize proximal hip and LS spine soft tissue/joints for the purpose of modulating pain, promoting relaxation,  increasing ROM, reducing/eliminating soft tissue swelling/inflammation/restriction, improving soft tissue extensibility and allowing for proper ROM for normal function with self care, mobility, lifting and ambulation.      Modalities:   CP x 10 min to lumbar     Charges  Timed Code Treatment Minutes: 40   Total Treatment Minutes: 55, CP     Medicare total (add KX at $2000)  587.54  11/19/21       [] EVAL (LOW) 91151   [] EVAL (MOD) 17810   [] EVAL (HIGH) 22590   [] RE-EVAL      [x] KA(95403) x2     [] IONTO  [] NMR (72708) x     [] VASO  [x] Manual (20602) x1     [] Other:  [] TA x      [] Mech Traction (01767)  [] ES(attended) (67555)     [] ES (un) (96619):     Goals:   Patient stated goal: Pt would like to get back to golfing and taking care of grandchildren without pain or restriction. [] Progressing: [] Met: [] Not Met: [] Adjusted    Therapist goals for Patient:   Short Term Goals: To be achieved in: 2 weeks  1. Independent in HEP and progression per patient tolerance, in order to prevent re-injury. [x] Progressing: [] Met: [] Not Met: [] Adjusted  2. Patient will have a decrease in pain to facilitate improvement in movement, function, and ADLs as indicated by Functional Deficits. [] Progressing: [] Met: [] Not Met: [] Adjusted      Long Term Goals: To be achieved in: 6 weeks  1. Disability index score of 8% or less for the Modified Oswestry to assist with reaching prior level of function. [] Progressing: [] Met: [] Not Met: [] Adjusted  2. Patient will demonstrate increased AROM with lumbar ext to 20 deg without pain and lumbar side bending to symmetrically reaching knee joint line in order to be able to perform bed mobility and transfers without an increase in pain. [] Progressing: [] Met: [] Not Met: [] Adjusted  3. Patient will demonstrate an increase in Strength with hip flex, ext and abd to 5/5 in order to be able to ascend/descend stairs and get into and out of a car without pain or restriction. [] Progressing: [] Met: [] Not Met: [] Adjusted  4. Patient will be able to perform 10 squats without pain in order to be able to pick something up off of the ground without pain or restriction. [] Progressing: [] Met: [] Not Met: [] Adjusted    Progression Towards Functional goals:  [] Patient is progressing as expected towards functional goals listed. [] Progression is slowed due to complexities listed. [] Progression has been slowed due to co-morbidities. [x] Plan just implemented, too soon to assess goals progression  [] Other:     Overall Progression Towards Functional goals/ Treatment Progress Update:  [] Patient is progressing as expected towards functional goals listed.     [] Progression is slowed due to complexities/Impairments listed. [] Progression has been slowed due to co-morbidities. [x] Plan just implemented, too soon to assess goals progression <30days   [] Goals require adjustment due to lack of progress  [] Patient is not progressing as expected and requires additional follow up with physician  [] Other    Prognosis for POC: [x] Good [] Fair  [] Poor      Patient requires continued skilled intervention: [x] Yes  [] No    Treatment/Activity Tolerance:  [x] Patient able to complete treatment  [] Patient limited by fatigue  [] Patient limited by pain    [] Patient limited by other medical complications  [] Other:     ASSESSMENT: Pt was able to complete exercises with decreased complaints of pain this visit when compared to LV. Pt did have difficulty breathing after performing the resisted walking that she credited to mask use and steroids that she is currently on. Pt requested to end exercise and ice her back when she started to have difficulty breathing. Pt was able to catch her breath once sitting upright on the table and had no issues upon leaving the clinic. In regards to exercise, pt was able to perform cone pickups with lunges and half kneeling chops in order to simulate some activity at her job.        Return to Play: (if applicable)   []  Stage 1: Intro to Strength   []  Stage 2: Return to Run and Strength   []  Stage 3: Return to Jump and Strength   []  Stage 4: Dynamic Strength and Agility   []  Stage 5: Sport Specific Training     []  Ready to Return to Play, Meets All Above Stages   []  Not Ready for Return to Sports   Comments:                         PLAN:   [x] Continue per plan of care [] Alter current plan (see comments above)  [] Plan of care initiated [] Hold pending MD visit [] Discharge      Electronically signed by:  Raheem Echevarria, PT, DPT  Kendra Humphrey, SPT  Therapist was present, directed the patient's care, made skilled judgement, and was responsible for assessment and treatment of the patient. Note: If patient does not return for scheduled/ recommended follow up visits, this note will serve as a discharge from care along with most recent update on progress. Access Code: [email or web address]  URL: [email or web address]  Date: 11/01/2021  Prepared by: Yovani Martinez    Exercises  Supine Transversus Abdominis Bracing - Hands on Stomach - 1 x daily - 7 x weekly - 2 sets - 10 reps - 5 sec hold  Supine Figure 4 Piriformis Stretch - 1 x daily - 7 x weekly - 3 sets - 30 sec hold  Supine Single Knee to Chest Stretch - 1 x daily - 7 x weekly - 2 sets - 10 reps - 20 sec hold  Supine Double Knee to Chest - 1 x daily - 7 x weekly - 2 sets - 10 reps  Supine Lower Trunk Rotation with Swiss Ball - 1 x daily - 7 x weekly - 2 sets - 10 reps  Supine March - 1 x daily - 7 x weekly - 2 sets - 10 reps

## 2021-11-22 ENCOUNTER — OFFICE VISIT (OUTPATIENT)
Dept: ORTHOPEDIC SURGERY | Age: 66
End: 2021-11-22
Payer: COMMERCIAL

## 2021-11-22 ENCOUNTER — HOSPITAL ENCOUNTER (OUTPATIENT)
Dept: PHYSICAL THERAPY | Age: 66
Setting detail: THERAPIES SERIES
Discharge: HOME | End: 2021-11-22
Payer: MEDICARE

## 2021-11-22 VITALS — WEIGHT: 184 LBS | BODY MASS INDEX: 28.88 KG/M2 | HEIGHT: 67 IN

## 2021-11-22 DIAGNOSIS — M22.41 CHONDROMALACIA OF BOTH PATELLAE: ICD-10-CM

## 2021-11-22 DIAGNOSIS — M54.16 LUMBAR RADICULITIS: ICD-10-CM

## 2021-11-22 DIAGNOSIS — G89.29 CHRONIC PAIN OF BOTH KNEES: ICD-10-CM

## 2021-11-22 DIAGNOSIS — M51.36 DDD (DEGENERATIVE DISC DISEASE), LUMBAR: Primary | ICD-10-CM

## 2021-11-22 DIAGNOSIS — M17.0 BILATERAL PRIMARY OSTEOARTHRITIS OF KNEE: ICD-10-CM

## 2021-11-22 DIAGNOSIS — M54.50 LUMBAR PAIN: ICD-10-CM

## 2021-11-22 DIAGNOSIS — M25.561 CHRONIC PAIN OF BOTH KNEES: ICD-10-CM

## 2021-11-22 DIAGNOSIS — M25.562 CHRONIC PAIN OF BOTH KNEES: ICD-10-CM

## 2021-11-22 DIAGNOSIS — M22.42 CHONDROMALACIA OF BOTH PATELLAE: ICD-10-CM

## 2021-11-22 PROBLEM — M51.369 DDD (DEGENERATIVE DISC DISEASE), LUMBAR: Status: ACTIVE | Noted: 2021-11-22

## 2021-11-22 PROCEDURE — 3017F COLORECTAL CA SCREEN DOC REV: CPT | Performed by: FAMILY MEDICINE

## 2021-11-22 PROCEDURE — G8400 PT W/DXA NO RESULTS DOC: HCPCS | Performed by: FAMILY MEDICINE

## 2021-11-22 PROCEDURE — 1090F PRES/ABSN URINE INCON ASSESS: CPT | Performed by: FAMILY MEDICINE

## 2021-11-22 PROCEDURE — 4040F PNEUMOC VAC/ADMIN/RCVD: CPT | Performed by: PHYSICIAN ASSISTANT

## 2021-11-22 PROCEDURE — 99213 OFFICE O/P EST LOW 20 MIN: CPT | Performed by: FAMILY MEDICINE

## 2021-11-22 PROCEDURE — 1036F TOBACCO NON-USER: CPT | Performed by: PHYSICIAN ASSISTANT

## 2021-11-22 PROCEDURE — 4040F PNEUMOC VAC/ADMIN/RCVD: CPT | Performed by: FAMILY MEDICINE

## 2021-11-22 PROCEDURE — 1123F ACP DISCUSS/DSCN MKR DOCD: CPT | Performed by: FAMILY MEDICINE

## 2021-11-22 PROCEDURE — 97110 THERAPEUTIC EXERCISES: CPT | Performed by: PHYSICAL THERAPIST

## 2021-11-22 PROCEDURE — 1036F TOBACCO NON-USER: CPT | Performed by: FAMILY MEDICINE

## 2021-11-22 PROCEDURE — G8427 DOCREV CUR MEDS BY ELIG CLIN: HCPCS | Performed by: FAMILY MEDICINE

## 2021-11-22 PROCEDURE — 99204 OFFICE O/P NEW MOD 45 MIN: CPT | Performed by: PHYSICIAN ASSISTANT

## 2021-11-22 PROCEDURE — G8484 FLU IMMUNIZE NO ADMIN: HCPCS | Performed by: FAMILY MEDICINE

## 2021-11-22 PROCEDURE — 1123F ACP DISCUSS/DSCN MKR DOCD: CPT | Performed by: PHYSICIAN ASSISTANT

## 2021-11-22 PROCEDURE — G8484 FLU IMMUNIZE NO ADMIN: HCPCS | Performed by: PHYSICIAN ASSISTANT

## 2021-11-22 PROCEDURE — 97140 MANUAL THERAPY 1/> REGIONS: CPT | Performed by: PHYSICAL THERAPIST

## 2021-11-22 PROCEDURE — G8417 CALC BMI ABV UP PARAM F/U: HCPCS | Performed by: FAMILY MEDICINE

## 2021-11-22 PROCEDURE — G8417 CALC BMI ABV UP PARAM F/U: HCPCS | Performed by: PHYSICIAN ASSISTANT

## 2021-11-22 PROCEDURE — G8427 DOCREV CUR MEDS BY ELIG CLIN: HCPCS | Performed by: PHYSICIAN ASSISTANT

## 2021-11-22 PROCEDURE — 3017F COLORECTAL CA SCREEN DOC REV: CPT | Performed by: PHYSICIAN ASSISTANT

## 2021-11-22 PROCEDURE — 1090F PRES/ABSN URINE INCON ASSESS: CPT | Performed by: PHYSICIAN ASSISTANT

## 2021-11-22 PROCEDURE — G8400 PT W/DXA NO RESULTS DOC: HCPCS | Performed by: PHYSICIAN ASSISTANT

## 2021-11-22 NOTE — PROGRESS NOTES
New Patient: SPINE    11/22/2021     CHIEF COMPLAINT:    Chief Complaint   Patient presents with    New Patient     NEW PATIENT LUMBAR PAIN REFER BY JIMMIE       HISTORY OF PRESENT ILLNESS:              The patient is a 73 y.o. female history of melanoma, referred by Dr. Maria De Jesus Sneed MD for low back and right leg pain. She reports a 1 year history of aching low back pain radiating into the right buttock, posterior thigh/calf to the foot with numbness. Her symptoms are typically increased with sitting or at nighttime. She reports some relief with changing position. Conservative care includes MDP, diclofenac, knee injections, physical therapy. She also recently had an evaluation with [facility] for her thoracic spine and underwent a T-KAMILLE over the summer with some improvement of her thoracic discomfort. She has a history of lumbar KAMILLE's years prior. She has a prior cervical fusion with Dr. Los Clinton. At times she reports some leg weakness. She denies any recent bowel or bladder dysfunction or saddle anesthesia. She denies any recent fevers chills or infections.   Current/Past Treatment:   · Physical Therapy: Yes  · Chiropractic:     · Injection: Thoracic KAMILLE 2021--[place], remote lumbar KAMILLE's  Medications:            NSAIDS: Diclofenac            Muscle relaxer:   Zanaflex, past            Steriods:   Oral steroids x2 this year            Neuropathic medications:              Opioids:            Other:   · Surgery/Consult: Remote cervical fusion, Dr. 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    Past Medical History: Medical history form was reviewed today & scanned into the media tab  Past Medical History:   Diagnosis Date    Arthritis     Cancer ([address]     skin-back    DVT (deep venous thrombosis) ([address]     11/2015 - on Eliquis    Hearing decreased     Hypoglycemia     Melanoma ([address]     Overactive bladder     Reflux     Wears glasses       Past Surgical History:     Past Surgical History:   Procedure Laterality Date    APPENDECTOMY      HYSTEROSCOPY  7/15    d and c    KNEE ARTHROSCOPY  12/14/11    left    NECK SURGERY      benign tumor    TONSILLECTOMY      TUBAL LIGATION      VARICOSE VEIN SURGERY       Current Medications:     Current Outpatient Medications:     methylPREDNISolone (MEDROL DOSEPACK) 4 MG tablet, Take by mouth as directed., Disp: 21 kit, Rfl: 0    diclofenac (VOLTAREN) 75 MG EC tablet, TAKE ONE TABLET BY MOUTH TWICE A DAY, Disp: 60 tablet, Rfl: 2    tolterodine (DETROL LA) 2 MG extended release capsule, , Disp: , Rfl:     atorvastatin (LIPITOR) 20 MG tablet, Take 1 tablet by mouth daily, Disp: 30 tablet, Rfl: 3  Allergies:  Vibramycin [doxycycline calcium]  Social History:    reports that she quit smoking about 16 years ago. She has never used smokeless tobacco. She reports current alcohol use. She reports that she does not use drugs. Family History:   Family History   Problem Relation Age of Onset    Heart Disease Mother         irregular heartbeat    Heart Disease Father     Heart Attack Paternal Grandfather     Diabetes Maternal Grandfather        REVIEW OF SYSTEMS: Full ROS reviewed & scanned into chart  CONSTITUTIONAL: Denies unexplained weight loss, fevers   SKIN: Denies active skin conditions   ENT: Denies dizziness, nosebleeds  RESPIRATORY: Denies current dyspnea, difficulty breathing  CARDIOVASCULAR: Denies chest pain   NEUROLOGICAL: Denies stroke, unsteady gait or progressive weakness  PSYCHOLOGICAL: Denies anxiety, depression   HEMATOLOGIC: Denies blood disorders, admits skin cancer  ENDOCRINE: Denies excessive thirst, urination, heat/cold  GI: Denies ulcer, nausea, vomiting, diarrhea   : Denies bowel or bladder incontinence. Admits frequent urination        PHYSICAL EXAM:    Vitals: Height 5' 7\" (1.702 m), weight 184 lb (83.5 kg).   Pain score 3/10    GENERAL EXAM:  · General Apparence: Patient is adequately groomed of motion is full. There is no gross instability. There are no rashes, ulcerations or lesions. Strength and tone are normal.    Diagnostic Testing:    Lumbar MRI scan report independently reviewed from November 2021 showing multilevel varying degrees of mild to moderate central and foraminal stenosis, multilevel facet arthropathy. L5 hemangioma    Per [facility] notes: Cervical spine: C7-T1: Subtle anterolisthesis of C7 on T1 with a shallow disc bulge contributes to mild spinal canal stenosis. Facet arthropathy encroaches upon the neural foramina resulting in moderate foraminal narrowing.         T1-T2: Grade 1 anterolisthesis of T1 on T2 with a disc bulge and facet arthropathy results in mild-moderate spinal canal stenosis. Facet arthropathy encroaches upon the neural foramina resulting in mild-moderate left and moderate foraminal narrowing. Impression:  1) Chronic LBP, right lumbar radiculitis  2) Multilevel mild-mod central & foraminal stenosis  3) Chronic thoracic pain-improved s/p KAMILLE-[facility]  4) T1-2 spondy w/mild-mod CS  5) Remote cervical fusion--Dr. Floyd Brar  6) Knee pain--underlying OA, Dr. Hugh Dance  7) H/o melanoma      Plan:   1) We reviewed her lumbar MRI scan. We discussed right L5-S1 LESI #1. Procedure risk and benefits were discussed. Pamphlet provided for more information.   She will call directly when ready to schedule (either [address] [place] [address] or [facility])    2) PT for above, HEP provided    3) All questions answered    4) F/u 2wks after [address] Boat[address] PA-C, MPAS  Board Certified by the [address]

## 2021-11-22 NOTE — PROGRESS NOTES
Chief Complaint  Lower Back Pain (MRI results )    Yumiko Woodward is a 60 y.o. 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 Benita Franklin is being seen by today for recurrent worsening of her right knee pain with underlying osteoarthritis. He is known to have grade IV chondromalacia to the weightbearing portion of her lateral compartment of her knee as well as partial pseudo-extrusion of the lateral meniscus with a chronic complex flap tear with synovitis we last completed viscosupplementation to her knees bilaterally on 6/2/2021. Patient also being evaluated for persistent mechanical low back pain with bilateral leg pain today. History of Present Illness for Follow Up Patient:      Maay Cabezas is being seen in for follow up today to review MRI results for ongoing right knee pain. Maya Cabezas is 12 weeks out from aggravation of right knee after a twisting injury. The patient rates their current pain as a 5 out of 10 on the pain scale but can increase with activities such as going from a seated to standing position or having to go up and down stairs. Treatment to date includes: rest, ice, NSAID- Diclofeac, physical therapy, home exercises, knee bracing, cortisone injection on 4/1/2019 and her first attempt at visco supplementation completed on 7/17/2019 to treat this problem. Her MRI was obtained at [facility] on 11/15/2019 did show evidence of grade IV chondromalacia of the weightbearing and posterior nonweightbearing portion of the lateral compartment with full-thickness chondral loss and some stress edema but no evidence of insufficiency fracture. She did have a lateral meniscal pseudo-extrusion with complex flap tear to the anterior horn to posterior root with chronic notch synovitis and ACL inflammation likely related to her twisting injury about a month ago.   Denies locking catching or true instability symptoms and she would like to avoid surgery if at all possible. Prior to her twisting injury 3 months ago, she states her knee was doing very well after completing Visco supplementation in July 2019 bilaterally. She did get a benefit from her previous round of Visco supplementation has been considering this once again. His mom also been working on her home-based exercise program.     Mendoza Sullivan was last seen in the office on 2/5/2020 was continued viscosupplementation to her knees bilaterally. She is done outstanding up until a few weeks ago when she began noticed increasing pain and discomfort to the anterior portion of her knees. There is no interim history of injury or no activity prior to coming symptomatic. Most of her pain is retropatellar in nature. She has done a great job with weight loss losing 45 pounds via the keto diet this year which has helped her knee substantially. She does continue with her diclofenac and has been working on her exercise program.  She does have more of an achy discomfort with overuse at 3 to 4-10 but at times does not have any pain at all and is sleeping comfortably. Denies locking catching or instability symptoms. She would be interested in repeating Visco supplementation which is helped her substantially in the past.  She would like to avoid surgery if at all possible. Mendoza Sullivan was last seen in the office on 11/4/2021 completed viscosupplementation to her knees bilaterally with Euflexxa. She was doing very well but over the last few weeks since the end of April 2021, she has noticed increasing discomfort primarily the anterior portion of her knees. There is no history of injury or new activity prior to becoming symptomatic although she has been having quite a bit of difficulty with her neck and back and is seeing the physicians at [facility] for this who had previously done surgery on her.   Is in the process of MRI work-ups and possible epidurals and is trying to do her home-based exercises. The soreness in her back may have altered her gait which she believes is causing soreness into her knees. Pain symptoms range between a 2-4 out of 10 with stair climbing distance and walking and reports no rest or night pain of significance. She is try to do her home-based exercise program and is continue with her diclofenac. Daren Anand was last seen in the office on 6/2/2021 when we finished up bilateral knee Euflexxa for her knees. Did reasonably well throughout the summer but has become a little bit more sore and achy with regard to her knees. There is no interim history of injury and she is trying to perform her home-based exercise program. She does utilize her braces at times and has continued with her diclofenac. Her greater complaint is increasing mechanical back pain. She has been experiencing worsening back pain chronically but over the last month or so she has had increasing pain. There is no history of injury no activity prior to becoming symptomatic. She does complain of primarily pain in a bandlike fashion in her lower back with pain rating down into her legs primarily in the L5 distribution but suspicious for stenosis. She recently saw Dr. Chloe Radford in the office who sent her for an x-ray of her lumbar spine which did show underlying lumbar degenerative disc changes and spondylitic spurring. She does fortunately not complain of neurogenic bowel or bladder symptoms and has had 4 sessions of therapy thus far which is been reasonably ineffective. She is being seen today for orthopedic and sports consultation with review of her recent imaging and treatment recommendations. We last saw Daren Anand in the office on 11/15/2021 initially for follow-up on her knee osteoarthritis but also for new onset worsening mechanical low back pain. We did send her for an updated MRI of her lumbar spine which was obtained on 11/19/2021.   This did show multilevel disc protrusions most prominently resulting in severe right-sided foraminal narrowing at L1 to although she did have by foraminal stenosis at multiple levels. She states that her actual knee symptoms may be doing much better and she is actually having worsening pain in her back difficulty walking most consistent with stenosis. As noted previously she did have previous cervical surgery with Dr. Scott Harvey at [facility] but they have really have never addressed her lower lumbar spine. She is trying to work on her stretching program and has had temporary success with epidurals in the past.  While her knees are somewhat achy the recent injections helped her knees but not so much her back and leg symptoms which are her greatest concern. She has been enrolled in physical therapy and is wearing during her brace and does have worsening pain in the morning and difficulty walking distances. She does deny neurogenic bowel bladder symptoms or mechanical/instability symptoms involving her knees. She last completed viscosupplementation to her knees on 2021. Attest: I have reviewed and attest the documentation of the HPI documented by my . I will make any changes if necessary. Enc Date: 2021  Time: 11:40 AM  Provider: Emerald Rodriguez MD        Social History     Tobacco Use    Smoking status: Former Smoker     Quit date: 2004     Years since quittin.9    Smokeless tobacco: Never Used   Substance Use Topics    Alcohol use: Yes     Comment: monthly    Drug use: No        Review of Systems  Pertinent items are noted in HPI  Review of systems reviewed from Patient History Form dated on 2020 and available in the patient's chart under the Media tab. Vital Signs     There were no vitals taken for this visit. General Exam:   Constitutional: Patient is adequately groomed with no evidence of malnutrition  DTRs: Deep tendon reflexes are intact  Mental Status:  The patient is and tenderness to the central gluteal region bilaterally. She can only forward flex about 30 to 40 degrees and does have painful extension in the range of 6-7 out of 10. This does produce some gluteal pain and pain to the lateral thighs but does not seem to radiate below the knees although she has had this primarily in the L5 distribution previously. There does not appear to be focal lower extremity motor deficits. Equivocal straight leg raising on the right and left.  DTRs 1+ knees and ankles.     Additional Examinations:  Right Lower Extremity: Examination of the right lower extremity does not show any tenderness, deformity or injury.  Range of motion is unremarkable.  There is no gross instability.  There are no rashes, ulcerations or lesions.  Strength and tone are normal.  Left Lower Extremity: Examination of the left lower extremity does not show any tenderness, deformity or injury.  Range of motion is unremarkable. Honora Glad is no gross instability.  There are no rashes, ulcerations or lesions.  Strength and tone are normal.  Examination of the biateral hip reveals intact skin.  The patient demonstrates full painless range of motion with regards to flexion, abduction, internal and external rotation.  There is not tenderness about the greater trochanter. Honora Glad is a negative straight leg raise against resistance.  Strength is 5/5 thorough out all planes.        Diagnostic Test Findings: bilateral knee AP and PA weight-bearing sunrise and lateral films were reviewed from 4/1/2019 and show fairly prominent nearly bone-on-bone changes to the lateral compartment of her right knee and medial compartment of her left knee with left greater than right knee patellofemoral arthropathy with spurring.         MRI right knee obtained at [address] [place] [address] 11/15/2019 as listed above  Narrative   Site: [facility] #: [id number] #: [id number] Procedure: MR Right Knee w/o Contrast ; Reason for Exam: dx;MMT, OSTEOARTHRITIS, CHONDROMALACIA OF BOTH PATELLA   This document is confidential medical information.  Unauthorized disclosure or use of this information is prohibited by law. If you are not the intended recipient of this document, please advise us by calling immediately [phone].       [facility] [address]           Patient Name: Jeanette Del Toro   Case ID: [id number]   Patient : 1955   Referring Physician: Concha Blevins MD   Exam Date: 11/15/2019   Exam Description: MR Right Knee w/o Contrast            HISTORY:  Evaluate meniscal tear.       TECHNICAL FACTORS:  Long- and short-axis fat- and water-weighted images were performed.       COMPARISON:  None.       FINDINGS:  No acute fracture.  Full-thickness chondral loss of weightbearing and posterior    nonweightbearing lateral femorotibial compartment with stress osseous edema.  Pseudoextruded    and partially macerated lateral meniscus with complex flap tear from anterior horn to posterior    horn.  Medial meniscus intact.  No high-grade chondromalacia in the medial femorotibial    compartment.       Moderate ACL inflammation.  PCL intact.  Medial and lateral collateral ligament complexes    intact.       Mild lateral subluxation of patella.  No high-grade chondromalacia in the patellofemoral    articulation.  Quadriceps and patellar tendons intact.       Moderate-large amount of joint effusion.  Moderate gastrocnemius/semimembranous reflection cyst    with partial dehiscence.  No acute muscle strain.       CONCLUSION:   1. Grade 4 chondromalacia of weightbearing and posterior nonweightbearing lateral femorotibial    compartment with full-thickness chondral loss and stress osseous edema.  Partial    pseudoextrusion of the lateral meniscus with complex flap tear from anterior horn to posterior    horn.    2. Chronic notch synovitis with inflamed ACL.       Thank you for the opportunity to provide your interpretation.               Riccardo RONEY Stark       A: Guero 11/18/2019 8:43 AM   T: REINA 11/18/2019 8:11 AM      Lumbar MRI scan obtained 11/19/2020 as listed above  Narrative   EXAMINATION:   MRI OF THE LUMBAR SPINE WITHOUT CONTRAST, 11/19/2021 9:56 am       TECHNIQUE:   Multiplanar multisequence MRI of the lumbar spine was performed without the   administration of intravenous contrast.       COMPARISON:   Lumbar radiographs from 10/29/2021 and lumbar MRI from 05/21/2018       HISTORY:   ORDERING SYSTEM PROVIDED HISTORY: Bilateral primary osteoarthritis of knee   TECHNOLOGIST PROVIDED HISTORY:   Reason for exam:->r/o L4-L5, L5-S1 hnp, spinal stenosis, spondyloysis   Reason for Exam: Chronic lower lumbar pain for many years. She did take a   hard fall back in January when she slipped on ice. Today she reports severe   right lower lumbar pain and buttock pain. She has severe pain, tingling, and   weakness in both legs. When she goes from sitting for one hour  to standing   she cannot gain enough strength to stand back up. Acuity: Chronic   Type of Exam: Subsequent/Follow-up       FINDINGS:   BONES/ALIGNMENT: There is a levoscoliosis centered at L1-L2.  No listhesis.    Vertebral heights are normal.  There are Modic type 1 endplate changes at   T6-E4.  T1 hypointense, T2 hyperintense intraosseous lesion within the L5   vertebral body is stable since 2018 likely representing a benign atypical   hemangioma.       SPINAL CORD: The conus terminates normally.       SOFT TISSUES: No paraspinal mass identified.       L1-L2: Severe right-sided degenerative disc disease with endplate   irregularity and sclerosis.  Right eccentric disc bulge and osteophytic   ridging causes severe right foraminal stenosis, mild thecal sac stenosis and   mild left foraminal stenosis.       L2-L3: Disc bulge causes mild thecal sac and mild bilateral foraminal   stenosis.       L3-L4: Disc bulge, facet and ligament flavum hypertrophy cause mild thecal   sac and mild bilateral with an L5-S1 radiculitis. We will see her back in a few weeks to consider viscosupplementation of her knees internal for her back care to [facility]. She will contact us in the interim with questions or concerns. CC: . Dr. Leidy Montalvo      This dictation was performed with a verbal recognition program [facility] ) and it was checked for errors. It is possible that there are still dictated errors within this office note. If so, please bring any errors to my attention for an addendum. All efforts were made to ensure that this office note is accurate.

## 2021-11-22 NOTE — FLOWSHEET NOTE
[facility], [address] [place]  Phone: [phone]  Fax [phone]    Physical Therapy Treatment Note/ Progress Report:           Date:  2021    Patient Name:  Ifeoma Cheek    :  1955  MRN: [id number]  Restrictions/Precautions:    Medical/Treatment Diagnosis Information:  · Diagnosis: M54.50 Lumbar pain with radiculopathy down R leg. · Treatment Diagnosis: M54.50 Lumbar pain with radiculopathy down R leg. Insurance/Certification information:  PT Insurance Information: Medicare  Physician Information:  Referring Practitioner: Cralos Moreau DO  Has the plan of care been signed (Y/N):        []  Yes  [x]  No     Date of Patient follow up with Physician: No future visit scheduled. Is this a Progress Report:     []  Yes  [x]  No         If Yes:  Date Range for reporting period:  Beginning 21   Ending    Progress report will be due (10 Rx or 30 days whichever is less):        Recertification will be due (POC Duration  / 90 days whichever is less): 21         Visit # Insurance Allowable Auth Required   In-person 7 MC []  Yes []  No    Telehealth   []  Yes []  No    Total            Functional Scale: Modified Oswestry 18% disability   Date assessed:  21      Therapy Diagnosis/Practice Pattern: F      Number of Comorbidities:  []0     [x]1-2    []3+    Latex Allergy:  [x]NO      []YES  Preferred Language for Healthcare:   [x]English       []other:      Pain level:      SUBJECTIVE:  Pt had a visit with MD this morning and was told about findings of MRI. Pt was referred to receive epidural, but has not scheduled yet. Pt notes she is/has been in increased pain, especially over the weekend. Pt notes her knees have been bothering her much more recently and it has made working her job very tough to do. Pt notes she has not been performing HEP due to the pain.      OBJECTIVE:  Observation:    Test measurements:      RESTRICTIONS/PRECAUTIONS: Osteoarthritis  Working at [employer]    Exercises/Interventions:     Therapeutic Ex (71857) Sets/sec Notes/CUES   Hook-lying TA contraction  HEP, tactile cues to initiate and maintain contraction, minimize ppt   Piriformis S 30'' x 2, R/L HEP, cues to decrease ROM for pain   Supine SKTC  Supine SKTC w/ ITB bias  Supine DKTC w/ SB w/ TA   2 x 10 HEP    HEP, tactile cues for TA contraction   Supine Lower Trunk Rotation on SB 2 x 10 HEP   Supine marches w/ TA w/ pressure cuff  HEP   Hooklying Clams GTB 5'' x 2 x 10    Hip Abd Iso w/ Bridge 5'' x 2 x 10 GTB    Prone press ups w/ sacral PA mob     Mini Squats Unable to perform b/c of pain   Standing Hip Abd/Ext 2 x 10 ea, R/L Cues to avoid leaning   Heel Raises 2 x 10 Knee extension cues   Heel Taps  Cues to maintain neutral pelvis   Resisted Walking  Cues for smaller steps when leading with L LE coming in   6'' forward step ups 2  x10, R/L    4'' lateral step ups 2 x 10, R/L    TB rows, ext GTB 2 x 10, R/L    TruStretching:  HS S  Hip Flexor S  Trunk S   30'' x 1, R/L  30'' x 1, R/L  30''         Pt ed:  HEP, exercise/golfing with pain,                Manual Intervention (34545) 10'    PA mobs on R sacral base 5' Increased pain   STM to R piriformis, glutes, QL, paraspinals 5'  pillow under hips   Brice Egan '                   NMR re-education (59549) CUES NEEDED   LAQ on SB  Marches on SB SBA, UE support for both   Palloff Press w/ GTB  Anti Rotation Press w/ step outs 2 x 10, R/LCues to avoid trunk rotation   Cone Pickups w/ lunge  10x R/L    Half kneeling chops, reverse chops at CC  Cues to follow hands with eyes, more rotation                                Therapeutic Exercise and NMR EXR  [x] (69470) Provided verbal/tactile cueing for activities related to strengthening, flexibility, endurance, ROM  for improvements in proximal hip and core control with self care, mobility, lifting and ambulation. [x] (19882) Provided verbal/tactile cueing for activities related to improving balance, coordination, kinesthetic sense, posture, motor skill, proprioception  to assist with core control in self care, mobility, lifting, and ambulation. Therapeutic Activities:    [] (28732 or 93302) Provided verbal/tactile cueing for activities related to improving balance, coordination, kinesthetic sense, posture, motor skill, proprioception and motor activation to allow for proper function  with self care and ADLs  [] (82667) Provided training and instruction to the patient for proper core and proximal hip recruitment and positioning with ambulation re-education     Home Exercise Program:    [x] (92351) Reviewed/Progressed HEP activities related to strengthening, flexibility, endurance, ROM of core, proximal hip and LE for functional self-care, mobility, lifting and ambulation   [] (46377) Reviewed/Progressed HEP activities related to improving balance, coordination, kinesthetic sense, posture, motor skill, proprioception of core, proximal hip and LE for self care, mobility, lifting, and ambulation      Manual Treatments:  PROM / STM / Oscillations-Mobs:  G-I, II, III, IV (PA's, Inf., Post.)  [x] (80249) Provided manual therapy to mobilize proximal hip and LS spine soft tissue/joints for the purpose of modulating pain, promoting relaxation,  increasing ROM, reducing/eliminating soft tissue swelling/inflammation/restriction, improving soft tissue extensibility and allowing for proper ROM for normal function with self care, mobility, lifting and ambulation.      Modalities:   CP x 10 min to lumbar and bilat knees, HP to neck/shoulders    Charges  Timed Code Treatment Minutes: 40   Total Treatment Minutes: 50, CP     Medicare total (add KX at $2000)  672.88  11/22/21       [] EVAL (LOW) 50208   [] EVAL (MOD) 61895   [] EVAL (HIGH) 42357   [] RE-EVAL      [x] KX(63017) x2     [] IONTO  [] NMR (19656) x     [] VASO  [x] Manual (68110) x1     [] Other:  [] TA x      [] Mech Traction (19779)  [] ES(attended) (81772)     [] ES (un) (60243):     Goals:   Patient stated goal: Pt would like to get back to golfing and taking care of grandchildren without pain or restriction. [] Progressing: [] Met: [] Not Met: [] Adjusted    Therapist goals for Patient:   Short Term Goals: To be achieved in: 2 weeks  1. Independent in HEP and progression per patient tolerance, in order to prevent re-injury. [x] Progressing: [] Met: [] Not Met: [] Adjusted  2. Patient will have a decrease in pain to facilitate improvement in movement, function, and ADLs as indicated by Functional Deficits. [] Progressing: [] Met: [] Not Met: [] Adjusted    Long Term Goals: To be achieved in: 6 weeks  1. Disability index score of 8% or less for the Modified Oswestry to assist with reaching prior level of function. [] Progressing: [] Met: [] Not Met: [] Adjusted  2. Patient will demonstrate increased AROM with lumbar ext to 20 deg without pain and lumbar side bending to symmetrically reaching knee joint line in order to be able to perform bed mobility and transfers without an increase in pain. [] Progressing: [] Met: [] Not Met: [] Adjusted  3. Patient will demonstrate an increase in Strength with hip flex, ext and abd to 5/5 in order to be able to ascend/descend stairs and get into and out of a car without pain or restriction. [] Progressing: [] Met: [] Not Met: [] Adjusted  4. Patient will be able to perform 10 squats without pain in order to be able to pick something up off of the ground without pain or restriction. [] Progressing: [] Met: [] Not Met: [] Adjusted    Progression Towards Functional goals:  [] Patient is progressing as expected towards functional goals listed. [] Progression is slowed due to complexities listed. [] Progression has been slowed due to co-morbidities.   [x] Plan just implemented, too soon to assess goals progression  [] Other: Overall Progression Towards Functional goals/ Treatment Progress Update:  [] Patient is progressing as expected towards functional goals listed. [] Progression is slowed due to complexities/Impairments listed. [] Progression has been slowed due to co-morbidities. [x] Plan just implemented, too soon to assess goals progression <30days   [] Goals require adjustment due to lack of progress  [] Patient is not progressing as expected and requires additional follow up with physician  [] Other    Prognosis for POC: [x] Good [] Fair  [] Poor      Patient requires continued skilled intervention: [x] Yes  [] No    Treatment/Activity Tolerance:  [x] Patient able to complete treatment  [] Patient limited by fatigue  [] Patient limited by pain    [] Patient limited by other medical complications  [] Other:     ASSESSMENT: Pt had increased pain in her back and bilat knees during most of the exercises performed today. Step ups and lateral step ups provoked pain in her knees, specifically her L knee. Anti-rotation with step outs and hip abd and ext in standing provoked pain in her back. Pt noted stretching at the [facility] gave her some relief. Pt requested ice  On her LB and knees today and HP on her shoulders for 10 minutes.        Return to Play: (if applicable)   []  Stage 1: Intro to Strength   []  Stage 2: Return to Run and Strength   []  Stage 3: Return to Jump and Strength   []  Stage 4: Dynamic Strength and Agility   []  Stage 5: Sport Specific Training     []  Ready to Return to Play, Meets All Above Stages   []  Not Ready for Return to Sports   Comments:                         PLAN:   [x] Continue per plan of care [] Alter current plan (see comments above)  [] Plan of care initiated [] Hold pending MD visit [] Discharge    Electronically signed by:  Katerin Solis, PT,   Crescencio Ramos, [facility]  Therapist was present, directed the patient's care, made skilled judgement, and was responsible for assessment and treatment of the patient. Note: If patient does not return for scheduled/ recommended follow up visits, this note will serve as a discharge from care along with most recent update on progress. Access Code: [email or web address]  URL: [email or web address]  Date: 11/01/2021  Prepared by: Michael Spicer    Exercises  Supine Transversus Abdominis Bracing - Hands on Stomach - 1 x daily - 7 x weekly - 2 sets - 10 reps - 5 sec hold  Supine Figure 4 Piriformis Stretch - 1 x daily - 7 x weekly - 3 sets - 30 sec hold  Supine Single Knee to Chest Stretch - 1 x daily - 7 x weekly - 2 sets - 10 reps - 20 sec hold  Supine Double Knee to Chest - 1 x daily - 7 x weekly - 2 sets - 10 reps  Supine Lower Trunk Rotation with Swiss Ball - 1 x daily - 7 x weekly - 2 sets - 10 reps  Supine March - 1 x daily - 7 x weekly - 2 sets - 10 reps

## 2021-11-26 ENCOUNTER — APPOINTMENT (OUTPATIENT)
Dept: PHYSICAL THERAPY | Age: 66
End: 2021-11-26
Payer: MEDICARE

## 2021-11-29 ENCOUNTER — TELEPHONE (OUTPATIENT)
Dept: ORTHOPEDIC SURGERY | Age: 66
End: 2021-11-29

## 2021-11-29 ENCOUNTER — HOSPITAL ENCOUNTER (OUTPATIENT)
Dept: PHYSICAL THERAPY | Age: 66
Setting detail: THERAPIES SERIES
Discharge: HOME | End: 2021-11-29
Payer: MEDICARE

## 2021-11-29 DIAGNOSIS — M54.50 LUMBAR PAIN: ICD-10-CM

## 2021-11-29 DIAGNOSIS — M54.16 LUMBAR RADICULITIS: ICD-10-CM

## 2021-11-29 DIAGNOSIS — M51.36 DDD (DEGENERATIVE DISC DISEASE), LUMBAR: Primary | ICD-10-CM

## 2021-11-29 PROCEDURE — 97110 THERAPEUTIC EXERCISES: CPT | Performed by: PHYSICAL THERAPIST

## 2021-11-29 PROCEDURE — 97140 MANUAL THERAPY 1/> REGIONS: CPT | Performed by: PHYSICAL THERAPIST

## 2021-11-29 NOTE — PROGRESS NOTES
[facility], [address]  Phone: [phone]  Fax [phone]    Physical Therapy Treatment Note/ Progress Report:           Date:  2021    Patient Name:  Anne Cole    :  1955  MRN: [id number]  Restrictions/Precautions:    Medical/Treatment Diagnosis Information:  · Diagnosis: M54.50 Lumbar pain with radiculopathy down R leg. · Treatment Diagnosis: M54.50 Lumbar pain with radiculopathy down R leg. Insurance/Certification information:  PT Insurance Information: Medicare  Physician Information:  Referring Practitioner: Jamia Cruz DO/Dr Kristal Valdez  Has the plan of care been signed (Y/N):        []  Yes  [x]  No     Date of Patient follow up with Physician: No future visit scheduled. Is this a Progress Report:     []  Yes  [x]  No         If Yes:  Date Range for reporting period:  Beginning 21   Ending 21    Progress report will be due (10 Rx or 30 days whichever is less):        Recertification will be due (POC Duration  / 90 days whichever is less): 21         Visit # Insurance Allowable Auth Required   In-person 8 MC []  Yes []  No    Telehealth   []  Yes []  No    Total            Functional Scale: Modified Oswestry 18% disability   Date assessed:  21      Therapy Diagnosis/Practice Pattern: F      Number of Comorbidities:  []0     [x]1-2    []3+    Latex Allergy:  [x]NO      []YES  Preferred Language for Healthcare:   [x]English       []other:      Pain level: eval 9/10 Current pain 2-3/10 average    SUBJECTIVE:  Pt reports the exercises do seem to be helping and she is noticing some decreases in her pain when she does them. She continues to be quite busy with work and caring for her parents, which still causes increased pain. She states she hasn't been wearing her back brace much because it is more uncomfortable than it helps.     OBJECTIVE:  Observation:    Test measurements:  MMT hip ext 4- R/4 L, hip abd 4 R/4+ L both w/ groin pain    RESTRICTIONS/PRECAUTIONS: Osteoarthritis  Working at [employer]    Exercises/Interventions:     Therapeutic Ex (04720) Sets/sec Notes/CUES   Hook-lying TA contraction  HEP, tactile cues to initiate and maintain contraction, minimize ppt   Piriformis S 30'' x 3, R/L HEP, cues to decrease ROM for pain   Supine SKTC  Supine SKTC w/ ITB bias  Supine DKTC w/ SB w/ TA   2 x 10 HEP    HEP, tactile cues for TA contraction   Supine Lower Trunk Rotation on SB 2 x 10 HEP   Supine marches w/ TA w/ pressure cuff  HEP   Hooklying Clams    Hip Abd Iso w/ Bridge 5'' x 3 x 10 GTB Cue R post hip activation   Prone press ups w/ sacral PA mob     Mini Squats 15x1 hand support, form cues to avoid ant shift   Standing Hip Abd/Ext GVL @ knees x10 ea, R/L Cues to avoid leaning   Heel Raises 2 x 10 Knee extension cues   Heel Taps  Cues to maintain neutral pelvis   Resisted Walking  Cues for smaller steps when leading with L LE coming in   6'' forward step ups    4'' lateral step ups    TB rows, ext GTB on SB 2 x 10, R/L    TruStretching:  HS S  Hip Flexor S  Trunk S   30'' x 1, R/L  30'' x 1, R/L  30''         Pt ed:  HEP, exercise/golfing with pain,                Manual Intervention (01518) 10'    PA mobs on R sacral base/distraction 4'    STM to R piriformis, glutes, QL, paraspinals 6'  pillow under hips   Cruz Silva '                   NMR re-education (27685) CUES NEEDED   LAQ on SB  Marches on SB SBA, UE support for both   Palloff Press w/ GTB  Anti Rotation Press w/ step outs 2 x 10, R/LCues to avoid trunk rotation   Cone Pickups w/ lunge      Half kneeling chops, reverse chops at CC  Cues to follow hands with eyes, more rotation   CC 1/2 lunge bent over single arm row 10# x15 R/L opp hand support on bar                           Therapeutic Exercise and NMR EXR  [x] (34726) Provided verbal/tactile cueing for activities related to strengthening, flexibility, endurance, ROM  for improvements in proximal hip and core control with self care, mobility, lifting and ambulation. [x] (94947) Provided verbal/tactile cueing for activities related to improving balance, coordination, kinesthetic sense, posture, motor skill, proprioception  to assist with core control in self care, mobility, lifting, and ambulation. Therapeutic Activities:    [] (83480 or 99118) Provided verbal/tactile cueing for activities related to improving balance, coordination, kinesthetic sense, posture, motor skill, proprioception and motor activation to allow for proper function  with self care and ADLs  [] (31144) Provided training and instruction to the patient for proper core and proximal hip recruitment and positioning with ambulation re-education     Home Exercise Program:    [x] (85488) Reviewed/Progressed HEP activities related to strengthening, flexibility, endurance, ROM of core, proximal hip and LE for functional self-care, mobility, lifting and ambulation   [] (31700) Reviewed/Progressed HEP activities related to improving balance, coordination, kinesthetic sense, posture, motor skill, proprioception of core, proximal hip and LE for self care, mobility, lifting, and ambulation      Manual Treatments:  PROM / STM / Oscillations-Mobs:  G-I, II, III, IV (PA's, Inf., Post.)  [x] (13106) Provided manual therapy to mobilize proximal hip and LS spine soft tissue/joints for the purpose of modulating pain, promoting relaxation,  increasing ROM, reducing/eliminating soft tissue swelling/inflammation/restriction, improving soft tissue extensibility and allowing for proper ROM for normal function with self care, mobility, lifting and ambulation.      Modalities:   CP x 10 min to lumbar and bilat knees, HP to neck/shoulders    Charges  Timed Code Treatment Minutes: 40   Total Treatment Minutes: 50, CP     Medicare total (add KX at $2000)  758.22  11/29/21       [] EVAL (LOW) 97463   [] EVAL (MOD) 31993   [] EVAL (HIGH) 99357   [] RE-EVAL      [x] FG(60012) x2   29.21  [] IONTO  [] NMR (25949) x      [] VASO  [x] Manual (26185) x1    26.92  [] Other:  [] TA x       [] Mech Traction (23405)  [] ES(attended) (40485)      [] ES (un) (55889):     Goals:   Patient stated goal: Pt would like to get back to golfing and taking care of grandchildren without pain or restriction. [] Progressing: [] Met: [] Not Met: [] Adjusted    Therapist goals for Patient:   Short Term Goals: To be achieved in: 2 weeks  1. Independent in HEP and progression per patient tolerance, in order to prevent re-injury. [] Progressing: [x] Met: [] Not Met: [] Adjusted  2. Patient will have a decrease in pain to facilitate improvement in movement, function, and ADLs as indicated by Functional Deficits. [] Progressing: [x] Met: [] Not Met: [] Adjusted    Long Term Goals: To be achieved in: 6 weeks  1. Disability index score of 8% or less for the Modified Oswestry to assist with reaching prior level of function. [] Progressing: [] Met: [] Not Met: [] Adjusted  2. Patient will demonstrate increased AROM with lumbar ext to 20 deg without pain and lumbar side bending to symmetrically reaching knee joint line in order to be able to perform bed mobility and transfers without an increase in pain. [x] Progressing: [] Met: [] Not Met: [] Adjusted  3. Patient will demonstrate an increase in Strength with hip flex, ext and abd to 5/5 in order to be able to ascend/descend stairs and get into and out of a car without pain or restriction. [x] Progressing: [] Met: [] Not Met: [] Adjusted  4. Patient will be able to perform 10 squats without pain in order to be able to pick something up off of the ground without pain or restriction. [x] Progressing: [] Met: [] Not Met: [] Adjusted    Progression Towards Functional goals:  [] Patient is progressing as expected towards functional goals listed.     [] Progression is slowed due to complexities listed. [] Progression has been slowed due to co-morbidities. [x] Plan just implemented, too soon to assess goals progression  [] Other:     Overall Progression Towards Functional goals/ Treatment Progress Update:  [] Patient is progressing as expected towards functional goals listed. [] Progression is slowed due to complexities/Impairments listed. [] Progression has been slowed due to co-morbidities. [x] Plan just implemented, too soon to assess goals progression <30days   [] Goals require adjustment due to lack of progress  [] Patient is not progressing as expected and requires additional follow up with physician  [] Other    Prognosis for POC: [x] Good [] Fair  [] Poor      Patient requires continued skilled intervention: [x] Yes  [] No    Treatment/Activity Tolerance:  [x] Patient able to complete treatment  [] Patient limited by fatigue  [] Patient limited by pain    [] Patient limited by other medical complications  [] Other:     ASSESSMENT: Pt is progressing with her pain levels, strength and LE flexibility. She continues to have difficulty squatting and bending, which she needs to do for work and continued functional strength and mobility deficits that will benefit from continued PT to address.      Return to Play: (if applicable)   []  Stage 1: Intro to Strength   []  Stage 2: Return to Run and Strength   []  Stage 3: Return to Jump and Strength   []  Stage 4: Dynamic Strength and Agility   []  Stage 5: Sport Specific Training     []  Ready to Return to Play, Meets All Above Stages   []  Not Ready for Return to Sports   Comments:                         PLAN:   [x] Continue per plan of care [] Alter current plan (see comments above)  [] Plan of care initiated [] Hold pending MD visit [] Discharge    Electronically signed by:  James Pandey PT,   Paxton Monroy, [facility]  Therapist was present, directed the patient's care, made skilled judgement, and was responsible for assessment and treatment of the patient. Note: If patient does not return for scheduled/ recommended follow up visits, this note will serve as a discharge from care along with most recent update on progress. Access Code: [email or web address]  URL: [email or web address]  Date: 11/01/2021  Prepared by: Michael Spicer    Exercises  Supine Transversus Abdominis Bracing - Hands on Stomach - 1 x daily - 7 x weekly - 2 sets - 10 reps - 5 sec hold  Supine Figure 4 Piriformis Stretch - 1 x daily - 7 x weekly - 3 sets - 30 sec hold  Supine Single Knee to Chest Stretch - 1 x daily - 7 x weekly - 2 sets - 10 reps - 20 sec hold  Supine Double Knee to Chest - 1 x daily - 7 x weekly - 2 sets - 10 reps  Supine Lower Trunk Rotation with Swiss Ball - 1 x daily - 7 x weekly - 2 sets - 10 reps  Supine March - 1 x daily - 7 x weekly - 2 sets - 10 reps

## 2021-11-29 NOTE — TELEPHONE ENCOUNTER
Called & spoke with the patient who would like to proceed with getting an injection from Dr. Taylor Beth. Patient was asked the following questions:  Blood thinners including ASA: NO  DM - NO  Hx of Glaucoma - NO  Pacemaker/Defibrillator - NO  Abx - NO  Open Wounds/Infections - NO    The pain is the same as what she was seen for in the office on 11/22/2021. Christy Ugarte will put in the order for for Dr. Taylor Beth, patient was informed to follow up 2 weeks after the injection.

## 2021-12-03 ENCOUNTER — HOSPITAL ENCOUNTER (OUTPATIENT)
Dept: PHYSICAL THERAPY | Age: 66
Setting detail: THERAPIES SERIES
Discharge: HOME | End: 2021-12-03
Payer: MEDICARE

## 2021-12-03 PROCEDURE — 97110 THERAPEUTIC EXERCISES: CPT

## 2021-12-03 PROCEDURE — 97530 THERAPEUTIC ACTIVITIES: CPT

## 2021-12-03 NOTE — FLOWSHEET NOTE
[facility],  [address]  Phone: [phone]  Fax [phone]    Physical Therapy Treatment Note/ Progress Report:           Date:  12/3/2021    Patient Name:  Marcia Baumann    :  1955  MRN: [id number]  Restrictions/Precautions:    Medical/Treatment Diagnosis Information:  · Diagnosis: M54.50 Lumbar pain with radiculopathy down R leg. · Treatment Diagnosis: M54.50 Lumbar pain with radiculopathy down R leg. Insurance/Certification information:  PT Insurance Information: Medicare  Physician Information:  Referring Practitioner: Tasha Crowell DO/Dr Jasmeet Lyons  Has the plan of care been signed (Y/N):        []  Yes  [x]  No     Date of Patient follow up with Physician: No future visit scheduled. Is this a Progress Report:     []  Yes  [x]  No         If Yes:  Date Range for reporting period:  Beginning 21   Ending 21    Progress report will be due (10 Rx or 30 days whichever is less):        Recertification will be due (POC Duration  / 90 days whichever is less): 21         Visit # Insurance Allowable Auth Required   In-person [address] []  Yes []  No    Telehealth   []  Yes []  No    Total            Functional Scale: Modified Oswestry 18% disability   Date assessed:  21      Therapy Diagnosis/Practice Pattern: F      Number of Comorbidities:  []0     [x]1-2    []3+    Latex Allergy:  [x]NO      []YES  Preferred Language for Healthcare:   [x]English       []other:      Pain level: eval 9/10 Current pain 2-3/10 average    SUBJECTIVE:  Pt states she overdid it with work, caring for her parents and babysitting yesterday, so her back was very sore from neck to her buttocks. She feels she recovered from icing last night.     OBJECTIVE:    Observation:    Test measurements:  MMT hip ext 4- R/4 L, hip abd 4 R/4+ L both w/ groin pain    RESTRICTIONS/PRECAUTIONS: Osteoarthritis  Working at [employer]    Exercises/Interventions:     Therapeutic Ex (80186) Sets/sec Notes/CUES   Hook-lying TA contraction  HEP, tactile cues to initiate and maintain contraction, minimize ppt   Piriformis S 30'' x 3, R/L HEP, cues to decrease ROM for pain   Supine SKTC  Supine SKTC w/ ITB bias  Supine DKTC w/ SB w/ TA 30'' x 2, R/L   HEP    HEP, tactile cues for TA contraction   Supine Lower Trunk Rotation on SB  HEP   Supine marches w/ TA w/ pressure cuff  HEP   Hooklying Clams    Hip Abd Iso w/ Bridge  Cue R post hip activation   Prone press ups w/ sacral PA mob     Mini Squats 1 hand support, form cues to avoid ant shift   Standing Hip Abd/Ext Cues to avoid leaning   Heel Raises Knee extension cues   Heel Taps  Cues to maintain neutral pelvis   Resisted Walking  Cues for smaller steps when leading with L LE coming in   6'' forward step ups    4'' lateral step ups    TB rows, ext GTB on SB 2 x 10, R/L    TruStretching:  HS S  Hip Flexor S  Trunk S   30'' x 1, R/L  30'' x 1, R/L  30''    Pilates rerformer:  Leg press  Frogs   B LE lower ~75-50  B LE circles cw, ccw  B UEs 90-->) LEs held in table-top  \" UEs held at sides with alt LE \"bike\"  100's LEs held in table-top  Standing hip ext UEs on bar  B UE TrA/lat activ on pass-through bar with 3 D LE reach     x30  x10  x10  x10 ea  x10  x20 alt R, L  25 pulses x4  x10 R/L  x5 trips R, L ea   3 R  2 R  2 R  2R  1R1B  1R1B  1R1B  1R  Blue cue for stance leg   SS     Pt ed:  HEP, exercise/golfing with pain,                Manual Intervention (12678)    PA mobs on R sacral base/distraction    STM to R piriformis, glutes, QL, paraspinals  pillow under hips   Zoltan Guillaume '                   [facility] re-education (26282) CUES NEEDED   LAQ on SB  Marches on SB SBA, UE support for both   [employer] Press w/ GTB  Anti Rotation Press w/ step outs 2 x 10, R/LCues to avoid trunk rotation   [employer] Pickups w/ lunge      Half kneeling chops, reverse chops at CC  Cues to follow hands with eyes, more rotation   CC 1/2 lunge bent over single arm row 15# x15 R/L opp hand support on bar             Therapeutic Activity:  Squat body mechanics, cue for TrA activ to brace before lifting  Not twisting/pivoting at trunk/hips to lift/place objects during work/babies during  but actually lift and then turn feet so body is square 6'             Therapeutic Exercise and NMR EXR  [x] (88657) Provided verbal/tactile cueing for activities related to strengthening, flexibility, endurance, ROM  for improvements in proximal hip and core control with self care, mobility, lifting and ambulation. [x] (30908) Provided verbal/tactile cueing for activities related to improving balance, coordination, kinesthetic sense, posture, motor skill, proprioception  to assist with core control in self care, mobility, lifting, and ambulation.      Therapeutic Activities:    [] (00792 or 02201) Provided verbal/tactile cueing for activities related to improving balance, coordination, kinesthetic sense, posture, motor skill, proprioception and motor activation to allow for proper function  with self care and ADLs  [] (64814) Provided training and instruction to the patient for proper core and proximal hip recruitment and positioning with ambulation re-education     Home Exercise Program:    [x] (55282) Reviewed/Progressed HEP activities related to strengthening, flexibility, endurance, ROM of core, proximal hip and LE for functional self-care, mobility, lifting and ambulation   [] (70096) Reviewed/Progressed HEP activities related to improving balance, coordination, kinesthetic sense, posture, motor skill, proprioception of core, proximal hip and LE for self care, mobility, lifting, and ambulation      Manual Treatments:  PROM / STM / Oscillations-Mobs:  G-I, II, III, IV (PA's, Inf., Post.)  [x] (82842) Provided manual therapy to mobilize proximal hip and LS spine soft tissue/joints for the purpose of modulating pain, promoting relaxation,  increasing ROM, reducing/eliminating soft tissue swelling/inflammation/restriction, improving soft tissue extensibility and allowing for proper ROM for normal function with self care, mobility, lifting and ambulation. Modalities:   CP x 15 min to lumbar and bilat knees,    Charges  Timed Code Treatment Minutes: 45   Total Treatment Minutes: 55, CP     Medicare total (add KX at $2000)  854.1  12/3/21       [] EVAL (LOW) 83329   [] EVAL (MOD) 47466   [] EVAL (HIGH) 15533   [] RE-EVAL      [x] JK(67447) x2   29.21  [] IONTO  [] NMR (94441) x      [] VASO  [] Manual (41730) x1    26.92  [] Other:  [x] TA x1    37.46    [] Mech Traction (20118)  [] ES(attended) (72396)      [] ES (un) (13984):     Goals:   Patient stated goal: Pt would like to get back to golfing and taking care of grandchildren without pain or restriction. [] Progressing: [] Met: [] Not Met: [] Adjusted    Therapist goals for Patient:   Short Term Goals: To be achieved in: 2 weeks  1. Independent in HEP and progression per patient tolerance, in order to prevent re-injury. [] Progressing: [x] Met: [] Not Met: [] Adjusted  2. Patient will have a decrease in pain to facilitate improvement in movement, function, and ADLs as indicated by Functional Deficits. [] Progressing: [x] Met: [] Not Met: [] Adjusted    Long Term Goals: To be achieved in: 6 weeks  1. Disability index score of 8% or less for the Modified Oswestry to assist with reaching prior level of function. [] Progressing: [] Met: [] Not Met: [] Adjusted  2. Patient will demonstrate increased AROM with lumbar ext to 20 deg without pain and lumbar side bending to symmetrically reaching knee joint line in order to be able to perform bed mobility and transfers without an increase in pain. [x] Progressing: [] Met: [] Not Met: [] Adjusted  3.  Patient will demonstrate an increase in Strength with hip flex, ext and abd to 5/5 in order to be able to ascend/descend stairs and get into and out of a car without pain or restriction. [x] Progressing: [] Met: [] Not Met: [] Adjusted  4. Patient will be able to perform 10 squats without pain in order to be able to pick something up off of the ground without pain or restriction. [x] Progressing: [] Met: [] Not Met: [] Adjusted    Progression Towards Functional goals:  [] Patient is progressing as expected towards functional goals listed. [] Progression is slowed due to complexities listed. [] Progression has been slowed due to co-morbidities. [x] Plan just implemented, too soon to assess goals progression  [] Other:     Overall Progression Towards Functional goals/ Treatment Progress Update:  [] Patient is progressing as expected towards functional goals listed. [] Progression is slowed due to complexities/Impairments listed. [] Progression has been slowed due to co-morbidities. [x] Plan just implemented, too soon to assess goals progression <30days   [] Goals require adjustment due to lack of progress  [] Patient is not progressing as expected and requires additional follow up with physician  [] Other    Prognosis for POC: [x] Good [] Fair  [] Poor      Patient requires continued skilled intervention: [x] Yes  [] No    Treatment/Activity Tolerance:  [x] Patient able to complete treatment  [] Patient limited by fatigue  [] Patient limited by pain    [] Patient limited by other medical complications  [] Other:     ASSESSMENT: Trialed use of Pilates reformer today to get core/LE workout but with less [address] d/t c/o soreness in back/knees yesterday. She still had c/o knee pain with standing hip ext on reformer, but all other exercises were tolerable. Spend time discussing body mechanics for work duties,  duties.       Return to Play: (if applicable)   []  Stage 1: Intro to Strength   []  Stage 2: Return to Run and Strength   []  Stage 3: Return to Jump and Strength   []  Stage 4: Dynamic Strength and Agility   []  Stage 5: Sport Specific Training     []  Ready to Return to Play, [employer]   []  Not Ready for Return to Sports   Comments:                         PLAN:   [x] Continue per plan of care [] Alter current plan (see comments above)  [] Plan of care initiated [] Hold pending MD visit [] Discharge    Electronically signed by:  Crissy Shane, PT, DPT    Note: If patient does not return for scheduled/ recommended follow up visits, this note will serve as a discharge from care along with most recent update on progress. Access Code: [email or web address]  URL: [email or web address]  Date: 11/01/2021  Prepared by: Compa Grayson    Exercises  Supine Transversus Abdominis Bracing - Hands on Stomach - 1 x daily - 7 x weekly - 2 sets - 10 reps - 5 sec hold  Supine Figure 4 Piriformis Stretch - 1 x daily - 7 x weekly - 3 sets - 30 sec hold  Supine Single Knee to Chest Stretch - 1 x daily - 7 x weekly - 2 sets - 10 reps - 20 sec hold  Supine Double Knee to Chest - 1 x daily - 7 x weekly - 2 sets - 10 reps  Supine Lower Trunk Rotation with Swiss Ball - 1 x daily - 7 x weekly - 2 sets - 10 reps  Supine March - 1 x daily - 7 x weekly - 2 sets - 10 reps

## 2021-12-06 ENCOUNTER — HOSPITAL ENCOUNTER (OUTPATIENT)
Dept: PHYSICAL THERAPY | Age: 66
Setting detail: THERAPIES SERIES
Discharge: HOME | End: 2021-12-06
Payer: MEDICARE

## 2021-12-06 PROCEDURE — 97110 THERAPEUTIC EXERCISES: CPT | Performed by: PHYSICAL THERAPIST

## 2021-12-06 PROCEDURE — 97140 MANUAL THERAPY 1/> REGIONS: CPT | Performed by: PHYSICAL THERAPIST

## 2021-12-06 NOTE — FLOWSHEET NOTE
[facility], [address]  Phone: [phone]  Fax [phone]    Physical Therapy Treatment Note/ Progress Report:           Date:  2021    Patient Name:  Tasha Sanchez    :  1955  MRN: [id number]  Restrictions/Precautions:    Medical/Treatment Diagnosis Information:  · Diagnosis: M54.50 Lumbar pain with radiculopathy down R leg. · Treatment Diagnosis: M54.50 Lumbar pain with radiculopathy down R leg. Insurance/Certification information:  PT Insurance Information: Medicare  Physician Information:  Referring Practitioner: Miles Kulkarni DO/Dr Suzie Galeano  Has the plan of care been signed (Y/N):        []  Yes  [x]  No     Date of Patient follow up with Physician: No future visit scheduled. Is this a Progress Report:     []  Yes  [x]  No         If Yes:  Date Range for reporting period:  Beginning 21   Ending 21    Progress report will be due (10 Rx or 30 days whichever is less): 36/10/49       Recertification will be due (POC Duration  / 90 days whichever is less): 21         Visit # Insurance Allowable Auth Required   In-person [facility] []  Yes []  No    Telehealth   []  Yes []  No    Total            Functional Scale: Modified Oswestry 18% disability   Date assessed:  21      Therapy Diagnosis/Practice Pattern: F      Number of Comorbidities:  []0     [x]1-2    []3+    Latex Allergy:  [x]NO      []YES  Preferred Language for Healthcare:   [x]English       []other:      Pain level: eval 9/10  Current pain \"dull pain\"/10 mostly but was back to 10/10 this weekend    SUBJECTIVE:  Pt states she was very uncomfortable all weekend from her neck to her hips. She states she felt fine following her PT session, but then she left to go care for her parents and then had to build a large display at work. She states she used a lot of ice over the weekend.     OBJECTIVE:    Observation:  Test measurements:      RESTRICTIONS/PRECAUTIONS: Osteoarthritis  Working at [employer]    Exercises/Interventions:     Therapeutic Ex (26963) Sets/sec Notes/CUES   Hook-lying TA contraction  HEP, tactile cues to initiate and maintain contraction, minimize ppt   Piriformis S HEP, cues to decrease ROM for pain   Supine SKTC  Supine SKTC w/ ITB bias  Supine DKTC w/ SB w/ TA HEP    HEP, tactile cues for TA contraction   Supine Lower Trunk Rotation on SB  HEP   Supine marches w/ TA w/ pressure cuff  HEP   Hooklying Clams  SL clams, rev clams x20 ea R/L   Hip Abd Iso w/ Bridge  Cue R post hip activation   Prone press ups w/ sacral PA mob     Mini Squats 1 hand support, form cues to avoid ant shift   Standing Hip Abd/Ext Cues to avoid leaning   Heel Raises Knee extension cues   Heel Taps  Cues to maintain neutral pelvis   Resisted Walking  Cues for smaller steps when leading with L LE coming in   6'' forward step ups    4'' lateral step ups    TB rows, ext GTB on SB 2 x 10, R/L    TruStretching:  HS S  Hip Flexor S  Trunk S   30'' x 1, R/L  30'' x 1, R/L  30''    Pilates rerformer:  Leg press ll, \  /    Fransisca Sidles   B LE lower ~75-50  B LE circles cw, ccw  B UEs 90-->) LEs held in table-top  \" UEs held at sides with alt LE \"bike\"  100's LEs held in table-top  Standing hip ext UEs on bar  B UE TrA/lat activ on pass-through bar with 3 D LE reach     x30    x10  x10 ea     3 R, 2R  2 R  2 R  2R  1R1B  1R1B  1R1B  1R  Blue cue for stance leg   [place] seated on SB:  Push through bar lumbar flex S  B shoulder ext, row  Alt LE march, LAQ w/ holding handles   x6  x20 ea  x20 ea R/L   2B 2 holes down  [employer] 2nd hole up  [employer] 2nd hole up   Pt ed:  HEP, exercise/golfing with pain,                Manual Intervention (44980) 12'    PA mobs on R sacral base/distraction     STM to R piriformis, glutes, QL, paraspinals 6'     Quad S 2'    L SL lumbar gapping, pelvic distraction x4'              NMR re-education (37842) CUES NEEDED   LAQ on SB  Marches on SB SBA, UE support for both   [employer] Press w/ GTB  Anti Rotation Press w/ step outs Cues to avoid trunk rotation   [employer] Pickups w/ lunge    Half kneeling chops, reverse chops at CC Cues to follow hands with eyes, more rotation   CC 1/2 lunge bent over single arm row opp hand support on bar             Therapeutic Activity:  Squat body mechanics, cue for TrA activ to brace before lifting  Not twisting/pivoting at trunk/hips to lift/place objects during work/babies during  but actually lift and then turn feet so body is square              Therapeutic Exercise and NMR EXR  [x] (30685) Provided verbal/tactile cueing for activities related to strengthening, flexibility, endurance, ROM  for improvements in proximal hip and core control with self care, mobility, lifting and ambulation. [x] (52953) Provided verbal/tactile cueing for activities related to improving balance, coordination, kinesthetic sense, posture, motor skill, proprioception  to assist with core control in self care, mobility, lifting, and ambulation.      Therapeutic Activities:    [] (07140 or 22025) Provided verbal/tactile cueing for activities related to improving balance, coordination, kinesthetic sense, posture, motor skill, proprioception and motor activation to allow for proper function  with self care and ADLs  [] (60664) Provided training and instruction to the patient for proper core and proximal hip recruitment and positioning with ambulation re-education     Home Exercise Program:    [x] (64792) Reviewed/Progressed HEP activities related to strengthening, flexibility, endurance, ROM of core, proximal hip and LE for functional self-care, mobility, lifting and ambulation   [] (24961) Reviewed/Progressed HEP activities related to improving balance, coordination, kinesthetic sense, posture, motor skill, proprioception of core, proximal hip and LE for self care, mobility, lifting, and ambulation      Manual Treatments:  PROM / STM / Oscillations-Mobs:  G-I, II, III, IV (PA's, Inf., Post.)  [x] (78632) Provided manual therapy to mobilize proximal hip and LS spine soft tissue/joints for the purpose of modulating pain, promoting relaxation,  increasing ROM, reducing/eliminating soft tissue swelling/inflammation/restriction, improving soft tissue extensibility and allowing for proper ROM for normal function with self care, mobility, lifting and ambulation. Modalities:   CP x 15 min to lumbar and bilat knees,    Charges  Timed Code Treatment Minutes: 45   Total Treatment Minutes: 55, CP     Medicare total (add KX at $2000)  939.44  12/6/21       [] EVAL (LOW) 56383   [] EVAL (MOD) 05459   [] EVAL (HIGH) 90064   [] RE-EVAL      [x] ZK(63236) x2   29.21  [] IONTO  [] NMR (20686) x      [] VASO  [x] Manual (93600) x1    26.92  [] Other:  [] TA x1    37.46    [] Mech Traction (36595)  [] ES(attended) (41609)      [] ES (un) (21450):     Goals:   Patient stated goal: Pt would like to get back to golfing and taking care of grandchildren without pain or restriction. [] Progressing: [] Met: [] Not Met: [] Adjusted    Therapist goals for Patient:   Short Term Goals: To be achieved in: 2 weeks  1. Independent in HEP and progression per patient tolerance, in order to prevent re-injury. [] Progressing: [x] Met: [] Not Met: [] Adjusted  2. Patient will have a decrease in pain to facilitate improvement in movement, function, and ADLs as indicated by Functional Deficits. [] Progressing: [x] Met: [] Not Met: [] Adjusted    Long Term Goals: To be achieved in: 6 weeks  1. Disability index score of 8% or less for the Modified Oswestry to assist with reaching prior level of function. [] Progressing: [] Met: [] Not Met: [] Adjusted  2.  Patient will demonstrate increased AROM with lumbar ext to 20 deg without pain and lumbar side bending to symmetrically reaching knee joint line in order to be able to perform bed mobility and transfers without an increase in pain. [x] Progressing: [] Met: [] Not Met: [] Adjusted  3. Patient will demonstrate an increase in Strength with hip flex, ext and abd to 5/5 in order to be able to ascend/descend stairs and get into and out of a car without pain or restriction. [x] Progressing: [] Met: [] Not Met: [] Adjusted  4. Patient will be able to perform 10 squats without pain in order to be able to pick something up off of the ground without pain or restriction. [x] Progressing: [] Met: [] Not Met: [] Adjusted    Progression Towards Functional goals:  [x] Patient is progressing as expected towards functional goals listed. [] Progression is slowed due to complexities listed. [] Progression has been slowed due to co-morbidities. [] Plan just implemented, too soon to assess goals progression  [] Other:     Overall Progression Towards Functional goals/ Treatment Progress Update:  [x] Patient is progressing as expected towards functional goals listed. [] Progression is slowed due to complexities/Impairments listed. [] Progression has been slowed due to co-morbidities. [] Plan just implemented, too soon to assess goals progression <30days   [] Goals require adjustment due to lack of progress  [] Patient is not progressing as expected and requires additional follow up with physician  [] Other    Prognosis for POC: [x] Good [] Fair  [] Poor      Patient requires continued skilled intervention: [x] Yes  [] No    Treatment/Activity Tolerance:  [x] Patient able to complete treatment  [] Patient limited by fatigue  [] Patient limited by pain    [] Patient limited by other medical complications  [] Other:     ASSESSMENT: modified reformer workout to see if she would tolerate better and initiated seated SB TE for less WBing core progression. Pt reported good challenge with this without reported increased pain.       Return to Play: (if applicable)   []  Stage 1: Intro to Strength   []  Stage 2: Return to Run and Strength   []  Stage 3: Return to Jump and Strength   []  Stage 4: Dynamic Strength and Agility   []  Stage 5: Sport Specific Training     []  Ready to Return to Play, Meets [employer] Stages   []  Not Ready for Return to Sports   Comments:                         PLAN:   [x] Continue per plan of care [] Alter current plan (see comments above)  [] Plan of care initiated [] Hold pending MD visit [] Discharge    Electronically signed by:  Nandini Martinez, PT, DPT    Note: If patient does not return for scheduled/ recommended follow up visits, this note will serve as a discharge from care along with most recent update on progress. Access Code: [email or web address]  URL: [email or web address]  Date: 11/01/2021  Prepared by: Nandini Martinez    Exercises  Supine Transversus Abdominis Bracing - Hands on Stomach - 1 x daily - 7 x weekly - 2 sets - 10 reps - 5 sec hold  Supine Figure 4 Piriformis Stretch - 1 x daily - 7 x weekly - 3 sets - 30 sec hold  Supine Single Knee to Chest Stretch - 1 x daily - 7 x weekly - 2 sets - 10 reps - 20 sec hold  Supine Double Knee to Chest - 1 x daily - 7 x weekly - 2 sets - 10 reps  Supine Lower Trunk Rotation with Swiss Ball - 1 x daily - 7 x weekly - 2 sets - 10 reps  Supine March - 1 x daily - 7 x weekly - 2 sets - 10 reps

## 2021-12-08 ENCOUNTER — HOSPITAL ENCOUNTER (OUTPATIENT)
Dept: PHYSICAL THERAPY | Age: 66
Setting detail: THERAPIES SERIES
Discharge: HOME | End: 2021-12-08
Payer: MEDICARE

## 2021-12-08 PROCEDURE — 97110 THERAPEUTIC EXERCISES: CPT | Performed by: PHYSICAL THERAPIST

## 2021-12-08 PROCEDURE — 97140 MANUAL THERAPY 1/> REGIONS: CPT | Performed by: PHYSICAL THERAPIST

## 2021-12-08 NOTE — FLOWSHEET NOTE
[facility], [address] [place]  Phone: [phone]  Fax [phone]    Physical Therapy Treatment Note/ Progress Report:           Date:  2021    Patient Name:  Remington Obando    :  1955  MRN: [id number]  Restrictions/Precautions:    Medical/Treatment Diagnosis Information:  · Diagnosis: M54.50 Lumbar pain with radiculopathy down R leg. · Treatment Diagnosis: M54.50 Lumbar pain with radiculopathy down R leg. Insurance/Certification information:  PT Insurance Information: Medicare  Physician Information:  Referring Practitioner: Madelyn Martino DO/Dr Jeanine Fernandez  Has the plan of care been signed (Y/N):        []  Yes  [x]  No     Date of Patient follow up with Physician: No future visit scheduled. Is this a Progress Report:     []  Yes  [x]  No         If Yes:  Date Range for reporting period:  Beginning 21   Ending 21    Progress report will be due (10 Rx or 30 days whichever is less):        Recertification will be due (POC Duration  / 90 days whichever is less): 21         Visit # Insurance Allowable Auth Required   In-person 6 [facility] []  Yes []  No    Telehealth   []  Yes []  No    Total            Functional Scale: Modified Oswestry 18% disability   Date assessed:  21      Therapy Diagnosis/Practice Pattern: F      Number of Comorbidities:  []0     [x]1-2    []3+    Latex Allergy:  [x]NO      []YES  Preferred Language for Healthcare:   [x]English       []other:      Pain level: eval 9/10  Current pain \"dull pain\"/10 mostly but was back to 10/10 this weekend    SUBJECTIVE:  Pt less discomfort following last session, but still thinks the reformer is contributing to some of her neck symptoms. She continues to note pain in B knees with daily activities as well. She has not heard back regarding scheduling KAMILLE.     OBJECTIVE:    Observation:    Test measurements: kneeling chops, reverse chops at CC Cues to follow hands with eyes, more rotation   CC 1/2 lunge bent over single arm row opp hand support on bar             Therapeutic Activity:  Squat body mechanics, cue for TrA activ to brace before lifting  Not twisting/pivoting at trunk/hips to lift/place objects during work/babies during  but actually lift and then turn feet so body is square              Therapeutic Exercise and NMR EXR  [x] (59240) Provided verbal/tactile cueing for activities related to strengthening, flexibility, endurance, ROM  for improvements in proximal hip and core control with self care, mobility, lifting and ambulation. [x] (54325) Provided verbal/tactile cueing for activities related to improving balance, coordination, kinesthetic sense, posture, motor skill, proprioception  to assist with core control in self care, mobility, lifting, and ambulation.      Therapeutic Activities:    [] (66245 or 98224) Provided verbal/tactile cueing for activities related to improving balance, coordination, kinesthetic sense, posture, motor skill, proprioception and motor activation to allow for proper function  with self care and ADLs  [] (47794) Provided training and instruction to the patient for proper core and proximal hip recruitment and positioning with ambulation re-education     Home Exercise Program:    [x] (73379) Reviewed/Progressed HEP activities related to strengthening, flexibility, endurance, ROM of core, proximal hip and LE for functional self-care, mobility, lifting and ambulation   [] (66528) Reviewed/Progressed HEP activities related to improving balance, coordination, kinesthetic sense, posture, motor skill, proprioception of core, proximal hip and LE for self care, mobility, lifting, and ambulation      Manual Treatments:  PROM / STM / Oscillations-Mobs:  G-I, II, III, IV (PA's, Inf., Post.)  [x] (98583) Provided manual therapy to mobilize proximal hip and LS spine soft tissue/joints for the purpose of modulating pain, promoting relaxation,  increasing ROM, reducing/eliminating soft tissue swelling/inflammation/restriction, improving soft tissue extensibility and allowing for proper ROM for normal function with self care, mobility, lifting and ambulation. Modalities:   CP x 15 min to lumbar and bilat knees, HP to neck/shoulders    Charges  Timed Code Treatment Minutes: 45   Total Treatment Minutes: 60, CP     Medicare total (add KX at $2000)  1024.78  12/8/21   85.34    [] EVAL (LOW) 98381   [] EVAL (MOD) 67849   [] EVAL (HIGH) 93137   [] RE-EVAL      [x] ZW(97524) x2   29.21  [] IONTO  [] NMR (47439) x      [] VASO  [x] Manual (46712) x1    26.92  [] Other:  [] TA x1    37.46    [] Mech Traction (03020)  [] ES(attended) (84559)      [] ES (un) (91591):     Goals:   Patient stated goal: Pt would like to get back to golfing and taking care of grandchildren without pain or restriction. [] Progressing: [] Met: [] Not Met: [] Adjusted    Therapist goals for Patient:   Short Term Goals: To be achieved in: 2 weeks  1. Independent in HEP and progression per patient tolerance, in order to prevent re-injury. [] Progressing: [x] Met: [] Not Met: [] Adjusted  2. Patient will have a decrease in pain to facilitate improvement in movement, function, and ADLs as indicated by Functional Deficits. [] Progressing: [x] Met: [] Not Met: [] Adjusted    Long Term Goals: To be achieved in: 6 weeks  1. Disability index score of 8% or less for the Modified Oswestry to assist with reaching prior level of function. [] Progressing: [] Met: [] Not Met: [] Adjusted  2. Patient will demonstrate increased AROM with lumbar ext to 20 deg without pain and lumbar side bending to symmetrically reaching knee joint line in order to be able to perform bed mobility and transfers without an increase in pain. [x] Progressing: [] Met: [] Not Met: [] Adjusted  3.  Patient will demonstrate an increase in Strength with hip flex, ext and abd to 5/5 in order to be able to ascend/descend stairs and get into and out of a car without pain or restriction. [x] Progressing: [] Met: [] Not Met: [] Adjusted  4. Patient will be able to perform 10 squats without pain in order to be able to pick something up off of the ground without pain or restriction. [x] Progressing: [] Met: [] Not Met: [] Adjusted    Progression Towards Functional goals:  [x] Patient is progressing as expected towards functional goals listed. [] Progression is slowed due to complexities listed. [] Progression has been slowed due to co-morbidities. [] Plan just implemented, too soon to assess goals progression  [] Other:     Overall Progression Towards Functional goals/ Treatment Progress Update:  [x] Patient is progressing as expected towards functional goals listed. [] Progression is slowed due to complexities/Impairments listed. [] Progression has been slowed due to co-morbidities. [] Plan just implemented, too soon to assess goals progression <30days   [] Goals require adjustment due to lack of progress  [] Patient is not progressing as expected and requires additional follow up with physician  [] Other    Prognosis for POC: [x] Good [] Fair  [] Poor      Patient requires continued skilled intervention: [x] Yes  [] No    Treatment/Activity Tolerance:  [x] Patient able to complete treatment  [] Patient limited by fatigue  [] Patient limited by pain    [] Patient limited by other medical complications  [] Other:     ASSESSMENT: Held reformer, but able to continue seated SB TE with improved control and without reported pain. SL lumbar gapping tolerated well again. Trial of thoracic ext over towel roll caused some mild numbness in L UE while lying, but resolved once position changed.       Return to Play: (if applicable)   []  Stage 1: Intro to Strength   []  Stage 2: Return to Run and Strength   []  Stage 3: Return to Jump and Strength   []  Stage 4: Dynamic Strength and Agility   []  Stage 5: Sport Specific Training     []  Ready to Return to Play, [employer]   []  Not Ready for Return to Sports   Comments:                         PLAN:   [x] Continue per plan of care [] Alter current plan (see comments above)  [] Plan of care initiated [] Hold pending MD visit [] Discharge    Electronically signed by:  Rosalba Reis PT,     Note: If patient does not return for scheduled/ recommended follow up visits, this note will serve as a discharge from care along with most recent update on progress. Access Code: [email or web address]  URL: [email or web address]  Date: 11/01/2021  Prepared by: Rosalba Reis    Exercises  Supine Transversus Abdominis Bracing - Hands on Stomach - 1 x daily - 7 x weekly - 2 sets - 10 reps - 5 sec hold  Supine Figure 4 Piriformis Stretch - 1 x daily - 7 x weekly - 3 sets - 30 sec hold  Supine Single Knee to Chest Stretch - 1 x daily - 7 x weekly - 2 sets - 10 reps - 20 sec hold  Supine Double Knee to Chest - 1 x daily - 7 x weekly - 2 sets - 10 reps  Supine Lower Trunk Rotation with Swiss Ball - 1 x daily - 7 x weekly - 2 sets - 10 reps  Supine March - 1 x daily - 7 x weekly - 2 sets - 10 reps

## 2021-12-13 ENCOUNTER — HOSPITAL ENCOUNTER (OUTPATIENT)
Dept: PHYSICAL THERAPY | Age: 66
Setting detail: THERAPIES SERIES
Discharge: HOME | End: 2021-12-13
Payer: MEDICARE

## 2021-12-13 NOTE — FLOWSHEET NOTE
[facility],  [address]        Physical Therapy  Cancellation/No-show Note  Patient Name:  Griffin Cruz  :  1955   Date:  2021  Cancelled visits to date: 1  No-shows to date: 0    For today's appointment patient:  [x]  Cancelled  []  Rescheduled appointment  []  No-show     Reason given by patient:  [x]  Patient ill  []  Conflicting appointment  []  No transportation    []  Conflict with work  []  No reason given  []  Other:     Comments:      Electronically signed by:  Cali Oneal, PT

## 2021-12-15 ENCOUNTER — APPOINTMENT (OUTPATIENT)
Dept: PHYSICAL THERAPY | Age: 66
End: 2021-12-15
Payer: MEDICARE

## 2021-12-20 ENCOUNTER — HOSPITAL ENCOUNTER (OUTPATIENT)
Dept: PHYSICAL THERAPY | Age: 66
Setting detail: THERAPIES SERIES
Discharge: HOME | End: 2021-12-20
Payer: MEDICARE

## 2021-12-20 PROCEDURE — 97110 THERAPEUTIC EXERCISES: CPT | Performed by: PHYSICAL THERAPIST

## 2021-12-20 PROCEDURE — 97140 MANUAL THERAPY 1/> REGIONS: CPT | Performed by: PHYSICAL THERAPIST

## 2021-12-22 ENCOUNTER — HOSPITAL ENCOUNTER (OUTPATIENT)
Dept: PHYSICAL THERAPY | Age: 66
Setting detail: THERAPIES SERIES
Discharge: HOME | End: 2021-12-22
Payer: MEDICARE

## 2021-12-22 PROCEDURE — 97110 THERAPEUTIC EXERCISES: CPT | Performed by: PHYSICAL THERAPIST

## 2021-12-22 PROCEDURE — 97140 MANUAL THERAPY 1/> REGIONS: CPT | Performed by: PHYSICAL THERAPIST

## 2021-12-22 NOTE — FLOWSHEET NOTE
[facility], [address]  Phone: [phone]  Fax [phone]    Physical Therapy Treatment Note/ Progress Report:           Date:  2021    Patient Name:  Aman Lizarraga    :  1955  MRN: [id number]  Restrictions/Precautions:    Medical/Treatment Diagnosis Information:  · Diagnosis: M54.50 Lumbar pain with radiculopathy down R leg. · Treatment Diagnosis: M54.50 Lumbar pain with radiculopathy down R leg. Insurance/Certification information:  PT Insurance Information: Medicare  Physician Information:  Referring Practitioner: Venus Castillo DO/Dr Rosina Goncalves  Has the plan of care been signed (Y/N):        []  Yes  [x]  No     Date of Patient follow up with Physician: No future visit scheduled. Is this a Progress Report:     []  Yes  [x]  No         If Yes:  Date Range for reporting period:  Beginning 21   Ending 21    Progress report will be due (10 Rx or 30 days whichever is less):        Recertification will be due (POC Duration  / 90 days whichever is less): 22        Visit # Insurance Allowable Auth Required   In-person 15 [facility] []  Yes []  No    Telehealth   []  Yes []  No    Total            Functional Scale: Modified Oswestry 18% disability   Date assessed:  21    ModODI 8%         21    Therapy Diagnosis/Practice Pattern: F      Number of Comorbidities:  []0     [x]1-2    []3+    Latex Allergy:  [x]NO      []YES  Preferred Language for Healthcare:   [x]English       []other:      Pain level: eval 9/10  Current pain 2-3/10 average, but may still spike to 10/10     SUBJECTIVE:  Pt reports her LB is doing ok despite the increase to her work schedule. She is hurting more in her neck and shoulders today. She finally got hermelindo of the MD office for her KAMILLE and it is set for 22.     OBJECTIVE:    Observation:    Test measurements:      RESTRICTIONS/PRECAUTIONS: Osteoarthritis  Working at [employer]    Exercises/Interventions:     Therapeutic Ex (71463) Sets/sec Notes/CUES   Hook-lying TA contraction  HEP, tactile cues to initiate and maintain contraction, minimize ppt   Piriformis S HEP, cues to decrease ROM for pain   Supine SKTC  Supine SKTC w/ ITB bias  Supine DKTC w/ SB w/ TA HEP    HEP, tactile cues for TA contraction   Supine Lower Trunk Rotation on SB  HEP   Supine marches w/ TA w/ pressure cuff  HEP   Supine thoracic ext over towel roll horizontal @T4 Slight L UE numbness reported   Hooklying Clams  SL clams, rev clams  SL hip abd w/ TA x20 ea R/L  x20 R/L    Stability/hip position cues   Hip Abd Iso w/ Bridge  Cue R post hip activation   Prone press ups w/ sacral PA mob     Mini Squats 1 hand support, form cues to avoid ant shift   Standing Hip Abd/Ext Cues to avoid leaning   Heel Raises Knee extension cues   Heel Taps  Cues to maintain neutral pelvis   Resisted Walking  Cues for smaller steps when leading with L LE coming in   6'' forward step ups    4'' lateral step ups    TB rows, ext GTB on SB 2 x 10, R/L    TruStretching:  HS S  Hip Flexor S  Trunk S       Pilates rerformer:              [place] seated on SB:  Push through bar lumbar flex S  B shoulder ext, row   Seated SB bicep curls  Alt LE march, LAQ   Pelvic tilts AP, lateral   x20  x20 ea  x20  x20 ea R/L     x20 ea   2R top hole (side)  Yshort 2nd hole up  Yshort, bottom hole  No UE support   Pt ed:  HEP, exercise/golfing with pain,           Pt ed: HEP, body mechanics, managing symptoms for neck/back/knee, POC     Manual Intervention (54999) 12'    PA mobs on R sacral base/distraction     STM to R piriformis, glutes, QL, paraspinals 8'     Quad S     L SL lumbar gapping, pelvic distraction x4'    STM B UT, gr ll-lll PA's T2-C7     reassessment          NMR re-education (35528) CUES NEEDED   LAQ on SB  Marches on SB SBA, UE support for both   [employer] w/ GTB  [employer] w/ step outs Cues to avoid trunk rotation   [employer] w/ lunge    Half kneeling chops, reverse chops at CC Cues to follow hands with eyes, more rotation   CC 1/2 lunge bent over single arm row opp hand support on bar             Therapeutic Activity:  Squat body mechanics, cue for TrA activ to brace before lifting  Not twisting/pivoting at trunk/hips to lift/place objects during work/babies during  but actually lift and then turn feet so body is square              Therapeutic Exercise and NMR EXR  [x] (91991) Provided verbal/tactile cueing for activities related to strengthening, flexibility, endurance, ROM  for improvements in proximal hip and core control with self care, mobility, lifting and ambulation. [x] (33555) Provided verbal/tactile cueing for activities related to improving balance, coordination, kinesthetic sense, posture, motor skill, proprioception  to assist with core control in self care, mobility, lifting, and ambulation.      Therapeutic Activities:    [] (83864 or 59963) Provided verbal/tactile cueing for activities related to improving balance, coordination, kinesthetic sense, posture, motor skill, proprioception and motor activation to allow for proper function  with self care and ADLs  [] (35464) Provided training and instruction to the patient for proper core and proximal hip recruitment and positioning with ambulation re-education     Home Exercise Program:    [x] (03007) Reviewed/Progressed HEP activities related to strengthening, flexibility, endurance, ROM of core, proximal hip and LE for functional self-care, mobility, lifting and ambulation   [] (52226) Reviewed/Progressed HEP activities related to improving balance, coordination, kinesthetic sense, posture, motor skill, proprioception of core, proximal hip and LE for self care, mobility, lifting, and ambulation      Manual Treatments:  PROM / STM / Oscillations-Mobs:  G-I, II, III, IV (PA's, Inf., Post.)  [x] (91614) Provided manual therapy to mobilize proximal hip and LS spine soft tissue/joints for the purpose of modulating pain, promoting relaxation,  increasing ROM, reducing/eliminating soft tissue swelling/inflammation/restriction, improving soft tissue extensibility and allowing for proper ROM for normal function with self care, mobility, lifting and ambulation. Modalities:   CP x 15 min to lumbar and bilat knees, HP to neck/shoulders    Charges  Timed Code Treatment Minutes: 40   Total Treatment Minutes: 60, CP     Medicare total (add KX at $2000)  1195.46  12/22/21   85.34    [] EVAL (LOW) 06117   [] EVAL (MOD) 98507   [] EVAL (HIGH) 08856   [] RE-EVAL      [x] DT(66734) x2   29.21  [] IONTO  [] NMR (76477) x      [] VASO  [x] Manual (84938) x1    26.92  [] Other:  [] TA x1    37.46    [] Mech Traction (06661)  [] ES(attended) (96858)      [] ES (un) (31648):     Goals:   Patient stated goal: Pt would like to get back to golfing and taking care of grandchildren without pain or restriction. [] Progressing: [] Met: [] Not Met: [] Adjusted    Therapist goals for Patient:   Short Term Goals: To be achieved in: 2 weeks  1. Independent in HEP and progression per patient tolerance, in order to prevent re-injury. [] Progressing: [x] Met: [] Not Met: [] Adjusted  2. Patient will have a decrease in pain to facilitate improvement in movement, function, and ADLs as indicated by Functional Deficits. [] Progressing: [x] Met: [] Not Met: [] Adjusted    Long Term Goals: To be achieved in: 6 weeks  1. Disability index score of 8% or less for the Modified Oswestry to assist with reaching prior level of function. [] Progressing: [x] Met: [] Not Met: [] Adjusted  2. Patient will demonstrate increased AROM with lumbar ext to 20 deg without pain and lumbar side bending to symmetrically reaching knee joint line in order to be able to perform bed mobility and transfers without an increase in pain.    [] Progressing: [x] Met: [] Not Met: [] Adjusted  3. Patient will demonstrate an increase in Strength with hip flex, ext and abd to 5/5 in order to be able to ascend/descend stairs and get into and out of a car without pain or restriction. [x] Progressing: [] Met: [] Not Met: [] Adjusted  4. Patient will be able to perform 10 squats without pain in order to be able to pick something up off of the ground without pain or restriction. [x] Progressing: [] Met: [] Not Met: [] Adjusted    Progression Towards Functional goals:  [x] Patient is progressing as expected towards functional goals listed. [] Progression is slowed due to complexities listed. [] Progression has been slowed due to co-morbidities. [] Plan just implemented, too soon to assess goals progression  [] Other:     Overall Progression Towards Functional goals/ Treatment Progress Update:  [x] Patient is progressing as expected towards functional goals listed. [] Progression is slowed due to complexities/Impairments listed. [] Progression has been slowed due to co-morbidities. [] Plan just implemented, too soon to assess goals progression <30days   [] Goals require adjustment due to lack of progress  [] Patient is not progressing as expected and requires additional follow up with physician  [] Other    Prognosis for POC: [x] Good [] Fair  [] Poor      Patient requires continued skilled intervention: [x] Yes  [] No    Treatment/Activity Tolerance:  [x] Patient able to complete treatment  [] Patient limited by fatigue  [] Patient limited by pain    [] Patient limited by other medical complications  [] Other:     ASSESSMENT: no radic sx reported with TE this visit. Progressed SL hip and core strengthening with cues as noted for form and stability. Better stability with seated SB activities.      Return to Play: (if applicable)   []  Stage 1: Intro to Strength   []  Stage 2: Return to Run and Strength   []  Stage 3: Return to Jump and Strength   []  Stage 4: Dynamic Strength and Agility   []  Stage 5: Sport Specific Training     []  Ready to Return to Play, [employer]   []  Not Ready for Return to Sports   Comments:                         PLAN:   [x] Continue per plan of care [] Alter current plan (see comments above)  [] Plan of care initiated [] Hold pending MD visit [] Discharge    Electronically signed by:  Cali Oneal PT,     Note: If patient does not return for scheduled/ recommended follow up visits, this note will serve as a discharge from care along with most recent update on progress. Access Code: [email or web address]  URL: [email or web address]  Date: 11/01/2021  Prepared by: Cali Oneal    Exercises  Supine Transversus Abdominis Bracing - Hands on Stomach - 1 x daily - 7 x weekly - 2 sets - 10 reps - 5 sec hold  Supine Figure 4 Piriformis Stretch - 1 x daily - 7 x weekly - 3 sets - 30 sec hold  Supine Single Knee to Chest Stretch - 1 x daily - 7 x weekly - 2 sets - 10 reps - 20 sec hold  Supine Double Knee to Chest - 1 x daily - 7 x weekly - 2 sets - 10 reps  Supine Lower Trunk Rotation with Swiss Ball - 1 x daily - 7 x weekly - 2 sets - 10 reps  Supine March - 1 x daily - 7 x weekly - 2 sets - 10 reps

## 2021-12-27 ENCOUNTER — HOSPITAL ENCOUNTER (OUTPATIENT)
Dept: PHYSICAL THERAPY | Age: 66
Setting detail: THERAPIES SERIES
Discharge: HOME | End: 2021-12-27
Payer: MEDICARE

## 2021-12-27 ENCOUNTER — APPOINTMENT (OUTPATIENT)
Dept: PHYSICAL THERAPY | Age: 66
End: 2021-12-27
Payer: MEDICARE

## 2021-12-27 PROCEDURE — 97140 MANUAL THERAPY 1/> REGIONS: CPT | Performed by: PHYSICAL THERAPIST

## 2021-12-27 PROCEDURE — 97110 THERAPEUTIC EXERCISES: CPT | Performed by: PHYSICAL THERAPIST

## 2021-12-27 NOTE — FLOWSHEET NOTE
[facility], [address]  Phone: [phone]  Fax [phone]    Physical Therapy Treatment Note/ Progress Report:           Date:  2021    Patient Name:  Dave Castellon    :  1955  MRN: [id number]  Restrictions/Precautions:    Medical/Treatment Diagnosis Information:  · Diagnosis: M54.50 Lumbar pain with radiculopathy down R leg. · Treatment Diagnosis: M54.50 Lumbar pain with radiculopathy down R leg. Insurance/Certification information:  PT Insurance Information: Medicare  Physician Information:  Referring Practitioner: Claudeen Aguas, DO/Dr Tivis Oppenheim  Has the plan of care been signed (Y/N):        []  Yes  [x]  No     Date of Patient follow up with Physician: 22 following KAMILLE from 22       Is this a Progress Report:     []  Yes  [x]  No         If Yes:  Date Range for reporting period:  Beginning 21   Ending 21    Progress report will be due (10 Rx or 30 days whichever is less):        Recertification will be due (POC Duration  / 90 days whichever is less): 22        Visit # Insurance Allowable Auth Required   In-person 15 [facility] []  Yes []  No    Telehealth   []  Yes []  No    Total            Functional Scale: Modified Oswestry 18% disability   Date assessed:  21    ModODI 8%         21    Therapy Diagnosis/Practice Pattern: F      Number of Comorbidities:  []0     [x]1-2    []3+    Latex Allergy:  [x]NO      []YES  Preferred Language for Healthcare:   [x]English       []other:      Pain level: eval 9/10  Current pain 2-3/10 average, but may still spike to 10/10     SUBJECTIVE:  Pt reports she sat in a car for 3 hours on xmas odilon and then went to [place] and tried to kneel. By the time she got home L>R post hip was really hurting! She used a lot of ice and is feeling somewhat better.     OBJECTIVE:    Observation:    Test measurements:  MMT hip ext 4 R/4 L, hip abd 4 R/4+ L AROM lumbar flexion finger tips to floor but w/ B radic Sx, lumbar ext full range w/o radic in legs but numbness in L UE due to head position    RESTRICTIONS/PRECAUTIONS: Osteoarthritis  Working at [employer]    Exercises/Interventions:     Therapeutic Ex (76753) Sets/sec Notes/CUES   Hook-lying TA contraction  HEP, tactile cues to initiate and maintain contraction, minimize ppt   Piriformis S HEP, cues to decrease ROM for pain   Supine SKTC  Supine SKTC w/ ITB bias  Supine DKTC w/ SB w/ TA HEP    HEP, tactile cues for TA contraction   Supine Lower Trunk Rotation on SB  HEP   Supine marches w/ TA w/ pressure cuff  HEP   Supine thoracic ext over towel roll horizontal @T4 Slight L UE numbness reported   Hooklying Clams  SL clams, rev clams  SL hip abd w/ TA x20 ea R/L      Stability/hip position cues   Hip Abd Iso w/ Bridge  Cue R post hip activation   Prone press ups w/ sacral PA mob     Mini Squats 1 hand support, form cues to avoid ant shift   Standing Hip Abd/Ext Cues to avoid leaning   Heel Raises Knee extension cues   Heel Taps  Cues to maintain neutral pelvis   Resisted Walking  Cues for smaller steps when leading with L LE coming in   6'' forward step ups    4'' lateral step ups    TB rows, ext GTB on SB     TruStretching:  HS S  Hip Flexor S  Trunk S   30'' x 2, R/L  30'' x 2, R/L  30''    Pilates rerformer:              Jackson seated on SB:  Push through bar lumbar flex S  B shoulder ext, row in 1/2 tandem  Seated SB bicep curls  Alt LE march, LAQ   Pelvic tilts AP, lateral   x20  x20 ea  x20  x20 ea R/L     x20 ea   2R top hole (side)  Yshort 2nd hole up  Yshort, bottom hole  No UE support   Pt ed:  HEP, exercise/golfing with pain,           Pt ed: HEP, body mechanics, managing symptoms for neck/back/knee, POC     Manual Intervention (59659) 12'    PA mobs on R sacral base/distraction     STM to R piriformis, glutes, QL, paraspinals 8'     Quad S     L SL lumbar gapping, pelvic distraction x4'    STM B UT, gr ll-lll PA's T2-C7     reassessment          NMR re-education (88932) CUES NEEDED   LAQ on SB  Marches on SB SBA, UE support for both   Palloff Press w/ GTB  Anti Rotation Press w/ step outs Yellow tower spr x10, R/LCues to avoid trunk rotation  2 LOB w/ indep recov to R   Cone Pickups w/ lunge    Half kneeling chops, reverse chops at CC Cues to follow hands with eyes, more rotation   CC 1/2 lunge bent over single arm row opp hand support on bar             Therapeutic Activity:  Squat body mechanics, cue for TrA activ to brace before lifting  Not twisting/pivoting at trunk/hips to lift/place objects during work/babies during  but actually lift and then turn feet so body is square              Therapeutic Exercise and NMR EXR  [x] (58693) Provided verbal/tactile cueing for activities related to strengthening, flexibility, endurance, ROM  for improvements in proximal hip and core control with self care, mobility, lifting and ambulation. [x] (29802) Provided verbal/tactile cueing for activities related to improving balance, coordination, kinesthetic sense, posture, motor skill, proprioception  to assist with core control in self care, mobility, lifting, and ambulation.      Therapeutic Activities:    [] (79386 or 29035) Provided verbal/tactile cueing for activities related to improving balance, coordination, kinesthetic sense, posture, motor skill, proprioception and motor activation to allow for proper function  with self care and ADLs  [] (71424) Provided training and instruction to the patient for proper core and proximal hip recruitment and positioning with ambulation re-education     Home Exercise Program:    [x] (29634) Reviewed/Progressed HEP activities related to strengthening, flexibility, endurance, ROM of core, proximal hip and LE for functional self-care, mobility, lifting and ambulation   [] (33767) Reviewed/Progressed HEP activities related to improving balance, demonstrate increased AROM with lumbar ext to 20 deg without pain and lumbar side bending to symmetrically reaching knee joint line in order to be able to perform bed mobility and transfers without an increase in pain. [] Progressing: [x] Met: [] Not Met: [] Adjusted  3. Patient will demonstrate an increase in Strength with hip flex, ext and abd to 5/5 in order to be able to ascend/descend stairs and get into and out of a car without pain or restriction. [x] Progressing: [] Met: [] Not Met: [] Adjusted  4. Patient will be able to perform 10 squats without pain in order to be able to pick something up off of the ground without pain or restriction. [] Progressing: [x] Met:able to squat, but with increased pain [] Not Met: [] Adjusted    Progression Towards Functional goals:  [x] Patient is progressing as expected towards functional goals listed. [] Progression is slowed due to complexities listed. [] Progression has been slowed due to co-morbidities. [] Plan just implemented, too soon to assess goals progression  [] Other:     Overall Progression Towards Functional goals/ Treatment Progress Update:  [x] Patient is progressing as expected towards functional goals listed. [] Progression is slowed due to complexities/Impairments listed. [] Progression has been slowed due to co-morbidities. [] Plan just implemented, too soon to assess goals progression <30days   [] Goals require adjustment due to lack of progress  [] Patient is not progressing as expected and requires additional follow up with physician  [] Other    Prognosis for POC: [x] Good [] Fair  [] Poor      Patient requires continued skilled intervention: [] Yes  [x] No    Treatment/Activity Tolerance:  [x] Patient able to complete treatment  [] Patient limited by fatigue  [] Patient limited by pain    [] Patient limited by other medical complications  [] Other:     ASSESSMENT: pt continues to show progress and HEP was advanced this visit.   She feels ready to trial D/C until after KAMILLE and then will contact PT if having issues at that time. Return to Play: (if applicable)   []  Stage 1: Intro to Strength   []  Stage 2: Return to Run and Strength   []  Stage 3: Return to Jump and Strength   []  Stage 4: Dynamic Strength and Agility   []  Stage 5: Sport Specific Training     []  Ready to Return to Play, Meets All Above Stages   []  Not Ready for Return to Sports   Comments:                         PLAN:   [] Continue per plan of care [] Alter current plan (see comments above)  [] Plan of care initiated [] Hold pending MD visit [x] Discharge --only prn following KAMILLE/PA appt    Electronically signed by:  Dariel Hardwick, PT,     Note: If patient does not return for scheduled/ recommended follow up visits, this note will serve as a discharge from care along with most recent update on progress. Access Code: [email or web address]  URL: [email or web address]  Date: 12/27/2021  Prepared by: Dariel Hardwick    Exercises  Supine Transversus Abdominis Bracing - Hands on Stomach - 1 x daily - 7 x weekly - 2 sets - 10 reps - 5 sec hold  Supine Figure 4 Piriformis Stretch - 1 x daily - 7 x weekly - 3 sets - 30 sec hold  Supine Single Knee to Chest Stretch - 1 x daily - 7 x weekly - 2 sets - 10 reps - 20 sec hold  Supine Double Knee to Chest - 1 x daily - 7 x weekly - 2 sets - 10 reps  Supine Lower Trunk Rotation with Swiss Ball - 1 x daily - 7 x weekly - 2 sets - 10 reps  Supine March - 1 x daily - 7 x weekly - 2 sets - 10 reps  Clamshell - 1 x daily - 7 x weekly - 3 sets - 10 reps  Sidelying Reverse Clamshell - 1 x daily - 7 x weekly - 3 sets - 10 reps  Sidelying Hip Abduction - 1 x daily - 7 x weekly - 3 sets - 10 reps  Seated Shoulder Row with Anchored Resistance - 1 x daily - 3-4 x weekly - 3 sets - 10 reps  Shoulder Extension with Resistance - 1 x daily - 3-4 x weekly - 3 sets - 10 reps  Anti-Rotation Sidestepping with Resistance - 1 x daily - 3 x weekly - 1-2 sets - 5-10 reps

## 2022-01-06 ENCOUNTER — HOSPITAL ENCOUNTER (OUTPATIENT)
Age: 67
Setting detail: OUTPATIENT SURGERY
Discharge: HOME | End: 2022-01-06
Attending: PAIN MEDICINE | Admitting: PAIN MEDICINE
Payer: MEDICARE

## 2022-01-06 VITALS
DIASTOLIC BLOOD PRESSURE: 95 MMHG | SYSTOLIC BLOOD PRESSURE: 142 MMHG | WEIGHT: 185 LBS | RESPIRATION RATE: 12 BRPM | OXYGEN SATURATION: 97 % | HEART RATE: 73 BPM | TEMPERATURE: 97.5 F | BODY MASS INDEX: 29.03 KG/M2 | HEIGHT: 67 IN

## 2022-01-06 PROCEDURE — 7100000010 HC PHASE II RECOVERY - FIRST 15 MIN: Performed by: PAIN MEDICINE

## 2022-01-06 PROCEDURE — 3600000002 HC SURGERY LEVEL 2 BASE: Performed by: PAIN MEDICINE

## 2022-01-06 PROCEDURE — 2500000003 HC RX 250 WO HCPCS: Performed by: PAIN MEDICINE

## 2022-01-06 PROCEDURE — 2709999900 HC NON-CHARGEABLE SUPPLY: Performed by: PAIN MEDICINE

## 2022-01-06 PROCEDURE — 6360000002 HC RX W HCPCS: Performed by: PAIN MEDICINE

## 2022-01-06 PROCEDURE — 6360000004 HC RX CONTRAST MEDICATION: Performed by: PAIN MEDICINE

## 2022-01-06 PROCEDURE — 62323 NJX INTERLAMINAR LMBR/SAC: CPT | Performed by: PAIN MEDICINE

## 2022-01-06 RX ORDER — LIDOCAINE HYDROCHLORIDE 10 MG/ML
INJECTION, SOLUTION EPIDURAL; INFILTRATION; INTRACAUDAL; PERINEURAL PRN
Status: DISCONTINUED | OUTPATIENT
Start: 2022-01-06 | End: 2022-01-06 | Stop reason: ALTCHOICE

## 2022-01-06 RX ORDER — BETAMETHASONE SODIUM PHOSPHATE AND BETAMETHASONE ACETATE 3; 3 MG/ML; MG/ML
INJECTION, SUSPENSION INTRA-ARTICULAR; INTRALESIONAL; INTRAMUSCULAR; SOFT TISSUE
Status: DISCONTINUED
Start: 2022-01-06 | End: 2022-01-06 | Stop reason: HOSPADM

## 2022-01-06 ASSESSMENT — PAIN - FUNCTIONAL ASSESSMENT
PAIN_FUNCTIONAL_ASSESSMENT: 0-10
PAIN_FUNCTIONAL_ASSESSMENT: PREVENTS OR INTERFERES SOME ACTIVE ACTIVITIES AND ADLS

## 2022-01-06 NOTE — PROCEDURES
Paty Briceño is a 73 y.o. female patient. No diagnosis found. Past Medical History:   Diagnosis Date    Arthritis     Cancer ([facility] [address]     skin-back    DVT (deep venous thrombosis) ([facility] [address]     11/2015 - on Eliquis    Hearing decreased     Hypoglycemia     Melanoma ([facility] [address]     Overactive bladder     Reflux     Wears glasses      Blood pressure 119/84, pulse 84, temperature 97.5 °F (36.4 °C), temperature source Temporal, resp. rate 16, height 5' 7\" (1.702 m), weight 185 lb (83.9 kg), SpO2 99 %. Procedures    La Tracy MD  1/6/2022  LUMBAR INTERLAMINAR EPIDURAL INJECTION AT   R L5S1        LEVEL. [address]59[address] with 08174  INDICATIONS:  Lumbar Radiculitis 724.4, M54.16    OPERATIVE PROCEDURE:  Consent was signed by the patient after the risks and benefits were explained. With the patient in the prone position, the back was prepped with Prevail and draped. Aseptic technique was used at every stage of the procedure. Skin infiltration was with 0.5 cc of 1% Lidocaine followed by placement of an _20__-gauge Touhy needle under fluoroscopic guidance in the epidural space which was maximised by fluoroscopic angulation. Aspiration was negative for CSF or blood . This was followed by injection of 2__cc of _LIDO  with _9 mg of CELESTONE  The needle was pulled out intact. The patient tolerated the procedure well and discharge instructions were given. Appropriate dye spread was seen in both AP and Oblique views. ESTIMATED BLOOD LOSS:  Less than 1 cc      Pulse Ox Monitoring was done. Omnipaque dye was / was not injected. Lateral view was / was not checked.

## 2022-01-21 ENCOUNTER — OFFICE VISIT (OUTPATIENT)
Dept: ORTHOPEDIC SURGERY | Age: 67
End: 2022-01-21
Payer: MEDICARE

## 2022-01-21 ENCOUNTER — TELEPHONE (OUTPATIENT)
Dept: WOMENS IMAGING | Age: 67
End: 2022-01-21

## 2022-01-21 VITALS — WEIGHT: 185 LBS | HEIGHT: 67 IN | BODY MASS INDEX: 29.03 KG/M2

## 2022-01-21 DIAGNOSIS — M54.50 LUMBAR PAIN: ICD-10-CM

## 2022-01-21 DIAGNOSIS — M51.36 DDD (DEGENERATIVE DISC DISEASE), LUMBAR: Primary | ICD-10-CM

## 2022-01-21 DIAGNOSIS — M54.16 LUMBAR RADICULITIS: ICD-10-CM

## 2022-01-21 PROCEDURE — 1090F PRES/ABSN URINE INCON ASSESS: CPT | Performed by: PHYSICIAN ASSISTANT

## 2022-01-21 PROCEDURE — G8417 CALC BMI ABV UP PARAM F/U: HCPCS | Performed by: PHYSICIAN ASSISTANT

## 2022-01-21 PROCEDURE — 99212 OFFICE O/P EST SF 10 MIN: CPT | Performed by: PHYSICIAN ASSISTANT

## 2022-01-21 PROCEDURE — 3017F COLORECTAL CA SCREEN DOC REV: CPT | Performed by: PHYSICIAN ASSISTANT

## 2022-01-21 PROCEDURE — G8484 FLU IMMUNIZE NO ADMIN: HCPCS | Performed by: PHYSICIAN ASSISTANT

## 2022-01-21 PROCEDURE — 1036F TOBACCO NON-USER: CPT | Performed by: PHYSICIAN ASSISTANT

## 2022-01-21 PROCEDURE — 1123F ACP DISCUSS/DSCN MKR DOCD: CPT | Performed by: PHYSICIAN ASSISTANT

## 2022-01-21 PROCEDURE — 4040F PNEUMOC VAC/ADMIN/RCVD: CPT | Performed by: PHYSICIAN ASSISTANT

## 2022-01-21 PROCEDURE — G8400 PT W/DXA NO RESULTS DOC: HCPCS | Performed by: PHYSICIAN ASSISTANT

## 2022-01-21 PROCEDURE — G8427 DOCREV CUR MEDS BY ELIG CLIN: HCPCS | Performed by: PHYSICIAN ASSISTANT

## 2022-01-21 NOTE — PROGRESS NOTES
FOLLOW UP: SPINE    1/21/2022     CHIEF COMPLAINT:    Chief Complaint   Patient presents with    Follow-up     f/u after Epidural 1/6/2022       HISTORY OF PRESENT ILLNESS:              The patient is a 73 y.o. female history of melanoma, here to follow-up after right L5-S1 LESI from 1/6/2022 for low back and right leg pain. She reports a >1 year history of aching LBP radiating into the right buttock, posterior thigh/calf to the foot with numbness. Her symptoms are typically increased with sitting or at nighttime. She reports some relief with changing position. She currently reports 80% improvement of her symptoms with improved functionality. Other conservative care includes MDP, diclofenac, knee injections, physical therapy. She also recently had an evaluation with [facility] for her thoracic spine and underwent a T-KAMILLE over the summer with some improvement of her thoracic discomfort. She has a prior cervical fusion with Dr. Hernandez Camp. Denies any progressive extremity weakness. She denies any recent bowel or bladder dysfunction or saddle anesthesia. She denies any recent fevers chills or infections. Denies any side effects of the procedure.   Overall doing well  Current/Past Treatment:   · Physical Therapy: Yes  · Chiropractic:     · Injection:   · Thoracic KAMILLE 2021--[place], remote lumbar KAMILLE's  1-6-2022 LUMBAR INTERLAMINAR EPIDURAL INJECTION AT R L5-S1--80%  Medications:            NSAIDS: Diclofenac            Muscle relaxer:   Zanaflex, past            Steriods:   Oral steroids x2 this year            Neuropathic medications:              Opioids:            Other:   · Surgery/Consult: Remote cervical fusion, Dr. 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    Past Medical History: Medical history form was reviewed today & scanned into the media tab  Past Medical History:   Diagnosis Date    Arthritis     Cancer ([facility] [address]     skin-back    DVT (deep venous thrombosis) ([facility] [address]     11/2015 - on Eliquis    Hearing decreased     Hypoglycemia     Melanoma ([facility] [address]     Overactive bladder     Reflux     Wears glasses       Past Surgical History:     Past Surgical History:   Procedure Laterality Date    APPENDECTOMY      HYSTEROSCOPY  7/15    d and c    KNEE ARTHROSCOPY  12/14/11    left    NECK SURGERY      benign tumor    PAIN MANAGEMENT PROCEDURE Right 1/6/2022    RIGHT LUMBAR FIVE SACRAL ONE EPIDURAL STEROID INJECTION SITE CONFIRMED BY FLUOROSCOPY performed by Queen Emerald MD at [facility]       Current Medications:     Current Outpatient Medications:     diclofenac (VOLTAREN) 75 MG EC tablet, TAKE ONE TABLET BY MOUTH TWICE A DAY, Disp: 60 tablet, Rfl: 2    tolterodine (DETROL LA) 2 MG extended release capsule, , Disp: , Rfl:     atorvastatin (LIPITOR) 20 MG tablet, Take 1 tablet by mouth daily, Disp: 30 tablet, Rfl: 3  Allergies:  Vibramycin [doxycycline calcium]  Social History:    reports that she quit smoking about 17 years ago. She has never used smokeless tobacco. She reports current alcohol use. She reports that she does not use drugs. Family History:   Family History   Problem Relation Age of Onset    Heart Disease Mother         irregular heartbeat    Heart Disease Father     Heart Attack Paternal Grandfather     Diabetes Maternal Grandfather        REVIEW OF SYSTEMS: Full ROS reviewed & scanned into chart  CONSTITUTIONAL: Denies unexplained weight loss, fevers   SKIN: Denies active skin conditions         PHYSICAL EXAM:    Vitals: Height 5' 7\" (1.702 m), weight 185 lb (83.9 kg). Pain score 2/10    GENERAL EXAM:  · General Apparence: Patient is adequately groomed with no evidence of malnutrition. · Orientation: The patient is oriented to time, place and person.    · Mood & Affect:The patient's mood and affect are appropriate   · Lymphatic: The lymphatic examination bilaterally reveals all areas to be without enlargement or induration  · Sensation: Sensation is intact without deficit  LUMBAR/SACRAL EXAMINATION:  · Inspection: Local inspection shows no step-off or bruising. · Palpation:   No evidence of focal tenderness at the midline. · Range of Motion: Able to sit forward flex without pain  · Strength:   Strength testing is 5/5 in all muscle groups tested. · Special Tests:   Straight leg raise and crossed SLR negative. Leg length and pelvis level.  0 out of 5 Greg's signs. · Skin: There are no rashes, ulcerations or lesions. · Reflexes: Reflexes are symmetrically 1-2+ at the patellar and ankle tendons. Clonus absent bilaterally at the feet. · Gait & station: Slightly forward unassisted  · Additional Examinations:   · RIGHT LOWER EXTREMITY: Inspection/examination of the right lower extremity does not show any tenderness, deformity or injury. Range of motion is full. There is no gross instability. There are no rashes, ulcerations or lesions. Strength and tone are normal.  · LEFT LOWER EXTREMITY:  Inspection/examination of the left lower extremity does not show any tenderness, deformity or injury. Range of motion is full. There is no gross instability. There are no rashes, ulcerations or lesions. Strength and tone are normal.    Diagnostic Testing: Op note reviewed from 1/6/2022    Lumbar MRI scan report independently reviewed from November 2021 showing multilevel varying degrees of mild to moderate central and foraminal stenosis, multilevel facet arthropathy. L5 hemangioma    Per [place] clinic notes: Cervical spine: C7-T1: Subtle anterolisthesis of C7 on T1 with a shallow disc bulge contributes to mild spinal canal stenosis. Facet arthropathy encroaches upon the neural foramina resulting in moderate foraminal narrowing.         T1-T2: Grade 1 anterolisthesis of T1 on T2 with a disc bulge and facet arthropathy results in mild-moderate spinal canal stenosis. Facet arthropathy encroaches upon the neural foramina resulting in mild-moderate left and moderate foraminal narrowing.         Impression:  1) Chronic LBP, right lumbar radiculitis--improved   2) Multilevel mild-mod central & foraminal stenosis  3) Chronic thoracic pain-improved s/p KAMILLE-Nuno  4) T1-2 spondy w/mild-mod CS  5) Remote cervical fusion--Dr. Felicita Granados  6) Knee pain--underlying OA, Dr. Virginia Guidry  7) H/o melanoma      Plan:   1) Cont HEP, activity as tolerated  2) We discussed total of 3 ESIs/yr--she may call if wishing to proceed w/R L5-S1 LESI #3 if symptoms worsen (h/o T KAMILLE w/Nuno summer 2021)         Trell Davidson PA-C, MPAS  Board Certified by the [address]

## 2022-02-23 ENCOUNTER — HOSPITAL ENCOUNTER (OUTPATIENT)
Dept: WOMENS IMAGING | Age: 67
Discharge: HOME | End: 2022-02-23
Payer: MEDICARE

## 2022-02-23 DIAGNOSIS — Z12.31 ENCOUNTER FOR SCREENING MAMMOGRAM FOR BREAST CANCER: ICD-10-CM

## 2022-02-23 PROCEDURE — 77063 BREAST TOMOSYNTHESIS BI: CPT

## 2022-03-23 ENCOUNTER — OFFICE VISIT (OUTPATIENT)
Dept: ORTHOPEDIC SURGERY | Age: 67
End: 2022-03-23
Payer: MEDICARE

## 2022-03-23 DIAGNOSIS — M22.42 CHONDROMALACIA OF BOTH PATELLAE: ICD-10-CM

## 2022-03-23 DIAGNOSIS — M25.562 CHRONIC PAIN OF BOTH KNEES: ICD-10-CM

## 2022-03-23 DIAGNOSIS — G89.29 CHRONIC PAIN OF BOTH KNEES: ICD-10-CM

## 2022-03-23 DIAGNOSIS — M22.41 CHONDROMALACIA OF BOTH PATELLAE: ICD-10-CM

## 2022-03-23 DIAGNOSIS — M17.0 BILATERAL PRIMARY OSTEOARTHRITIS OF KNEE: Primary | ICD-10-CM

## 2022-03-23 DIAGNOSIS — M25.561 CHRONIC PAIN OF BOTH KNEES: ICD-10-CM

## 2022-03-23 PROBLEM — M79.671 RIGHT FOOT PAIN: Status: ACTIVE | Noted: 2022-03-23

## 2022-03-23 PROCEDURE — 20610 DRAIN/INJ JOINT/BURSA W/O US: CPT | Performed by: FAMILY MEDICINE

## 2022-03-23 RX ORDER — HYALURONATE SODIUM 10 MG/ML
40 SYRINGE (ML) INTRAARTICULAR ONCE
Status: COMPLETED | OUTPATIENT
Start: 2022-03-23 | End: 2022-03-23

## 2022-03-23 RX ORDER — DICLOFENAC SODIUM 75 MG/1
75 TABLET, DELAYED RELEASE ORAL 2 TIMES DAILY
Qty: 60 TABLET | Refills: 3 | Status: SHIPPED
Start: 2022-03-23 | End: 2022-10-24 | Stop reason: SDUPTHER

## 2022-03-23 RX ADMIN — Medication 40 MG: at 10:32

## 2022-03-23 NOTE — PROGRESS NOTES
Chief Complaint  Knee Pain (EUFLEXXA #1 Marthenia Sero)    Missy Funez is a 60 y.o. 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 Nikole Franklin is being seen by today for recurrent worsening of her right knee pain with underlying osteoarthritis. He is known to have grade IV chondromalacia to the weightbearing portion of her lateral compartment of her knee as well as partial pseudo-extrusion of the lateral meniscus with a chronic complex flap tear with synovitis. Patient status post completion of viscosupplementation bilateral knee 6/2/2021    History of Present Illness for Follow Up Patient:      [facility] is being seen in for follow up today to review MRI results for ongoing right knee pain. [facility] is 12 weeks out from aggravation of right knee after a twisting injury. The patient rates their current pain as a 5 out of 10 on the pain scale but can increase with activities such as going from a seated to standing position or having to go up and down stairs. Treatment to date includes: rest, ice, NSAID- Diclofeac, physical therapy, home exercises, knee bracing, cortisone injection on 4/1/2019 and her first attempt at visco supplementation completed on 7/17/2019 to treat this problem. Her MRI was obtained at [facility] on 11/15/2019 did show evidence of grade IV chondromalacia of the weightbearing and posterior nonweightbearing portion of the lateral compartment with full-thickness chondral loss and some stress edema but no evidence of insufficiency fracture. She did have a lateral meniscal pseudo-extrusion with complex flap tear to the anterior horn to posterior root with chronic notch synovitis and ACL inflammation likely related to her twisting injury about a month ago.   Denies locking catching or true instability symptoms and she would like to avoid surgery if at all possible. Prior to her twisting injury 3 months ago, she states her knee was doing very well after completing Visco supplementation in July 2019 bilaterally. She did get a benefit from her previous round of Visco supplementation has been considering this once again. His mom also been working on her home-based exercise program.     Cesilia Hdz was last seen in the office on 2/5/2020 was continued viscosupplementation to her knees bilaterally. She is done outstanding up until a few weeks ago when she began noticed increasing pain and discomfort to the anterior portion of her knees. There is no interim history of injury or no activity prior to coming symptomatic. Most of her pain is retropatellar in nature. She has done a great job with weight loss losing 45 pounds via the keto diet this year which has helped her knee substantially. She does continue with her diclofenac and has been working on her exercise program.  She does have more of an achy discomfort with overuse at 3 to 4-10 but at times does not have any pain at all and is sleeping comfortably. Denies locking catching or instability symptoms. She would be interested in repeating Visco supplementation which is helped her substantially in the past.  She would like to avoid surgery if at all possible. Cesilia Hdz was last seen in the office on 11/4/2021 completed viscosupplementation to her knees bilaterally with Euflexxa. She was doing very well but over the last few weeks since the end of April 2021, she has noticed increasing discomfort primarily the anterior portion of her knees. There is no history of injury or new activity prior to becoming symptomatic although she has been having quite a bit of difficulty with her neck and back and is seeing the physicians at [facility] for this who had previously done surgery on her. Is in the process of MRI work-ups and possible epidurals and is trying to do her home-based exercises.   The soreness in her back may have altered her gait which she believes is causing soreness into her knees. Pain symptoms range between a 2-4 out of 10 with stair climbing distance and walking and reports no rest or night pain of significance. She is try to do her home-based exercise program and is continue with her diclofenac. Estevan Spann was last seen in the office on 11/15/2021 for recurrence of her bilateral knee osteoarthritis and some mechanical back pain. She states her back pain symptoms are under reasonable control and is now under the care of Dr. Henri You. She states her knees become a little bit more sore and she is hoping to get these feeling better prior to the onset of golf season. Her pain symptoms are more achy in the range of 2-3 out of 10 and got an excellent response to viscosupplementation last summer 2021 with viscosupplementation would like to continue with this again. She does have some soreness and stiffness and tightness to the posterior aspect of her knee but has been inconsistent with her home-based exercises. Denies locking catching or true instability symptoms. She has continued with her diclofenac episodically but does need a refill. She would like to pursue viscosupplementation once again would like to avoid surgery if at all possible. Attest: I have reviewed and attest the documentation of the HPI documented by my . I will make any changes if necessary. Enc Date: 3/23/2022  Time: 12:27 PM  Provider: Akbar Cox MD        Social History     Tobacco Use    Smoking status: Former Smoker     Quit date: 2004     Years since quittin.2    Smokeless tobacco: Never Used   Substance Use Topics    Alcohol use: Yes     Comment: monthly    Drug use: No        Review of Systems  Pertinent items are noted in HPI  Review of systems reviewed from Patient History Form dated on 2020 and available in the patient's chart under the Media tab.        Vital Signs     There were no vitals taken for this visit. General Exam:   Constitutional: Patient is adequately groomed with no evidence of malnutrition  DTRs: Deep tendon reflexes are intact  Mental Status: The patient is oriented to time, place and person. The patient's mood and affect are appropriate. Lymphatic: The lymphatic examination bilaterally reveals all areas to be without enlargement or induration. Vascular: Examination reveals no swelling or calf tenderness. Peripheral pulses are palpable and 2+. Neurological: The patient has good coordination. There is no weakness or sensory deficit. Knee Examination  Inspection:  There is no high-grade deformity other she does have bilaterally patellofemoral crepitation and trace knee joint effusions.     Palpation:  She does have only mild tenderness over the medial and lateral patellofemoral facet bilaterally primarily on the right knee today.  This does seem to be more anterior in nature today. She does have less prominent tenderness clinically over the Lateral compartment of her right knee and medial compartment of her left knee with some mild crepitation.     Rang of Motion:  She is stiff in the terminal 5 of extension with flexion limited to about 110-120.  Hamstrings are mildly tight.  Fair quad tone.     Strength: 4 out of 5 with flexion as extension.     Special Tests:  She does have mild recurrent pain primarily reproduced with the patellar grind testing on the right today.  Negative apprehension testing.  She does have moderate pain with crepitation with lateral Jose's on the right knee and medial Jose's on the left.  I do not sense a high-grade click.  I do not sense high-grade instability of screening testing is fairly benign.     Skin: There are no rashes, ulcerations or lesions.  Distal neurovascular exam is intact.     Gait: Reasonable gait with less pain with positional change.     Reflex symmetrically preserved     Additional Comments:      Additional Examinations:  Right Lower Extremity: Examination of the right lower extremity does not show any tenderness, deformity or injury.  Range of motion is unremarkable.  There is no gross instability.  There are no rashes, ulcerations or lesions.  Strength and tone are normal.  Left Lower Extremity: Examination of the left lower extremity does not show any tenderness, deformity or injury.  Range of motion is unremarkable. Jyothi Cambric is no gross instability.  There are no rashes, ulcerations or lesions.  Strength and tone are normal.  Examination of the biateral hip reveals intact skin.  The patient demonstrates full painless range of motion with regards to flexion, abduction, internal and external rotation.  There is not tenderness about the greater trochanter. Jyothi Cambric is a negative straight leg raise against resistance.  Strength is 5/5 thorough out all planes.        Diagnostic Test Findings: bilateral knee AP and PA weight-bearing sunrise and lateral films were reviewed from 2019 and show fairly prominent nearly bone-on-bone changes to the lateral compartment of her right knee and medial compartment of her left knee with left greater than right knee patellofemoral arthropathy with spurring.     MRI right knee obtained at [facility] 11/15/2019 as listed above  Narrative   Site: [facility] #: [id number] #: [id number] Procedure: MR Right Knee w/o Contrast ; Reason for Exam: dx;MMT, OSTEOARTHRITIS, CHONDROMALACIA OF BOTH PATELLA   This document is confidential medical information.  Unauthorized disclosure or use of this information is prohibited by law. If you are not the intended recipient of this document, please advise us by calling immediately [phone].       [employer] [place] [address]           Patient Name: Aung Hernandez   Case ID: [id number]   Patient : 1955   Referring Physician: Tanya Pickard MD   Exam Date: 11/15/2019   Exam Description: MR Right Knee w/o Contrast            HISTORY:  Evaluate meniscal tear.       TECHNICAL FACTORS:  Long- and short-axis fat- and water-weighted images were performed.       COMPARISON:  None.       FINDINGS:  No acute fracture.  Full-thickness chondral loss of weightbearing and posterior    nonweightbearing lateral femorotibial compartment with stress osseous edema.  Pseudoextruded    and partially macerated lateral meniscus with complex flap tear from anterior horn to posterior    horn.  Medial meniscus intact.  No high-grade chondromalacia in the medial femorotibial    compartment.       Moderate ACL inflammation.  PCL intact.  Medial and lateral collateral ligament complexes    intact.       Mild lateral subluxation of patella.  No high-grade chondromalacia in the patellofemoral    articulation.  Quadriceps and patellar tendons intact.       Moderate-large amount of joint effusion.  Moderate gastrocnemius/semimembranous reflection cyst    with partial dehiscence.  No acute muscle strain.       CONCLUSION:   1. Grade 4 chondromalacia of weightbearing and posterior nonweightbearing lateral femorotibial    compartment with full-thickness chondral loss and stress osseous edema.  Partial    pseudoextrusion of the lateral meniscus with complex flap tear from anterior horn to posterior    horn. 2. Chronic notch synovitis with inflamed ACL.       Thank you for the opportunity to provide your interpretation.               Riccardo Stark M.D.       A: Guero 11/18/2019 8:43 AM   T: REINA 11/18/2019 8:11 AM         Assessment & Plan:    Encounter Diagnoses   Name Primary?  Bilateral primary osteoarthritis of knee Yes    Chondromalacia of both patellae     Chronic pain of both knees        Orders Placed This Encounter   Procedures    22762 - [place] DRAIN/INJECT LARGE JOINT/BURSA         Treatment Plan:  Treatment options were discussed with Jovani Mckeon.    She does have quite prominent arthritic changes to the right knee lateral compartment and left knee medial compartment does have patellofemoral arthropathy particularly on the left knee compared with right knee. Constance Astorga was doing reasonably well following completion of her Visco supplementation series completed on 6/2/2021 but more recently has become a little bit achy. She does not believe her current symptoms require steroid injection after discussion of pros and cons of Visco supplementation, she did receive her first injection of Euflexxa to her knees bilaterally. This was performed using a standard prefilled 2 cc syringe. She was given a refill on her diclofenac 75 mg 1 pill twice daily use of her knee brace and home-based exercises. Once again she is done an outstanding job with weight loss losing 45 pounds this year with use of the keto diet. Continued attempts at weight loss were encouraged. She will be seen back each next 2 weeks to finish up her Euflexxa injection series. She is fairly opposed to any type of surgical intervention and continues to deny mechanical symptoms. She will continue with her back pain management with Dr. Arva Hodgkins. She will be seen back each next 2 weeks but will contact us in the interim with questions or concerns.          This dictation was performed with a verbal recognition program (DRAGON) and it was checked for errors. It is possible that there are still dictated errors within this office note. If so, please bring any errors to my attention for an addendum. All efforts were made to ensure that this office note is accurate.

## 2022-03-30 ENCOUNTER — OFFICE VISIT (OUTPATIENT)
Dept: ORTHOPEDIC SURGERY | Age: 67
End: 2022-03-30
Payer: MEDICARE

## 2022-03-30 VITALS — WEIGHT: 185 LBS | HEIGHT: 67 IN | BODY MASS INDEX: 29.03 KG/M2

## 2022-03-30 DIAGNOSIS — M25.561 CHRONIC PAIN OF BOTH KNEES: ICD-10-CM

## 2022-03-30 DIAGNOSIS — G89.29 CHRONIC PAIN OF BOTH KNEES: ICD-10-CM

## 2022-03-30 DIAGNOSIS — M25.562 CHRONIC PAIN OF BOTH KNEES: ICD-10-CM

## 2022-03-30 DIAGNOSIS — M22.42 CHONDROMALACIA OF BOTH PATELLAE: ICD-10-CM

## 2022-03-30 DIAGNOSIS — M22.41 CHONDROMALACIA OF BOTH PATELLAE: ICD-10-CM

## 2022-03-30 DIAGNOSIS — M17.0 BILATERAL PRIMARY OSTEOARTHRITIS OF KNEE: Primary | ICD-10-CM

## 2022-03-30 PROCEDURE — 20610 DRAIN/INJ JOINT/BURSA W/O US: CPT | Performed by: FAMILY MEDICINE

## 2022-03-30 PROCEDURE — 99999 PR OFFICE/OUTPT VISIT,PROCEDURE ONLY: CPT | Performed by: FAMILY MEDICINE

## 2022-03-30 RX ORDER — HYALURONATE SODIUM 10 MG/ML
40 SYRINGE (ML) INTRAARTICULAR ONCE
Status: COMPLETED | OUTPATIENT
Start: 2022-03-30 | End: 2022-03-30

## 2022-03-30 RX ADMIN — Medication 40 MG: at 10:25

## 2022-03-30 NOTE — LETTER
[address] [place] [address] [facility] [address]  Phone: [phone]  Fax: [phone]    Deb Santiago MD         March 30, 2022     Patient: Jessica Merida   YOB: 1955   Date of Visit: 3/30/2022       To Whom It May Concern: It is my medical opinion that Khanh Clemons requires a disability parking placard for the following reasons:  She has limited walking ability due to an orthopedic condition. Duration of need: 3 years    If you have any questions or concerns, please don't hesitate to call.     Sincerely,        Deb Santiago MD

## 2022-03-30 NOTE — PROGRESS NOTES
CC:  FU Knee Osteoarthritis with Viscosupplementation       HPI: Manolo is a very pleasant 32-year-old white female who still works part-time at [employer] and [employer] for [employer] 3 days/week and is very nice patient of Dr. Eliceo Mueller who is being seen today to continue with her Euflexxa injections. She is doing very well at this time and is here for her second Euflexxa injection. Her overall pain does seem to be improving r but she does have some discomfort if she is carrying like him up and down stairs. Certainly this is tolerable at most only 3-4 out of 10. She is continue with her diclofenac 75 mg 1 pill twice daily. This is her fifth round of viscosupplementation that she is attempting and it has helped her substantially. Once again she has done an excellent job losing weight and has lost 50 pounds over the past year. PE: no substantial change in exam.    Assessment:  Knee Osteoarthritis with viscosupplementation      PROCEDURE NOTE:    PRE-PROCEDURE DIAGNOSIS: DJD knee    POST-PROCEDURE DIAGNOSIS: DJD knee    Injection #2 of Euflexxa bilaterally. PROCEDURE:  With the patient's permission, her bilaterally knee was prepped in standard sterile fashion with Betadine and Alcohol and the prefilled 2cc injection of Euflexxa was injected intra-articularly into the bilaterally knee via a superolateral approach without difficulty. The patient tolerated this well without difficulty. A band-aid was applied. POST-PROCEDURE INSTRUCTIONS GIVEN TO PATIENT: The patient was advised to ice the knee for 15-20 minutes to relieve any injection site related pain. FOLLOW-UP: as directed. We will continue with her diclofenac 75 mg 1 pill twice daily as well as her home-based exercise program and icing. Activity modification was discussed. Golfing guidelines were discussed once again. She may utilize her brace. She will be seen back next week to complete her Euflexxa series. This is her fifth attempt at [place]. We did give her a handicap placard. She will contact us in the interim with questions or concerns.

## 2022-04-06 ENCOUNTER — OFFICE VISIT (OUTPATIENT)
Dept: ORTHOPEDIC SURGERY | Age: 67
End: 2022-04-06
Payer: MEDICARE

## 2022-04-06 DIAGNOSIS — M25.561 CHRONIC PAIN OF BOTH KNEES: ICD-10-CM

## 2022-04-06 DIAGNOSIS — M17.0 BILATERAL PRIMARY OSTEOARTHRITIS OF KNEE: Primary | ICD-10-CM

## 2022-04-06 DIAGNOSIS — G89.29 CHRONIC PAIN OF BOTH KNEES: ICD-10-CM

## 2022-04-06 DIAGNOSIS — M22.42 CHONDROMALACIA OF BOTH PATELLAE: ICD-10-CM

## 2022-04-06 DIAGNOSIS — M22.41 CHONDROMALACIA OF BOTH PATELLAE: ICD-10-CM

## 2022-04-06 DIAGNOSIS — M25.562 CHRONIC PAIN OF BOTH KNEES: ICD-10-CM

## 2022-04-06 PROCEDURE — 20610 DRAIN/INJ JOINT/BURSA W/O US: CPT | Performed by: FAMILY MEDICINE

## 2022-04-06 PROCEDURE — 99999 PR OFFICE/OUTPT VISIT,PROCEDURE ONLY: CPT | Performed by: FAMILY MEDICINE

## 2022-04-06 RX ORDER — HYALURONATE SODIUM 10 MG/ML
40 SYRINGE (ML) INTRAARTICULAR ONCE
Status: COMPLETED | OUTPATIENT
Start: 2022-04-06 | End: 2022-04-06

## 2022-04-06 RX ADMIN — Medication 40 MG: at 10:21

## 2022-04-06 NOTE — PROGRESS NOTES
CC:  FU Knee Osteoarthritis with Viscosupplementation       HPI: Jeannine Schneider is a very pleasant 50-year-old white female who still works part-time at [employer] and [employer] for [employer] 3 days/week and is very nice patient of Dr. Sean Mesa who is being seen today to continue with her Euflexxa injections. She is doing very well at this time and is here for her third Euflexxa injection. Her overall pain does seem to be improving  but she did have an episode where she was getting off a barstool this past weekend in her knee buckled on her causing her to fall. She admits she has been very lax in performing her home-based exercises and recently focusing more on her back rehab exercises. Certainly this is tolerable at most only 3-4 out of 10 although she will have some more significant pain when she has buckling episodes or sits too long and then has to reposition. .  She is continue with her diclofenac 75 mg 1 pill twice daily. This is her fifth round of viscosupplementation that she is attempting and it has helped her substantially. Once again she has done an excellent job losing weight and has lost 50 pounds over the past year. PE: no substantial change in exam.    Assessment:  Knee Osteoarthritis with viscosupplementation      PROCEDURE NOTE:    PRE-PROCEDURE DIAGNOSIS: DJD knee    POST-PROCEDURE DIAGNOSIS: DJD knee    Injection #3 of Euflexxa bilaterally. PROCEDURE:  With the patient's permission, her bilaterally knee was prepped in standard sterile fashion with Betadine and Alcohol and the prefilled 2cc injection of Euflexxa was injected intra-articularly into the bilaterally knee via a superolateral approach without difficulty. The patient tolerated this well without difficulty. A band-aid was applied. POST-PROCEDURE INSTRUCTIONS GIVEN TO PATIENT: The patient was advised to ice the knee for 15-20 minutes to relieve any injection site related pain.     FOLLOW-UP: as directed. We will continue with her diclofenac 75 mg 1 pill twice daily as well as her home-based exercise program and icing. Activity modification was discussed. Golfing guidelines were discussed once again. She may utilize her brace. She is aware that she can have repeat viscosupplementation at 6-month intervals and can have interim steroid injections if needed. This is her 6 attempt at viscosupplementation she is hoping to put off knee arthroplasty as long she can. She was previously given a handicap placard. She will contact us in the interim with questions or concerns.

## 2022-04-11 ENCOUNTER — OFFICE VISIT (OUTPATIENT)
Dept: ORTHOPEDIC SURGERY | Age: 67
End: 2022-04-11
Payer: MEDICARE

## 2022-04-11 DIAGNOSIS — M22.42 CHONDROMALACIA OF BOTH PATELLAE: ICD-10-CM

## 2022-04-11 DIAGNOSIS — M22.41 CHONDROMALACIA OF BOTH PATELLAE: ICD-10-CM

## 2022-04-11 DIAGNOSIS — M25.561 CHRONIC PAIN OF BOTH KNEES: ICD-10-CM

## 2022-04-11 DIAGNOSIS — M25.562 CHRONIC PAIN OF BOTH KNEES: ICD-10-CM

## 2022-04-11 DIAGNOSIS — M17.0 BILATERAL PRIMARY OSTEOARTHRITIS OF KNEE: Primary | ICD-10-CM

## 2022-04-11 DIAGNOSIS — G89.29 CHRONIC PAIN OF BOTH KNEES: ICD-10-CM

## 2022-04-11 PROCEDURE — 20610 DRAIN/INJ JOINT/BURSA W/O US: CPT | Performed by: FAMILY MEDICINE

## 2022-04-11 PROCEDURE — L1812 KO ELASTIC W/JOINTS PRE OTS: HCPCS | Performed by: FAMILY MEDICINE

## 2022-04-11 RX ORDER — BETAMETHASONE SODIUM PHOSPHATE AND BETAMETHASONE ACETATE 3; 3 MG/ML; MG/ML
24 INJECTION, SUSPENSION INTRA-ARTICULAR; INTRALESIONAL; INTRAMUSCULAR; SOFT TISSUE ONCE
Status: COMPLETED | OUTPATIENT
Start: 2022-04-11 | End: 2022-04-11

## 2022-04-11 RX ORDER — LIDOCAINE HYDROCHLORIDE 10 MG/ML
2 INJECTION, SOLUTION INFILTRATION; PERINEURAL ONCE
Status: COMPLETED | OUTPATIENT
Start: 2022-04-11 | End: 2022-04-11

## 2022-04-11 RX ORDER — BUPIVACAINE HYDROCHLORIDE 2.5 MG/ML
4 INJECTION, SOLUTION INFILTRATION; PERINEURAL ONCE
Status: COMPLETED | OUTPATIENT
Start: 2022-04-11 | End: 2022-04-11

## 2022-04-11 RX ADMIN — LIDOCAINE HYDROCHLORIDE 2 ML: 10 INJECTION, SOLUTION INFILTRATION; PERINEURAL at 14:02

## 2022-04-11 RX ADMIN — BETAMETHASONE SODIUM PHOSPHATE AND BETAMETHASONE ACETATE 24 MG: 3; 3 INJECTION, SUSPENSION INTRA-ARTICULAR; INTRALESIONAL; INTRAMUSCULAR; SOFT TISSUE at 14:01

## 2022-04-11 RX ADMIN — BUPIVACAINE HYDROCHLORIDE 10 MG: 2.5 INJECTION, SOLUTION INFILTRATION; PERINEURAL at 14:01

## 2022-04-11 NOTE — PROGRESS NOTES
Chief Complaint  Knee Pain (CK BILATERAL KNEES/REQ CORTISONE)    Nicole Aroryo is a 60 y.o. 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 Tristan Aguilera Noemi is being seen by today for recurrent worsening of her right knee pain with underlying osteoarthritis. He is known to have grade IV chondromalacia to the weightbearing portion of her lateral compartment of her knee as well as partial pseudo-extrusion of the lateral meniscus with a chronic complex flap tear with synovitis. Patient status post completion of viscosupplementation bilateral knee 6/2/2021    History of Present Illness for Follow Up Patient:      Ashleigh Gomez is being seen in for follow up today to review MRI results for ongoing right knee pain. Ashleigh Gomez is 12 weeks out from aggravation of right knee after a twisting injury. The patient rates their current pain as a 5 out of 10 on the pain scale but can increase with activities such as going from a seated to standing position or having to go up and down stairs. Treatment to date includes: rest, ice, NSAID- Diclofeac, physical therapy, home exercises, knee bracing, cortisone injection on 4/1/2019 and her first attempt at visco supplementation completed on 7/17/2019 to treat this problem. Her MRI was obtained at [facility] on 11/15/2019 did show evidence of grade IV chondromalacia of the weightbearing and posterior nonweightbearing portion of the lateral compartment with full-thickness chondral loss and some stress edema but no evidence of insufficiency fracture. She did have a lateral meniscal pseudo-extrusion with complex flap tear to the anterior horn to posterior root with chronic notch synovitis and ACL inflammation likely related to her twisting injury about a month ago.   Denies locking catching or true instability symptoms and she would like to avoid surgery if at all possible. Prior to her twisting injury 3 months ago, she states her knee was doing very well after completing Visco supplementation in July 2019 bilaterally. She did get a benefit from her previous round of Visco supplementation has been considering this once again. His mom also been working on her home-based exercise program.     Marv Andrews was last seen in the office on 2/5/2020 was continued viscosupplementation to her knees bilaterally. She is done outstanding up until a few weeks ago when she began noticed increasing pain and discomfort to the anterior portion of her knees. There is no interim history of injury or no activity prior to coming symptomatic. Most of her pain is retropatellar in nature. She has done a great job with weight loss losing 45 pounds via the keto diet this year which has helped her knee substantially. She does continue with her diclofenac and has been working on her exercise program.  She does have more of an achy discomfort with overuse at 3 to 4-10 but at times does not have any pain at all and is sleeping comfortably. Denies locking catching or instability symptoms. She would be interested in repeating Visco supplementation which is helped her substantially in the past.  She would like to avoid surgery if at all possible. Marv Andrews was last seen in the office on 11/4/2021 completed viscosupplementation to her knees bilaterally with Euflexxa. She was doing very well but over the last few weeks since the end of April 2021, she has noticed increasing discomfort primarily the anterior portion of her knees. There is no history of injury or new activity prior to becoming symptomatic although she has been having quite a bit of difficulty with her neck and back and is seeing the physicians at [address] [place] [address] for this who had previously done surgery on her. Is in the process of MRI work-ups and possible epidurals and is trying to do her home-based exercises.   The soreness in her back may have altered her gait which she believes is causing soreness into her knees. Pain symptoms range between a 2-4 out of 10 with stair climbing distance and walking and reports no rest or night pain of significance. She is try to do her home-based exercise program and is continue with her diclofenac. Manolo was last seen in the office on 11/15/2021 for recurrence of her bilateral knee osteoarthritis and some mechanical back pain. She states her back pain symptoms are under reasonable control and is now under the care of Dr. Alek Benavides. She states her knees become a little bit more sore and she is hoping to get these feeling better prior to the onset of golf season. Her pain symptoms are more achy in the range of 2-3 out of 10 and got an excellent response to viscosupplementation last summer 2021 with viscosupplementation would like to continue with this again. She does have some soreness and stiffness and tightness to the posterior aspect of her knee but has been inconsistent with her home-based exercises. Denies locking catching or true instability symptoms. She has continued with her diclofenac episodically but does need a refill. She would like to pursue viscosupplementation once again would like to avoid surgery if at all possible. We last saw Manolo in the office on 4/6/2022 we completed viscosupplementation to her knees with Euflexxa. She has noticed a slight improvement overall in her knee pain symptoms but does continue pain with walking distances climbing stairs and with positional changes. She hopes to start golfing in the very near future. She does continue to have pain with weather changes and has continued with her diclofenac 75 mg 1 pill twice daily and does continue with her brace and is trying to perform her home-based exercises. She has not noticed a great deal of swelling more of an achiness anteriorly and medially. She is hoping to put off the surgery as long as she can. Attest: I have reviewed and attest the documentation of the HPI documented by my . I will make any changes if necessary. Enc Date: 2022  Time: 3:15 PM  Provider: Berenice Vargas MD        Social History     Tobacco Use    Smoking status: Former Smoker     Quit date: 2004     Years since quittin.3    Smokeless tobacco: Never Used   Substance Use Topics    Alcohol use: Yes     Comment: monthly    Drug use: No        Review of Systems  Pertinent items are noted in HPI  Review of systems reviewed from Patient History Form dated on 3/23/2022 and available in the patient's chart under the Media tab. Vital Signs     There were no vitals taken for this visit. General Exam:   Constitutional: Patient is adequately groomed with no evidence of malnutrition  DTRs: Deep tendon reflexes are intact  Mental Status: The patient is oriented to time, place and person. The patient's mood and affect are appropriate. Lymphatic: The lymphatic examination bilaterally reveals all areas to be without enlargement or induration. Vascular: Examination reveals no swelling or calf tenderness. Peripheral pulses are palpable and 2+. Neurological: The patient has good coordination. There is no weakness or sensory deficit.       Knee Examination  Inspection:  There is no high-grade deformity other she does have bilaterally patellofemoral crepitation and trace knee joint effusions.     Palpation:  She does have only mild tenderness over the medial and lateral patellofemoral facet bilaterally primarily on the right knee today.  This does seem to be more anterior in nature today. She does have less prominent tenderness clinically over the Lateral compartment of her right knee and medial compartment of her left knee with some mild crepitation.     Rang of Motion:  She is stiff in the terminal 5 of extension with flexion limited to about 110-120.  Hamstrings are mildly tight.  Fair quad tone.     Strength: 4 out of 5 with flexion as extension.     Special Tests:  She does have mild recurrent pain primarily reproduced with the patellar grind testing on the right today.  Negative apprehension testing.  She does have moderate pain with crepitation with lateral Jose's on the right knee and medial Jose's on the left.  I do not sense a high-grade click.  I do not sense high-grade instability of screening testing is fairly benign.     Skin: There are no rashes, ulcerations or lesions.  Distal neurovascular exam is intact.     Gait: Reasonable gait with less pain with positional change.     Reflex symmetrically preserved     Additional Comments:      Additional Examinations:  Right Lower Extremity: Examination of the right lower extremity does not show any tenderness, deformity or injury.  Range of motion is unremarkable.  There is no gross instability.  There are no rashes, ulcerations or lesions.  Strength and tone are normal.  Left Lower Extremity: Examination of the left lower extremity does not show any tenderness, deformity or injury.  Range of motion is unremarkable.  There is no gross instability.  There are no rashes, ulcerations or lesions.  Strength and tone are normal.  Examination of the biateral hip reveals intact skin.  The patient demonstrates full painless range of motion with regards to flexion, abduction, internal and external rotation.  There is not tenderness about the greater trochanter. Lucetta Record is a negative straight leg raise against resistance.  Strength is 5/5 thorough out all planes.        Diagnostic Test Findings: bilateral knee AP and PA weight-bearing sunrise and lateral films were reviewed from 4/1/2019 and show fairly prominent nearly bone-on-bone changes to the lateral compartment of her right knee and medial compartment of her left knee with left greater than right knee patellofemoral arthropathy with spurring.     MRI right knee [facility] 11/15/2019 as listed above  Narrative   Site: [place] [facility] #: [id number] #: [id number] Procedure: MR Right Knee w/o Contrast ; Reason for Exam: dx;MMT, OSTEOARTHRITIS, CHONDROMALACIA OF BOTH PATELLA   This document is confidential medical information.  Unauthorized disclosure or use of this information is prohibited by law. If you are not the intended recipient of this document, please advise us by calling immediately [phone].       [facility] Aguir[facility] [address]           Patient Name: Shaniqua Aggarwal   Case ID: [id number]   Patient : 1955   Referring Physician: Zo López MD   Exam Date: 11/15/2019   Exam Description: MR Right Knee w/o Contrast            HISTORY:  Evaluate meniscal tear.       TECHNICAL FACTORS:  Long- and short-axis fat- and water-weighted images were performed.       COMPARISON:  None.       FINDINGS:  No acute fracture.  Full-thickness chondral loss of weightbearing and posterior    nonweightbearing lateral femorotibial compartment with stress osseous edema.  Pseudoextruded    and partially macerated lateral meniscus with complex flap tear from anterior horn to posterior    horn.  Medial meniscus intact.  No high-grade chondromalacia in the medial femorotibial    compartment.       Moderate ACL inflammation.  PCL intact.  Medial and lateral collateral ligament complexes    intact.       Mild lateral subluxation of patella.  No high-grade chondromalacia in the patellofemoral    articulation.  Quadriceps and patellar tendons intact.       Moderate-large amount of joint effusion.  Moderate gastrocnemius/semimembranous reflection cyst    with partial dehiscence.  No acute muscle strain.       CONCLUSION:   1. Grade 4 chondromalacia of weightbearing and posterior nonweightbearing lateral femorotibial    compartment with full-thickness chondral loss and stress osseous edema.  Partial    pseudoextrusion of the lateral meniscus with complex flap tear from anterior horn to posterior    horn. 2. Chronic notch synovitis with inflamed ACL.       Thank you for the opportunity to provide your interpretation.               Riccardo Stark M.D.       A: Guero 11/18/2019 8:43 AM   T: REINA 11/18/2019 8:11 AM         Assessment & Plan:    Encounter Diagnoses   Name Primary?  Bilateral primary osteoarthritis of knee Yes    Chondromalacia of both patellae     Chronic pain of both knees        Orders Placed This Encounter   Procedures    75693 - [place] DRAIN/INJECT LARGE JOINT/BURSA    Breg Economy Hinged Knee WrapAround Brace     Patient was prescribed a Breg Economy Hinged Wrap Around Knee Brace. The right knee will require stabilization / immobilization from this semi-rigid / rigid orthosis to improve their function. The orthosis will assist in protecting the affected area, provide functional support and facilitate healing. The patient was educated and fit by a healthcare professional with expert knowledge and specialization in brace application while under the direct supervision of the treating physician. Verbal and written instructions for the use of and application of this item were provided. They were instructed to contact the office immediately should the brace result in increased pain, decreased sensation, increased swelling or worsening of the condition. Treatment Plan:  Treatment options were discussed with Riley Norman. She does have quite prominent arthritic changes to the right knee lateral compartment and left knee medial compartment does have patellofemoral arthropathy particularly on the left knee compared with right knee. Lydia Guadarrama is moderately improved since starting treatment once again in March 2022. With her ongoing pain and upcoming golf season, we did opt to inject her knees bilaterally today using 2 cc of Celestone, 2 cc of Marcaine, 1 cc Xylocaine. She will continue with her exercise program and use of her knee brace.   She will continue with her diclofenac 75 mg 1 pill twice daily. Once again she has done quite well and lost 45 pounds over the past year with a keto diet. She will continue with her back pain management with Dr. Gee Le. She recently completed viscosupplementation on 4/6/2022 and is aware that she would be eligible for this once again in early October 2022. Potential for interim steroid injections were discussed as she does anticipate going to [place] in August 2022 for vacation. She would be eligible for an interim steroid injection at that point depending on her symptoms and icing and activity modification was discussed. She will contact us in the interim with questions or concerns. This dictation was performed with a verbal recognition program (DRAGON) and it was checked for errors. It is possible that there are still dictated errors within this office note. If so, please bring any errors to my attention for an addendum. All efforts were made to ensure that this office note is accurate.

## 2022-04-29 NOTE — PLAN OF CARE
[facility], [address] [place]  Phone: [phone]  Fax [phone]    Physical Therapy Treatment Note/ Progress Report:           Date:  2021    Patient Name:  Briana Or    :  1955  MRN: [id number]  Restrictions/Precautions:    Medical/Treatment Diagnosis Information:  · Diagnosis: M54.50 Lumbar pain with radiculopathy down R leg. · Treatment Diagnosis: M54.50 Lumbar pain with radiculopathy down R leg. Insurance/Certification information:  PT Insurance Information: Medicare  Physician Information:  Referring Practitioner: Radha Kelly DO/Dr Daina Aase  Has the plan of care been signed (Y/N):        []  Yes  [x]  No     Date of Patient follow up with Physician: No future visit scheduled. Is this a Progress Report:     []  Yes  [x]  No         If Yes:  Date Range for reporting period:  Beginning 21   Ending 21    Progress report will be due (10 Rx or 30 days whichever is less): 32       Recertification will be due (POC Duration  / 90 days whichever is less): 22        Visit # Insurance Allowable Auth Required   In-person [address] []  Yes []  No    Telehealth   []  Yes []  No    Total            Functional Scale: Modified Oswestry 18% disability   Date assessed:  21    ModODI 8%         21    Therapy Diagnosis/Practice Pattern: F      Number of Comorbidities:  []0     [x]1-2    []3+    Latex Allergy:  [x]NO      []YES  Preferred Language for Healthcare:   [x]English       []other:      Pain level: eval 9/10  Current pain 2-3/10 average, but may still spike to 10/10     SUBJECTIVE:  Pt reports she had a GI virus last week and was very ill and not able to do much of anything. She notes she still has occasional episodes of severe pain that can radiate to her ankle, but overall it is more just uncomfortable and more manageable.   She has not heard back regarding scheduling s/p C3-C6 decompression of epidural hematoma and posterior fusion    -NSGY following  -dex 4 q6h + H2B  -MAP goal >80, maintaining on NS  - q1h neuro and VS checks  -PT/OT/SLP as appropriate  -c collar in place  -drains per NSGY  -post op XR pending  -hold AC/AP in acute post op setting  -cefazolin ppx   KAMILLE, but was unable to follow up on her own due to her illness. She states her work will be very busy through the end of the year. OBJECTIVE:    Observation:    Test measurements:  MMT hip ext 4 R/4 L, hip abd 4 R/4+ L AROM lumbar flexion finger tips to floor but w/ B radic Sx, lumbar ext full range w/o radic in legs but numbness in L UE due to head position    RESTRICTIONS/PRECAUTIONS: Osteoarthritis  Working at [employer]    Exercises/Interventions:     Therapeutic Ex (37140) Sets/sec Notes/CUES   Hook-lying TA contraction  HEP, tactile cues to initiate and maintain contraction, minimize ppt   Piriformis S HEP, cues to decrease ROM for pain   Supine SKTC  Supine SKTC w/ ITB bias  Supine DKTC w/ SB w/ TA HEP    HEP, tactile cues for TA contraction   Supine Lower Trunk Rotation on SB  HEP   Supine marches w/ TA w/ pressure cuff  HEP   Supine thoracic ext over towel roll horizontal @T4 Slight L UE numbness reported   Hooklying Clams  SL clams, rev clams x20 ea R/L   Hip Abd Iso w/ Bridge  Cue R post hip activation   Prone press ups w/ sacral PA mob     Mini Squats 1 hand support, form cues to avoid ant shift   Standing Hip Abd/Ext Cues to avoid leaning   Heel Raises Knee extension cues   Heel Taps  Cues to maintain neutral pelvis   Resisted Walking  Cues for smaller steps when leading with L LE coming in   6'' forward step ups    4'' lateral step ups    TB rows, ext GTB on SB 2 x 10, R/L    TruStretching:  HS S  Hip Flexor S  Trunk S       Pilates rerformer:              [place] B:  Push through bar lumbar flex S  B shoulder ext, row in 1/2 tandem  Seated SB bicep curls  Alt LE march, LAQ w/ holding handles  Pelvic tilts AP, lateral   x20  x20 ea  x20  x20 ea R/L    x20 ea   2R top hole (side)  Yshort 2nd hole up  [place], bottom hole  Yshort 2nd hole up   Pt ed:  HEP, exercise/golfing with pain,           Pt ed: HEP, body mechanics, managing symptoms for neck/back/knee, POC x6'    Manual Intervention (70307) 16'    PA mobs on R sacral base/distraction     STM to R piriformis, glutes, QL, paraspinals 8'     Quad S     L SL lumbar gapping, pelvic distraction x4'    STM B UT, gr ll-lll PA's T2-C7     reassessment x4'         NMR re-education (43292) CUES NEEDED   LAQ on SB  Marches on SB SBA, UE support for both   [employer] Press w/ GTB  Anti Rotation Press w/ step outs Cues to avoid trunk rotation   [employer] Pickups w/ lunge    Half kneeling chops, reverse chops at CC Cues to follow hands with eyes, more rotation   CC 1/2 lunge bent over single arm row opp hand support on bar             Therapeutic Activity:  Squat body mechanics, cue for TrA activ to brace before lifting  Not twisting/pivoting at trunk/hips to lift/place objects during work/babies during  but actually lift and then turn feet so body is square              Therapeutic Exercise and NMR EXR  [x] (64569) Provided verbal/tactile cueing for activities related to strengthening, flexibility, endurance, ROM  for improvements in proximal hip and core control with self care, mobility, lifting and ambulation. [x] (30316) Provided verbal/tactile cueing for activities related to improving balance, coordination, kinesthetic sense, posture, motor skill, proprioception  to assist with core control in self care, mobility, lifting, and ambulation.      Therapeutic Activities:    [] (03546 or 45768) Provided verbal/tactile cueing for activities related to improving balance, coordination, kinesthetic sense, posture, motor skill, proprioception and motor activation to allow for proper function  with self care and ADLs  [] (04842) Provided training and instruction to the patient for proper core and proximal hip recruitment and positioning with ambulation re-education     Home Exercise Program:    [x] (65665) Reviewed/Progressed HEP activities related to strengthening, flexibility, endurance, ROM of core, proximal hip and LE for functional self-care, mobility, lifting and ambulation   [] (26354) Reviewed/Progressed HEP activities related to improving balance, coordination, kinesthetic sense, posture, motor skill, proprioception of core, proximal hip and LE for self care, mobility, lifting, and ambulation      Manual Treatments:  PROM / STM / Oscillations-Mobs:  G-I, II, III, IV (PA's, Inf., Post.)  [x] (30138) Provided manual therapy to mobilize proximal hip and LS spine soft tissue/joints for the purpose of modulating pain, promoting relaxation,  increasing ROM, reducing/eliminating soft tissue swelling/inflammation/restriction, improving soft tissue extensibility and allowing for proper ROM for normal function with self care, mobility, lifting and ambulation. Modalities:   CP x 15 min to lumbar and , HP to neck/shoulders    Charges  Timed Code Treatment Minutes: 45   Total Treatment Minutes: 60, CP     Medicare total (add KX at $2000)  1110.12  12/20/21   85.34    [] EVAL (LOW) 95098   [] EVAL (MOD) 11107   [] EVAL (HIGH) 86068   [] RE-EVAL      [x] TE(66975) x2   29.21  [] IONTO  [] NMR (97827) x      [] VASO  [x] Manual (34796) x1    26.92  [] Other:  [] TA x1    37.46    [] Mech Traction (11950)  [] ES(attended) (55055)      [] ES (un) (57727):     Goals:   Patient stated goal: Pt would like to get back to golfing and taking care of grandchildren without pain or restriction. [] Progressing: [] Met: [] Not Met: [] Adjusted    Therapist goals for Patient:   Short Term Goals: To be achieved in: 2 weeks  1. Independent in HEP and progression per patient tolerance, in order to prevent re-injury. [] Progressing: [x] Met: [] Not Met: [] Adjusted  2. Patient will have a decrease in pain to facilitate improvement in movement, function, and ADLs as indicated by Functional Deficits. [] Progressing: [x] Met: [] Not Met: [] Adjusted    Long Term Goals: To be achieved in: 6 weeks  1.  Disability index score of 8% or less for the Modified Oswestry to assist with reaching prior level of function. [] Progressing: [x] Met: [] Not Met: [] Adjusted  2. Patient will demonstrate increased AROM with lumbar ext to 20 deg without pain and lumbar side bending to symmetrically reaching knee joint line in order to be able to perform bed mobility and transfers without an increase in pain. [] Progressing: [x] Met: [] Not Met: [] Adjusted  3. Patient will demonstrate an increase in Strength with hip flex, ext and abd to 5/5 in order to be able to ascend/descend stairs and get into and out of a car without pain or restriction. [x] Progressing: [] Met: [] Not Met: [] Adjusted  4. Patient will be able to perform 10 squats without pain in order to be able to pick something up off of the ground without pain or restriction. [x] Progressing: [] Met: [] Not Met: [] Adjusted    Progression Towards Functional goals:  [x] Patient is progressing as expected towards functional goals listed. [] Progression is slowed due to complexities listed. [] Progression has been slowed due to co-morbidities. [] Plan just implemented, too soon to assess goals progression  [] Other:     Overall Progression Towards Functional goals/ Treatment Progress Update:  [x] Patient is progressing as expected towards functional goals listed. [] Progression is slowed due to complexities/Impairments listed. [] Progression has been slowed due to co-morbidities.   [] Plan just implemented, too soon to assess goals progression <30days   [] Goals require adjustment due to lack of progress  [] Patient is not progressing as expected and requires additional follow up with physician  [] Other    Prognosis for POC: [x] Good [] Fair  [] Poor      Patient requires continued skilled intervention: [x] Yes  [] No    Treatment/Activity Tolerance:  [x] Patient able to complete treatment  [] Patient limited by fatigue  [] Patient limited by pain    [] Patient limited by other medical complications  [] Other:     ASSESSMENT: Pt is making good progress with mobility and pain control. She continues to have radicular symptoms intermittently and is awaiting KAMILLE. She will benefit from continued skilled PT for strength and posture progression and transition to HEP. Return to Play: (if applicable)   []  Stage 1: Intro to Strength   []  Stage 2: Return to Run and Strength   []  Stage 3: Return to Jump and Strength   []  Stage 4: Dynamic Strength and Agility   []  Stage 5: Sport Specific Training     []  Ready to Return to Play, Meets All Above Stages   []  Not Ready for Return to Sports   Comments:                         PLAN:   [x] Continue per plan of care [] Alter current plan (see comments above)  [] Plan of care initiated [] Hold pending MD visit [] Discharge    Electronically signed by:  Rosalba Reis PT,     Note: If patient does not return for scheduled/ recommended follow up visits, this note will serve as a discharge from care along with most recent update on progress. Access Code: [email or web address]  URL: [employer]/  Date: 11/01/2021  Prepared by: Rosalba Reis    Exercises  Supine Transversus Abdominis Bracing - Hands on Stomach - 1 x daily - 7 x weekly - 2 sets - 10 reps - 5 sec hold  Supine Figure 4 Piriformis Stretch - 1 x daily - 7 x weekly - 3 sets - 30 sec hold  Supine Single Knee to Chest Stretch - 1 x daily - 7 x weekly - 2 sets - 10 reps - 20 sec hold  Supine Double Knee to Chest - 1 x daily - 7 x weekly - 2 sets - 10 reps  Supine Lower Trunk Rotation with Swiss Ball - 1 x daily - 7 x weekly - 2 sets - 10 reps  Supine March - 1 x daily - 7 x weekly - 2 sets - 10 reps

## 2022-07-18 ENCOUNTER — OFFICE VISIT (OUTPATIENT)
Dept: ORTHOPEDIC SURGERY | Age: 67
End: 2022-07-18
Payer: MEDICARE

## 2022-07-18 VITALS — HEIGHT: 67 IN | WEIGHT: 195 LBS | BODY MASS INDEX: 30.61 KG/M2

## 2022-07-18 DIAGNOSIS — M22.41 CHONDROMALACIA OF BOTH PATELLAE: ICD-10-CM

## 2022-07-18 DIAGNOSIS — M25.561 CHRONIC PAIN OF BOTH KNEES: ICD-10-CM

## 2022-07-18 DIAGNOSIS — M25.562 CHRONIC PAIN OF BOTH KNEES: ICD-10-CM

## 2022-07-18 DIAGNOSIS — M17.0 BILATERAL PRIMARY OSTEOARTHRITIS OF KNEE: Primary | ICD-10-CM

## 2022-07-18 DIAGNOSIS — M22.42 CHONDROMALACIA OF BOTH PATELLAE: ICD-10-CM

## 2022-07-18 DIAGNOSIS — G89.29 CHRONIC PAIN OF BOTH KNEES: ICD-10-CM

## 2022-07-18 PROCEDURE — G8400 PT W/DXA NO RESULTS DOC: HCPCS | Performed by: FAMILY MEDICINE

## 2022-07-18 PROCEDURE — 20610 DRAIN/INJ JOINT/BURSA W/O US: CPT | Performed by: FAMILY MEDICINE

## 2022-07-18 PROCEDURE — 1123F ACP DISCUSS/DSCN MKR DOCD: CPT | Performed by: FAMILY MEDICINE

## 2022-07-18 PROCEDURE — 99213 OFFICE O/P EST LOW 20 MIN: CPT | Performed by: FAMILY MEDICINE

## 2022-07-18 PROCEDURE — 1036F TOBACCO NON-USER: CPT | Performed by: FAMILY MEDICINE

## 2022-07-18 PROCEDURE — 1090F PRES/ABSN URINE INCON ASSESS: CPT | Performed by: FAMILY MEDICINE

## 2022-07-18 PROCEDURE — G8427 DOCREV CUR MEDS BY ELIG CLIN: HCPCS | Performed by: FAMILY MEDICINE

## 2022-07-18 PROCEDURE — G8417 CALC BMI ABV UP PARAM F/U: HCPCS | Performed by: FAMILY MEDICINE

## 2022-07-18 PROCEDURE — 3017F COLORECTAL CA SCREEN DOC REV: CPT | Performed by: FAMILY MEDICINE

## 2022-07-18 RX ORDER — BETAMETHASONE SODIUM PHOSPHATE AND BETAMETHASONE ACETATE 3; 3 MG/ML; MG/ML
24 INJECTION, SUSPENSION INTRA-ARTICULAR; INTRALESIONAL; INTRAMUSCULAR; SOFT TISSUE ONCE
Status: COMPLETED | OUTPATIENT
Start: 2022-07-18 | End: 2022-07-19

## 2022-07-18 RX ORDER — OXYBUTYNIN CHLORIDE 10 MG/1
TABLET, EXTENDED RELEASE ORAL
COMMUNITY
Start: 2022-06-29

## 2022-07-18 RX ORDER — LIDOCAINE HYDROCHLORIDE 10 MG/ML
2 INJECTION, SOLUTION INFILTRATION; PERINEURAL ONCE
Status: COMPLETED | OUTPATIENT
Start: 2022-07-18 | End: 2022-07-19

## 2022-07-18 RX ORDER — BUPIVACAINE HYDROCHLORIDE 2.5 MG/ML
4 INJECTION, SOLUTION INFILTRATION; PERINEURAL ONCE
Status: COMPLETED | OUTPATIENT
Start: 2022-07-18 | End: 2022-07-19

## 2022-07-19 RX ADMIN — LIDOCAINE HYDROCHLORIDE 2 ML: 10 INJECTION, SOLUTION INFILTRATION; PERINEURAL at 08:25

## 2022-07-19 RX ADMIN — BUPIVACAINE HYDROCHLORIDE 10 MG: 2.5 INJECTION, SOLUTION INFILTRATION; PERINEURAL at 08:24

## 2022-07-19 RX ADMIN — BETAMETHASONE SODIUM PHOSPHATE AND BETAMETHASONE ACETATE 24 MG: 3; 3 INJECTION, SUSPENSION INTRA-ARTICULAR; INTRALESIONAL; INTRAMUSCULAR; SOFT TISSUE at 08:23

## 2022-07-19 NOTE — PROGRESS NOTES
Chief Complaint  Knee Pain (BILATERAL KNEES)    Griffin Cruz is a 60 y.o. 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 Leone Stains is being seen by today for recurrent worsening of her right knee pain with underlying osteoarthritis. He is known to have grade IV chondromalacia to the weightbearing portion of her lateral compartment of her knee as well as partial pseudo-extrusion of the lateral meniscus with a chronic complex flap tear with synovitis. Patient status post completion of viscosupplementation bilateral knee 4/6/2022    History of Present Illness for Follow Up Patient:      Mansoor Slade is being seen in for follow up today to review MRI results for ongoing right knee pain. Mansoor Slade is 12 weeks out from aggravation of right knee after a twisting injury. The patient rates their current pain as a 5 out of 10 on the pain scale but can increase with activities such as going from a seated to standing position or having to go up and down stairs. Treatment to date includes: rest, ice, NSAID- Diclofeac, physical therapy, home exercises, knee bracing, cortisone injection on 4/1/2019 and her first attempt at visco supplementation completed on 7/17/2019 to treat this problem. Her MRI was obtained at [facility] on 11/15/2019 did show evidence of grade IV chondromalacia of the weightbearing and posterior nonweightbearing portion of the lateral compartment with full-thickness chondral loss and some stress edema but no evidence of insufficiency fracture. She did have a lateral meniscal pseudo-extrusion with complex flap tear to the anterior horn to posterior root with chronic notch synovitis and ACL inflammation likely related to her twisting injury about a month ago. Denies locking catching or true instability symptoms and she would like to avoid surgery if at all possible. Prior to her twisting injury 3 months ago, she states her knee was doing very well after completing Visco supplementation in July 2019 bilaterally. She did get a benefit from her previous round of Visco supplementation has been considering this once again. His mom also been working on her home-based exercise program.     Emily Dallas was last seen in the office on 2/5/2020 was continued viscosupplementation to her knees bilaterally. She is done outstanding up until a few weeks ago when she began noticed increasing pain and discomfort to the anterior portion of her knees. There is no interim history of injury or no activity prior to coming symptomatic. Most of her pain is retropatellar in nature. She has done a great job with weight loss losing 45 pounds via the keto diet this year which has helped her knee substantially. She does continue with her diclofenac and has been working on her exercise program.  She does have more of an achy discomfort with overuse at 3 to 4-10 but at times does not have any pain at all and is sleeping comfortably. Denies locking catching or instability symptoms. She would be interested in repeating Visco supplementation which is helped her substantially in the past.  She would like to avoid surgery if at all possible. Emily Dallas was last seen in the office on 11/4/2021 completed viscosupplementation to her knees bilaterally with Euflexxa. She was doing very well but over the last few weeks since the end of April 2021, she has noticed increasing discomfort primarily the anterior portion of her knees. There is no history of injury or new activity prior to becoming symptomatic although she has been having quite a bit of difficulty with her neck and back and [place] [facility] done surgery on her. Is in the process of MRI work-ups and possible epidurals and is trying to do her home-based exercises.   The soreness in her back may have altered her gait which she believes is causing soreness into her knees. Pain symptoms range between a 2-4 out of 10 with stair climbing distance and walking and reports no rest or night pain of significance. She is try to do her home-based exercise program and is continue with her diclofenac. Diogo Meyer was last seen in the office on 11/15/2021 for recurrence of her bilateral knee osteoarthritis and some mechanical back pain. She states her back pain symptoms are under reasonable control and is now under the care of Dr. Preethi Hare. She states her knees become a little bit more sore and she is hoping to get these feeling better prior to the onset of golf season. Her pain symptoms are more achy in the range of 2-3 out of 10 and got an excellent response to viscosupplementation last summer 2021 with viscosupplementation would like to continue with this again. She does have some soreness and stiffness and tightness to the posterior aspect of her knee but has been inconsistent with her home-based exercises. Denies locking catching or true instability symptoms. She has continued with her diclofenac episodically but does need a refill. She would like to pursue viscosupplementation once again would like to avoid surgery if at all possible. We last saw Diogo Meyer in the office on 4/6/2022 we completed viscosupplementation to her knees with Euflexxa. She has noticed a slight improvement overall in her knee pain symptoms but does continue pain with walking distances climbing stairs and with positional changes. She hopes to start golfing in the very near future. She does continue to have pain with weather changes and has continued with her diclofenac 75 mg 1 pill twice daily and does continue with her brace and is trying to perform her home-based exercises. She has not noticed a great deal of swelling more of an achiness anteriorly and medially. She is hoping to put off the surgery as long as she can.     We last saw Diogo Meyer in the office on 2022 when we finished up viscosupplementation to her knees bilaterally for underlying knee osteoarthritis chondromalacia patella. She is becoming increasingly symptomatic and does leave for [place] in 2022 for vacation. There is no history of injury no activity prior to becoming symptomatic. She is a little bit fearful that she is can be doing frequent walking on the beach and is requesting a steroid injection. No interim history of injury no activity prior to becoming symptomatic. Pain does range between a 3-6 out of 10. No substantial rest or night pain. She is hoping to put off knee surgery as long as she can. She is trying to work on her exercise program is continue with her bracing and does continue with her diclofenac. She does have a known history of substantial knee osteoarthritis with patellofemoral arthropathy and does utilize her brace occasionally. Attest: I have reviewed and attest the documentation of the HPI documented by my . I will make any changes if necessary. Enc Date: 2022  Time: 10:25 PM  Provider: Heriberto Melgoza MD        Social History     Tobacco Use    Smoking status: Former     Types: Cigarettes     Quit date: 2004     Years since quittin.6    Smokeless tobacco: Never   Substance Use Topics    Alcohol use: Yes     Comment: monthly    Drug use: No        Review of Systems  Pertinent items are noted in HPI  Review of systems reviewed from Patient History Form dated on 3/23/2022 and available in the patient's chart under the Media tab. Vital Signs     Ht 5' 7\" (1.702 m)   Wt 195 lb (88.5 kg)   BMI 30.54 kg/m²       General Exam:   Constitutional: Patient is adequately groomed with no evidence of malnutrition  DTRs: Deep tendon reflexes are intact  Mental Status: The patient is oriented to time, place and person. The patient's mood and affect are appropriate.   Lymphatic: The lymphatic examination bilaterally reveals all areas to be without enlargement or induration. Vascular: Examination reveals no swelling or calf tenderness. Peripheral pulses are palpable and 2+. Neurological: The patient has good coordination. There is no weakness or sensory deficit. Knee Examination  Inspection:  There is no high-grade deformity other she does have bilaterally patellofemoral crepitation and trace knee joint effusions. Palpation:  She does have more moderate tenderness over the medial and lateral patellofemoral facet bilaterally primarily on the right knee today. This does seem to be more anterior in nature today. She does have l recurrent tenderness clinically over the Lateral compartment of her right knee and medial compartment of her left knee with some mild crepitation. Rang of Motion:  She is stiff in the terminal 5 of extension with flexion limited to about 110-120. Hamstrings are mildly tight. Fair quad tone. Strength: 4 out of 5 with flexion as extension. Special Tests:  She does have moderate recurrent pain primarily reproduced with the patellar grind testing on the right today. Negative apprehension testing. She does have moderate pain with crepitation with lateral Jose's on the right knee and medial Jose's on the left. I do not sense a high-grade click. I do not sense high-grade instability of screening testing is fairly benign. Skin: There are no rashes, ulcerations or lesions. Distal neurovascular exam is intact. Gait: Reasonable gait with less pain with positional change. Reflex symmetrically preserved     Additional Comments:      Additional Examinations:  Right Lower Extremity: Examination of the right lower extremity does not show any tenderness, deformity or injury. Range of motion is unremarkable. There is no gross instability. There are no rashes, ulcerations or lesions.   Strength and tone are normal.  Left Lower Extremity: Examination of the left lower extremity does not show any tenderness, deformity or injury. Range of motion is unremarkable. There is no gross instability. There are no rashes, ulcerations or lesions. Strength and tone are normal.  Examination of the biateral hip reveals intact skin. The patient demonstrates full painless range of motion with regards to flexion, abduction, internal and external rotation. There is not tenderness about the greater trochanter. There is a negative straight leg raise against resistance. Strength is 5/5 thorough out all planes. Diagnostic Test Findings: bilateral knee AP and PA weight-bearing sunrise and lateral films were reviewed from 2019 and show fairly prominent nearly bone-on-bone changes to the lateral compartment of her right knee and medial compartment of her left knee with left greater than right knee patellofemoral arthropathy with spurring. MRI right knee obtained at [address] [place] [address] 11/15/2019 as listed above  Narrative   Site: [employer] [facility] #: [id number] #: [id number] Procedure: MR Right Knee w/o Contrast ; Reason for Exam: dx;MMT, OSTEOARTHRITIS, CHONDROMALACIA OF BOTH PATELLA   This document is confidential medical information. Unauthorized disclosure or use of this information is prohibited by law. If you are not the intended recipient of this document, please advise us by calling immediately [phone]. [facility]   [address]           Patient Name: Gisselle Jane   Case ID: [id number]   Patient : 1955   Referring Physician: Yahir Melara MD   Exam Date: 11/15/2019   Exam Description: MR Right Knee w/o Contrast            HISTORY:  Evaluate meniscal tear. TECHNICAL FACTORS:  Long- and short-axis fat- and water-weighted images were performed. COMPARISON:  None. FINDINGS:  No acute fracture.   Full-thickness chondral loss of weightbearing and posterior    nonweightbearing lateral femorotibial compartment with stress osseous edema. Pseudoextruded    and partially macerated lateral meniscus with complex flap tear from anterior horn to posterior    horn. Medial meniscus intact. No high-grade chondromalacia in the medial femorotibial    compartment. Moderate ACL inflammation. PCL intact. Medial and lateral collateral ligament complexes    intact. Mild lateral subluxation of patella. No high-grade chondromalacia in the patellofemoral    articulation. Quadriceps and patellar tendons intact. Moderate-large amount of joint effusion. Moderate gastrocnemius/semimembranous reflection cyst    with partial dehiscence. No acute muscle strain. CONCLUSION:   1. Grade 4 chondromalacia of weightbearing and posterior nonweightbearing lateral femorotibial    compartment with full-thickness chondral loss and stress osseous edema. Partial    pseudoextrusion of the lateral meniscus with complex flap tear from anterior horn to posterior    horn. 2. Chronic notch synovitis with inflamed ACL. Thank you for the opportunity to provide your interpretation. Riccardo Stark M.D. A: Guero 11/18/2019 8:43 AM   T: REINA 11/18/2019 8:11 AM         Assessment & Plan:    Encounter Diagnoses   Name Primary? Bilateral primary osteoarthritis of knee Yes    Chondromalacia of both patellae     Chronic pain of both knees        No orders of the defined types were placed in this encounter. Treatment Plan:  Treatment options were discussed with Daren Porter. She does have quite prominent arthritic changes to the right knee lateral compartment and left knee medial compartment does have patellofemoral arthropathy particularly on the left knee compared with right knee. .   She is become a little bit sore following completion of bilateral knee Euflexxa on 4/6/2022. With her upcoming vacation, we did inject her knees bilaterally using 2 cc of Celestone, 2 cc of Marcaine, 1 cc of Xylocaine.   She will continue with her diclofenac 75 mg 1 pill twice daily and will continue with her exercise program and bracing. She has done it good job in losing weight and has lost about 50 pounds over the past year. She will continue with her spine care and pain management per Dr. Ricky Ponce  She would be eligible for repeat viscosupplementation after 10/6/2022. She is hoping to put off knee surgery as long as she can. She will contact us in the interim with questions or concerns. This dictation was performed with a verbal recognition program (DRAGON) and it was checked for errors. It is possible that there are still dictated errors within this office note. If so, please bring any errors to my attention for an addendum. All efforts were made to ensure that this office note is accurate.

## 2022-07-20 ENCOUNTER — OFFICE VISIT (OUTPATIENT)
Dept: ORTHOPEDIC SURGERY | Age: 67
End: 2022-07-20
Payer: MEDICARE

## 2022-07-20 VITALS — BODY MASS INDEX: 30.61 KG/M2 | HEIGHT: 67 IN | WEIGHT: 195 LBS

## 2022-07-20 DIAGNOSIS — S46.011A ROTATOR CUFF STRAIN, RIGHT, INITIAL ENCOUNTER: ICD-10-CM

## 2022-07-20 DIAGNOSIS — M19.011 PRIMARY OSTEOARTHRITIS OF RIGHT SHOULDER: ICD-10-CM

## 2022-07-20 DIAGNOSIS — M25.511 RIGHT SHOULDER PAIN, UNSPECIFIED CHRONICITY: Primary | ICD-10-CM

## 2022-07-20 PROCEDURE — G8427 DOCREV CUR MEDS BY ELIG CLIN: HCPCS | Performed by: FAMILY MEDICINE

## 2022-07-20 PROCEDURE — G8417 CALC BMI ABV UP PARAM F/U: HCPCS | Performed by: FAMILY MEDICINE

## 2022-07-20 PROCEDURE — 1123F ACP DISCUSS/DSCN MKR DOCD: CPT | Performed by: FAMILY MEDICINE

## 2022-07-20 PROCEDURE — G8400 PT W/DXA NO RESULTS DOC: HCPCS | Performed by: FAMILY MEDICINE

## 2022-07-20 PROCEDURE — 20610 DRAIN/INJ JOINT/BURSA W/O US: CPT | Performed by: FAMILY MEDICINE

## 2022-07-20 PROCEDURE — 1090F PRES/ABSN URINE INCON ASSESS: CPT | Performed by: FAMILY MEDICINE

## 2022-07-20 PROCEDURE — 1036F TOBACCO NON-USER: CPT | Performed by: FAMILY MEDICINE

## 2022-07-20 PROCEDURE — 3017F COLORECTAL CA SCREEN DOC REV: CPT | Performed by: FAMILY MEDICINE

## 2022-07-20 PROCEDURE — 99214 OFFICE O/P EST MOD 30 MIN: CPT | Performed by: FAMILY MEDICINE

## 2022-07-20 RX ORDER — LIDOCAINE HYDROCHLORIDE 10 MG/ML
3 INJECTION, SOLUTION INFILTRATION; PERINEURAL ONCE
Status: COMPLETED | OUTPATIENT
Start: 2022-07-20 | End: 2022-07-20

## 2022-07-20 RX ORDER — BETAMETHASONE SODIUM PHOSPHATE AND BETAMETHASONE ACETATE 3; 3 MG/ML; MG/ML
12 INJECTION, SUSPENSION INTRA-ARTICULAR; INTRALESIONAL; INTRAMUSCULAR; SOFT TISSUE ONCE
Status: COMPLETED | OUTPATIENT
Start: 2022-07-20 | End: 2022-07-20

## 2022-07-20 RX ORDER — BUPIVACAINE HYDROCHLORIDE 2.5 MG/ML
4 INJECTION, SOLUTION INFILTRATION; PERINEURAL ONCE
Status: COMPLETED | OUTPATIENT
Start: 2022-07-20 | End: 2022-07-20

## 2022-07-20 RX ORDER — DICLOFENAC SODIUM 75 MG/1
75 TABLET, DELAYED RELEASE ORAL 2 TIMES DAILY
Qty: 60 TABLET | Refills: 3 | Status: SHIPPED
Start: 2022-07-20 | End: 2022-10-24 | Stop reason: SDUPTHER

## 2022-07-20 RX ADMIN — BUPIVACAINE HYDROCHLORIDE 10 MG: 2.5 INJECTION, SOLUTION INFILTRATION; PERINEURAL at 10:56

## 2022-07-20 RX ADMIN — BETAMETHASONE SODIUM PHOSPHATE AND BETAMETHASONE ACETATE 12 MG: 3; 3 INJECTION, SUSPENSION INTRA-ARTICULAR; INTRALESIONAL; INTRAMUSCULAR; SOFT TISSUE at 10:55

## 2022-07-20 RX ADMIN — LIDOCAINE HYDROCHLORIDE 3 ML: 10 INJECTION, SOLUTION INFILTRATION; PERINEURAL at 10:56

## 2022-07-20 NOTE — PROGRESS NOTES
Chief Complaint    Shoulder Pain (R SHOULDER PAIN)    Initial consultation new onset right shoulder pain status post lifting injury roughly 7/1/2022 with right shoulder weakness and motion loss and difficulty sleeping    History of Present Illness:  Mesfin Rasmussen is a 73 y.o. 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 Ankur Sanford is being seen by today for an also helps care for her mother who is seen today for separate orthopedic condition. She states that in early July 2022 she was lifting up a potted plant and attempting to move this and felt a pop in her right shoulder. This was associated with the pain and since that time has had difficulty with a constant dull achy pain in her shoulder at 2-3 out of 10 with sharper pain with active elevation with diminished motion and inability to golf. She really does not have any history of substantial shoulder injuries. She tried icing and has continued with her diclofenac but despite this her pain symptoms in her shoulder have improved. She has noticed crepitation and popping but no active locking or catching and denies substantial cervical pain or radicular symptoms. She never really has had a problem with her shoulder and does leave for a family vacation to [place] on 8/4 to 8/12/2022. She is having a difficult time sleeping in her shoulder but her knees which she was seen for recently are doing somewhat better. Her major goal is to diminish her pain and return to golf. She has not golfed in the last couple weeks and is being seen today for orthopedic and sports consultation with initial imaging.       Pain Assessment  Location of Pain: Shoulder  Location Modifiers: Right  Severity of Pain: 7  Quality of Pain: Sharp  Duration of Pain: Persistent  Frequency of Pain: Constant  Aggravating Factors: (SLEEPING)  Limiting Behavior: Yes  Relieving Factors: Ice  Result of Injury: Yes  Work-Related Injury: No  Are there other pain locations you wish to document?: No    Medical History  Patient's medications, allergies, past medical, surgical, social and family histories were reviewed and updated as appropriate. Review of Systems  Relevant review of systems reviewed on 7/20/2022 and available in the patient's chart under the medial tab. Vital Signs  There were no vitals filed for this visit. General Exam:   Constitutional: Patient is adequately groomed with no evidence of malnutrition  DTRs: Deep tendon reflexes are intact  Mental Status: The patient is oriented to time, place and person. The patient's mood and affect are appropriate. Lymphatic: The lymphatic examination bilaterally reveals all areas to be without enlargement or induration. Vascular: Examination reveals no swelling or calf tenderness. Peripheral pulses are palpable and 2+. Neurological: The patient has good coordination. There is no weakness or sensory deficit. Shoulder Examination    Inspection: There is no high-grade deformity tense effusion or soft tissue swelling. She does have before meals and glenohumeral crepitation. Palpation: She is clinically tender over the posterior cuff greater tuberosity and to lesser degree biceps and AC joint. No Donavan deformities. Rang of Motion: She does have pain associated reductions in motion. Pain with abduction at 95 forward flexion better to about 110 external rotation limited to 25 with internal rotation to about L4. She is stiff. Strength: Weakness 4-5 with both supra and infraspinatus testing. Subscap 4+ out of 5. Special Tests: She has negative drop and lift off testing but does have 6 out of 7 out of 10 pain with impingement testing. Pain with glenohumeral grind testing. Negative speeds testing. Positive George.   Negative screening cervical testing. Skin: There are no rashes, ulcerations or lesions. Distal motor sensory and vascular exam is intact. Gait: Reasonable gait. Reflexes:  Symmetrically preserved. Additional Comments:        Additional Examinations:  Contralateral Exam: Examination of the left shoulder reveals no atrophy or deformity. The skin is warm and dry. Range of motion is within normal limits. There is no focal tenderness with palpation. No AC joint tenderness. Negative Neer's and George-Derek exams. Strength is graded 5/5 throughout. Right Upper Extremity:  Examination of the right upper extremity does not show any tenderness, deformity or injury. Range of motion is unremarkable. There is no gross instability. There are no rashes, ulcerations or lesions. Strength and tone are normal.  Left Upper Extremity: Examination of the left upper extremity does not show any tenderness, deformity or injury. Range of motion is unremarkable. There is no gross instability. There are no rashes, ulcerations or lesions. Strength and tone are normal.  Neck: Examination of the neck does not show any tenderness, deformity or injury. Range of motion is unremarkable. There is no gross instability. There are no rashes, ulcerations or lesions. Strength and tone are normal.         Diagnostic Test Findings:   Right shoulder true AP outlet and axillary films were obtained today and does show moderate to significant right glenohumeral osteoarthritis with inferior spurring and evidence of AC arthropathy without evidence of acute high-grade osseous injury. Assessment: #1.  2 to 3-week status post lifting injury right shoulder with newer onset right shoulder pain suspected cuff straining and aggravation underlying right shoulder significant osteoarthritis       Impression:    Encounter Diagnosis   Name Primary?     Right shoulder pain, unspecified chronicity Yes       Office Procedures:     Orders Placed This Encounter   Procedures    XR SHOULDER RIGHT (MIN 2 VIEWS)     Standing Status:   Future     Number of Occurrences:   1     Standing Expiration Date:   7/20/2023       Treatment Plan: Treatment options were discussed with Bianca Davies today. We did review her plain films and exam findings. She has been experiencing persistent right shoulder pain and night pain with her right shoulder since lifting a potted plant on roughly 7/1/2022. She does have underlying significant glenohumeral arthropathy and probably has some degree of cuff straining and some pain associated weakness. We discussed further work-up with imaging however she has had little in the way of treatment and they are leaving for vacation. As an alternative, we did perform a right shoulder glenohumeral injection today using 2 cc of Celestone, 4 cc of Marcaine, 3 cc of Xylocaine. She does not leave for [place] for her family vacation until 8/4/2022 and hopefully we can get a few sessions of dedicated therapy and for her shoulder to be instructed on home-based exercises and she can then resume physical therapy when she returns home. She will continue with diclofenac 75 mg 1 pill twice daily and icing and activity modification was discussed. Once again her major goal is to diminish her pain and get back to golfing. We will see her back in about 4 weeks for follow-up. She will contact us in the interim with questions or concerns. This dictation was performed with a verbal recognition program (DRAGON) and it was checked for errors. It is possible that there are still dictated errors within this office note. If so, please bring any errors to my attention for an addendum. All efforts were made to ensure that this office note is accurate.

## 2022-08-01 ENCOUNTER — HOSPITAL ENCOUNTER (OUTPATIENT)
Dept: PHYSICAL THERAPY | Age: 67
Setting detail: THERAPIES SERIES
Discharge: HOME | End: 2022-08-01
Payer: MEDICARE

## 2022-08-01 PROCEDURE — 97161 PT EVAL LOW COMPLEX 20 MIN: CPT | Performed by: PHYSICAL THERAPIST

## 2022-08-01 PROCEDURE — G0283 ELEC STIM OTHER THAN WOUND: HCPCS | Performed by: PHYSICAL THERAPIST

## 2022-08-01 PROCEDURE — 97110 THERAPEUTIC EXERCISES: CPT | Performed by: PHYSICAL THERAPIST

## 2022-08-01 NOTE — FLOWSHEET NOTE
[facility], [address]  Phone: [phone]  Fax [phone]  :  Physical Therapy Treatment Note/ Progress Report:           Date:  2022    Patient Name:  Morenita Tidwell    :  1955  MRN: [id number]    Referring Physician:  Shaun Rogel MD                                                    Evaluation Date: 2022                                              Date of Referral:22     Insurance: Medicare     Next MD appt: scheduled: 22     Medical Diagnosis:  A09.332 (ICD-10-CM) - Right shoulder pain, unspecified chronicity   S46.011A (ICD-10-CM) - Rotator cuff strain, right, initial encounter   M19.011 (ICD-10-CM) - Primary osteoarthritis of right shoulder         Treatment Diagnosis:  M25.511 (ICD-10-CM) - Right shoulder pain, unspecified chronicity       Has the plan of care been signed (Y/N):        []  Yes  [x]  No    Progress report will be due (10 Rx or 30 days ): 79       Recertification will be due (POC Duration  / 90 days ): 10/1/22     Is this a Progress Report:     []  Yes  [x]  No      If Yes:  Date Range for reporting period:  Beginnin22  Ending:              Visit # Date Range Insurance Allowable Requires auth   IN PERSON 1 22- BMN    [x]no        []yes:   TELEHEALTH       TOTAL              CX/NS       Next PT appt: 8/3/22                PT Practice Pattern:E  Co morbidities:1-2    Nonsurgical  INITIAL VISIT 22 CURRENT VISIT    PAIN 0-10/10     FUNCTIONAL SCALE FOTO     ROM                                    STRENGHT (MMT)                                              Latex Allergy/Pacemaker/Adhesive allergy:  [x]NO      []YES     RESTRICTIONS/PRECAUTIONS: past ACDF         SUBJECTIVE:  Pain level:  5-8/10 See eval    OBJECTIVE: See eval  Observation:             Exercises/Interventions:     HEP:    Medbridge   Access Code: [email or web address]  URL: [email or web address]  Date: 08/01/2022  Prepared by: Patria Boykin PT    Therapeutic Ex (60970)  NMR re-education (60312) Reps/Sets/time Notes/CUES/Assessment HEP   Posterior shoulder rolls 10x  X    No money 10x3\"  X    Scap set 10x5\"  X    Flexion table slide  10x10\"  X                                                                                                    Pt education x10' HEP, mechanics of shoulder, inflammation, progression after vacation          Manual Intervention (37132)                                      CUES NEEDED                Therapeutic Activity (85936)                                                Therapeutic Exercise and NMR EXR  [x] (72609) Provided verbal/tactile cueing for activities related to strengthening, flexibility, endurance, ROM for improvements in LE, proximal hip, and core control with self care, mobility, lifting, ambulation.  [] (19729) Provided verbal/tactile cueing for activities related to improving balance, coordination, kinesthetic sense, posture, motor skill, proprioception  to assist with LE, proximal hip, and core control in self care, mobility, lifting, ambulation and eccentric single leg control.      NMR and Therapeutic Activities:    [] (22877 or 49410) Provided verbal/tactile cueing for activities related to improving balance, coordination, kinesthetic sense, posture, motor skill, proprioception and motor activation to allow for proper function of core, proximal hip and LE with self care and ADLs  [] (85018) Gait Re-education- Provided training and instruction to the patient for proper LE, core and proximal hip recruitment and positioning and eccentric body weight control with ambulation re-education including up and down stairs     Home Exercise Program:    [x] (28078) Reviewed/Progressed HEP activities related to strengthening, flexibility, endurance, ROM of core, proximal hip and LE for functional self-care, mobility, lifting and ambulation/stair navigation   [] (19951)Reviewed/Progressed HEP activities related to improving balance, coordination, kinesthetic sense, posture, motor skill, proprioception of core, proximal hip and LE for self care, mobility, lifting, and ambulation/stair navigation      Manual Treatments:  PROM / STM / Oscillations-Mobs:  G-I, II, III, IV (PA's, Inf., Post.)  [] (25980) Provided manual therapy to mobilize LE, proximal hip and/or LS spine soft tissue/joints for the purpose of modulating pain, promoting relaxation,  increasing ROM, reducing/eliminating soft tissue swelling/inflammation/restriction, improving soft tissue extensibility and allowing for proper ROM for normal function with self care, mobility, lifting and ambulation. Trigger point Dry Needling:  fine needle insertion into myofascial trigger points to stimulate a healing response  [] (82220) Needle insertion without injection: 1 or 2 muscles  [] (29260) Needle insertion without injection: 3 or more muscles    Modalities:     [] GAME READY (VASO)- for significant edema, swelling, pain control. [x] ESU (HV/PM) for edema and pain control x15' PM ant/post w CP      Charges:  Timed Code Treatment Minutes: 24   Total Treatment Minutes: 60       [x] EVAL (LOW) 73318 (typically 20 minutes face-to-face)  [] EVAL (MOD) 21177 (typically 30 minutes face-to-face)  [] EVAL (HIGH) 73236 (typically 45 minutes face-to-face)  [] RE-EVAL     [x] BN(85739) x 2    [] IONTO  [] NMR (09359) x     [] VASO  [] Manual (10749) x      [] Other:  [] TA x      [] Mech Traction (50020)  [] ES(attended) (82105)      [x] ES (un) (22957):             GOALS:    Patient stated goal: to be able to play golf and work without pain. [] Progressing: [] Met: [] Not Met: [] Adjusted     Therapist goals for Patient:  Short Term Goals: To be achieved in: 2 weeks  1. Independent in HEP and progression per patient tolerance, in order to prevent re-injury. [] Progressing: [] Met: [] Not Met: [] Adjusted  2. Patient will have a decrease in pain to facilitate improvement in movement, function, and ADLs as indicated by Functional Deficits. [] Progressing: [] Met: [] Not Met: [] Adjusted     Long Term Goals: To be achieved in: 8 weeks        1. FOTO score to be equal or greater to the predicted score (66/100)  to assist with reaching prior level of function. [] Progressing: [] Met: [] Not Met: [] Adjusted      2. Patient will demonstrate increased AROM to equal the opposite side bilaterally to allow for putting on her bra as indicated by patients Functional Deficits. [] Progressing: [] Met: [] Not Met: [] Adjusted       3. Patient will demonstrate an increase in strength to 4+/5 to allow for stocking cards as indicated by patients Functional Deficits. [] Progressing: [] Met: [] Not Met: [] Adjusted       4. Patient will return to all transfers, work activities, and functional activities without increased symptoms or restriction, specifically washing her hair. [] Progressing: [] Met: [] Not Met: [] Adjusted       5. (Pt specific functional or activity goal) pt will be able to return to golf activities  [] Progressing: [] Met: [] Not Met: [] Adjusted  6. Pt will demonstrate pain decreased by 50%  (4/10) with all ADLS. [] Progressing: [] Met: [] Not Met: [] Adjusted        ASSESSMENT:    See eval for initial assessment      Overall Progression Towards Functional goals/ Treatment Progress Update:  [] Patient is progressing as expected towards functional goals listed. [] Progression is slowed due to complexities/Impairments listed. [] Progression has been slowed due to co-morbidities.   [x] Plan just implemented, too soon to assess goals progression <30days   [] Goals require adjustment due to lack of progress  [] Patient is not progressing as expected and requires additional follow up with physician  [] Other    Prognosis for POC: [x] Good [] Fair  [] Poor      Patient requires continued skilled intervention: [x] Yes  [] No    Treatment/Activity Tolerance:  [x] Patient able to complete treatment  [] Patient limited by fatigue  [] Patient limited by pain    [] Patient limited by other medical complications  [] Other:                     PLAN: See eval for initial POC  [] Continue per plan of care [] Alter current plan (see comments above)  [x] Plan of care initiated [] Hold pending MD visit [] Discharge      Electronically signed by:  Lynn De Leon [id number]    Note: If patient does not return for scheduled/ recommended follow up visits, this note will serve as a discharge from care along with most recent update on progress.

## 2022-08-01 NOTE — PLAN OF CARE
[facility], [address]  Phone: [phone]  Fax [phone]     Jordyn Tapia    Dear Dr. Lilo Garcia ,    We had the pleasure of evaluating the following patient for physical therapy services at [address] [place] [address]. A summary of our findings can be found in the initial assessment below. This includes our plan of care. If you have any questions or concerns regarding these findings, please do not hesitate to contact me at the office phone number checked above. Thank you for the referral.       Physician Signature:_______________________________Date:__________________  By signing above (or electronic signature), therapists plan is approved by physician    Patient: Latoya Kimball   : 1955   MRN: [id number]    Referring Physician:  Jesse Faye MD        Evaluation Date: 2022         Date of Referral:22    Insurance: Medicare    Next MD appt: scheduled: 22    Medical Diagnosis:  M25.511 (ICD-10-CM) - Right shoulder pain, unspecified chronicity   S46.011A (ICD-10-CM) - Rotator cuff strain, right, initial encounter   M19.011 (ICD-10-CM) - Primary osteoarthritis of right shoulder       Treatment Diagnosis:  M25.511 (ICD-10-CM) - Right shoulder pain, unspecified chronicity          Precautions/ Contra-indications:  none  C-SSRS Triggered by Intake questionnaire (Past 2 wk assessment):   [x] No, Questionnaire did not trigger screening.   [] Yes, Patient intake triggered further evaluation      [] C-SSRS Screening completed  [] PCP notified via Plan of Care  [] Emergency services notified     Latex/adhesive Allergy/pacemaker:  [x]NO      []YES  Preferred Language for Healthcare:   [x]English       []other:    SUBJECTIVE: Patient stated complaint:About 3-4 weeks ago pt reports she was lifting  a heavy plant and felt something \"rip\" in her upper arm (indicates bicep).  Pt is mostly with reaching overhead (work placing card on top shelf) and doing her hair, occassionally wakes up sleeping. Last night she caught her mother who was falling w her R arm. Pt is right hand dominant. Pt is going out of town for a week and a half. Relevant Medical History: lumbar sx, cx sx, knee sx  No shoulder surgeries  Functional Disability Index: FOTO 53/100     Pain Scale: 5-8/10  Easing factors:  ice  Provocative factors:  reaching, lifting, reaching behind back or overhead. Type: [x]Constant   []Intermittent  []Radiating []Localized []other:     Numbness/Tingling:  just began all the way down her arm briefly. 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 Living Status/Prior Level of Function: Independent with ADLs and IADLs, pt is having pain w most activities. OBJECTIVE:     *painful w test    CERV ROM     Cervical Flexion     Cervical Extension     Cervical SB     Cervical rotation          ROM Left Right   Shoulder Flex 160 130   Shoulder Abd 170 90   Shoulder ER @ side 60 38   Shoulder IR T7 L1*        SFLX p session  136        Strength  Left Right   Shoulder Flex 5/5 4/5   Shoulder Scap 5/5 4/5   Shoulder ER 5/5 4/5   Shoulder IR 5/5 4+/5   bicep 5/5 4/5          Reflexes/Sensation:  NT this visit   [x]Dermatomes/Myotomes intact    [x]Reflexes equal and normal bilaterally   []Other:    Joint mobility:  NT this visit   []Normal    []Hypo   []Hyper    Palpation: TTP and TPs in R UT, R post cuff, R bicep and RC attachments. Tight R bicep    Functional Mobility/Transfers:  using RUE painful    Posture:  rounded shoulders, slight forward head. Bandages/Dressings/Incisions:  NA    Gait: (include devices/WB status): WNL    Orthopedic Special Tests: impingement (+), drop arm (-)                       [x] Patient history, allergies, meds reviewed. Medical chart reviewed. See intake form.      Review Of Systems (ROS):  [x]Performed Review of systems (Integumentary, CardioPulmonary, Neurological) by intake and observation. Intake form has been scanned into medical record. Patient has been instructed to contact their primary care physician regarding ROS issues if not already being addressed at this time. Co-morbidities/Complexities (which will affect course of rehabilitation):   []None           Arthritic conditions   []Rheumatoid arthritis (M05.9)  [x]Osteoarthritis (M19.91)   Cardiovascular conditions   []Hypertension (I10)  []Hyperlipidemia (E78.5)  []Angina pectoris (I20)  []Atherosclerosis (I70)   Musculoskeletal conditions   []Disc pathology   []Congenital spine pathologies   []Prior surgical intervention  []Osteoporosis (M81.8)  []Osteopenia (M85.8)   Endocrine conditions   []Hypothyroid (E03.9)  []Hyperthyroid Gastrointestinal conditions   []Constipation (I36.08)   Metabolic conditions   []Morbid obesity (E66.01)  []Diabetes type 1(E10.65) or 2 (E11.65)   []Neuropathy (G60.9)     Pulmonary conditions   []Asthma (J45)  []Coughing   []COPD (J44.9)   Psychological Disorders  []Anxiety (F41.9)  []Depression (F32.9)   []Other:   []Other:          Barriers to/and or personal factors that will affect rehab potential:              []Age  []Sex              []Motivation/Lack of Motivation                        []Co-Morbidities              []Cognitive Function, education/learning barriers              []Environmental, home barriers              []profession/work barriers  []past PT/medical experience  []other:  Justification: pt is magdalena out of town for nearly 2 weeks so cannot attend PT     Falls Risk Assessment (30 days): Pt indicates no hx of falls or balance issues over the past year. There is no apparent indication of need for fall reduction program during clinic observation. [x] Falls Risk assessed and no intervention required.   [] Falls Risk assessed and Patient requires intervention due to being higher risk TUG score (>12s at risk):     [] Falls education provided, including           ASSESSMENT:   Functional Impairments   []Noted spinal or UE joint hypomobility   []Noted spinal or UE joint hypermobility   [x]Decreased UE functional ROM   [x]Decreased UE functional strength   []Abnormal reflexes/sensation/myotomal/dermatomal deficits   []Decreased RC/scapular/core strength and neuromuscular control   []other:      Functional Activity Limitations (from functional questionnaire and intake)   []Reduced ability to tolerate prolonged functional positions   []Reduced ability or difficulty with changes of positions or transfers between positions   []Reduced ability to maintain good posture and demonstrate good body mechanics with sitting, bending, and lifting   [] Reduced ability or tolerance with driving and/or computer work   []Reduced ability to sleep   []Reduced ability to perform lifting, reaching, carrying tasks   []Reduced ability to tolerate impact through UE   []Reduced ability to reach behind back   []Reduced ability to  or hold objects   []Reduced ability to throw or toss an object   []other:    Participation Restrictions   [x]Reduced participation in self care activities   [x]Reduced participation in home management activities   []Reduced participation in work activities   []Reduced participation in social activities. []Reduced participation in sport/recreation activities. Classification:   []Signs/symptoms consistent with post-surgical status including decreased ROM, strength and function.   [x]Signs/symptoms consistent with joint sprain/strain   []Signs/symptoms consistent with shoulder impingement   []Signs/symptoms consistent with shoulder/elbow/wrist tendinopathy   []Signs/symptoms consistent with Rotator cuff tear   []Signs/symptoms consistent with labral tear   []Signs/symptoms consistent with postural dysfunction    []Signs/symptoms consistent with Glenohumeral IR Deficit - <45 degrees   []Signs/symptoms consistent with facet dysfunction of cervical/thoracic spine    []Signs/symptoms consistent with pathology which may benefit from Dry needling     []other:     Prognosis/Rehab Potential:      []Excellent   [x]Good    []Fair   []Poor    Tolerance of evaluation/treatment:    []Excellent   []Good    [x]Fair   []Poor  Physical Therapy Evaluation Complexity Justification  [x] A history of present problem with:  [] no personal factors and/or comorbidities that impact the plan of care;  [x]1-2 personal factors and/or comorbidities that impact the plan of care  []3 personal factors and/or comorbidities that impact the plan of care  [x] An examination of body systems using standardized tests and measures addressing any of the following: body structures and functions (impairments), activity limitations, and/or participation restrictions;:  [x] a total of 1-2 or more elements   [] a total of 3 or more elements   [] a total of 4 or more elements   [x] A clinical presentation with:  [x] stable and/or uncomplicated characteristics   [] evolving clinical presentation with changing characteristics  [] unstable and unpredictable characteristics;   [x] Clinical decision making of [x] low, [] moderate, [] high complexity using standardized patient assessment instrument and/or measurable assessment of functional outcome. [x] EVAL (LOW) 58296 (typically 20 minutes face-to-face)  [] EVAL (MOD) 27280 (typically 30 minutes face-to-face)  [] EVAL (HIGH) 38294 (typically 45 minutes face-to-face)  [] RE-EVAL       PLAN:  Frequency/Duration:  2 days per week for 8 Weeks:  INTERVENTIONS:  [x] Therapeutic exercise including: strength training, ROM, for Upper extremity and core   [x]  NMR activation and proprioception for UE, scap and Core   [x] Manual therapy as indicated for shoulder, scapula and spine to include: Dry Needling/IASTM, STM, PROM, Gr I-IV mobilizations, manipulation.    [x] Modalities as needed that may include: thermal agents, E-stim, Biofeedback, US, iontophoresis as indicated Patient education on joint protection, postural re-education, activity modification, progression of HEP. HEP instruction: Medbridge   Access Code: [email or web address]  URL: [email or web address]  Date: 08/01/2022  Prepared by: Charles Ludwig      GOALS:  Patient stated goal: to be able to play golf and work without pain. [] Progressing: [] Met: [] Not Met: [] Adjusted    Therapist goals for Patient:   Short Term Goals: To be achieved in: 2 weeks  1. Independent in HEP and progression per patient tolerance, in order to prevent re-injury. [] Progressing: [] Met: [] Not Met: [] Adjusted  2. Patient will have a decrease in pain to facilitate improvement in movement, function, and ADLs as indicated by Functional Deficits. [] Progressing: [] Met: [] Not Met: [] Adjusted    Long Term Goals: To be achieved in: 8 weeks      1. FOTO score to be equal or greater to the predicted score (66/100)  to assist with reaching prior level of function. [] Progressing: [] Met: [] Not Met: [] Adjusted   2. Patient will demonstrate increased AROM to equal the opposite side bilaterally to allow for putting on her bra as indicated by patients Functional Deficits. [] Progressing: [] Met: [] Not Met: [] Adjusted   3. Patient will demonstrate an increase in strength to 4+/5 to allow for stocking cards as indicated by patients Functional Deficits. [] Progressing: [] Met: [] Not Met: [] Adjusted   4. Patient will return to all transfers, work activities, and functional activities without increased symptoms or restriction, specifically washing her hair. [] Progressing: [] Met: [] Not Met: [] Adjusted   5. (Pt specific functional or activity goal) pt will be able to return to golf activities  [] Progressing: [] Met: [] Not Met: [] Adjusted  6. Pt will demonstrate pain decreased by 50%  (4/10) with all ADLS.   [] Progressing: [] Met: [] Not Met: [] Adjusted         Electronically signed by:  Sergio Mckeon, [id number]

## 2022-08-03 ENCOUNTER — APPOINTMENT (OUTPATIENT)
Dept: PHYSICAL THERAPY | Age: 67
End: 2022-08-03
Payer: MEDICARE

## 2022-08-03 ENCOUNTER — HOSPITAL ENCOUNTER (OUTPATIENT)
Dept: PHYSICAL THERAPY | Age: 67
Setting detail: THERAPIES SERIES
Discharge: HOME | End: 2022-08-03
Payer: MEDICARE

## 2022-08-03 PROCEDURE — G0283 ELEC STIM OTHER THAN WOUND: HCPCS | Performed by: PHYSICAL THERAPIST

## 2022-08-03 PROCEDURE — 97110 THERAPEUTIC EXERCISES: CPT | Performed by: PHYSICAL THERAPIST

## 2022-08-03 NOTE — FLOWSHEET NOTE
[facility], [address] [place]  Phone: [phone]  Fax [phone]  :  Physical Therapy Treatment Note/ Progress Report:           Date:  8/3/2022    Patient Name:  Baltazar Mcallister    :  1955  MRN: [id number]    Referring Physician:  Jeanette Casey MD                                                    Evaluation Date: 2022                                              Date of Referral:22     Insurance: Medicare     Next MD appt: scheduled: 22     Medical Diagnosis:  C26.101 (ICD-10-CM) - Right shoulder pain, unspecified chronicity   S46.011A (ICD-10-CM) - Rotator cuff strain, right, initial encounter   M19.011 (ICD-10-CM) - Primary osteoarthritis of right shoulder         Treatment Diagnosis:  M25.511 (ICD-10-CM) - Right shoulder pain, unspecified chronicity       Has the plan of care been signed (Y/N):        []  Yes  [x]  No    Progress report will be due (10 Rx or 30 days ): 18       Recertification will be due (POC Duration  / 90 days ): 10/1/22     Is this a Progress Report:     []  Yes  [x]  No      If Yes:  Date Range for reporting period:  Beginnin22  Ending:              Visit # Date Range Insurance Allowable Requires auth   IN PERSON 2 22- BMN    [x]no        []yes:   TELEHEALTH       TOTAL              CX/NS       Next PT appt: 8/3/22                PT Practice Pattern:E  Co morbidities:1-2    Nonsurgical  INITIAL VISIT 22 CURRENT VISIT    PAIN 0-10/10 5-8/10 4-6/10   FUNCTIONAL SCALE FOTO 53/100 NT   AROM R SFLX 130 150    R SABD 90 140    ER @ side 60 50                     STRENGHT (MMT) RSFLX      SABD      SER 4/5 4+/5    SIR      bicep                     Latex Allergy/Pacemaker/Adhesive allergy:  [x]NO      []YES     RESTRICTIONS/PRECAUTIONS: past ACDF         SUBJECTIVE:  Pt reports she is able to do the exercises w no issues.     Pain level:  4-8/10     OBJECTIVE: Observation: pt guards her R shoulder holding it with her L hand. Exercises/Interventions:     HEP:    Medbridge   Access Code: [email or web address]  URL: [email or web address]  Date: 08/01/2022  Prepared by: Savana Hobson PT    Therapeutic Ex (96687)  NMR re-education (13022) Reps/Sets/time Notes/CUES/Assessment HEP   Posterior shoulder rolls 10x Good form X    No money 10x3\"  X    Scap set 10x5\"  X    Flexion table slide  10x10\"  X                                                                                                    Pt education x10' HEP, mechanics of shoulder, inflammation, progression after vacation          Manual Intervention (31845)                                      CUES NEEDED                Therapeutic Activity (74321)                                                Therapeutic Exercise and NMR EXR  [x] (00770) Provided verbal/tactile cueing for activities related to strengthening, flexibility, endurance, ROM for improvements in LE, proximal hip, and core control with self care, mobility, lifting, ambulation.  [] (46441) Provided verbal/tactile cueing for activities related to improving balance, coordination, kinesthetic sense, posture, motor skill, proprioception  to assist with LE, proximal hip, and core control in self care, mobility, lifting, ambulation and eccentric single leg control.      NMR and Therapeutic Activities:    [] (11293 or 44883) Provided verbal/tactile cueing for activities related to improving balance, coordination, kinesthetic sense, posture, motor skill, proprioception and motor activation to allow for proper function of core, proximal hip and LE with self care and ADLs  [] (18902) Gait Re-education- Provided training and instruction to the patient for proper LE, core and proximal hip recruitment and positioning and eccentric body weight control with ambulation re-education including up and down stairs     Home Exercise Program:    [x] (25123) Reviewed/Progressed function. Overall Progression Towards Functional goals/ Treatment Progress Update:  [] Patient is progressing as expected towards functional goals listed. [] Progression is slowed due to complexities/Impairments listed. [] Progression has been slowed due to co-morbidities. [x] Plan just implemented, too soon to assess goals progression <30days   [] Goals require adjustment due to lack of progress  [] Patient is not progressing as expected and requires additional follow up with physician  [] Other    Prognosis for POC: [x] Good [] Fair  [] Poor      Patient requires continued skilled intervention: [x] Yes  [] No    Treatment/Activity Tolerance:  [x] Patient able to complete treatment  [] Patient limited by fatigue  [] Patient limited by pain    [] Patient limited by other medical complications  [] Other:                     PLAN: Cont when pt returns from vacation. [x] Continue per plan of care [] Alter current plan (see comments above)  [] Plan of care initiated [] Hold pending MD visit [] Discharge      Electronically signed by:  Brianda Dos Santos, [id number]    Note: If patient does not return for scheduled/ recommended follow up visits, this note will serve as a discharge from care along with most recent update on progress.

## 2022-08-15 ENCOUNTER — HOSPITAL ENCOUNTER (OUTPATIENT)
Dept: PHYSICAL THERAPY | Age: 67
Setting detail: THERAPIES SERIES
Discharge: HOME | End: 2022-08-15
Payer: MEDICARE

## 2022-08-15 PROCEDURE — 97112 NEUROMUSCULAR REEDUCATION: CPT | Performed by: PHYSICAL THERAPIST

## 2022-08-15 PROCEDURE — 97110 THERAPEUTIC EXERCISES: CPT | Performed by: PHYSICAL THERAPIST

## 2022-08-15 PROCEDURE — 97140 MANUAL THERAPY 1/> REGIONS: CPT | Performed by: PHYSICAL THERAPIST

## 2022-08-15 PROCEDURE — G0283 ELEC STIM OTHER THAN WOUND: HCPCS | Performed by: PHYSICAL THERAPIST

## 2022-08-15 NOTE — FLOWSHEET NOTE
[facility], [address] [place]  Phone: [phone]  Fax [phone]  :  Physical Therapy Treatment Note/ Progress Report:           Date:  8/15/2022    Patient Name:  Selinda Curling    :  1955  MRN: [id number]    Referring Physician:  Kathleen Daniel MD                                                    Evaluation Date: 2022                                              Date of Referral:22     Insurance: Medicare     Next MD appt: scheduled: 22     Medical Diagnosis:  J30.407 (ICD-10-CM) - Right shoulder pain, unspecified chronicity   S46.011A (ICD-10-CM) - Rotator cuff strain, right, initial encounter   M19.011 (ICD-10-CM) - Primary osteoarthritis of right shoulder         Treatment Diagnosis:  M25.511 (ICD-10-CM) - Right shoulder pain, unspecified chronicity       Has the plan of care been signed (Y/N):        []  Yes  [x]  No    Progress report will be due (10 Rx or 30 days ):        Recertification will be due (POC Duration  / 90 days ): 10/1/22     Is this a Progress Report:     []  Yes  [x]  No      If Yes:  Date Range for reporting period:  Beginnin22  Ending:              Visit # Date Range Insurance Allowable Requires auth   IN PERSON 3 22- BMN    [x]no        []yes:   TELEHEALTH       TOTAL              CX/NS       Next PT appt: 8/3/22                PT Practice Pattern:E  Co morbidities:1-2    Nonsurgical  INITIAL VISIT 22 CURRENT VISIT    PAIN 0-10/10 5-8/10 4-6/10   FUNCTIONAL SCALE FOTO 53/100 NT   AROM R SFLX 130 150    R SABD 90 140    ER @ side 60 50    AROM IR L1 L1               STRENGHT (MMT) RSFLX      SABD  4/5 p    SER 4/5 4+/5    SIR      bicep                     Latex Allergy/Pacemaker/Adhesive allergy:  [x]NO      []YES     RESTRICTIONS/PRECAUTIONS: past ACDF         SUBJECTIVE:  Pt reports she is able to do the exercises w no issues.  Reports carrying suitcases aggravated shoulder on vacation. Went back to work over the weekend and pain noted with work activities. Soreness noted at rest upon arrival,. Reaching overhead and behind back is still noted most painful. Reports cortisone injection helped about 30% and reports pain is not as constant. Sees MD this week. Pain level:  6/10 upon arrival.    OBJECTIVE: + Speeds, + O'daisha test, + Impingement signs. Observation:             Exercises/Interventions:     HEP:    Medbridge   Access Code: [email or web address]  URL: [email or web address]  Date: 08/01/2022  Prepared by: Reymundo Espitia PT    Therapeutic Ex (57079)  NMR re-education (37819) Reps/Sets/time Notes/CUES/Assessment HEP   Posterior shoulder rolls 10x Good form X    No money 10x3\"  + TBX    Scap set 10x5\"  X    Flexion table slide  10x10\"  X          SB flexion  H10 x 10 reps     SB scaption H10 x10 reps                             TB Rows /Ext H5 x10 reps  + to HEP   TB no money H5 x10  + to HEP   TB ER attempted walkouts/isotonics  Too painful so held                 Corner pec stretches H30 x3 reps  + to HEP                     Pt education x10' HEP, mechanics of shoulder, inflammation, progression after vacation          Manual Intervention (33812)      PROM/GH joint mobs 10' R shoulder                              CUES NEEDED                Therapeutic Activity (19824)                                            Access Code: [email or web address]  URL: [email or web address]  Date: 08/15/2022  Prepared by: Kamini Hyman    Exercises  Standing Shoulder Row with Anchored Resistance - 1-2 x daily - 7 x weekly - 1-2 sets - 10 reps - 2 hold  Shoulder extension with resistance - Neutral - 1-2 x daily - 7 x weekly - 1-2 sets - 10 reps - 2 hold  Shoulder External Rotation and Scapular Retraction with Resistance - 1-2 x daily - 7 x weekly - 1-2 sets - 10 reps - 2 hold  Corner Pec Major Stretch - 2 x daily - 7 x weekly - 1 sets - 5 reps - 30\" hold      Therapeutic Exercise and NMR EXR  [x] (31728) Provided verbal/tactile cueing for activities related to strengthening, flexibility, endurance, ROM for improvements in LE, proximal hip, and core control with self care, mobility, lifting, ambulation.  [] (58947) Provided verbal/tactile cueing for activities related to improving balance, coordination, kinesthetic sense, posture, motor skill, proprioception  to assist with LE, proximal hip, and core control in self care, mobility, lifting, ambulation and eccentric single leg control.      NMR and Therapeutic Activities:    [x] (19657 or 15001) Provided verbal/tactile cueing for activities related to improving balance, coordination, kinesthetic sense, posture, motor skill, proprioception and motor activation to allow for proper function of core, proximal hip and LE with self care and ADLs  [] (28345) Gait Re-education- Provided training and instruction to the patient for proper LE, core and proximal hip recruitment and positioning and eccentric body weight control with ambulation re-education including up and down stairs     Home Exercise Program:    [x] (44371) Reviewed/Progressed HEP activities related to strengthening, flexibility, endurance, ROM of core, proximal hip and LE for functional self-care, mobility, lifting and ambulation/stair navigation   [] (85648)Reviewed/Progressed HEP activities related to improving balance, coordination, kinesthetic sense, posture, motor skill, proprioception of core, proximal hip and LE for self care, mobility, lifting, and ambulation/stair navigation      Manual Treatments:  PROM / STM / Oscillations-Mobs:  G-I, II, III, IV (PA's, Inf., Post.)  [x] (53980) Provided manual therapy to mobilize LE, proximal hip and/or LS spine soft tissue/joints for the purpose of modulating pain, promoting relaxation,  increasing ROM, reducing/eliminating soft tissue swelling/inflammation/restriction, improving soft tissue extensibility and allowing for proper ROM for normal increase in strength to 4+/5 to allow for stocking cards as indicated by patients Functional Deficits. [] Progressing: [] Met: [] Not Met: [] Adjusted       4. Patient will return to all transfers, work activities, and functional activities without increased symptoms or restriction, specifically washing her hair. [] Progressing: [] Met: [] Not Met: [] Adjusted       5. (Pt specific functional or activity goal) pt will be able to return to golf activities  [] Progressing: [] Met: [] Not Met: [] Adjusted  6. Pt will demonstrate pain decreased by 50%  (4/10) with all ADLS. [] Progressing: [] Met: [] Not Met: [] Adjusted        ASSESSMENT:  Patient overall reports about 30% improvement with shoulder since injection. Still painful with reaching overhead and behind back. Progress exercises this visit for HEP. Assess response. Patient will benefit from continued skilled intervention to increase ROM, flexibility, strength and function. Overall Progression Towards Functional goals/ Treatment Progress Update:  [] Patient is progressing as expected towards functional goals listed. [] Progression is slowed due to complexities/Impairments listed. [] Progression has been slowed due to co-morbidities. [x] Plan just implemented, too soon to assess goals progression <30days   [] Goals require adjustment due to lack of progress  [] Patient is not progressing as expected and requires additional follow up with physician  [] Other    Prognosis for POC: [x] Good [] Fair  [] Poor      Patient requires continued skilled intervention: [x] Yes  [] No    Treatment/Activity Tolerance:  [x] Patient able to complete treatment  [] Patient limited by fatigue  [] Patient limited by pain    [] Patient limited by other medical complications  [] Other:                     PLAN: Progress scapular stabilization strength as tolerated.   [x] Continue per plan of care [] Alter current plan (see comments above)  [] Plan of care initiated [] Hold pending MD visit [] Discharge      Electronically signed by:  Timothy Garcia, PT [id number]    Note: If patient does not return for scheduled/ recommended follow up visits, this note will serve as a discharge from care along with most recent update on progress.

## 2022-08-17 ENCOUNTER — APPOINTMENT (OUTPATIENT)
Dept: PHYSICAL THERAPY | Age: 67
End: 2022-08-17
Payer: MEDICARE

## 2022-08-22 ENCOUNTER — APPOINTMENT (OUTPATIENT)
Dept: PHYSICAL THERAPY | Age: 67
End: 2022-08-22
Payer: MEDICARE

## 2022-08-26 ENCOUNTER — APPOINTMENT (OUTPATIENT)
Dept: PHYSICAL THERAPY | Age: 67
End: 2022-08-26
Payer: MEDICARE

## 2022-08-29 ENCOUNTER — HOSPITAL ENCOUNTER (OUTPATIENT)
Dept: PHYSICAL THERAPY | Age: 67
Setting detail: THERAPIES SERIES
Discharge: HOME | End: 2022-08-29
Payer: MEDICARE

## 2022-08-29 PROCEDURE — 97140 MANUAL THERAPY 1/> REGIONS: CPT | Performed by: PHYSICAL THERAPIST

## 2022-08-29 PROCEDURE — 97110 THERAPEUTIC EXERCISES: CPT | Performed by: PHYSICAL THERAPIST

## 2022-08-29 NOTE — PROGRESS NOTES
[facility], [address]  Phone: [phone]  Fax [phone]  :  Physical Therapy Treatment Note/ Progress Report:           Date:  2022    Patient Name:  Keila Peraza    :  1955  MRN: [id number]    Referring Physician:  Jennifer Miller MD                                                    Evaluation Date: 2022                                              Date of Referral:22     Insurance: Medicare     Next MD appt: scheduled: needs to schedule     Medical Diagnosis:  P09.887 (ICD-10-CM) - Right shoulder pain, unspecified chronicity   S46.011A (ICD-10-CM) - Rotator cuff strain, right, initial encounter   M19.011 (ICD-10-CM) - Primary osteoarthritis of right shoulder         Treatment Diagnosis:  M25.511 (ICD-10-CM) - Right shoulder pain, unspecified chronicity       Has the plan of care been signed (Y/N):        [x]  Yes  []  No    Progress report will be due (10 Rx or 30 days ):   DUE Visit 10 or 22 ; done visit 4 83      Recertification will be due (POC Duration  / 90 days ): 10/1/22     Is this a Progress Report:     []  Yes  [x]  No      If Yes:  Date Range for reporting period:  Beginnin22  Endin22              Visit # Date Range Insurance Allowable Requires auth   IN PERSON 4 22-22 BMN    [x]no        []yes:   TELEHEALTH       TOTAL              CX/NS       Next PT appt: 22                PT Practice Pattern:E  Co morbidities:1-2    Nonsurgical  INITIAL VISIT 22 CURRENT VISIT 22    PAIN 0-10/10 5-8/10 0-7/10   FUNCTIONAL SCALE FOTO 53/100 45/100   AROM R SFLX 130 148    R SABD 90 142    ER @ side 60 60    AROM IR L1 L1               STRENGHT (MMT) RSFLX 4/5 4+/5    SABD 4/5 4+/5    SER 4/5 4+/5    SIR 4+/5 4+/5    bicep 4/5 5/5                   Latex Allergy/Pacemaker/Adhesive allergy:  [x]NO      []YES     RESTRICTIONS/PRECAUTIONS: past golf activities  [] Progressing: [] Met: [] Not Met: [] Adjusted  6. Pt will demonstrate pain decreased by 50%  (4/10) with all ADLS. [] Progressing: [] Met: [] Not Met: [] Adjusted        ASSESSMENT:  Today's visit focused on education, reviewing HEP and reassessment due to infrequent visits caused by illness. Patient overall reports about 30% improvement with shoulder since injection. Still painful with reaching overhead and behind back. Progress exercises this visit for HEP. Response has not been assessed because she has not done exercises in 2 weeks. Patient will benefit from continued skilled intervention to increase ROM, flexibility, strength and function. Overall Progression Towards Functional goals/ Treatment Progress Update:  [] Patient is progressing as expected towards functional goals listed. [x] Progression is slowed due to complexities/Impairments listed. [] Progression has been slowed due to co-morbidities. [x] Plan just implemented, too soon to assess goals progression <30days   [] Goals require adjustment due to lack of progress  [] Patient is not progressing as expected and requires additional follow up with physician  [] Other    Prognosis for POC: [x] Good [] Fair  [] Poor      Patient requires continued skilled intervention: [x] Yes  [] No    Treatment/Activity Tolerance:  [x] Patient able to complete treatment  [] Patient limited by fatigue  [] Patient limited by pain    [] Patient limited by other medical complications  [] Other:                     PLAN: Progress scapular stabilization strength as tolerated  and  cont for 4 weeks due to missed time with PT visits and HEP performance.  .  [x] Continue per plan of care [] Alter current plan (see comments above)  [] Plan of care initiated [] Hold pending MD visit [] Discharge      Electronically signed by:  Kenyatta Bolton, PT [id number]  Note: If patient does not return for scheduled/ recommended follow up visits, this note will serve as a discharge from care along with most recent update on progress.

## 2022-08-29 NOTE — FLOWSHEET NOTE
ACDF         SUBJECTIVE:  Pt reports she had to also cancel her MD appt since she had COVID. She has not done her HEP. Pain level:  0-7/10 with waking up at night    OBJECTIVE: + Speeds, + O'daisha test, + Impingement signs. Pt had COVID so missed PT for the past 2 weeks after coming back for vacation. Observation:             Exercises/Interventions:     HEP:    Medbridge   Access Code: [email or web address]  URL: [email or web address]  Date: 08/01/2022  Prepared by: Eddie Stone PT    Therapeutic Ex (31727)  NMR re-education (85018) Reps/Sets/time Notes/CUES/Assessment HEP   Good form X         X     X          SB flexion  H10 x 10 reps     SB scaption H10 x10 reps                             TB Rows /Ext 12x No H due to thoracic pain + to HEP   TB no money 10x3\"  + to HEP                   Corner pec stretches H30 x3 reps  + to HEP                     Pt education x10' HEP, mechanics of shoulder, inflammation, needing to continue PT for results, getting back into HEP          Manual Intervention (93575)               reassessment 8'                   CUES NEEDED                Therapeutic Activity (00629)                                            Access Code: [email or web address]  URL: [email or web address] [email or web address]  Date: 08/15/2022  Prepared by: Timothy Garcia    Exercises  Standing Shoulder Row with Anchored Resistance - 1-2 x daily - 7 x weekly - 1-2 sets - 10 reps - 2 hold  Shoulder extension with resistance - Neutral - 1-2 x daily - 7 x weekly - 1-2 sets - 10 reps - 2 hold  Shoulder External Rotation and Scapular Retraction with Resistance - 1-2 x daily - 7 x weekly - 1-2 sets - 10 reps - 2 hold  Corner Pec Major Stretch - 2 x daily - 7 x weekly - 1 sets - 5 reps - 30\" hold      Therapeutic Exercise and NMR EXR  [x] (99871) Provided verbal/tactile cueing for activities related to strengthening, flexibility, endurance, ROM for improvements in LE, proximal hip, and core control with self care, mobility, lifting, ambulation.   [] or 2 muscles  [] (61789) Needle insertion without injection: 3 or more muscles    Modalities:  CP shoulder and back x10'   [] GAME READY (VASO)- for significant edema, swelling, pain control. [] ESU (HV/PM) for edema and pain control x15' PM ant/post w CP      Charges:  Timed Code Treatment Minutes: 30   Total Treatment Minutes: 40       [] EVAL (LOW) 28777 (typically 20 minutes face-to-face)  [] EVAL (MOD) 29878 (typically 30 minutes face-to-face)  [] EVAL (HIGH) 70230 (typically 45 minutes face-to-face)  [] RE-EVAL     [x] HV(04345) x 2  [] IONTO  [] NMR (58357) x    [] VASO  [x] Manual (61988) x   1   [] Other:  [] TA x      [] Mech Traction (80257)  [] ES(attended) (07443)      [] ES (un) (94741):             GOALS:    Patient stated goal: to be able to play golf and work without pain. [] Progressing: [] Met: [] Not Met: [] Adjusted     Therapist goals for Patient:  Short Term Goals: To be achieved in: 2 weeks  1. Independent in HEP and progression per patient tolerance, in order to prevent re-injury. [] Progressing: [] Met: [] Not Met: [] Adjusted  2. Patient will have a decrease in pain to facilitate improvement in movement, function, and ADLs as indicated by Functional Deficits. [] Progressing: [] Met: [] Not Met: [] Adjusted     Long Term Goals: To be achieved in: 8 weeks        1. FOTO score to be equal or greater to the predicted score (66/100)  to assist with reaching prior level of function. [] Progressing: [] Met: [] Not Met: [] Adjusted      2. Patient will demonstrate increased AROM to equal the opposite side bilaterally to allow for putting on her bra as indicated by patients Functional Deficits. [] Progressing: [] Met: [] Not Met: [] Adjusted       3. Patient will demonstrate an increase in strength to 4+/5 to allow for stocking cards as indicated by patients Functional Deficits. [] Progressing: [] Met: [] Not Met: [] Adjusted       4.  Patient will return to all transfers, work activities, and functional activities without increased symptoms or restriction, specifically washing her hair. [] Progressing: [] Met: [] Not Met: [] Adjusted       5. (Pt specific functional or activity goal) pt will be able to return to golf activities  [] Progressing: [] Met: [] Not Met: [] Adjusted  6. Pt will demonstrate pain decreased by 50%  (4/10) with all ADLS. [] Progressing: [] Met: [] Not Met: [] Adjusted        ASSESSMENT:  Today's visit focused on education, reviewing HEP and reassessment due to infrequent visits caused by illness. Patient overall reports about 30% improvement with shoulder since injection. Still painful with reaching overhead and behind back. Progress exercises this visit for HEP. Response has not been assessed because she has not done exercises in 2 weeks. Patient will benefit from continued skilled intervention to increase ROM, flexibility, strength and function. Overall Progression Towards Functional goals/ Treatment Progress Update:  [] Patient is progressing as expected towards functional goals listed. [x] Progression is slowed due to complexities/Impairments listed. [] Progression has been slowed due to co-morbidities. [x] Plan just implemented, too soon to assess goals progression <30days   [] Goals require adjustment due to lack of progress  [] Patient is not progressing as expected and requires additional follow up with physician  [] Other    Prognosis for POC: [x] Good [] Fair  [] Poor      Patient requires continued skilled intervention: [x] Yes  [] No    Treatment/Activity Tolerance:  [x] Patient able to complete treatment  [] Patient limited by fatigue  [] Patient limited by pain    [] Patient limited by other medical complications  [] Other:                     PLAN: Progress scapular stabilization strength as tolerated  and  cont for 4 weeks due to missed time with PT visits and HEP performance.  .  [x] Continue per plan of care [] Alter current plan (see comments above)  [] Plan of care initiated [] Hold pending MD visit [] Discharge      Electronically signed by:  Miguel Angel aCrballo, PT [id number]  Note: If patient does not return for scheduled/ recommended follow up visits, this note will serve as a discharge from care along with most recent update on progress.

## 2022-09-02 ENCOUNTER — HOSPITAL ENCOUNTER (OUTPATIENT)
Dept: PHYSICAL THERAPY | Age: 67
Setting detail: THERAPIES SERIES
Discharge: HOME | End: 2022-09-02
Payer: MEDICARE

## 2022-09-02 PROCEDURE — 97110 THERAPEUTIC EXERCISES: CPT | Performed by: PHYSICAL THERAPIST

## 2022-09-02 PROCEDURE — 97140 MANUAL THERAPY 1/> REGIONS: CPT | Performed by: PHYSICAL THERAPIST

## 2022-09-02 NOTE — FLOWSHEET NOTE
[facility], [address] [place]  Phone: [phone]  Fax [phone]  :  Physical Therapy Treatment Note/ Progress Report:           Date:  2022    Patient Name:  Janeth Deluca    :  1955  MRN: [id number]    Referring Physician:  Landrum Epley, MD                                                    Evaluation Date: 2022                                              Date of Referral:22     Insurance: Medicare     Next MD appt: scheduled: needs to schedule     Medical Diagnosis:  O24.781 (ICD-10-CM) - Right shoulder pain, unspecified chronicity   S46.011A (ICD-10-CM) - Rotator cuff strain, right, initial encounter   M19.011 (ICD-10-CM) - Primary osteoarthritis of right shoulder         Treatment Diagnosis:  M25.511 (ICD-10-CM) - Right shoulder pain, unspecified chronicity       Has the plan of care been signed (Y/N):        [x]  Yes  []  No    Progress report will be due (10 Rx or 30 days ):   DUE Visit 10 or 22 ; done visit 4 6/3/63      Recertification will be due (POC Duration  / 90 days ): 10/1/22     Is this a Progress Report:     []  Yes  [x]  No      If Yes:  Date Range for reporting period:  Beginnin22  Endin22              Visit # Date Range Insurance Allowable Requires auth   IN PERSON 5 22-22 BMN    [x]no        []yes:   TELEHEALTH       TOTAL              CX/NS       Next PT appt: 22                PT Practice Pattern:E  Co morbidities:1-2    Nonsurgical  INITIAL VISIT 22 CURRENT VISIT 22    PAIN 0-10/10 5-8/10 0-7/10   FUNCTIONAL SCALE FOTO 53/100 45/100   AROM R SFLX 130 148    R SABD 90 142    ER @ side 60 60    AROM IR L1 L1               STRENGHT (MMT) RSFLX 4/5 4+/5    SABD 4/5 4+/5    SER 4/5 4+/5    SIR 4+/5 4+/5    bicep 4/5 5/5                   Latex Allergy/Pacemaker/Adhesive allergy:  [x]NO      []YES     RESTRICTIONS/PRECAUTIONS: past ACDF         SUBJECTIVE:  Pt reports starting back to work . 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 Had not started back to band exercises due to needing a new band. Pain level:  0-7/10 with waking up at night    OBJECTIVE: + Speeds, + O'daisha test, + Impingement signs. Pt had COVID so missed PT for the past 2 weeks after coming back for vacation. Observation:             Exercises/Interventions:     HEP:    [employer]   Access Code: [email or web address]  URL: [email or web address]  Date: 08/01/2022  Prepared by: Sergio Mckeon PT    Therapeutic Ex (66321)  NMR re-education (45188) Reps/Sets/time Notes/CUES/Assessment HEP   Good form X         X     X          SB flexion  H10 x 10 reps     SB scaption H10 x10 reps           SA 2# punches Attemtped but reproduced pain in back ,so held     Triceps 1# in supine 2x10 reps           TB Rows /Ext 2x10 reps  + to HEP   TB no money 2x 10x3\"  + to HEP   TB ER walkouts H5 5 reps     True stretches pec stretch H10 x5            Corner pec stretches  + to HEP         IR stretches  ? N.V           Pt education x10' HEP, mechanics of shoulder, inflammation, needing to continue PT for results, getting back into HEP          Manual Intervention (49006)      PROM/GH joint mobs 10' R shoulder Manual IR stretches         STM/friction massage to bicipital groove 8' Significant tenderness noted                  CUES NEEDED                Therapeutic Activity (06094)                                            Access Code: [email or web address]  URL: [email or web address]  Date: 08/15/2022  Prepared by: Nikhil Hammed    Exercises  Standing Shoulder Row with Anchored Resistance - 1-2 x daily - 7 x weekly - 1-2 sets - 10 reps - 2 hold  Shoulder extension with resistance - Neutral - 1-2 x daily - 7 x weekly - 1-2 sets - 10 reps - 2 hold  Shoulder External Rotation and Scapular Retraction with Resistance - 1-2 x daily - 7 x weekly - 1-2 sets - 10 reps - 2 hold  Corner Pec Major Stretch - 2 x daily - 7 x weekly - 1 sets - 5 reps - 30\" hold      Therapeutic Exercise and NMR EXR  [x] (63421) Provided verbal/tactile cueing for activities related to strengthening, flexibility, endurance, ROM for improvements in LE, proximal hip, and core control with self care, mobility, lifting, ambulation.  [] (43834) Provided verbal/tactile cueing for activities related to improving balance, coordination, kinesthetic sense, posture, motor skill, proprioception  to assist with LE, proximal hip, and core control in self care, mobility, lifting, ambulation and eccentric single leg control.      NMR and Therapeutic Activities:    [x] (11719 or 99089) Provided verbal/tactile cueing for activities related to improving balance, coordination, kinesthetic sense, posture, motor skill, proprioception and motor activation to allow for proper function of core, proximal hip and LE with self care and ADLs  [] (88975) Gait Re-education- Provided training and instruction to the patient for proper LE, core and proximal hip recruitment and positioning and eccentric body weight control with ambulation re-education including up and down stairs     Home Exercise Program:    [x] (96927) Reviewed/Progressed HEP activities related to strengthening, flexibility, endurance, ROM of core, proximal hip and LE for functional self-care, mobility, lifting and ambulation/stair navigation   [] (94537)Reviewed/Progressed HEP activities related to improving balance, coordination, kinesthetic sense, posture, motor skill, proprioception of core, proximal hip and LE for self care, mobility, lifting, and ambulation/stair navigation      Manual Treatments:  PROM / STM / Oscillations-Mobs:  G-I, II, III, IV (PA's, Inf., Post.)  [x] (48498) Provided manual therapy to mobilize LE, proximal hip and/or LS spine soft tissue/joints for the purpose of modulating pain, promoting relaxation,  increasing ROM, reducing/eliminating soft tissue swelling/inflammation/restriction, improving soft tissue extensibility and allowing for proper ROM for normal function with self care, mobility, lifting and ambulation. Trigger point Dry Needling:  fine needle insertion into myofascial trigger points to stimulate a healing response  [] (28711) Needle insertion without injection: 1 or 2 muscles  [] (98726) Needle insertion without injection: 3 or more muscles    Modalities:  CP shoulder and back x10'   [] GAME READY (VASO)- for significant edema, swelling, pain control. [] ESU (HV/PM) for edema and pain control x15' PM ant/post w CP      Charges:  Timed Code Treatment Minutes: 39'   Total Treatment Minutes: 54'       [] EVAL (LOW) 455 1011 (typically 20 minutes face-to-face)  [] EVAL (MOD) 68882 (typically 30 minutes face-to-face)  [] EVAL (HIGH) 59426 (typically 45 minutes face-to-face)  [] RE-EVAL     [x] CP(35566) x 2  [] IONTO  [] NMR (47603) x    [] VASO  [x] Manual (62941) x   1   [] Other:  [] TA x      [] Mech Traction (03258)  [] ES(attended) (01597)      [] ES (un) (82385):             GOALS:    Patient stated goal: to be able to play golf and work without pain. [] Progressing: [] Met: [] Not Met: [] Adjusted     Therapist goals for Patient:  Short Term Goals: To be achieved in: 2 weeks  1. Independent in HEP and progression per patient tolerance, in order to prevent re-injury. [] Progressing: [] Met: [] Not Met: [] Adjusted  2. Patient will have a decrease in pain to facilitate improvement in movement, function, and ADLs as indicated by Functional Deficits. [] Progressing: [] Met: [] Not Met: [] Adjusted     Long Term Goals: To be achieved in: 8 weeks        1. FOTO score to be equal or greater to the predicted score (66/100)  to assist with reaching prior level of function. [] Progressing: [] Met: [] Not Met: [] Adjusted      2.  Patient will demonstrate increased AROM to equal the opposite side bilaterally to allow for putting on her bra as indicated by patients Functional Deficits. [] Progressing: [] Met: [] Not Met: [] Adjusted       3. Patient will demonstrate an increase in strength to 4+/5 to allow for stocking cards as indicated by patients Functional Deficits. [] Progressing: [] Met: [] Not Met: [] Adjusted       4. Patient will return to all transfers, work activities, and functional activities without increased symptoms or restriction, specifically washing her hair. [] Progressing: [] Met: [] Not Met: [] Adjusted       5. (Pt specific functional or activity goal) pt will be able to return to golf activities  [] Progressing: [] Met: [] Not Met: [] Adjusted  6. Pt will demonstrate pain decreased by 50%  (4/10) with all ADLS. [] Progressing: [] Met: [] Not Met: [] Adjusted        ASSESSMENT:  Reports pain was less when ill and not using arm as much. Increased symptoms noted this week due to activity. Significant tenderness noted with STM bicipital groove this visit. Restriction noted IR R > L. Patient overall reports about 30% improvement with shoulder since injection. Still painful with reaching overhead and behind back. Progress exercises this visit for HEP. Response has not been assessed because she has not done exercises in 2 weeks. Patient will benefit from continued skilled intervention to increase ROM, flexibility, strength and function. Overall Progression Towards Functional goals/ Treatment Progress Update:  [] Patient is progressing as expected towards functional goals listed. [x] Progression is slowed due to complexities/Impairments listed. [] Progression has been slowed due to co-morbidities.   [x] Plan just implemented, too soon to assess goals progression <30days   [] Goals require adjustment due to lack of progress  [] Patient is not progressing as expected and requires additional follow up with physician  [] Other    Prognosis for POC: [x] Good [] Fair  [] Poor      Patient requires continued skilled intervention: [x] Yes  [] No    Treatment/Activity Tolerance:  [x] Patient able to complete treatment  [] Patient limited by fatigue  [] Patient limited by pain    [] Patient limited by other medical complications  [] Other:                     PLAN: Progress scapular stabilization strength as tolerated. May benefit from IASTM. Considered adding IR stretches next visit. [x] Continue per plan of care [] Alter current plan (see comments above)  [] Plan of care initiated [] Hold pending MD visit [] Discharge      Electronically signed by:  Garret Tapia, [address] [place] [address]  Note: If patient does not return for scheduled/ recommended follow up visits, this note will serve as a discharge from care along with most recent update on progress.

## 2022-09-07 ENCOUNTER — HOSPITAL ENCOUNTER (OUTPATIENT)
Dept: PHYSICAL THERAPY | Age: 67
Setting detail: THERAPIES SERIES
Discharge: HOME | End: 2022-09-07
Payer: MEDICARE

## 2022-09-07 ENCOUNTER — OFFICE VISIT (OUTPATIENT)
Dept: ORTHOPEDIC SURGERY | Age: 67
End: 2022-09-07
Payer: MEDICARE

## 2022-09-07 DIAGNOSIS — S46.011A ROTATOR CUFF STRAIN, RIGHT, INITIAL ENCOUNTER: ICD-10-CM

## 2022-09-07 DIAGNOSIS — M19.011 PRIMARY OSTEOARTHRITIS OF RIGHT SHOULDER: ICD-10-CM

## 2022-09-07 DIAGNOSIS — M25.511 RIGHT SHOULDER PAIN, UNSPECIFIED CHRONICITY: Primary | ICD-10-CM

## 2022-09-07 PROCEDURE — G8400 PT W/DXA NO RESULTS DOC: HCPCS | Performed by: FAMILY MEDICINE

## 2022-09-07 PROCEDURE — 3017F COLORECTAL CA SCREEN DOC REV: CPT | Performed by: FAMILY MEDICINE

## 2022-09-07 PROCEDURE — 1090F PRES/ABSN URINE INCON ASSESS: CPT | Performed by: FAMILY MEDICINE

## 2022-09-07 PROCEDURE — G8417 CALC BMI ABV UP PARAM F/U: HCPCS | Performed by: FAMILY MEDICINE

## 2022-09-07 PROCEDURE — 1036F TOBACCO NON-USER: CPT | Performed by: FAMILY MEDICINE

## 2022-09-07 PROCEDURE — 99214 OFFICE O/P EST MOD 30 MIN: CPT | Performed by: FAMILY MEDICINE

## 2022-09-07 PROCEDURE — G8428 CUR MEDS NOT DOCUMENT: HCPCS | Performed by: FAMILY MEDICINE

## 2022-09-07 PROCEDURE — 97035 APP MDLTY 1+ULTRASOUND EA 15: CPT | Performed by: PHYSICAL THERAPIST

## 2022-09-07 PROCEDURE — 97112 NEUROMUSCULAR REEDUCATION: CPT | Performed by: PHYSICAL THERAPIST

## 2022-09-07 PROCEDURE — 1123F ACP DISCUSS/DSCN MKR DOCD: CPT | Performed by: FAMILY MEDICINE

## 2022-09-07 PROCEDURE — 97110 THERAPEUTIC EXERCISES: CPT | Performed by: PHYSICAL THERAPIST

## 2022-09-07 RX ORDER — METHYLPREDNISOLONE 4 MG/1
TABLET ORAL
Qty: 21 KIT | Refills: 0 | Status: SHIPPED | OUTPATIENT
Start: 2022-09-07 | End: 2022-10-24

## 2022-09-07 NOTE — PATIENT INSTRUCTIONS
Stop diclofenac for now. Take Medrol for the next 6 days. This is a steroid pack. Flip the package over to the foil side and the directions will tell you to start with 6 pills the first day, 5 pills the second day, etc. Please do not take any other anti-inflammatories with the medrol dose rita as this can upset your stomach. If something else is needed, you may take extra strength tylenol.      Once you are finished with the medrol, then you may re-start your anti-inflammatory: diclofenac 2x/day    **CONTINUE WITH PHYSICAL THERAPY

## 2022-09-07 NOTE — FLOWSHEET NOTE
[facility], [address] [place]  Phone: [phone]  Fax [phone]  :  Physical Therapy Treatment Note/ Progress Report:           Date:  2022    Patient Name:  Latoya Kimball    :  1955  MRN: [id number]    Referring Physician:  Jesse Faye MD                                                    Evaluation Date: 2022                                              Date of Referral:22     Insurance: Medicare     Next MD appt: scheduled: needs to schedule     Medical Diagnosis:  P66.720 (ICD-10-CM) - Right shoulder pain, unspecified chronicity   S46.011A (ICD-10-CM) - Rotator cuff strain, right, initial encounter   M19.011 (ICD-10-CM) - Primary osteoarthritis of right shoulder         Treatment Diagnosis:  M25.511 (ICD-10-CM) - Right shoulder pain, unspecified chronicity       Has the plan of care been signed (Y/N):        [x]  Yes  []  No    Progress report will be due :   DUE Visit 10 or 22 ; done visit 4       Recertification will be due (POC Duration  / 90 days ):   10/1/22     Is this a Progress Report:     []  Yes  [x]  No      If Yes:  Date Range for reporting period:  Beginnin22  Ending:              Visit # Date Range Insurance Allowable Requires auth   IN PERSON 6 22- BMN    [x]no        []yes:   TELEHEALTH       TOTAL              CX/NS       Next PT appt: 22                PT Practice Pattern:E  Co morbidities:1-2    Nonsurgical  INITIAL VISIT 22 CURRENT VISIT 22    PAIN 0-10/10 5-8/10 0-7/10   FUNCTIONAL SCALE FOTO 53/100 45/100   AROM R SFLX 130 148    R SABD 90 142    ER @ side 60 60    AROM IR L1 L1               STRENGHT (MMT) RSFLX 4/5 4+/5    SABD 4/5 4+/5    SER 4/5 4+/5    SIR 4+/5 4+/5    bicep 4/5 5/5                   Latex Allergy/Pacemaker/Adhesive allergy:  [x]NO      []YES     RESTRICTIONS/PRECAUTIONS: past ACDF         SUBJECTIVE:  Pt reports that she saw the MD and wants her to get an MRI, begin steroid pack and  cont PT. Been hurting more at night and waking her up. The most pain is with lifting and reaching as well. Pt was discussing her HEP and it became apparent she has not been using her instruction papers at all only is going by \"memory\" per her account. See assessment and pt education. Pain level:  0-7/10 with waking up at night    OBJECTIVE: + Speeds, + O'daisha test, + Impingement signs. Pt had COVID so missed PT for the past 2 weeks after coming back for vacation. Observation:             Exercises/Interventions:     HEP:    [employer]   Access Code: [email or web address]  URL: [email or web address]  Date: 08/01/2022  Prepared by: Arianne Andres PT    Therapeutic Ex (58478)  NMR re-education (15507) Reps/Sets/time Notes/CUES/Assessment HEP   Good form X         X    Flexion table slide  10x10\" MC X    Scaption table slide 10x10\" MC X 9/7   ER table stretch 10x10\" MC X 9/7           Strap IR stretch   X 9/7                 TB Rows /Ext 2x10 reps Significant MC/VC + to HEP   TB no money seated 2x 10x3\"  YTB for HEP Significant MC/VC + to HEP                 Corner pec stretches H30 x3 reps MC/VC + to HEP                     Pt education x10' HEP, expected progress at this time          Manual Intervention (71067)      Manual IR stretches                       CUES NEEDED                Therapeutic Activity (66932)                                            Access Code: [email or web address]  URL: [email or web address]  Date: 08/15/2022  Prepared by: Traci Scruggs    Exercises  Standing Shoulder Row with Anchored Resistance - 1-2 x daily - 7 x weekly - 1-2 sets - 10 reps - 2 hold  Shoulder extension with resistance - Neutral - 1-2 x daily - 7 x weekly - 1-2 sets - 10 reps - 2 hold  Shoulder External Rotation and Scapular Retraction with Resistance - 1-2 x daily - 7 x weekly - 1-2 sets - 10 reps - 2 hold  Corner Pec Major Stretch - 2 x daily - 7 x weekly - 1 sets - 5 reps - 30\" hold      Therapeutic Exercise and NMR EXR  [x] (29330) Provided verbal/tactile cueing for activities related to strengthening, flexibility, endurance, ROM for improvements in LE, proximal hip, and core control with self care, mobility, lifting, ambulation.  [] (14451) Provided verbal/tactile cueing for activities related to improving balance, coordination, kinesthetic sense, posture, motor skill, proprioception  to assist with LE, proximal hip, and core control in self care, mobility, lifting, ambulation and eccentric single leg control.      NMR and Therapeutic Activities:    [x] (09425 or 33249) Provided verbal/tactile cueing for activities related to improving balance, coordination, kinesthetic sense, posture, motor skill, proprioception and motor activation to allow for proper function of core, proximal hip and LE with self care and ADLs  [] (08666) Gait Re-education- Provided training and instruction to the patient for proper LE, core and proximal hip recruitment and positioning and eccentric body weight control with ambulation re-education including up and down stairs     Home Exercise Program:    [x] (22992) Reviewed/Progressed HEP activities related to strengthening, flexibility, endurance, ROM of core, proximal hip and LE for functional self-care, mobility, lifting and ambulation/stair navigation   [] (06581)Reviewed/Progressed HEP activities related to improving balance, coordination, kinesthetic sense, posture, motor skill, proprioception of core, proximal hip and LE for self care, mobility, lifting, and ambulation/stair navigation      Manual Treatments:  PROM / STM / Oscillations-Mobs:  G-I, II, III, IV (PA's, Inf., Post.)  [] (28933) Provided manual therapy to mobilize LE, proximal hip and/or LS spine soft tissue/joints for the purpose of modulating pain, promoting relaxation,  increasing ROM, reducing/eliminating soft tissue swelling/inflammation/restriction, improving soft tissue extensibility and allowing for proper ROM for normal function with self care, mobility, lifting and ambulation. Trigger point Dry Needling:  fine needle insertion into myofascial trigger points to stimulate a healing response  [] (25983) Needle insertion without injection: 1 or 2 muscles  [] (95876) Needle insertion without injection: 3 or more muscles    Modalities:  US see below   [] GAME READY (VASO)- for significant edema, swelling, pain control. [] ESU (HV/PM) for edema and pain control x15' PM ant/post w CP      Charges:  Timed Code Treatment Minutes: 55'   Total Treatment Minutes: 55'       [] EVAL (LOW) 87657 (typically 20 minutes face-to-face)  [] EVAL (MOD) 60907 (typically 30 minutes face-to-face)  [] EVAL (HIGH) 08133 (typically 45 minutes face-to-face)  [] RE-EVAL     [x] RJ(64059) x 2  [] IONTO  [x] NMR (35610) x  1  [] VASO  [] Manual (74629) x     [x] Other: US 50% 0.8 w/cm2 bicep tendon x8'  [] TA x      [] Mech Traction (75070)  [] ES(attended) (54374)      [] ES (un) (16623):             GOALS:    Patient stated goal: to be able to play golf and work without pain. [] Progressing: [] Met: [] Not Met: [] Adjusted     Therapist goals for Patient:  Short Term Goals: To be achieved in: 2 weeks  1. Independent in HEP and progression per patient tolerance, in order to prevent re-injury. [] Progressing: [] Met: [] Not Met: [] Adjusted  2. Patient will have a decrease in pain to facilitate improvement in movement, function, and ADLs as indicated by Functional Deficits. [] Progressing: [] Met: [] Not Met: [] Adjusted     Long Term Goals: To be achieved in: 8 weeks        1. FOTO score to be equal or greater to the predicted score (66/100)  to assist with reaching prior level of function. [] Progressing: [] Met: [] Not Met: [] Adjusted      2.  Patient will demonstrate increased AROM to equal the opposite side bilaterally to allow for putting on her bra as indicated by patients Functional Deficits. [] Progressing: [] Met: [] Not Met: [] Adjusted       3. Patient will demonstrate an increase in strength to 4+/5 to allow for stocking cards as indicated by patients Functional Deficits. [] Progressing: [] Met: [] Not Met: [] Adjusted       4. Patient will return to all transfers, work activities, and functional activities without increased symptoms or restriction, specifically washing her hair. [] Progressing: [] Met: [] Not Met: [] Adjusted       5. (Pt specific functional or activity goal) pt will be able to return to golf activities  [] Progressing: [] Met: [] Not Met: [] Adjusted  6. Pt will demonstrate pain decreased by 50%  (4/10) with all ADLS. [] Progressing: [] Met: [] Not Met: [] Adjusted        ASSESSMENT:  9/7/22 Pt cont to not perform her HEP correctly or consistently and does not follow instructions in clinic or for outside of clinic. She struggles with form and speed of exercises in clinic. 10' of pt education was performed this visit regarding the need for performance of HEP properly and consistently (has not since onset of PT) and progression is not expected from 1 PT visit a week over 4 weeks alone. Progress is not expected if pt does not perform HEP. We will see pt next visit to assess HEP performance and then possible hold until after MRI.       9/2/22 Reports pain was less when ill and not using arm as much. Increased symptoms noted this week due to activity. Significant tenderness noted with STM bicipital groove this visit. Restriction noted IR R > L. Patient overall reports about 30% improvement with shoulder since injection. Still painful with reaching overhead and behind back. Progress exercises this visit for HEP. Response has not been assessed because she has not done exercises in 2 weeks. Patient will benefit from continued skilled intervention to increase ROM, flexibility, strength and function.           Overall Progression Towards Functional goals/ Treatment Progress Update:  [] Patient is progressing as expected towards functional goals listed. [x] Progression is slowed due to complexities/Impairments listed. [] Progression has been slowed due to co-morbidities. [] Plan just implemented, too soon to assess goals progression <30days   [] Goals require adjustment due to lack of progress  [] Patient is not progressing as expected and requires additional follow up with physician  [] Other    Prognosis for POC: [] Good [x] Fair  [x] Poor      Patient requires continued skilled intervention: [x] Yes  [] No    Treatment/Activity Tolerance:  [x] Patient able to complete treatment  [] Patient limited by fatigue  [x] Patient limited by pain    [] Patient limited by other medical complications  [] Other:                     PLAN: We will see pt next visit to assess HEP performance and then possible hold until after MRI. [x] Continue per plan of care [] Alter current plan (see comments above)  [] Plan of care initiated [] Hold pending MD visit [] Discharge      Electronically signed by:  Kenyatta Bolton, [place] [id number]  Note: If patient does not return for scheduled/ recommended follow up visits, this note will serve as a discharge from care along with most recent update on progress.

## 2022-09-07 NOTE — PROGRESS NOTES
Chief Complaint    Shoulder Pain (CK R SHOULDER)    FU new onset right shoulder pain status post lifting injury roughly 7/1/2022 with right shoulder weakness and motion loss and difficulty sleeping    History of Present Illness:  Morenita Tidwell is a 78 y.o. 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 Amrita Manriquez is being seen by today for an also helps care for her mother who is seen today for separate orthopedic condition. She states that in early July 2022 she was lifting up a potted plant and attempting to move this and felt a pop in her right shoulder. This was associated with the pain and since that time has had difficulty with a constant dull achy pain in her shoulder at 2-3 out of 10 with sharper pain with active elevation with diminished motion and inability to golf. She really does not have any history of substantial shoulder injuries. She tried icing and has continued with her diclofenac but despite this her pain symptoms in her shoulder have improved. She has noticed crepitation and popping but no active locking or catching and denies substantial cervical pain or radicular symptoms. She never really has had a problem with her shoulder and does leave for a family vacation to [place] on 8/4 to 8/12/2022. She is having a difficult time sleeping in her shoulder but her knees which she was seen for recently are doing somewhat better. Her major goal is to diminish her pain and return to golf. She has not golfed in the last couple weeks and is being seen today for orthopedic and sports consultation with initial imaging. We last saw Boris Aponte in the office on 7/20/2022 for her right shoulder. Following her initial evaluation she did notice a slight improvement which was fairly short-lived.   She was able to get through her vacation in [place] but is continued pain associated reductions in motion. Pain with abduction at 95 forward flexion better to about 110 external rotation limited to 25 with internal rotation to about L4. She is stiff. Strength: Weakness 4-5 with both supra and infraspinatus testing. Subscap 4+ out of 5. Special Tests: She has negative drop and lift off testing but does have 7 out of 10 pain with impingement testing. Pain with glenohumeral grind testing. Negative speeds testing. Positive George. Negative screening cervical testing. Skin: There are no rashes, ulcerations or lesions. Distal motor sensory and vascular exam is intact. Gait: Reasonable gait. Reflexes:  Symmetrically preserved. Additional Comments:        Additional Examinations:  Contralateral Exam: Examination of the left shoulder reveals no atrophy or deformity. The skin is warm and dry. Range of motion is within normal limits. There is no focal tenderness with palpation. No AC joint tenderness. Negative Neer's and George-Derek exams. Strength is graded 5/5 throughout. Right Upper Extremity:  Examination of the right upper extremity does not show any tenderness, deformity or injury. Range of motion is unremarkable. There is no gross instability. There are no rashes, ulcerations or lesions. Strength and tone are normal.  Left Upper Extremity: Examination of the left upper extremity does not show any tenderness, deformity or injury. Range of motion is unremarkable. There is no gross instability. There are no rashes, ulcerations or lesions. Strength and tone are normal.  Neck: Examination of the neck does not show any tenderness, deformity or injury. Range of motion is unremarkable. There is no gross instability. There are no rashes, ulcerations or lesions.   Strength and tone are normal.         Diagnostic Test Findings:   Right shoulder true AP outlet and axillary films were reviewed from 7/20/2022 and does show moderate to significant right glenohumeral osteoarthritis with inferior spurring and evidence of AC arthropathy without evidence of acute high-grade osseous injury. Assessment: #1.  2+ months status post lifting injury right shoulder with persistent right shoulder pain suspected cuff straining and aggravation underlying right shoulder significant osteoarthritis       Impression:    Encounter Diagnoses   Name Primary? Right shoulder pain, unspecified chronicity Yes    Rotator cuff strain, right, initial encounter     Primary osteoarthritis of right shoulder        Office Procedures:     Orders Placed This Encounter   Procedures    XR SHOULDER RIGHT (MIN 2 VIEWS)     Standing Status:   Future     Number of Occurrences:   1     Standing Expiration Date:   9/7/2023    MRI SHOULDER RIGHT WO CONTRAST     Standing Status:   Future     Standing Expiration Date:   9/7/2023     Scheduling Instructions:      [facility]      [address], [place] [address]:      TIME AND DATE TBD      PLEASE CALL PATIENT ONCE APPROVED TO SCHEDULE       PUSH TO [facility] PACS SYSTEM            Remember that it may take several business days to pre-cert your MRI through your insurance. Our office will contact you as soon as we have the approval. We will not give any test results over the phone. Please call [phone] once you have your test day and time to schedule a follow up with Dr. Oscar Srivastava. Order Specific Question:   Reason for exam:     Answer:   evaluate OA, r/o rotator cuff tear, impingement    Ambulatory referral to Physical Therapy     Referral Priority:   Routine     Referral Type:   Eval and Treat     Referral Reason:   Specialty Services Required     Requested Specialty:   Physical Therapist     Number of Visits Requested:   1       Treatment Plan: Treatment options were discussed with Manolo today.   We did once again review her plain films and exam findings. She has been experiencing persistent right shoulder pain and night pain with her right shoulder since lifting a potted plant on roughly 7/1/2022. She does have underlying moderate to significant glenohumeral arthropathy and probably has some degree of cuff straining and some pain associated weakness. With her worsening symptoms, would like for her to have an MRI to more thoroughly evaluate the degree of her degenerative changes and to rule out rotator cuff tearing. We did place her on a Medrol Dosepak to be followed by diclofenac 75 mg 1 pill twice daily we will continue with her exercise program icing and activity modification. We will see her back post imaging. She will contact us in the interim with questions or concerns. This dictation was performed with a verbal recognition program (DRAGON) and it was checked for errors. It is possible that there are still dictated errors within this office note. If so, please bring any errors to my attention for an addendum. All efforts were made to ensure that this office note is accurate.

## 2022-09-12 ENCOUNTER — OFFICE VISIT (OUTPATIENT)
Dept: ORTHOPEDIC SURGERY | Age: 67
End: 2022-09-12
Payer: MEDICARE

## 2022-09-12 ENCOUNTER — HOSPITAL ENCOUNTER (OUTPATIENT)
Dept: PHYSICAL THERAPY | Age: 67
Setting detail: THERAPIES SERIES
Discharge: HOME | End: 2022-09-12
Payer: MEDICARE

## 2022-09-12 VITALS — BODY MASS INDEX: 30.61 KG/M2 | WEIGHT: 195 LBS | HEIGHT: 67 IN

## 2022-09-12 DIAGNOSIS — M67.911 TENDINOPATHY OF RIGHT ROTATOR CUFF: ICD-10-CM

## 2022-09-12 DIAGNOSIS — M67.921 TENDINOPATHY OF RIGHT BICEPS TENDON: ICD-10-CM

## 2022-09-12 DIAGNOSIS — M19.011 PRIMARY OSTEOARTHRITIS OF RIGHT SHOULDER: ICD-10-CM

## 2022-09-12 DIAGNOSIS — M25.511 RIGHT SHOULDER PAIN, UNSPECIFIED CHRONICITY: Primary | ICD-10-CM

## 2022-09-12 DIAGNOSIS — M75.41 SHOULDER IMPINGEMENT SYNDROME, RIGHT: ICD-10-CM

## 2022-09-12 DIAGNOSIS — M75.111 NONTRAUMATIC INCOMPLETE TEAR OF RIGHT ROTATOR CUFF: ICD-10-CM

## 2022-09-12 DIAGNOSIS — M19.019 AC JOINT ARTHROPATHY: ICD-10-CM

## 2022-09-12 PROCEDURE — 97140 MANUAL THERAPY 1/> REGIONS: CPT | Performed by: PHYSICAL THERAPIST

## 2022-09-12 PROCEDURE — 20610 DRAIN/INJ JOINT/BURSA W/O US: CPT | Performed by: FAMILY MEDICINE

## 2022-09-12 PROCEDURE — 97112 NEUROMUSCULAR REEDUCATION: CPT | Performed by: PHYSICAL THERAPIST

## 2022-09-12 PROCEDURE — 97110 THERAPEUTIC EXERCISES: CPT | Performed by: PHYSICAL THERAPIST

## 2022-09-12 PROCEDURE — 20605 DRAIN/INJ JOINT/BURSA W/O US: CPT | Performed by: FAMILY MEDICINE

## 2022-09-12 RX ORDER — BETAMETHASONE SODIUM PHOSPHATE AND BETAMETHASONE ACETATE 3; 3 MG/ML; MG/ML
18 INJECTION, SUSPENSION INTRA-ARTICULAR; INTRALESIONAL; INTRAMUSCULAR; SOFT TISSUE ONCE
Status: COMPLETED | OUTPATIENT
Start: 2022-09-12 | End: 2022-09-12

## 2022-09-12 RX ORDER — BUPIVACAINE HYDROCHLORIDE 2.5 MG/ML
5 INJECTION, SOLUTION INFILTRATION; PERINEURAL ONCE
Status: COMPLETED | OUTPATIENT
Start: 2022-09-12 | End: 2022-09-12

## 2022-09-12 RX ORDER — LIDOCAINE HYDROCHLORIDE 10 MG/ML
4 INJECTION, SOLUTION INFILTRATION; PERINEURAL ONCE
Status: COMPLETED | OUTPATIENT
Start: 2022-09-12 | End: 2022-09-12

## 2022-09-12 RX ADMIN — BUPIVACAINE HYDROCHLORIDE 12.5 MG: 2.5 INJECTION, SOLUTION INFILTRATION; PERINEURAL at 13:49

## 2022-09-12 RX ADMIN — LIDOCAINE HYDROCHLORIDE 4 ML: 10 INJECTION, SOLUTION INFILTRATION; PERINEURAL at 13:56

## 2022-09-12 RX ADMIN — BETAMETHASONE SODIUM PHOSPHATE AND BETAMETHASONE ACETATE 18 MG: 3; 3 INJECTION, SUSPENSION INTRA-ARTICULAR; INTRALESIONAL; INTRAMUSCULAR; SOFT TISSUE at 13:47

## 2022-09-12 NOTE — FLOWSHEET NOTE
[facility], [address] [place]  Phone: [phone]  Fax [phone]  :  Physical Therapy Treatment Note/ Progress Report:           Date:  2022    Patient Name:  Vik Begum    :  1955  MRN: [id number]    Referring Physician:  Denice Tyler MD                                                    Evaluation Date: 2022                                              Date of Referral:22     Insurance: Medicare     Next MD appt: scheduled: 22     Medical Diagnosis:  A83.569 (ICD-10-CM) - Right shoulder pain, unspecified chronicity   S46.011A (ICD-10-CM) - Rotator cuff strain, right, initial encounter   M19.011 (ICD-10-CM) - Primary osteoarthritis of right shoulder         Treatment Diagnosis:  M25.511 (ICD-10-CM) - Right shoulder pain, unspecified chronicity       Has the plan of care been signed (Y/N):        [x]  Yes  []  No    Progress report will be due :    ; done visit 4       Recertification will be due (POC Duration  / 90 days ):   10/1/22     Is this a Progress Report:     []  Yes  [x]  No      If Yes:  Date Range for reporting period:  Beginnin22  Ending:              Visit # Date Range Insurance Allowable Requires auth   IN PERSON 7 22- BMN    [x]no        []yes:   TELEHEALTH       TOTAL              CX/NS       Next PT appt: 22                PT Practice Pattern:E  Co morbidities:1-2    Nonsurgical  INITIAL VISIT 22    PAIN 0-10/10 5-8/10 0-7/10   FUNCTIONAL SCALE FOTO 53/100 45/100   AROM R SFLX 130 148    R SABD 90 142    ER @ side 60 60    AROM IR L1 L1               STRENGHT (MMT) RSFLX 4/5 4+/5    SABD 4/5 4+/5    SER 4/5 4+/5    SIR 4+/5 4+/5    bicep 4/5 5/5                   Latex Allergy/Pacemaker/Adhesive allergy:  [x]NO      []YES     RESTRICTIONS/PRECAUTIONS: past ACDF         SUBJECTIVE:  Pt reports seeing MD today to go over MRI results. Just finished steroid pack today without much benefit. Reports having to clean her mother's closet and used UE a lot on Saturday and arm has been sore. Tried to do exercises yesterday , but only got through half of the exercises and had to stop due to pain. Reports pain is back to baseline from before injection. Pain level:  3/10 without moving UE.     OBJECTIVE: + Speeds, + O'daisha test, + Impingement signs. Pt had COVID so missed PT for the past 2 weeks after coming back for vacation. Observation:             Exercises/Interventions:     HEP:    [facility]   Access Code: [email or web address]  URL: [email or web address]  Date: 08/01/2022  Prepared by: Eddie Stone PT    Therapeutic Ex (67776)  NMR re-education (77990) Reps/Sets/time Notes/CUES/Assessment HEP   Good form X         X    Flexion table slide  MC X    Scaption table slide MC X 9/7   ER table stretch 10x10\" MC X 9/7   H10 x 10 reps     H10 x10 reps     Strap IR stretch   X 9/7   SA punches 2x10 reps               TB Rows /Ext Significant MC/VC + to HEP   TB no money seated Significant MC/VC + to HEP                 Corner pec stretches H30 x3 reps MC/VC + to HEP                     Pt education x10' HEP, expected progress at this time          Manual Intervention (20953)      PROM/GH joint mobs 10' R shoulder Manual IR stretches         STM/friction massage to bicipital groove 8' Significant tenderness noted                  CUES NEEDED                Therapeutic Activity (55863)                                            Access Code: [email or web address]  URL: [email or web address]  Date: 08/15/2022  Prepared by: Timothy Garcia    Exercises  Standing Shoulder Row with Anchored Resistance - 1-2 x daily - 7 x weekly - 1-2 sets - 10 reps - 2 hold  Shoulder extension with resistance - Neutral - 1-2 x daily - 7 x weekly - 1-2 sets - 10 reps - 2 hold  Shoulder External Rotation and Scapular Retraction with Resistance - 1-2 x daily - 7 x weekly - 1-2 sets - 10 reps - tissue swelling/inflammation/restriction, improving soft tissue extensibility and allowing for proper ROM for normal function with self care, mobility, lifting and ambulation. Trigger point Dry Needling:  fine needle insertion into myofascial trigger points to stimulate a healing response  [] (33679) Needle insertion without injection: 1 or 2 muscles  [] (91677) Needle insertion without injection: 3 or more muscles    Modalities: laser to R shld. CP to R shld and mid back x 20 min after treatment   [] GAME READY (VASO)- for significant edema, swelling, pain control. [] ESU (HV/PM) for edema and pain control x15' PM ant/post w CP      Charges:  Timed Code Treatment Minutes: 52'   Total Treatment Minutes: 72'       [] EVAL (LOW) 37867 (typically 20 minutes face-to-face)  [] EVAL (MOD) 81667 (typically 30 minutes face-to-face)  [] EVAL (HIGH) 24751 (typically 45 minutes face-to-face)  [] RE-EVAL     [x] XI(90477) x 1  [] IONTO  [x] NMR (80719) x  1  [] VASO  [x] Manual (91543) x  1   [x] Other: laser 24 w/cm2 to bicipital groove x3'  [] TA x      [] Mech Traction (84727)  [] ES(attended) (60890)      [] ES (un) (25196):             GOALS:    Patient stated goal: to be able to play golf and work without pain. [] Progressing: [] Met: [] Not Met: [] Adjusted     Therapist goals for Patient:  Short Term Goals: To be achieved in: 2 weeks  1. Independent in HEP and progression per patient tolerance, in order to prevent re-injury. [] Progressing: [] Met: [] Not Met: [] Adjusted  2. Patient will have a decrease in pain to facilitate improvement in movement, function, and ADLs as indicated by Functional Deficits. [] Progressing: [] Met: [] Not Met: [] Adjusted     Long Term Goals: To be achieved in: 8 weeks        1. FOTO score to be equal or greater to the predicted score (66/100)  to assist with reaching prior level of function. [] Progressing: [] Met: [] Not Met: [] Adjusted      2.  Patient will demonstrate increased AROM to equal the opposite side bilaterally to allow for putting on her bra as indicated by patients Functional Deficits. [] Progressing: [] Met: [] Not Met: [] Adjusted       3. Patient will demonstrate an increase in strength to 4+/5 to allow for stocking cards as indicated by patients Functional Deficits. [] Progressing: [] Met: [] Not Met: [] Adjusted       4. Patient will return to all transfers, work activities, and functional activities without increased symptoms or restriction, specifically washing her hair. [] Progressing: [] Met: [] Not Met: [] Adjusted       5. (Pt specific functional or activity goal) pt will be able to return to golf activities  [] Progressing: [] Met: [] Not Met: [] Adjusted  6. Pt will demonstrate pain decreased by 50%  (4/10) with all ADLS. [] Progressing: [] Met: [] Not Met: [] Adjusted        ASSESSMENT: Patient more compliant with HEP, but due to increase activities with UE at work and with helping her parents move into [place] ,increased pain has been noted. Patient to see MD today regarding results of MRI. Will await POC from MD visit in regards to continuing PT.      9/7/22 Pt cont to not perform her HEP correctly or consistently and does not follow instructions in clinic or for outside of clinic. She struggles with form and speed of exercises in clinic. 10' of pt education was performed this visit regarding the need for performance of HEP properly and consistently (has not since onset of PT) and progression is not expected from 1 PT visit a week over 4 weeks alone. Progress is not expected if pt does not perform HEP. We will see pt next visit to assess HEP performance and then possible hold until after MRI.       9/2/22 Reports pain was less when ill and not using arm as much. Increased symptoms noted this week due to activity. Significant tenderness noted with STM bicipital groove this visit. Restriction noted IR R > L.   Patient overall reports about 30% improvement with shoulder since injection. Still painful with reaching overhead and behind back. Progress exercises this visit for HEP. Response has not been assessed because she has not done exercises in 2 weeks. Patient will benefit from continued skilled intervention to increase ROM, flexibility, strength and function. Overall Progression Towards Functional goals/ Treatment Progress Update:  [] Patient is progressing as expected towards functional goals listed. [x] Progression is slowed due to complexities/Impairments listed. [] Progression has been slowed due to co-morbidities. [] Plan just implemented, too soon to assess goals progression <30days   [] Goals require adjustment due to lack of progress  [] Patient is not progressing as expected and requires additional follow up with physician  [] Other    Prognosis for POC: [] Good [x] Fair  [x] Poor      Patient requires continued skilled intervention: [x] Yes  [] No    Treatment/Activity Tolerance:  [x] Patient able to complete treatment  [] Patient limited by fatigue  [x] Patient limited by pain    [] Patient limited by other medical complications  [] Other:                     PLAN: Consult with MD note and assess if continue PT is needed. [x] Continue per plan of care [] Alter current plan (see comments above)  [] Plan of care initiated [] Hold pending MD visit [] Discharge      Electronically signed by:  Terri Valencia, [address]  Note: If patient does not return for scheduled/ recommended follow up visits, this note will serve as a discharge from care along with most recent update on progress.

## 2022-09-12 NOTE — PROGRESS NOTES
Chief Complaint    Shoulder Pain (TR MRI RIGHT SHOULDER/)    FU new onset right shoulder pain status post lifting injury roughly 7/1/2022 with right shoulder weakness and motion loss and difficulty sleeping    History of Present Illness:  Baltazar Mcallister is a 78 y.o. 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 Acey Primrose is being seen by today for an also helps care for her mother who is seen today for separate orthopedic condition. She states that in early July 2022 she was lifting up a potted plant and attempting to move this and felt a pop in her right shoulder. This was associated with the pain and since that time has had difficulty with a constant dull achy pain in her shoulder at 2-3 out of 10 with sharper pain with active elevation with diminished motion and inability to golf. She really does not have any history of substantial shoulder injuries. She tried icing and has continued with her diclofenac but despite this her pain symptoms in her shoulder have improved. She has noticed crepitation and popping but no active locking or catching and denies substantial cervical pain or radicular symptoms. She never really has had a problem with her shoulder and does leave for a family vacation to [place] on 8/4 to 8/12/2022. She is having a difficult time sleeping in her shoulder but her knees which she was seen for recently are doing somewhat better. Her major goal is to diminish her pain and return to golf. She has not golfed in the last couple weeks and is being seen today for orthopedic and sports consultation with initial imaging. We last saw [facility] in the office on 7/20/2022 for her right shoulder. Following her initial evaluation she did notice a slight improvement which was fairly short-lived.   She was able to get through her vacation in [place] but is unable to comfortably golf and is now actually feeling worse than 6 weeks ago. She does have limited ability to actively elevate does feel weak and has had some crepitation and popping. Her plain films did seem to suggest some moderate shoulder osteoarthritis and despite therapy 4 sessions and compliance with her home exercises she does have continued pain and limited functionality with ongoing discomfort at night. We were supposed to see her a couple weeks ago but she contracted COVID after coming back from her trip in [place] and continues to remain symptomatic with regard to her shoulder with shoulder pain and weakness and difficulty with overhead activities. Jolene Justin was last seen in the office on 9/7/2022 for ongoing pain to her right shoulder. Once again she is known to have right shoulder osteoarthritis and probable cuff straining and we had seen her just prior to her leaving for vacation to [place]. She was experiencing worsening pain symptoms which did prompt us to send her for an updated MRI of her shoulder. This was performed on 9/8/2022 which did show evidence of significant high-grade chondromalacia patella with partial thickness supra and infraspinatus tears with diffuse peritendinobursitis. She did have labral fraying and some progression of her partial-thickness tears compared to her previous scan. There was capsulitis noted as well as ablator related impingement to the [facility] joint with AC arthropathy and a type II acromion. There was evidence of biceps tendinopathy with some perching. She has been more symptomatic at this point as she has been cleaning out her parents house which are transitioning over to a nursing care facilities. She has gotten a marginal improvement in her pain symptoms with her Medrol pack but seems to do much better with her diclofenac for the pain relief perspective. She is currently in therapy and denies locking catching or true instability symptoms.   She would like to avoid surgery if at all possible. Medical History  Patient's medications, allergies, past medical, surgical, social and family histories were reviewed and updated as appropriate. Review of Systems  Relevant review of systems reviewed on 7/20/2022 and available in the patient's chart under the medial tab. Vital Signs  There were no vitals filed for this visit. General Exam:   Constitutional: Patient is adequately groomed with no evidence of malnutrition  DTRs: Deep tendon reflexes are intact  Mental Status: The patient is oriented to time, place and person. The patient's mood and affect are appropriate. Lymphatic: The lymphatic examination bilaterally reveals all areas to be without enlargement or induration. Vascular: Examination reveals no swelling or calf tenderness. Peripheral pulses are palpable and 2+. Neurological: The patient has good coordination. There is no weakness or sensory deficit. Shoulder Examination    Inspection: There is no high-grade deformity tense effusion or soft tissue swelling. She does have before meals and glenohumeral crepitation. Palpation: She is still clinically tender over the posterior cuff greater tuberosity and to lesser degree biceps and AC joint. No Donavan deformities. Rang of Motion: She does have continued pain associated reductions in motion. Pain with abduction at 95 forward flexion better to about 110 external rotation limited to 25 with internal rotation to about L4. She is stiff. Strength: Weakness 4-5 with both supra and infraspinatus testing. Subscap 4+ out of 5. Special Tests: She has negative drop and lift off testing but does have 7 out of 10 pain with impingement testing. Pain with glenohumeral grind testing. Negative speeds testing. Positive George. Negative screening cervical testing. Skin: There are no rashes, ulcerations or lesions.   Distal motor sensory and vascular exam is intact. Gait: Reasonable gait. Reflexes:  Symmetrically preserved. Additional Comments:        Additional Examinations:  Contralateral Exam: Examination of the left shoulder reveals no atrophy or deformity. The skin is warm and dry. Range of motion is within normal limits. There is no focal tenderness with palpation. No AC joint tenderness. Negative Neer's and George-Derek exams. Strength is graded 5/5 throughout. Right Upper Extremity:  Examination of the right upper extremity does not show any tenderness, deformity or injury. Range of motion is unremarkable. There is no gross instability. There are no rashes, ulcerations or lesions. Strength and tone are normal.  Left Upper Extremity: Examination of the left upper extremity does not show any tenderness, deformity or injury. Range of motion is unremarkable. There is no gross instability. There are no rashes, ulcerations or lesions. Strength and tone are normal.  Neck: Examination of the neck does not show any tenderness, deformity or injury. Range of motion is unremarkable. There is no gross instability. There are no rashes, ulcerations or lesions. Strength and tone are normal.         Diagnostic Test Findings:   Right shoulder true AP outlet and axillary films were reviewed from 7/20/2022 and does show moderate to significant right glenohumeral osteoarthritis with inferior spurring and evidence of AC arthropathy without evidence of acute high-grade osseous injury. Right shoulder MRI obtained at [facility] AT [facility] [address] [facility],182 is listed above     Site: [id number] #: [id number] #: [id number] Procedure: MR Right Shoulder joint w/o Contrast ; Reason for Exam: right shoulder pain; rotator cuff strain; primary osteoarthritis; evaluate OA, rule out rotator cuff tear, impingement   This document is confidential medical information. Unauthorized disclosure or use of this information is prohibited by law. If you are not the may be contributory. Findings similar to prior    although tearing slightly progressed. 2. High-grade glenohumeral chondromalacia, remodeling and diffuse labral tearing. Capsulitis or    capsulosynovitis present. This has progressed. 3. Outlet-related cuff impingement secondary to [facility] joint hypertrophy and a lateral downsloping    hypertrophied type 2 acromion. AC joint is degenerated. 4. Arcuate segment biceps long head tendinopathy. Tendon is perched on a hypertrophied lesser    tuberosity as it exits the joint at site of partial-thickness subscapularis tear. Thank you for the opportunity to provide your interpretation. MD Deepika Arnett [facility] 09/09/2022 10:33 AM     Assessment: #1.  2.5+ months status post lifting injury right shoulder with persistent right shoulder pain MRI documented partial-thickness rotator cuff tears with tendinopathy, capsulitis high-grade glenohumeral osteoarthritis with biceps tendinopathy. Impression:    Encounter Diagnoses   Name Primary? Right shoulder pain, unspecified chronicity Yes    Rotator cuff strain, right, initial encounter     Primary osteoarthritis of right shoulder     Coracoid impingement of right shoulder        Office Procedures:     No orders of the defined types were placed in this encounter. Treatment Plan: Treatment options were discussed with Jolene Justin today. We did once again review her plain films, recent right shoulder MRI and exam findings. She has been experiencing persistent right shoulder pain and night pain with her right shoulder since lifting a potted plant on roughly 7/1/2022. She does unfortunately have fairly substantial glenohumeral arthropathy with AC arthropathy with impingement and partial-thickness tearing to the rotator cuff with biceps tendinopathy.   She is remained symptomatic and after discussing options, we did perform a right shoulder subacromial injection today using 2 cc of Celestone, 4 cc of Marcaine, 3 cc of Xylocaine. We also performed a right shoulder AC injection using 1 cc of Celestone, 1 cc Marcaine, 1 cc Xylocaine. I would like for her to continue with physical therapy. She is finishing up her Medrol pack and then will resume her diclofenac 75 mg 1 pill twice daily. Icing and activity modification and the importance of performing her exercise program was discussed. She is quite busy cleaning out her parents house as they have transitioned to a nursing home and was advised to get help with behavior activities and try to minimize overhead activity if possible. We will see her back in about 6 weeks for follow-up. Icing and activity modification was discussed. She will contact us in the interim with questions or concerns. This dictation was performed with a verbal recognition program (DRAGON) and it was checked for errors. It is possible that there are still dictated errors within this office note. If so, please bring any errors to my attention for an addendum. All efforts were made to ensure that this office note is accurate.

## 2022-09-14 ENCOUNTER — HOSPITAL ENCOUNTER (OUTPATIENT)
Dept: PHYSICAL THERAPY | Age: 67
Setting detail: THERAPIES SERIES
Discharge: HOME | End: 2022-09-14
Payer: MEDICARE

## 2022-09-14 PROCEDURE — 97112 NEUROMUSCULAR REEDUCATION: CPT | Performed by: PHYSICAL THERAPIST

## 2022-09-14 PROCEDURE — 97140 MANUAL THERAPY 1/> REGIONS: CPT | Performed by: PHYSICAL THERAPIST

## 2022-09-14 PROCEDURE — 97110 THERAPEUTIC EXERCISES: CPT | Performed by: PHYSICAL THERAPIST

## 2022-09-14 NOTE — FLOWSHEET NOTE
[facility], [address] [place]  Phone: [phone]  Fax [phone]  :  Physical Therapy Treatment Note/ Progress Report:           Date:  2022    Patient Name:  Baltazar Mcallister    :  1955  MRN: [id number]    Referring Physician:  Jeanette Casey MD                                                    Evaluation Date: 2022                                              Date of Referral:22     Insurance: Medicare     Next MD appt: scheduled: 10/24/22     Medical Diagnosis:  X93.828 (ICD-10-CM) - Right shoulder pain, unspecified chronicity   S46.011A (ICD-10-CM) - Rotator cuff strain, right, initial encounter   M19.011 (ICD-10-CM) - Primary osteoarthritis of right shoulder         Treatment Diagnosis:  M25.511 (ICD-10-CM) - Right shoulder pain, unspecified chronicity       Has the plan of care been signed (Y/N):        [x]  Yes  []  No    Progress report will be due :    ; done visit 4       Recertification will be due (POC Duration  / 90 days ):   10/1/22     Is this a Progress Report:     []  Yes  [x]  No      If Yes:  Date Range for reporting period:  Beginnin22  Ending:              Visit # Date Range Insurance Allowable Requires auth   IN PERSON 8 22- BMN    [x]no        []yes:   TELEHEALTH       TOTAL              CX/NS       Next PT appt: 22                PT Practice Pattern:E  Co morbidities:1-2    Nonsurgical  INITIAL VISIT 22    PAIN 0-10/10 5-8/10 0-7/10   FUNCTIONAL SCALE FOTO 53/100 45/100   AROM R SFLX 130 148    R SABD 90 142    ER @ side 60 60    AROM IR L1 L1               STRENGHT (MMT) RSFLX 4/5 4+/5    SABD 4/5 4+/5    SER 4/5 4+/5    SIR 4+/5 4+/5    bicep 4/5 5/5                   Latex Allergy/Pacemaker/Adhesive allergy:  [x]NO      []YES     RESTRICTIONS/PRECAUTIONS: past ACDF         SUBJECTIVE:  Pt reports seeing MD. Has significant OA and tears in Rotation and Scapular Retraction with Resistance - 1-2 x daily - 7 x weekly - 1-2 sets - 10 reps - 2 hold  Corner Pec Major Stretch - 2 x daily - 7 x weekly - 1 sets - 5 reps - 30\" hold      Therapeutic Exercise and NMR EXR  [x] (75191) Provided verbal/tactile cueing for activities related to strengthening, flexibility, endurance, ROM for improvements in LE, proximal hip, and core control with self care, mobility, lifting, ambulation.  [] (60301) Provided verbal/tactile cueing for activities related to improving balance, coordination, kinesthetic sense, posture, motor skill, proprioception  to assist with LE, proximal hip, and core control in self care, mobility, lifting, ambulation and eccentric single leg control.      NMR and Therapeutic Activities:    [x] (23674 or 08217) Provided verbal/tactile cueing for activities related to improving balance, coordination, kinesthetic sense, posture, motor skill, proprioception and motor activation to allow for proper function of core, proximal hip and LE with self care and ADLs  [] (73464) Gait Re-education- Provided training and instruction to the patient for proper LE, core and proximal hip recruitment and positioning and eccentric body weight control with ambulation re-education including up and down stairs     Home Exercise Program:    [x] (14980) Reviewed/Progressed HEP activities related to strengthening, flexibility, endurance, ROM of core, proximal hip and LE for functional self-care, mobility, lifting and ambulation/stair navigation   [] (82068)Reviewed/Progressed HEP activities related to improving balance, coordination, kinesthetic sense, posture, motor skill, proprioception of core, proximal hip and LE for self care, mobility, lifting, and ambulation/stair navigation      Manual Treatments:  PROM / STM / Oscillations-Mobs:  G-I, II, III, IV (PA's, Inf., Post.)  [x] (05700) Provided manual therapy to mobilize LE, proximal hip and/or LS spine soft tissue/joints for the purpose of modulating pain, promoting relaxation,  increasing ROM, reducing/eliminating soft tissue swelling/inflammation/restriction, improving soft tissue extensibility and allowing for proper ROM for normal function with self care, mobility, lifting and ambulation. Trigger point Dry Needling:  fine needle insertion into myofascial trigger points to stimulate a healing response  [] (71171) Needle insertion without injection: 1 or 2 muscles  [] (75744) Needle insertion without injection: 3 or more muscles    Modalities: laser to R shld. CP to R shld and mid back x 20 min after treatment   [] GAME READY (VASO)- for significant edema, swelling, pain control. [] ESU (HV/PM) for edema and pain control x15' PM ant/post w CP      Charges:  Timed Code Treatment Minutes: 45'   Total Treatment Minutes: 54'       [] EVAL (LOW) 455 1011 (typically 20 minutes face-to-face)  [] EVAL (MOD) 16404 (typically 30 minutes face-to-face)  [] EVAL (HIGH) 02490 (typically 45 minutes face-to-face)  [] RE-EVAL     [x] EZ(15313) x 1  [] IONTO  [x] NMR (21669) x  1  [] VASO  [x] Manual (34015) x  1   [x] Other: laser 24 w/cm2 to bicipital groove x3'/AC joint   [] TA x      [] Mech Traction (62384)  [] ES(attended) (81375)      [] ES (un) (51475):             GOALS:    Patient stated goal: to be able to play golf and work without pain. [] Progressing: [] Met: [] Not Met: [] Adjusted     Therapist goals for Patient:  Short Term Goals: To be achieved in: 2 weeks  1. Independent in HEP and progression per patient tolerance, in order to prevent re-injury. [] Progressing: [] Met: [] Not Met: [] Adjusted  2. Patient will have a decrease in pain to facilitate improvement in movement, function, and ADLs as indicated by Functional Deficits. [] Progressing: [] Met: [] Not Met: [] Adjusted     Long Term Goals: To be achieved in: 8 weeks        1.  FOTO score to be equal or greater to the predicted score (66/100)  to assist with reaching prior level of function. [] Progressing: [] Met: [] Not Met: [] Adjusted      2. Patient will demonstrate increased AROM to equal the opposite side bilaterally to allow for putting on her bra as indicated by patients Functional Deficits. [] Progressing: [] Met: [] Not Met: [] Adjusted       3. Patient will demonstrate an increase in strength to 4+/5 to allow for stocking cards as indicated by patients Functional Deficits. [] Progressing: [] Met: [] Not Met: [] Adjusted       4. Patient will return to all transfers, work activities, and functional activities without increased symptoms or restriction, specifically washing her hair. [] Progressing: [] Met: [] Not Met: [] Adjusted       5. (Pt specific functional or activity goal) pt will be able to return to golf activities  [] Progressing: [] Met: [] Not Met: [] Adjusted  6. Pt will demonstrate pain decreased by 50%  (4/10) with all ADLS. [] Progressing: [] Met: [] Not Met: [] Adjusted        ASSESSMENT: Patient more compliant with HEP, but activity at home and work increase pain. Patient recommended to continue therapy from MD another 6 weeks in conjunction with steroid injections/anti-inflam. Assess response to new exercises. Pt requires skilled intervention to restore ROM, strength, functional endurance and balance in order to perform ADLs without significant symptoms or limitations. 9/7/22 Pt cont to not perform her HEP correctly or consistently and does not follow instructions in clinic or for outside of clinic. She struggles with form and speed of exercises in clinic. 10' of pt education was performed this visit regarding the need for performance of HEP properly and consistently (has not since onset of PT) and progression is not expected from 1 PT visit a week over 4 weeks alone. Progress is not expected if pt does not perform HEP.     We will see pt next visit to assess HEP performance and then possible hold until after MRI.       9/2/22 Reports pain was less when ill and not using arm as much. Increased symptoms noted this week due to activity. Significant tenderness noted with STM bicipital groove this visit. Restriction noted IR R > L. Patient overall reports about 30% improvement with shoulder since injection. Still painful with reaching overhead and behind back. Progress exercises this visit for HEP. Response has not been assessed because she has not done exercises in 2 weeks. Patient will benefit from continued skilled intervention to increase ROM, flexibility, strength and function. Overall Progression Towards Functional goals/ Treatment Progress Update:  [] Patient is progressing as expected towards functional goals listed. [x] Progression is slowed due to complexities/Impairments listed. [] Progression has been slowed due to co-morbidities. [] Plan just implemented, too soon to assess goals progression <30days   [] Goals require adjustment due to lack of progress  [] Patient is not progressing as expected and requires additional follow up with physician  [] Other    Prognosis for POC: [] Good [x] Fair  [x] Poor      Patient requires continued skilled intervention: [x] Yes  [] No    Treatment/Activity Tolerance:  [x] Patient able to complete treatment  [] Patient limited by fatigue  [x] Patient limited by pain    [] Patient limited by other medical complications  [] Other:                     PLAN: Continue PT. Progresses strengthening as tolerated. [x] Continue per plan of care [] Alter current plan (see comments above)  [] Plan of care initiated [] Hold pending MD visit [] Discharge      Electronically signed by:  Christ Cogan, [address]  Note: If patient does not return for scheduled/ recommended follow up visits, this note will serve as a discharge from care along with most recent update on progress.

## 2022-09-20 ENCOUNTER — HOSPITAL ENCOUNTER (OUTPATIENT)
Dept: PHYSICAL THERAPY | Age: 67
Setting detail: THERAPIES SERIES
Discharge: HOME | End: 2022-09-20
Payer: MEDICARE

## 2022-09-20 PROCEDURE — 97140 MANUAL THERAPY 1/> REGIONS: CPT | Performed by: PHYSICAL THERAPIST

## 2022-09-20 PROCEDURE — 97112 NEUROMUSCULAR REEDUCATION: CPT | Performed by: PHYSICAL THERAPIST

## 2022-09-20 PROCEDURE — 97110 THERAPEUTIC EXERCISES: CPT | Performed by: PHYSICAL THERAPIST

## 2022-09-20 NOTE — FLOWSHEET NOTE
[facility], [address] [place]  Phone: [phone]  Fax [phone]  :  Physical Therapy Treatment Note/ Progress Report:           Date:  2022    Patient Name:  Dossie Or    :  1955  MRN: [id number]    Referring Physician:  Jacob Watt MD                                                    Evaluation Date: 2022                                              Date of Referral:22     Insurance: Medicare     Next MD appt: scheduled: 10/24/22     Medical Diagnosis:  H41.225 (ICD-10-CM) - Right shoulder pain, unspecified chronicity   S46.011A (ICD-10-CM) - Rotator cuff strain, right, initial encounter   M19.011 (ICD-10-CM) - Primary osteoarthritis of right shoulder         Treatment Diagnosis:  M25.511 (ICD-10-CM) - Right shoulder pain, unspecified chronicity       Has the plan of care been signed (Y/N):        [x]  Yes  []  No    Progress report will be due :    ; done visit 4 9/86/15      Recertification will be due (POC Duration  / 90 days ):   10/1/22     Is this a Progress Report:     []  Yes  [x]  No      If Yes:  Date Range for reporting period:  Beginnin22  Ending:              Visit # Date Range Insurance Allowable Requires auth   IN PERSON 9 22- BMN    [x]no        []yes:   TELEHEALTH       TOTAL              CX/NS       Next PT appt: 22                PT Practice Pattern:E  Co morbidities:1-2    Nonsurgical  INITIAL VISIT 22    PAIN 0-10/10 5-8/10 0-7/10   FUNCTIONAL SCALE FOTO 53/100 45/100   AROM R SFLX 130 148    R SABD 90 142    ER @ side 60 60    AROM IR L1 L1               STRENGHT (MMT) RSFLX 4/5 4+/5    SABD 4/5 4+/5    SER 4/5 4+/5    SIR 4+/5 4+/5    bicep 4/5 5/5                   Latex Allergy/Pacemaker/Adhesive allergy:  [x]NO      []YES     RESTRICTIONS/PRECAUTIONS: past ACDF         SUBJECTIVE:  Pt reports she has noticed no difference with the cortisone injections but she is doing a lot going through her parents home as they are moving to nursing home. Pain level:  0/10 without moving UE. Increased pain with activity and then after. OBJECTIVE:   Observation:             Exercises/Interventions:     HEP:    [employer]   Access Code: [email or web address]  URL: [email or web address]/  Date: 08/01/2022  Prepared by: Tori Sainz PT    Therapeutic Ex (81670)  NMR re-education (64512) Reps/Sets/time Notes/CUES/Assessment HEP   Good form X         X    MC X    MC X 9/7   ER table stretch 10x10\" MC X 9/7            X 9/7               TB Rows /Ext alt 10 reps Significant MC/VC + to HEP   TSignificant MC/VC + to HEP           Supine crosses (small) 1#  6x VC    Corner pec stretches No $ supine YTB 10x3\"     Pulleys 15x     BUE TB tricep GTB  15x3\"     TB walk outs IR/ER (reactive) YTB 15x3\" ea     Pt education x10' HEP, expected progress at this time                Manual Intervention (69300)      PROM/GH joint mobs 5' R shoulder Manual IR stretches         STM/friction massage to bicipital groove 5' Significant tenderness noted                  CUES NEEDED                Therapeutic Activity (16048)            Finisher 2#, Flex/scap/ CW/CCW 10 reps                               Access Code: [email or web address]  URL: [email or web address]  Date: 08/15/2022  Prepared by: Rica Mcburney    Exercises  Standing Shoulder Row with Anchored Resistance - 1-2 x daily - 7 x weekly - 1-2 sets - 10 reps - 2 hold  Shoulder extension with resistance - Neutral - 1-2 x daily - 7 x weekly - 1-2 sets - 10 reps - 2 hold  Shoulder External Rotation and Scapular Retraction with Resistance - 1-2 x daily - 7 x weekly - 1-2 sets - 10 reps - 2 hold  Corner Pec Major Stretch - 2 x daily - 7 x weekly - 1 sets - 5 reps - 30\" hold      Therapeutic Exercise and NMR EXR  [x] (91327) Provided verbal/tactile cueing for activities related to strengthening, flexibility, endurance, ROM for improvements in LE, proximal hip, and core control with self care, mobility, lifting, ambulation. [x] (57697) Provided verbal/tactile cueing for activities related to improving balance, coordination, kinesthetic sense, posture, motor skill, proprioception  to assist with LE, proximal hip, and core control in self care, mobility, lifting, ambulation and eccentric single leg control. NMR and Therapeutic Activities:    [x] (24145 or 68707) Provided verbal/tactile cueing for activities related to improving balance, coordination, kinesthetic sense, posture, motor skill, proprioception and motor activation to allow for proper function of core, proximal hip and LE with self care and ADLs  [] (04404) Gait Re-education- Provided training and instruction to the patient for proper LE, core and proximal hip recruitment and positioning and eccentric body weight control with ambulation re-education including up and down stairs     Home Exercise Program:    [x] (61594) Reviewed/Progressed HEP activities related to strengthening, flexibility, endurance, ROM of core, proximal hip and LE for functional self-care, mobility, lifting and ambulation/stair navigation   [] (66352)Reviewed/Progressed HEP activities related to improving balance, coordination, kinesthetic sense, posture, motor skill, proprioception of core, proximal hip and LE for self care, mobility, lifting, and ambulation/stair navigation      Manual Treatments:  PROM / STM / Oscillations-Mobs:  G-I, II, III, IV (PA's, Inf., Post.)  [x] (10461) Provided manual therapy to mobilize LE, proximal hip and/or LS spine soft tissue/joints for the purpose of modulating pain, promoting relaxation,  increasing ROM, reducing/eliminating soft tissue swelling/inflammation/restriction, improving soft tissue extensibility and allowing for proper ROM for normal function with self care, mobility, lifting and ambulation.      Trigger point Dry Needling:  fine needle insertion into myofascial trigger points to stimulate a healing response  [] (44617) Needle insertion without injection: 1 or 2 muscles  [] (17809) Needle insertion without injection: 3 or more muscles    Modalities: l. (Pt noticed no results from laser tx) CP to R shld and mid back x 10 min after treatment   [] GAME READY (VASO)- for significant edema, swelling, pain control. [] ESU (HV/PM) for edema and pain control x15' PM ant/post w CP      Charges:  Timed Code Treatment Minutes: 38   Total Treatment Minutes: 48       [] EVAL (LOW) 24631 (typically 20 minutes face-to-face)  [] EVAL (MOD) 81240 (typically 30 minutes face-to-face)  [] EVAL (HIGH) 62564 (typically 45 minutes face-to-face)  [] RE-EVAL     [x] TN(16415) x 1  [] IONTO  [x] NMR (32788) x  1  [] VASO  [x] Manual (81533) x  1   [] Other: laser 24 w/cm2 to bicipital groove x3'/AC joint   [] TA x      [] Mech Traction (06529)  [] ES(attended) (12443)      [] ES (un) (37547):             GOALS:    Patient stated goal: to be able to play golf and work without pain. [] Progressing: [] Met: [] Not Met: [] Adjusted     Therapist goals for Patient:    Short Term Goals: To be achieved in: 2 weeks 1/2 MET  1. Independent in HEP and progression per patient tolerance, in order to prevent re-injury. [] Progressing: [x] Met: [] Not Met: [] Adjusted  2. Patient will have a decrease in pain to facilitate improvement in movement, function, and ADLs as indicated by Functional Deficits. [] Progressing: [] Met: [x] Not Met: [] Adjusted     Long Term Goals: To be achieved in: 8 weeks        1. FOTO score to be equal or greater to the predicted score (66/100)  to assist with reaching prior level of function. [] Progressing: [] Met: [] Not Met: [] Adjusted      2. Patient will demonstrate increased AROM to equal the opposite side bilaterally to allow for putting on her bra as indicated by patients Functional Deficits. [] Progressing: [] Met: [] Not Met: [] Adjusted       3.  Patient will demonstrate an not using arm as much. Increased symptoms noted this week due to activity. Significant tenderness noted with STM bicipital groove this visit. Restriction noted IR R > L. Patient overall reports about 30% improvement with shoulder since injection. Still painful with reaching overhead and behind back. Progress exercises this visit for HEP. Response has not been assessed because she has not done exercises in 2 weeks. Patient will benefit from continued skilled intervention to increase ROM, flexibility, strength and function. Overall Progression Towards Functional goals/ Treatment Progress Update:  [] Patient is progressing as expected towards functional goals listed. [x] Progression is slowed due to complexities/Impairments listed. [] Progression has been slowed due to co-morbidities. [] Plan just implemented, too soon to assess goals progression <30days   [] Goals require adjustment due to lack of progress  [] Patient is not progressing as expected and requires additional follow up with physician  [] Other    Prognosis for POC: [] Good [x] Fair  [x] Poor      Patient requires continued skilled intervention: [x] Yes  [] No    Treatment/Activity Tolerance:  [x] Patient able to complete treatment  [] Patient limited by fatigue  [x] Patient limited by pain    [] Patient limited by other medical complications  [] Other:                     PLAN: Continue PT. Progresses strengthening as tolerated. [x] Continue per plan of care [] Alter current plan (see comments above)  [] Plan of care initiated [] Hold pending MD visit [] Discharge      Electronically signed by:  Haley Boateng, [place] [id number]  Note: If patient does not return for scheduled/ recommended follow up visits, this note will serve as a discharge from care along with most recent update on progress.

## 2022-09-26 ENCOUNTER — HOSPITAL ENCOUNTER (OUTPATIENT)
Dept: PHYSICAL THERAPY | Age: 67
Setting detail: THERAPIES SERIES
Discharge: HOME | End: 2022-09-26
Payer: MEDICARE

## 2022-09-26 PROCEDURE — 97112 NEUROMUSCULAR REEDUCATION: CPT | Performed by: PHYSICAL THERAPIST

## 2022-09-26 PROCEDURE — 97140 MANUAL THERAPY 1/> REGIONS: CPT | Performed by: PHYSICAL THERAPIST

## 2022-09-26 PROCEDURE — 97110 THERAPEUTIC EXERCISES: CPT | Performed by: PHYSICAL THERAPIST

## 2022-09-26 NOTE — FLOWSHEET NOTE
[facility], [address] [place]  Phone: [phone]  Fax [phone]  :  Physical Therapy Treatment Note/ Progress Report:           Date:  2022    Patient Name:  Stephanie Hdez    :  1955  MRN: [id number]    Referring Physician:  Krystyna Curry MD                                                    Evaluation Date: 2022                                              Date of Referral:22     Insurance: Medicare     Next MD appt: scheduled: 10/24/22     Medical Diagnosis:  H63.717 (ICD-10-CM) - Right shoulder pain, unspecified chronicity   S46.011A (ICD-10-CM) - Rotator cuff strain, right, initial encounter   M19.011 (ICD-10-CM) - Primary osteoarthritis of right shoulder         Treatment Diagnosis:  M25.511 (ICD-10-CM) - Right shoulder pain, unspecified chronicity       Has the plan of care been signed (Y/N):        [x]  Yes  []  No    Progress report will be due :    ; done visit 4 98      Recertification will be due (POC Duration  / 90 days ):   10/1/22     Is this a Progress Report:  N.V   []  Yes  [x]  No      If Yes:  Date Range for reporting period:  Beginnin22  Ending:              Visit # Date Range Insurance Allowable Requires auth   IN PERSON 10 22- BMN    [x]no        []yes:   TELEHEALTH       TOTAL              CX/NS       Next PT appt: 22                PT Practice Pattern:E  Co morbidities:1-2    Nonsurgical  INITIAL VISIT 22   PAIN 0-10/10 5-8/10 0-7/10 0-5 today   FUNCTIONAL SCALE FOTO 53/100 45/100    AROM R SFLX 130 148 150    R SABD 90 142 152    ER @ side 60 60 65    AROM IR L1 L1 T12                 STRENGHT (MMT) RSFLX 4/5 4+/5     SABD 4/5 4+/5     SER 4/5 4+/5     SIR 4+/5 4+/5     bicep 4/5 5/5                     Latex Allergy/Pacemaker/Adhesive allergy:  [x]NO      []YES     RESTRICTIONS/PRECAUTIONS: past ACDF         SUBJECTIVE:  Pt reports that her resting pain is less since the injection. Activity increases her pain. Had to finish cleaning her moms apartment. Nellie Rodriguez are turned in and no more cleaning required. Pain level:  0/10 without moving UE. Increased pain with activity and then after. OBJECTIVE:   Observation:             Exercises/Interventions:     HEP:    [employer]   Access Code: [email or web address]  URL: [email or web address]  Date: 08/01/2022  Prepared by: Madhav Bejarano PT    Therapeutic Ex (18376)  NMR re-education (21826) Reps/Sets/time Notes/CUES/Assessment HEP   Good form X         X    MC X    MC X 9/7   ER table stretch 10x10\" MC X 9/7            X 9/7   A punches 1# 2x10 reps     Triceps 1# in supine 2x10 reps         TB Rows /Ext alt 1x10 reps Significant MC/VC  HEP   TSignificant MC/VC  HEP           Supine crosses (small) 1#  6x VC    Corner pec stretches       No $ supine     Pulleys 15x     BUE TB tricep     TB walk outs IR/ER (reactive) YTB 10x3\" ea     Pt education x10' HEP, expected progress at this time                Manual Intervention (39596)      PROM/GH joint mobs 5' R shoulder Manual IR stretches         STM/friction massage to bicipital groove 5' Significant tenderness noted                  CUES NEEDED                Therapeutic Activity (65008)            Finisher 3#, Flex/scap/ CW/CCW 10 reps                               Access Code: [email or web address]  URL: [email or web address]  Date: 08/15/2022  Prepared by: Latoya Friend    Exercises  Standing Shoulder Row with Anchored Resistance - 1-2 x daily - 7 x weekly - 1-2 sets - 10 reps - 2 hold  Shoulder extension with resistance - Neutral - 1-2 x daily - 7 x weekly - 1-2 sets - 10 reps - 2 hold  Shoulder External Rotation and Scapular Retraction with Resistance - 1-2 x daily - 7 x weekly - 1-2 sets - 10 reps - 2 hold  Corner Pec Major Stretch - 2 x daily - 7 x weekly - 1 sets - 5 reps - 30\" hold      Therapeutic Exercise and NMR EXR  [x] (16005) Provided mobility, lifting and ambulation. Trigger point Dry Needling:  fine needle insertion into myofascial trigger points to stimulate a healing response  [] (12954) Needle insertion without injection: 1 or 2 muscles  [] (31843) Needle insertion without injection: 3 or more muscles    Modalities: lCP to R shld and mid back x 10 min after treatment   [] GAME READY (VASO)- for significant edema, swelling, pain control. [] ESU (HV/PM) for edema and pain control x15' PM ant/post w CP      Charges:  Timed Code Treatment Minutes: 39'   Total Treatment Minutes: 54'       [] EVAL (LOW) 455 1011 (typically 20 minutes face-to-face)  [] EVAL (MOD) 97732 (typically 30 minutes face-to-face)  [] EVAL (HIGH) 46745 (typically 45 minutes face-to-face)  [] RE-EVAL     [x] NE(69091) x 1  [] IONTO  [x] NMR (82724) x  1  [] VASO  [x] Manual (85510) x  1   [] Other:  [] TA x      [] Mech Traction (07301)  [] ES(attended) (65615)      [] ES (un) (17683):             GOALS:    Patient stated goal: to be able to play golf and work without pain. [] Progressing: [] Met: [] Not Met: [] Adjusted     Therapist goals for Patient:    Short Term Goals: To be achieved in: 2 weeks 1/2 MET  1. Independent in HEP and progression per patient tolerance, in order to prevent re-injury. [] Progressing: [x] Met: [] Not Met: [] Adjusted  2. Patient will have a decrease in pain to facilitate improvement in movement, function, and ADLs as indicated by Functional Deficits. [] Progressing: [] Met: [x] Not Met: [] Adjusted     Long Term Goals: To be achieved in: 8 weeks        1. FOTO score to be equal or greater to the predicted score (66/100)  to assist with reaching prior level of function. [] Progressing: [] Met: [] Not Met: [] Adjusted      2. Patient will demonstrate increased AROM to equal the opposite side bilaterally to allow for putting on her bra as indicated by patients Functional Deficits. [] Progressing: [] Met: [] Not Met: [] Adjusted       3. Patient will demonstrate an increase in strength to 4+/5 to allow for stocking cards as indicated by patients Functional Deficits. [] Progressing: [] Met: [] Not Met: [] Adjusted       4. Patient will return to all transfers, work activities, and functional activities without increased symptoms or restriction, specifically washing her hair. [] Progressing: [] Met: [] Not Met: [] Adjusted       5. (Pt specific functional or activity goal) pt will be able to return to golf activities  [] Progressing: [] Met: [] Not Met: [] Adjusted  6. Pt will demonstrate pain decreased by 50%  (4/10) with all ADLS. [] Progressing: [] Met: [] Not Met: [] Adjusted        ASSESSMENT:  9/26/22: Resting pain is less than before the injection. Activity continues to flare up shoulder. Increased AROM noted this visit  all motions. Re-assess N.V    9/20/22 Pt is able to complete all requested tasks, able to initiate RC close to body. Her babysitting, cleaning out house and work is continuing irritation in her shoulder. Patient will benefit from continued skilled intervention to increase ROM, flexibility, strength and function. 9/14/22 Patient more compliant with HEP, but activity at home and work increase pain. Patient recommended to continue therapy from MD another 6 weeks in conjunction with steroid injections/anti-inflam. Assess response to new exercises. Pt requires skilled intervention to restore ROM, strength, functional endurance and balance in order to perform ADLs without significant symptoms or limitations. 9/7/22 Pt cont to not perform her HEP correctly or consistently and does not follow instructions in clinic or for outside of clinic. She struggles with form and speed of exercises in clinic. 10' of pt education was performed this visit regarding the need for performance of HEP properly and consistently (has not since onset of PT) and progression is not expected from 1 PT visit a week over 4 weeks alone.   Progress is not expected if pt does not perform HEP. We will see pt next visit to assess HEP performance and then possible hold until after MRI.       9/2/22 Reports pain was less when ill and not using arm as much. Increased symptoms noted this week due to activity. Significant tenderness noted with STM bicipital groove this visit. Restriction noted IR R > L. Patient overall reports about 30% improvement with shoulder since injection. Still painful with reaching overhead and behind back. Progress exercises this visit for HEP. Response has not been assessed because she has not done exercises in 2 weeks. Patient will benefit from continued skilled intervention to increase ROM, flexibility, strength and function. Overall Progression Towards Functional goals/ Treatment Progress Update:  [] Patient is progressing as expected towards functional goals listed. [x] Progression is slowed due to complexities/Impairments listed. [] Progression has been slowed due to co-morbidities. [] Plan just implemented, too soon to assess goals progression <30days   [] Goals require adjustment due to lack of progress  [] Patient is not progressing as expected and requires additional follow up with physician  [] Other    Prognosis for POC: [] Good [x] Fair  [x] Poor      Patient requires continued skilled intervention: [x] Yes  [] No    Treatment/Activity Tolerance:  [x] Patient able to complete treatment  [] Patient limited by fatigue  [x] Patient limited by pain    [] Patient limited by other medical complications  [] Other:                     PLAN: Continue PT. Progresses strengthening as tolerated.  Re-assess N.V.    [x] Continue per plan of care [] Alter current plan (see comments above)  [] Plan of care initiated [] Hold pending MD visit [] Discharge      Electronically signed by:  Latoya Centeno, [place] [id number]  Note: If patient does not return for scheduled/ recommended follow up visits, this note will serve as a discharge from care

## 2022-09-28 ENCOUNTER — HOSPITAL ENCOUNTER (OUTPATIENT)
Dept: PHYSICAL THERAPY | Age: 67
Setting detail: THERAPIES SERIES
Discharge: HOME | End: 2022-09-28
Payer: MEDICARE

## 2022-09-28 PROCEDURE — 97140 MANUAL THERAPY 1/> REGIONS: CPT | Performed by: PHYSICAL THERAPIST

## 2022-09-28 PROCEDURE — 97110 THERAPEUTIC EXERCISES: CPT | Performed by: PHYSICAL THERAPIST

## 2022-09-28 PROCEDURE — 97112 NEUROMUSCULAR REEDUCATION: CPT | Performed by: PHYSICAL THERAPIST

## 2022-09-28 NOTE — PLAN OF CARE
[facility], [address] [place] [address]  Phone: [phone]  Fax [phone]  :  Physical Therapy Treatment Note/ Progress Report:     Physical Therapy Re-Certification Plan of Care/MD UPDATE      Dear Ester Cruz MD      We had the pleasure of treating the following patient for physical therapy services at [address] [place] [address]. A summary of our findings can be found in the updated assessment below. This includes our plan of care. If you have any questions or concerns regarding these findings, please do not hesitate to contact me at the office phone number checked above. Thank you for the referral.     Physician Signature:________________________________Date:__________________  By signing above (or electronic signature), therapists plan is approved by physician    Date Range Of Visits: 22- 22  Total Visits to Date: 6  Overall Response to Treatment:   []Patient is responding well to treatment and improvement is noted with regards  to goals   []Patient should continue to improve in reasonable time if they continue HEP   [x]Patient has plateaued and is no longer responding to skilled PT intervention    []Patient is getting worse and would benefit from return to referring MD   []Patient unable to adhere to initial POC   [x]Other: Patient responded to injection and pain lessened about 30% R shoulder and was able to tolerate more exercises in therapy. Due to significant activity patient has been doing with cleaning and working, her R shoulder pain is back to baseline before injection. Recommend 1x/week for 6 weeks to monitor symptoms until MD F/U in October and have encouraged patient to continue resting shoulder.           Date:  2022    Patient Name:  Baltazar Mcallister    :  1955  MRN: [id number]    Referring Physician:  Jeanette Casey MD Evaluation Date: 2022                                              Date of Referral:22     Insurance: Medicare     Next MD appt: scheduled: 10/24/22     Medical Diagnosis:  M25.511 (ICD-10-CM) - Right shoulder pain, unspecified chronicity   S46.011A (ICD-10-CM) - Rotator cuff strain, right, initial encounter   M19.011 (ICD-10-CM) - Primary osteoarthritis of right shoulder         Treatment Diagnosis:  M25.511 (ICD-10-CM) - Right shoulder pain, unspecified chronicity       Has the plan of care been signed (Y/N):        [x]  Yes  []  No    Progress report will be due :    ; done visit 11       Recertification will be due (POC Duration  / 90 days ):   22 6 weeks. Is this a Progress Report:    [x]  Yes  []  No      If Yes:  Date Range for reporting period:  Beginnin22  Endin22              Visit # Date Range Insurance Allowable Requires auth   IN PERSON 11 22- BMN    [x]no        []yes:   TELEHEALTH       TOTAL              CX/NS       Next PT appt: 10/04/22                PT Practice Pattern:E  Co morbidities:1-2    Nonsurgical  INITIAL VISIT 22   PAIN 0-10/10 5-8/10 0-7/10 0-5 today   FUNCTIONAL SCALE FOTO 53/100 45/100 49/100   AROM R SFLX 130 148 150    R SABD 90 142 152    ER @ side 60 60 65    AROM IR L1 L1 T12                 STRENGHT (MMT) RSFLX 4/5 4+/5 4+/5 P    SABD 4/5 4+/5 4+/5 p    SER 4/5 4+/5 4 +/5 p    SIR 4+/5 4+/5 4+/5    bicep 4/5 5/5 5/5                    Latex Allergy/Pacemaker/Adhesive allergy:  [x]NO      []YES     RESTRICTIONS/PRECAUTIONS: past ACDF         SUBJECTIVE:  Pt reports that her resting pain is less since the injection. Activity increases her pain. Had to finish cleaning her moms apartment. [place] are turned in and no more cleaning required. Pain level:  0/10 without moving UE. Increased pain with activity and then after. OBJECTIVE:   Observation:     CONCLUSION: MRI    1.  Moderate cuff tendinopathy and peritendinobursitis. Region of interstitial and articular    surface partial-thickness tearing mid to distal supraspinatus and infraspinatus measures 2.35 x    2.15 cm. Regions of pinhole perforation may be contributory. Findings similar to prior    although tearing slightly progressed. 2. High-grade glenohumeral chondromalacia, remodeling and diffuse labral tearing. Capsulitis or    capsulosynovitis present. This has progressed. 3. Outlet-related cuff impingement secondary to [facility] joint hypertrophy and a lateral downsloping    hypertrophied type 2 acromion. AC joint is degenerated. 4. Arcuate segment biceps long head tendinopathy. Tendon is perched on a hypertrophied lesser    tuberosity as it exits the joint at site of partial-thickness subscapularis tear. Exercises/Interventions:     HEP:    [facility]   Access Code: [email or web address]  URL: [email or web address]  Date: 08/01/2022  Prepared by: Madhav Bejarano PT    Therapeutic Ex (17737)  NMR re-education (09905) Reps/Sets/time Notes/CUES/Assessment HEP   Good form X         X    MC X    MC X 9/7   ER table stretch 10x10\" MC X 9/7            X 9/7   A punches 1# 2x10 reps     Triceps 1# in supine 2x10 reps         TB Rows /Ext alt 1x10 reps Significant MC/VC  HEP   TSignificant MC/VC  HEP           Supine crosses (small) 1#  6x VC    Corner pec stretches       No $ supine     Pulleys 15x     BUE TB tricep     TB walk outs IR/ER (reactive) YTB 10x3\" ea     Pt education x10' HEP, expected progress at this time                Manual Intervention (64679)      PROM/GH joint mobs 5' R shoulder Manual IR stretches         STM/friction massage to bicipital groove 5' Significant tenderness noted                  CUES NEEDED                Therapeutic Activity (64675)            Finisher 3#, Flex/scap/ CW/CCW                               Access Code: [email or web address]  URL: [email or web address]  Date: 08/15/2022  Prepared by: Latoya Centeno    Exercises  Standing Shoulder Row with Anchored Resistance - 1-2 x daily - 7 x weekly - 1-2 sets - 10 reps - 2 hold  Shoulder extension with resistance - Neutral - 1-2 x daily - 7 x weekly - 1-2 sets - 10 reps - 2 hold  Shoulder External Rotation and Scapular Retraction with Resistance - 1-2 x daily - 7 x weekly - 1-2 sets - 10 reps - 2 hold  Corner Pec Major Stretch - 2 x daily - 7 x weekly - 1 sets - 5 reps - 30\" hold      Therapeutic Exercise and NMR EXR  [x] (07380) Provided verbal/tactile cueing for activities related to strengthening, flexibility, endurance, ROM for improvements in LE, proximal hip, and core control with self care, mobility, lifting, ambulation. [x] (96131) Provided verbal/tactile cueing for activities related to improving balance, coordination, kinesthetic sense, posture, motor skill, proprioception  to assist with LE, proximal hip, and core control in self care, mobility, lifting, ambulation and eccentric single leg control.      NMR and Therapeutic Activities:    [x] (20926 or 06877) Provided verbal/tactile cueing for activities related to improving balance, coordination, kinesthetic sense, posture, motor skill, proprioception and motor activation to allow for proper function of core, proximal hip and LE with self care and ADLs  [] (98389) Gait Re-education- Provided training and instruction to the patient for proper LE, core and proximal hip recruitment and positioning and eccentric body weight control with ambulation re-education including up and down stairs     Home Exercise Program:    [x] (57441) Reviewed/Progressed HEP activities related to strengthening, flexibility, endurance, ROM of core, proximal hip and LE for functional self-care, mobility, lifting and ambulation/stair navigation   [] (70941)Reviewed/Progressed HEP activities related to improving balance, coordination, kinesthetic sense, posture, motor skill, proprioception of core, proximal hip and LE for self care, mobility, lifting, and ambulation/stair navigation      Manual Treatments:  PROM / STM / Oscillations-Mobs:  G-I, II, III, IV (PA's, Inf., Post.)  [x] (66700) Provided manual therapy to mobilize LE, proximal hip and/or LS spine soft tissue/joints for the purpose of modulating pain, promoting relaxation,  increasing ROM, reducing/eliminating soft tissue swelling/inflammation/restriction, improving soft tissue extensibility and allowing for proper ROM for normal function with self care, mobility, lifting and ambulation. Trigger point Dry Needling:  fine needle insertion into myofascial trigger points to stimulate a healing response  [] (48570) Needle insertion without injection: 1 or 2 muscles  [] (15691) Needle insertion without injection: 3 or more muscles    Modalities: lCP to R shld and mid back x 10 min after treatment   [] GAME READY (VASO)- for significant edema, swelling, pain control. [] ESU (HV/PM) for edema and pain control x15' PM ant/post w CP      Charges:  Timed Code Treatment Minutes: 39'   Total Treatment Minutes: 39'       [] EVAL (LOW) 02671 (typically 20 minutes face-to-face)  [] EVAL (MOD) 02911 (typically 30 minutes face-to-face)  [] EVAL (HIGH) 47963 (typically 45 minutes face-to-face)  [] RE-EVAL     [x] LA(12977) x 1  [] IONTO  [x] NMR (49303) x  1  [] VASO  [x] Manual (99241) x  1   [] Other:  [] TA x      [] Mech Traction (44873)  [] ES(attended) (47543)      [] ES (un) (59788):             GOALS:    Patient stated goal: to be able to play golf and work without pain. [] Progressing: [] Met: [] Not Met: [] Adjusted     Therapist goals for Patient:    Short Term Goals: To be achieved in: 2 weeks 1/2 MET  1. Independent in HEP and progression per patient tolerance, in order to prevent re-injury. [] Progressing: [x] Met: [] Not Met: [] Adjusted  2. Patient will have a decrease in pain to facilitate improvement in movement, function, and ADLs as indicated by Functional Deficits.   [] Progressing: [] Met: [x] Not Met: [] Adjusted     Long Term Goals: To be achieved in: 8 weeks        1. FOTO score to be equal or greater to the predicted score (66/100)  to assist with reaching prior level of function. [] Progressing: [] Met: [x] Not Met: [] Adjusted      2. Patient will demonstrate increased AROM to equal the opposite side bilaterally to allow for putting on her bra as indicated by patients Functional Deficits. [x] Progressing: [] Met: [] Not Met: [] Adjusted       3. Patient will demonstrate an increase in strength to 4+/5 to allow for stocking cards as indicated by patients Functional Deficits. [x] Progressing: [] Met: [] Not Met: [] Adjusted       4. Patient will return to all transfers, work activities, and functional activities without increased symptoms or restriction, specifically washing her hair. [] Progressing: [] Met: [x] Not Met: [] Adjusted       5. (Pt specific functional or activity goal) pt will be able to return to golf activities  [] Progressing: [] Met: [x] Not Met: [] Adjusted  6. Pt will demonstrate pain decreased by 50%  (4/10) with all ADLS. [] Progressing: [] Met: [x] Not Met: [] Adjusted        ASSESSMENT:  9/28/22: Resting pain is back to baseline before the injection due to increased activity and flare-up. Activity continues to flare up shoulder. Increased AROM still noted from initial visit. Minimal to slow progress noted in general. Decrease to 1x/week until next MD follow to assess symptoms. Have encourage patient to rest UE as much as possible. 9/20/22 Pt is able to complete all requested tasks, able to initiate RC close to body. Her babysitting, cleaning out house and work is continuing irritation in her shoulder. Patient will benefit from continued skilled intervention to increase ROM, flexibility, strength and function. 9/14/22 Patient more compliant with HEP, but activity at home and work increase pain.  Patient recommended to continue therapy from MD another 6 weeks in conjunction with steroid injections/anti-inflam. Assess response to new exercises. Pt requires skilled intervention to restore ROM, strength, functional endurance and balance in order to perform ADLs without significant symptoms or limitations. 9/7/22 Pt cont to not perform her HEP correctly or consistently and does not follow instructions in clinic or for outside of clinic. She struggles with form and speed of exercises in clinic. 10' of pt education was performed this visit regarding the need for performance of HEP properly and consistently (has not since onset of PT) and progression is not expected from 1 PT visit a week over 4 weeks alone. Progress is not expected if pt does not perform HEP. We will see pt next visit to assess HEP performance and then possible hold until after MRI.       9/2/22 Reports pain was less when ill and not using arm as much. Increased symptoms noted this week due to activity. Significant tenderness noted with STM bicipital groove this visit. Restriction noted IR R > L. Patient overall reports about 30% improvement with shoulder since injection. Still painful with reaching overhead and behind back. Progress exercises this visit for HEP. Response has not been assessed because she has not done exercises in 2 weeks. Patient will benefit from continued skilled intervention to increase ROM, flexibility, strength and function. Overall Progression Towards Functional goals/ Treatment Progress Update:  [] Patient is progressing as expected towards functional goals listed. [x] Progression is slowed due to complexities/Impairments listed. [] Progression has been slowed due to co-morbidities.   [] Plan just implemented, too soon to assess goals progression <30days   [] Goals require adjustment due to lack of progress  [] Patient is not progressing as expected and requires additional follow up with physician  [] Other    Prognosis for POC: [] Good [x] Fair  [x]

## 2022-10-04 ENCOUNTER — APPOINTMENT (OUTPATIENT)
Dept: PHYSICAL THERAPY | Age: 67
End: 2022-10-04
Payer: MEDICARE

## 2022-10-07 ENCOUNTER — HOSPITAL ENCOUNTER (OUTPATIENT)
Dept: PHYSICAL THERAPY | Age: 67
Setting detail: THERAPIES SERIES
Discharge: HOME | End: 2022-10-07
Payer: MEDICARE

## 2022-10-07 PROCEDURE — 97140 MANUAL THERAPY 1/> REGIONS: CPT | Performed by: PHYSICAL THERAPIST

## 2022-10-07 PROCEDURE — 97110 THERAPEUTIC EXERCISES: CPT | Performed by: PHYSICAL THERAPIST

## 2022-10-07 PROCEDURE — 97112 NEUROMUSCULAR REEDUCATION: CPT | Performed by: PHYSICAL THERAPIST

## 2022-10-07 NOTE — FLOWSHEET NOTE
[facility], [address] [place]  Phone: [phone]  Fax [phone]  :  Physical Therapy Treatment Note/ Progress Report:     Physical Therapy Re-Certification Plan of Care/MD UPDATE      Dear Krys Martinez MD      We had the pleasure of treating the following patient for physical therapy services at [address] [place] [address]. A summary of our findings can be found in the updated assessment below. This includes our plan of care. If you have any questions or concerns regarding these findings, please do not hesitate to contact me at the office phone number checked above. Thank you for the referral.     Physician Signature:________________________________Date:__________________  By signing above (or electronic signature), therapists plan is approved by physician    Date Range Of Visits: 22- 22  Total Visits to Date: 6  Overall Response to Treatment:   []Patient is responding well to treatment and improvement is noted with regards  to goals   []Patient should continue to improve in reasonable time if they continue HEP   [x]Patient has plateaued and is no longer responding to skilled PT intervention    []Patient is getting worse and would benefit from return to referring MD   []Patient unable to adhere to initial POC   [x]Other: Patient responded to injection and pain lessened about 30% R shoulder and was able to tolerate more exercises in therapy. Due to significant activity patient has been doing with cleaning and working, her R shoulder pain is back to baseline before injection. Recommend 1x/week for 6 weeks to monitor symptoms until MD F/U in October and have encouraged patient to continue resting shoulder.           Date:  10/7/2022    Patient Name:  Mary Denny    :  1955  MRN: [id number]    Referring Physician:  Roberta Phelan MD Evaluation Date: 2022                                              Date of Referral:22     Insurance: Medicare     Next MD appt: scheduled: 10/24/22     Medical Diagnosis:  M25.511 (ICD-10-CM) - Right shoulder pain, unspecified chronicity   S46.011A (ICD-10-CM) - Rotator cuff strain, right, initial encounter   M19.011 (ICD-10-CM) - Primary osteoarthritis of right shoulder         Treatment Diagnosis:  M25.511 (ICD-10-CM) - Right shoulder pain, unspecified chronicity       Has the plan of care been signed (Y/N):        [x]  Yes  []  No    Progress report will be due :    ; done visit 11 22, due       Recertification will be due (POC Duration  / 90 days ):   22 6 weeks. Is this a Progress Report:    []  Yes  [x]  No      If Yes:  Date Range for reporting period:  Beginnin22  Endin22              Visit # Date Range Insurance Allowable Requires auth   IN PERSON 12 22- BMN    [x]no        []yes:   TELEHEALTH       TOTAL              CX/NS       Next PT appt: 10/14/22                PT Practice Pattern:E  Co morbidities:1-2    Nonsurgical  INITIAL VISIT 22   PAIN 0-10/10 5-8/10 0-7/10 0-5 today   FUNCTIONAL SCALE FOTO 53/100 45/100 49/100   AROM R SFLX 130 148 150    R SABD 90 142 152    ER @ side 60 60 65    AROM IR L1 L1 T12                 STRENGHT (MMT) RSFLX 4/5 4+/5 4+/5 P    SABD 4/5 4+/5 4+/5 p    SER 4/5 4+/5 4 +/5 p    SIR 4+/5 4+/5 4+/5    bicep 4/5 5/5 5/5                    Latex Allergy/Pacemaker/Adhesive allergy:  [x]NO      []YES     RESTRICTIONS/PRECAUTIONS: past ACDF         SUBJECTIVE:  Pt reports coming from her family doctor prior to PT. Getting a stress test scheduled in a few days due to some episodes of feeling like she is going to pass out. Shoulder sore overall. Pain level:  0/10 without moving UE. Increased pain with activity and then after. OBJECTIVE:   Observation:     CONCLUSION: MRI    1.  Moderate cuff tendinopathy and peritendinobursitis. Region of interstitial and articular    surface partial-thickness tearing mid to distal supraspinatus and infraspinatus measures 2.35 x    2.15 cm. Regions of pinhole perforation may be contributory. Findings similar to prior    although tearing slightly progressed. 2. High-grade glenohumeral chondromalacia, remodeling and diffuse labral tearing. Capsulitis or    capsulosynovitis present. This has progressed. 3. Outlet-related cuff impingement secondary to [facility] joint hypertrophy and a lateral downsloping    hypertrophied type 2 acromion. AC joint is degenerated. 4. Arcuate segment biceps long head tendinopathy. Tendon is perched on a hypertrophied lesser    tuberosity as it exits the joint at site of partial-thickness subscapularis tear. Exercises/Interventions:     HEP:    [employer]   Access Code: [email or web address]  URL: [email or web address]  Date: 08/01/2022  Prepared by: Damien Esparza PT    Therapeutic Ex (97171)  NMR re-education (32076) Reps/Sets/time Notes/CUES/Assessment HEP   Good form X         X    MC X    MC X 9/7   ER table stretch 10x10\" MC X 9/7            X 9/7   A punches 1# 2x10 reps     Triceps 1# in supine 2x10 reps         TB Rows /Ext alt 1Significant MC/VC  HEP   TSignificant MC/VC  HEP           Supine crosses (small) VC    Corner pec stretches       No $ supine     Pulleys 15x     BUE TB tricep     TB walk outs IR/ER (reactive) YTB 10x3\" ea     Pt education x10' HEP, expected progress at this time                Manual Intervention (46062)      PROM/GH joint mobs 5' R shoulder Manual IR stretches         STM/friction massage to bicipital groove 5' Significant tenderness noted                  CUES NEEDED                Therapeutic Activity (21150)            Finisher 4#, Flex/scap/ CW/CCW 15 reps           CC Rows unilateral 15#  2x10 reps                   Access Code: [email or web address]  URL: [email or web address]  Date: 08/15/2022  Prepared by: Clora Latimer    Exercises  Standing Shoulder Row with Anchored Resistance - 1-2 x daily - 7 x weekly - 1-2 sets - 10 reps - 2 hold  Shoulder extension with resistance - Neutral - 1-2 x daily - 7 x weekly - 1-2 sets - 10 reps - 2 hold  Shoulder External Rotation and Scapular Retraction with Resistance - 1-2 x daily - 7 x weekly - 1-2 sets - 10 reps - 2 hold  Corner Pec Major Stretch - 2 x daily - 7 x weekly - 1 sets - 5 reps - 30\" hold      Therapeutic Exercise and NMR EXR  [x] (89132) Provided verbal/tactile cueing for activities related to strengthening, flexibility, endurance, ROM for improvements in LE, proximal hip, and core control with self care, mobility, lifting, ambulation. [x] (52337) Provided verbal/tactile cueing for activities related to improving balance, coordination, kinesthetic sense, posture, motor skill, proprioception  to assist with LE, proximal hip, and core control in self care, mobility, lifting, ambulation and eccentric single leg control.      NMR and Therapeutic Activities:    [x] (54924 or 92540) Provided verbal/tactile cueing for activities related to improving balance, coordination, kinesthetic sense, posture, motor skill, proprioception and motor activation to allow for proper function of core, proximal hip and LE with self care and ADLs  [] (24465) Gait Re-education- Provided training and instruction to the patient for proper LE, core and proximal hip recruitment and positioning and eccentric body weight control with ambulation re-education including up and down stairs     Home Exercise Program:    [x] (71947) Reviewed/Progressed HEP activities related to strengthening, flexibility, endurance, ROM of core, proximal hip and LE for functional self-care, mobility, lifting and ambulation/stair navigation   [] (02713)Reviewed/Progressed HEP activities related to improving balance, coordination, kinesthetic sense, posture, motor skill, proprioception of core, proximal hip and LE for self care, mobility, lifting, and ambulation/stair navigation      Manual Treatments:  PROM / STM / Oscillations-Mobs:  G-I, II, III, IV (PA's, Inf., Post.)  [x] (97702) Provided manual therapy to mobilize LE, proximal hip and/or LS spine soft tissue/joints for the purpose of modulating pain, promoting relaxation,  increasing ROM, reducing/eliminating soft tissue swelling/inflammation/restriction, improving soft tissue extensibility and allowing for proper ROM for normal function with self care, mobility, lifting and ambulation. Trigger point Dry Needling:  fine needle insertion into myofascial trigger points to stimulate a healing response  [] (28614) Needle insertion without injection: 1 or 2 muscles  [] (14152) Needle insertion without injection: 3 or more muscles    Modalities: lCP to R shld and mid back x 10 min after treatment   [] GAME READY (VASO)- for significant edema, swelling, pain control. [] ESU (HV/PM) for edema and pain control x15' PM ant/post w CP      Charges:  Timed Code Treatment Minutes: 39'   Total Treatment Minutes: 39'       [] EVAL (LOW) 39106 (typically 20 minutes face-to-face)  [] EVAL (MOD) 64710 (typically 30 minutes face-to-face)  [] EVAL (HIGH) 01969 (typically 45 minutes face-to-face)  [] RE-EVAL     [x] FO(03719) x 1  [] IONTO  [x] NMR (30023) x  1  [] VASO  [x] Manual (75343) x  1   [] Other:  [] TA x      [] Mech Traction (10895)  [] ES(attended) (10546)      [] ES (un) (38076):             GOALS:    Patient stated goal: to be able to play golf and work without pain. [] Progressing: [] Met: [] Not Met: [] Adjusted     Therapist goals for Patient:    Short Term Goals: To be achieved in: 2 weeks 1/2 MET  1. Independent in HEP and progression per patient tolerance, in order to prevent re-injury. [] Progressing: [x] Met: [] Not Met: [] Adjusted  2.  Patient will have a decrease in pain to facilitate improvement in movement, function, and ADLs as indicated by Functional Deficits. [] Progressing: [] Met: [x] Not Met: [] Adjusted     Long Term Goals: To be achieved in: 8 weeks        1. FOTO score to be equal or greater to the predicted score (66/100)  to assist with reaching prior level of function. [] Progressing: [] Met: [x] Not Met: [] Adjusted      2. Patient will demonstrate increased AROM to equal the opposite side bilaterally to allow for putting on her bra as indicated by patients Functional Deficits. [x] Progressing: [] Met: [] Not Met: [] Adjusted       3. Patient will demonstrate an increase in strength to 4+/5 to allow for stocking cards as indicated by patients Functional Deficits. [x] Progressing: [] Met: [] Not Met: [] Adjusted       4. Patient will return to all transfers, work activities, and functional activities without increased symptoms or restriction, specifically washing her hair. [] Progressing: [] Met: [x] Not Met: [] Adjusted       5. (Pt specific functional or activity goal) pt will be able to return to golf activities  [] Progressing: [] Met: [x] Not Met: [] Adjusted  6. Pt will demonstrate pain decreased by 50%  (4/10) with all ADLS. [] Progressing: [] Met: [x] Not Met: [] Adjusted        ASSESSMENT:  9/28/22: Resting pain is back to baseline before the injection due to increased activity and flare-up. Activity continues to flare up shoulder. Increased AROM still noted from initial visit. Minimal to slow progress noted in general. Decrease to 1x/week until next MD follow to assess symptoms. Have encourage patient to rest UE as much as possible. 9/20/22 Pt is able to complete all requested tasks, able to initiate RC close to body. Her babysitting, cleaning out house and work is continuing irritation in her shoulder. Patient will benefit from continued skilled intervention to increase ROM, flexibility, strength and function. 9/14/22 Patient more compliant with HEP, but activity at home and work increase pain.  Patient recommended to continue therapy from MD another 6 weeks in conjunction with steroid injections/anti-inflam. Assess response to new exercises. Pt requires skilled intervention to restore ROM, strength, functional endurance and balance in order to perform ADLs without significant symptoms or limitations. 9/7/22 Pt cont to not perform her HEP correctly or consistently and does not follow instructions in clinic or for outside of clinic. She struggles with form and speed of exercises in clinic. 10' of pt education was performed this visit regarding the need for performance of HEP properly and consistently (has not since onset of PT) and progression is not expected from 1 PT visit a week over 4 weeks alone. Progress is not expected if pt does not perform HEP. We will see pt next visit to assess HEP performance and then possible hold until after MRI.       9/2/22 Reports pain was less when ill and not using arm as much. Increased symptoms noted this week due to activity. Significant tenderness noted with STM bicipital groove this visit. Restriction noted IR R > L. Patient overall reports about 30% improvement with shoulder since injection. Still painful with reaching overhead and behind back. Progress exercises this visit for HEP. Response has not been assessed because she has not done exercises in 2 weeks. Patient will benefit from continued skilled intervention to increase ROM, flexibility, strength and function. Overall Progression Towards Functional goals/ Treatment Progress Update:  [] Patient is progressing as expected towards functional goals listed. [x] Progression is slowed due to complexities/Impairments listed. [] Progression has been slowed due to co-morbidities.   [] Plan just implemented, too soon to assess goals progression <30days   [] Goals require adjustment due to lack of progress  [] Patient is not progressing as expected and requires additional follow up with physician  [] Other    Prognosis for POC: [] Good [x] Fair  [x] Poor      Patient requires continued skilled intervention: [x] Yes  [] No    Treatment/Activity Tolerance:  [x] Patient able to complete treatment  [] Patient limited by fatigue  [x] Patient limited by pain    [] Patient limited by other medical complications  [] Other:                     PLAN: Decrease to 1x/week with possible D/C if no further improvements noted. [] Continue per plan of care [x] Alter current plan (see comments above)  [] Plan of care initiated [] Hold pending MD visit [] Discharge      Electronically signed by:  Jt Fortune, PT [id number]  Note: If patient does not return for scheduled/ recommended follow up visits, this note will serve as a discharge from care along with most recent update on progress.

## 2022-10-10 ENCOUNTER — APPOINTMENT (OUTPATIENT)
Dept: PHYSICAL THERAPY | Age: 67
End: 2022-10-10
Payer: MEDICARE

## 2022-10-14 ENCOUNTER — HOSPITAL ENCOUNTER (OUTPATIENT)
Dept: PHYSICAL THERAPY | Age: 67
Setting detail: THERAPIES SERIES
Discharge: HOME | End: 2022-10-14
Payer: MEDICARE

## 2022-10-14 PROCEDURE — 97110 THERAPEUTIC EXERCISES: CPT | Performed by: PHYSICAL THERAPIST

## 2022-10-14 PROCEDURE — 97140 MANUAL THERAPY 1/> REGIONS: CPT | Performed by: PHYSICAL THERAPIST

## 2022-10-14 PROCEDURE — 97112 NEUROMUSCULAR REEDUCATION: CPT | Performed by: PHYSICAL THERAPIST

## 2022-10-14 NOTE — FLOWSHEET NOTE
[facility], [address] [place]  Phone: [phone]  Fax [phone]  :  Physical Therapy Treatment Note/ Progress Report:     Physical Therapy Re-Certification Plan of Care/MD UPDATE      Dear Iwona Garcia MD      We had the pleasure of treating the following patient for physical therapy services at [address] [place] [address]. A summary of our findings can be found in the updated assessment below. This includes our plan of care. If you have any questions or concerns regarding these findings, please do not hesitate to contact me at the office phone number checked above. Thank you for the referral.     Physician Signature:________________________________Date:__________________  By signing above (or electronic signature), therapists plan is approved by physician    Date Range Of Visits: 22- 22  Total Visits to Date: 6  Overall Response to Treatment:   []Patient is responding well to treatment and improvement is noted with regards  to goals   []Patient should continue to improve in reasonable time if they continue HEP   [x]Patient has plateaued and is no longer responding to skilled PT intervention    []Patient is getting worse and would benefit from return to referring MD   []Patient unable to adhere to initial POC   [x]Other: Patient responded to injection and pain lessened about 30% R shoulder and was able to tolerate more exercises in therapy. Due to significant activity patient has been doing with cleaning and working, her R shoulder pain is back to baseline before injection. Recommend 1x/week for 6 weeks to monitor symptoms until MD F/U in October and have encouraged patient to continue resting shoulder.           Date:  10/14/2022    Patient Name:  Greer Lazaro    :  1955  MRN: [id number]    Referring Physician:  Evens Ruelas MD Evaluation Date: 2022                                              Date of Referral:22     Insurance: Medicare     Next MD appt: scheduled: 10/24/22     Medical Diagnosis:  M25.511 (ICD-10-CM) - Right shoulder pain, unspecified chronicity   S46.011A (ICD-10-CM) - Rotator cuff strain, right, initial encounter   M19.011 (ICD-10-CM) - Primary osteoarthritis of right shoulder         Treatment Diagnosis:  M25.511 (ICD-10-CM) - Right shoulder pain, unspecified chronicity       Has the plan of care been signed (Y/N):        [x]  Yes  []  No    Progress report will be due :    ; done visit 11 22, due       Recertification will be due (POC Duration  / 90 days ):   22 6 weeks. Is this a Progress Report:    []  Yes  [x]  No      If Yes:  Date Range for reporting period:  Beginnin22  Endin22              Visit # Date Range Insurance Allowable Requires auth   IN PERSON 13 22-  BMN    [x]no        []yes:   TELEHEALTH       TOTAL              CX/NS       Next PT appt: 10/19/22                PT Practice Pattern:E  Co morbidities:1-2    Nonsurgical  INITIAL VISIT 22   PAIN 0-10/10 5-8/10 0-7/10 0-5 today   FUNCTIONAL SCALE FOTO 53/100 45/100 49/100   AROM R SFLX 130 148 150    R SABD 90 142 152    ER @ side 60 60 65    AROM IR L1 L1 T12                 STRENGHT (MMT) RSFLX 4/5 4+/5 4+/5 P    SABD 4/5 4+/5 4+/5 p    SER 4/5 4+/5 4 +/5 p    SIR 4+/5 4+/5 4+/5    bicep 4/5 5/5 5/5                    Latex Allergy/Pacemaker/Adhesive allergy:  [x]NO      []YES     RESTRICTIONS/PRECAUTIONS: past ACDF         SUBJECTIVE:  Pt reports stress test scheduled for Nov 3. Reports shoulder hasn't changed. Using shld at work a lot due to setting up for [employer]. Overhead reaching is still painful. Having resting pain in general due to activity with shlds. Pain level:  3/10 resting pain upon arrival.    OBJECTIVE:   Observation:     CONCLUSION: MRI    1.  Moderate cuff tendinopathy and peritendinobursitis. Region of interstitial and articular    surface partial-thickness tearing mid to distal supraspinatus and infraspinatus measures 2.35 x    2.15 cm. Regions of pinhole perforation may be contributory. Findings similar to prior    although tearing slightly progressed. 2. High-grade glenohumeral chondromalacia, remodeling and diffuse labral tearing. Capsulitis or    capsulosynovitis present. This has progressed. 3. Outlet-related cuff impingement secondary to [facility] [place], operated by [facility] joint hypertrophy and a lateral downsloping    hypertrophied type 2 acromion. AC joint is degenerated. 4. Arcuate segment biceps long head tendinopathy. Tendon is perched on a hypertrophied lesser    tuberosity as it exits the joint at site of partial-thickness subscapularis tear. Exercises/Interventions:     HEP:    [employer]   Access Code: [email or web address]  URL: [email or web address]  Date: 08/01/2022  Prepared by: Delfin Sam PT    Therapeutic Ex (95760)  NMR re-education (56759) Reps/Sets/time Notes/CUES/Assessment HEP   Good form X         X    MC X    MC X 9/7   ER table stretch MC X 9/7            X 9/7   L shld abd 1# 2x10 reps     A punches 2# 2x15 reps     Triceps 1# in supine         TB Rows /Ext alt 1Significant MC/VC  HEP   TSignificant MC/VC  HEP           Supine crosses (small) 2#  10x VC    Corner pec stretches SL rotation pec stretches 1' x3 reps     Supine ER stretches H30 5 reps     No $ supine     Pulleys 15x     BUE TB tricep     TB walk outs IR/ER (reactive) YTB 10x3\" ea     Pt education x10' HEP, expected progress at this time                Manual Intervention (59673)      PROM/GH joint mobs 5' R shoulder Manual IR stretches         STM/friction massage to bicipital groove 5' Significant tenderness noted                  CUES NEEDED                Therapeutic Activity (29560)            Finisher 4#, Flex/scap/ CW/CCW 15 reps           CC Rows unilateral 15#  2x10 reps     CC Triceps 20# 20# 2x10 reps              Access Code: [email or web address]  URL: [email or web address]  Date: 08/15/2022  Prepared by: Thiago Murphy    Exercises  Standing Shoulder Row with Anchored Resistance - 1-2 x daily - 7 x weekly - 1-2 sets - 10 reps - 2 hold  Shoulder extension with resistance - Neutral - 1-2 x daily - 7 x weekly - 1-2 sets - 10 reps - 2 hold  Shoulder External Rotation and Scapular Retraction with Resistance - 1-2 x daily - 7 x weekly - 1-2 sets - 10 reps - 2 hold  Corner Pec Major Stretch - 2 x daily - 7 x weekly - 1 sets - 5 reps - 30\" hold      Therapeutic Exercise and NMR EXR  [x] (13262) Provided verbal/tactile cueing for activities related to strengthening, flexibility, endurance, ROM for improvements in LE, proximal hip, and core control with self care, mobility, lifting, ambulation. [x] (49216) Provided verbal/tactile cueing for activities related to improving balance, coordination, kinesthetic sense, posture, motor skill, proprioception  to assist with LE, proximal hip, and core control in self care, mobility, lifting, ambulation and eccentric single leg control.      NMR and Therapeutic Activities:    [x] (48528 or 64822) Provided verbal/tactile cueing for activities related to improving balance, coordination, kinesthetic sense, posture, motor skill, proprioception and motor activation to allow for proper function of core, proximal hip and LE with self care and ADLs  [] (22646) Gait Re-education- Provided training and instruction to the patient for proper LE, core and proximal hip recruitment and positioning and eccentric body weight control with ambulation re-education including up and down stairs     Home Exercise Program:    [x] (39064) Reviewed/Progressed HEP activities related to strengthening, flexibility, endurance, ROM of core, proximal hip and LE for functional self-care, mobility, lifting and ambulation/stair navigation   [] (17065)Reviewed/Progressed HEP activities related to improving balance, coordination, kinesthetic sense, posture, motor skill, proprioception of core, proximal hip and LE for self care, mobility, lifting, and ambulation/stair navigation      Manual Treatments:  PROM / STM / Oscillations-Mobs:  G-I, II, III, IV (PA's, Inf., Post.)  [x] (26664) Provided manual therapy to mobilize LE, proximal hip and/or LS spine soft tissue/joints for the purpose of modulating pain, promoting relaxation,  increasing ROM, reducing/eliminating soft tissue swelling/inflammation/restriction, improving soft tissue extensibility and allowing for proper ROM for normal function with self care, mobility, lifting and ambulation. Trigger point Dry Needling:  fine needle insertion into myofascial trigger points to stimulate a healing response  [] (36114) Needle insertion without injection: 1 or 2 muscles  [] (48443) Needle insertion without injection: 3 or more muscles    Modalities: lCP to R shld and mid back x 10 min after treatment   [] GAME READY (VASO)- for significant edema, swelling, pain control. [] ESU (HV/PM) for edema and pain control x15' PM ant/post w CP      Charges:  Timed Code Treatment Minutes: 39'   Total Treatment Minutes: 39'       [] EVAL (LOW) 68540 (typically 20 minutes face-to-face)  [] EVAL (MOD) 21863 (typically 30 minutes face-to-face)  [] EVAL (HIGH) 11889 (typically 45 minutes face-to-face)  [] RE-EVAL     [x] TF(95108) x 1  [] IONTO  [x] NMR (34889) x  1  [] VASO  [x] Manual (40181) x  1   [] Other:  [] TA x      [] Mech Traction (03164)  [] ES(attended) (64085)      [] ES (un) (41612):             GOALS:    Patient stated goal: to be able to play golf and work without pain. [] Progressing: [] Met: [] Not Met: [] Adjusted     Therapist goals for Patient:    Short Term Goals: To be achieved in: 2 weeks 1/2 MET  1. Independent in HEP and progression per patient tolerance, in order to prevent re-injury. [] Progressing: [x] Met: [] Not Met: [] Adjusted  2.  Patient will have a decrease in pain to facilitate improvement in movement, function, and ADLs as indicated by Functional Deficits. [] Progressing: [] Met: [x] Not Met: [] Adjusted     Long Term Goals: To be achieved in: 8 weeks        1. FOTO score to be equal or greater to the predicted score (66/100)  to assist with reaching prior level of function. [] Progressing: [] Met: [x] Not Met: [] Adjusted      2. Patient will demonstrate increased AROM to equal the opposite side bilaterally to allow for putting on her bra as indicated by patients Functional Deficits. [x] Progressing: [] Met: [] Not Met: [] Adjusted       3. Patient will demonstrate an increase in strength to 4+/5 to allow for stocking cards as indicated by patients Functional Deficits. [x] Progressing: [] Met: [] Not Met: [] Adjusted       4. Patient will return to all transfers, work activities, and functional activities without increased symptoms or restriction, specifically washing her hair. [] Progressing: [] Met: [x] Not Met: [] Adjusted       5. (Pt specific functional or activity goal) pt will be able to return to golf activities  [] Progressing: [] Met: [x] Not Met: [] Adjusted  6. Pt will demonstrate pain decreased by 50%  (4/10) with all ADLS. [] Progressing: [] Met: [x] Not Met: [] Adjusted        ASSESSMENT: 10/14/22. No increased pain noted with new exercises. Asess response. 9/28/22: Resting pain is back to baseline before the injection due to increased activity and flare-up. Activity continues to flare up shoulder. Increased AROM still noted from initial visit. Minimal to slow progress noted in general. Decrease to 1x/week until next MD follow to assess symptoms. Have encourage patient to rest UE as much as possible. 9/20/22 Pt is able to complete all requested tasks, able to initiate RC close to body. Her babysitting, cleaning out house and work is continuing irritation in her shoulder.  Patient will benefit from continued skilled intervention to increase ROM, flexibility, strength and function. 9/14/22 Patient more compliant with HEP, but activity at home and work increase pain. Patient recommended to continue therapy from MD another 6 weeks in conjunction with steroid injections/anti-inflam. Assess response to new exercises. Pt requires skilled intervention to restore ROM, strength, functional endurance and balance in order to perform ADLs without significant symptoms or limitations. 9/7/22 Pt cont to not perform her HEP correctly or consistently and does not follow instructions in clinic or for outside of clinic. She struggles with form and speed of exercises in clinic. 10' of pt education was performed this visit regarding the need for performance of HEP properly and consistently (has not since onset of PT) and progression is not expected from 1 PT visit a week over 4 weeks alone. Progress is not expected if pt does not perform HEP. We will see pt next visit to assess HEP performance and then possible hold until after MRI.       9/2/22 Reports pain was less when ill and not using arm as much. Increased symptoms noted this week due to activity. Significant tenderness noted with STM bicipital groove this visit. Restriction noted IR R > L. Patient overall reports about 30% improvement with shoulder since injection. Still painful with reaching overhead and behind back. Progress exercises this visit for HEP. Response has not been assessed because she has not done exercises in 2 weeks. Patient will benefit from continued skilled intervention to increase ROM, flexibility, strength and function. Overall Progression Towards Functional goals/ Treatment Progress Update:  [] Patient is progressing as expected towards functional goals listed. [x] Progression is slowed due to complexities/Impairments listed. [] Progression has been slowed due to co-morbidities.   [] Plan just implemented, too soon to assess goals progression <30days   [] Goals require adjustment due to lack of progress  [] Patient is not progressing as expected and requires additional follow up with physician  [] Other    Prognosis for POC: [] Good [x] Fair  [x] Poor      Patient requires continued skilled intervention: [x] Yes  [] No    Treatment/Activity Tolerance:  [x] Patient able to complete treatment  [] Patient limited by fatigue  [x] Patient limited by pain    [] Patient limited by other medical complications  [] Other:                     PLAN: Decrease to 1x/week with possible D/C if no further improvements noted. [] Continue per plan of care [x] Alter current plan (see comments above)  [] Plan of care initiated [] Hold pending MD visit [] Discharge      Electronically signed by:  Thiago Murphy, [place] [id number]  Note: If patient does not return for scheduled/ recommended follow up visits, this note will serve as a discharge from care along with most recent update on progress.

## 2022-10-17 ENCOUNTER — APPOINTMENT (OUTPATIENT)
Dept: PHYSICAL THERAPY | Age: 67
End: 2022-10-17
Payer: MEDICARE

## 2022-10-19 ENCOUNTER — HOSPITAL ENCOUNTER (OUTPATIENT)
Dept: PHYSICAL THERAPY | Age: 67
Setting detail: THERAPIES SERIES
Discharge: HOME | End: 2022-10-19
Payer: MEDICARE

## 2022-10-19 PROCEDURE — 97140 MANUAL THERAPY 1/> REGIONS: CPT | Performed by: PHYSICAL THERAPIST

## 2022-10-19 PROCEDURE — 97110 THERAPEUTIC EXERCISES: CPT | Performed by: PHYSICAL THERAPIST

## 2022-10-19 PROCEDURE — 97112 NEUROMUSCULAR REEDUCATION: CPT | Performed by: PHYSICAL THERAPIST

## 2022-10-19 NOTE — FLOWSHEET NOTE
[facility], [address]  Phone: [phone]  Fax [phone]  :  Physical Therapy Treatment Note/ Progress Report:     Physical Therapy Re-Certification Plan of Care/MD UPDATE      Dear Ricardo Becker MD      We had the pleasure of treating the following patient for physical therapy services at [address] [place] [address]. A summary of our findings can be found in the updated assessment below. This includes our plan of care. If you have any questions or concerns regarding these findings, please do not hesitate to contact me at the office phone number checked above. Thank you for the referral.     Physician Signature:________________________________Date:__________________  By signing above (or electronic signature), therapists plan is approved by physician    Date Range Of Visits: 22- 22  Total Visits to Date: 6  Overall Response to Treatment:   []Patient is responding well to treatment and improvement is noted with regards  to goals   []Patient should continue to improve in reasonable time if they continue HEP   [x]Patient has plateaued and is no longer responding to skilled PT intervention    []Patient is getting worse and would benefit from return to referring MD   []Patient unable to adhere to initial POC   [x]Other: Patient responded to injection and pain lessened about 30% R shoulder and was able to tolerate more exercises in therapy. Due to significant activity patient has been doing with cleaning and working, her R shoulder pain is back to baseline before injection. Recommend 1x/week for 6 weeks to monitor symptoms until MD F/U in October and have encouraged patient to continue resting shoulder.           Date:  10/19/2022    Patient Name:  Shirley Helm    :  1955  MRN: [id number]    Referring Physician:  Angela Manjarrez MD Evaluation Date: 2022                                              Date of Referral:22     Insurance: Medicare     Next MD appt: scheduled: 10/24/22     Medical Diagnosis:  M25.511 (ICD-10-CM) - Right shoulder pain, unspecified chronicity   S46.011A (ICD-10-CM) - Rotator cuff strain, right, initial encounter   M19.011 (ICD-10-CM) - Primary osteoarthritis of right shoulder         Treatment Diagnosis:  M25.511 (ICD-10-CM) - Right shoulder pain, unspecified chronicity       Has the plan of care been signed (Y/N):        [x]  Yes  []  No    Progress report will be due :    ; done visit 11 22, due //09      Recertification will be due (POC Duration  / 90 days ):   22 6 weeks. Is this a Progress Report:    []  Yes  [x]  No      If Yes:  Date Range for reporting period:  Beginnin22  Endin22              Visit # Date Range Insurance Allowable Requires auth   IN PERSON 14 22-  BMN    [x]no        []yes:   TELEHEALTH       TOTAL              CX/NS       Next PT appt: 10/24/22                PT Practice Pattern:E  Co morbidities:1-2    Nonsurgical  INITIAL VISIT 8/1/22  8/29/22  9/28/22 10/19/22   PAIN 0-10/10 5-8/10 0-7/10 0-5 today    FUNCTIONAL SCALE FOTO 53/100 45/100 49/100 45/100   AROM R SFLX 130 148 150     R SABD 90 142 152     ER @ side 60 60 65     AROM IR L1 L1 T12                    STRENGHT (MMT) RSFLX 4/5 4+/5 4+/5 P     SABD 4/5 4+/5 4+/5 p     SER 4/5 4+/5 4 +/5 p     SIR 4+/5 4+/5 4+/5     bicep 4/5 5/5 5/5                      Latex Allergy/Pacemaker/Adhesive allergy:  [x]NO      []YES     RESTRICTIONS/PRECAUTIONS: past ACDF         SUBJECTIVE:  Pt reports stress test scheduled for Nov 3. Reports shoulder hasn't changed. Using shld at work a lot due to setting up for [employer]. Overhead reaching is still painful. Having resting pain in general due to activity with shlds.       Pain level:  3/10 resting pain upon arrival. Increases after PT and with activity    OBJECTIVE:   Observation: FOTO: 45/100 on 10/19/22    CONCLUSION: MRI    1. Moderate cuff tendinopathy and peritendinobursitis. Region of interstitial and articular    surface partial-thickness tearing mid to distal supraspinatus and infraspinatus measures 2.35 x    2.15 cm. Regions of pinhole perforation may be contributory. Findings similar to prior    although tearing slightly progressed. 2. High-grade glenohumeral chondromalacia, remodeling and diffuse labral tearing. Capsulitis or    capsulosynovitis present. This has progressed. 3. Outlet-related cuff impingement secondary to [facility] joint hypertrophy and a lateral downsloping    hypertrophied type 2 acromion. AC joint is degenerated. 4. Arcuate segment biceps long head tendinopathy. Tendon is perched on a hypertrophied lesser    tuberosity as it exits the joint at site of partial-thickness subscapularis tear. Exercises/Interventions:     HEP:    [employer]   Access Code: [email or web address]  URL: [email or web address]  Date: 08/01/2022  Prepared by: Rocio Leon PT    Therapeutic Ex (09308)  NMR re-education (56397) Reps/Sets/time Notes/CUES/Assessment HEP   Good form X         X    MC X    MC X 9/7   ER table stretch MC X 9/7            X 9/7   L shld abd 1# 2x10 reps     A punches 2# 2x15 reps     Triceps 1# in supine         TB Rows /Ext alt 1Significant MC/VC  HEP   TSignificant MC/VC  HEP           Supine crosses (small) VC    Corner pec stretches SL rotation pec stretches 1' x3 reps     Supine ER stretches H30 5 reps     No $ supine     Pulleys 15x     BUE TB tricep     TB walk outs IR/ER (reactive) YTB 10x3\" ea     Pt education x10' HEP, expected progress at this time                Manual Intervention (83408)      PROM/GH joint mobs 10' R shoulder Manual IR stretches         STM/friction massage to bicipital groove 5' Significant tenderness noted                  CUES NEEDED                Therapeutic Activity (18736) Finisher 4#, Flex/scap/ CW/CCW 15 reps           CC Rows unilateral     CC Triceps 20#            Access Code: [email or web address]  URL: [email or web address]  Date: 08/15/2022  Prepared by: Imtiaz Chi    Exercises  Standing Shoulder Row with Anchored Resistance - 1-2 x daily - 7 x weekly - 1-2 sets - 10 reps - 2 hold  Shoulder extension with resistance - Neutral - 1-2 x daily - 7 x weekly - 1-2 sets - 10 reps - 2 hold  Shoulder External Rotation and Scapular Retraction with Resistance - 1-2 x daily - 7 x weekly - 1-2 sets - 10 reps - 2 hold  Corner Pec Major Stretch - 2 x daily - 7 x weekly - 1 sets - 5 reps - 30\" hold      Therapeutic Exercise and NMR EXR  [x] (24303) Provided verbal/tactile cueing for activities related to strengthening, flexibility, endurance, ROM for improvements in LE, proximal hip, and core control with self care, mobility, lifting, ambulation. [x] (22471) Provided verbal/tactile cueing for activities related to improving balance, coordination, kinesthetic sense, posture, motor skill, proprioception  to assist with LE, proximal hip, and core control in self care, mobility, lifting, ambulation and eccentric single leg control.      NMR and Therapeutic Activities:    [x] (14402 or 99581) Provided verbal/tactile cueing for activities related to improving balance, coordination, kinesthetic sense, posture, motor skill, proprioception and motor activation to allow for proper function of core, proximal hip and LE with self care and ADLs  [] (58633) Gait Re-education- Provided training and instruction to the patient for proper LE, core and proximal hip recruitment and positioning and eccentric body weight control with ambulation re-education including up and down stairs     Home Exercise Program:    [x] (72664) Reviewed/Progressed HEP activities related to strengthening, flexibility, endurance, ROM of core, proximal hip and LE for functional self-care, mobility, lifting and ambulation/stair navigation [] (20845)Reviewed/Progressed HEP activities related to improving balance, coordination, kinesthetic sense, posture, motor skill, proprioception of core, proximal hip and LE for self care, mobility, lifting, and ambulation/stair navigation      Manual Treatments:  PROM / STM / Oscillations-Mobs:  G-I, II, III, IV (PA's, Inf., Post.)  [x] (57251) Provided manual therapy to mobilize LE, proximal hip and/or LS spine soft tissue/joints for the purpose of modulating pain, promoting relaxation,  increasing ROM, reducing/eliminating soft tissue swelling/inflammation/restriction, improving soft tissue extensibility and allowing for proper ROM for normal function with self care, mobility, lifting and ambulation. Trigger point Dry Needling:  fine needle insertion into myofascial trigger points to stimulate a healing response  [] (57555) Needle insertion without injection: 1 or 2 muscles  [] (60214) Needle insertion without injection: 3 or more muscles    Modalities: lCP to R shld and mid back x 10 min after treatment   [] GAME READY (VASO)- for significant edema, swelling, pain control. [] ESU (HV/PM) for edema and pain control x15' PM ant/post w CP      Charges:  Timed Code Treatment Minutes: 40'   Total Treatment Minutes: 48'       [] EVAL (LOW) 36774 (typically 20 minutes face-to-face)  [] EVAL (MOD) 49280 (typically 30 minutes face-to-face)  [] EVAL (HIGH) 02530 (typically 45 minutes face-to-face)  [] RE-EVAL     [x] JN(83934) x 1  [] IONTO  [x] NMR (27238) x  1  [] VASO  [x] Manual (49191) x  1   [] Other:  [] TA x      [] Mech Traction (99689)  [] ES(attended) (19944)      [] ES (un) (37281):             GOALS:    Patient stated goal: to be able to play golf and work without pain. [] Progressing: [] Met: [] Not Met: [] Adjusted     Therapist goals for Patient:    Short Term Goals: To be achieved in: 2 weeks 1/2 MET  1. Independent in HEP and progression per patient tolerance, in order to prevent re-injury.   [] Progressing: [x] Met: [] Not Met: [] Adjusted  2. Patient will have a decrease in pain to facilitate improvement in movement, function, and ADLs as indicated by Functional Deficits. [] Progressing: [] Met: [x] Not Met: [] Adjusted     Long Term Goals: To be achieved in: 8 weeks        1. FOTO score to be equal or greater to the predicted score (66/100)  to assist with reaching prior level of function. [] Progressing: [] Met: [x] Not Met: [] Adjusted      2. Patient will demonstrate increased AROM to equal the opposite side bilaterally to allow for putting on her bra as indicated by patients Functional Deficits. [x] Progressing: [] Met: [] Not Met: [] Adjusted       3. Patient will demonstrate an increase in strength to 4+/5 to allow for stocking cards as indicated by patients Functional Deficits. [x] Progressing: [] Met: [] Not Met: [] Adjusted       4. Patient will return to all transfers, work activities, and functional activities without increased symptoms or restriction, specifically washing her hair. [] Progressing: [] Met: [x] Not Met: [] Adjusted       5. (Pt specific functional or activity goal) pt will be able to return to golf activities  [] Progressing: [] Met: [x] Not Met: [] Adjusted  6. Pt will demonstrate pain decreased by 50%  (4/10) with all ADLS. [] Progressing: [] Met: [x] Not Met: [] Adjusted        ASSESSMENT: 10/19/22. No significant progress noted overall. FOTO scores not improved. Patient to F/U with MD next week. Re-assess N.V. and anticipate D/C due to lack of progress. 9/28/22: Resting pain is back to baseline before the injection due to increased activity and flare-up. Activity continues to flare up shoulder. Increased AROM still noted from initial visit. Minimal to slow progress noted in general. Decrease to 1x/week until next MD follow to assess symptoms. Have encourage patient to rest UE as much as possible.     9/20/22 Pt is able to complete all requested tasks, able to initiate RC close to body. Her babysitting, cleaning out house and work is continuing irritation in her shoulder. Patient will benefit from continued skilled intervention to increase ROM, flexibility, strength and function. 9/14/22 Patient more compliant with HEP, but activity at home and work increase pain. Patient recommended to continue therapy from MD another 6 weeks in conjunction with steroid injections/anti-inflam. Assess response to new exercises. Pt requires skilled intervention to restore ROM, strength, functional endurance and balance in order to perform ADLs without significant symptoms or limitations. 9/7/22 Pt cont to not perform her HEP correctly or consistently and does not follow instructions in clinic or for outside of clinic. She struggles with form and speed of exercises in clinic. 10' of pt education was performed this visit regarding the need for performance of HEP properly and consistently (has not since onset of PT) and progression is not expected from 1 PT visit a week over 4 weeks alone. Progress is not expected if pt does not perform HEP. We will see pt next visit to assess HEP performance and then possible hold until after MRI.       9/2/22 Reports pain was less when ill and not using arm as much. Increased symptoms noted this week due to activity. Significant tenderness noted with STM bicipital groove this visit. Restriction noted IR R > L. Patient overall reports about 30% improvement with shoulder since injection. Still painful with reaching overhead and behind back. Progress exercises this visit for HEP. Response has not been assessed because she has not done exercises in 2 weeks. Patient will benefit from continued skilled intervention to increase ROM, flexibility, strength and function. Overall Progression Towards Functional goals/ Treatment Progress Update:  [] Patient is progressing as expected towards functional goals listed.     [x] Progression is slowed due to complexities/Impairments listed. [] Progression has been slowed due to co-morbidities. [] Plan just implemented, too soon to assess goals progression <30days   [] Goals require adjustment due to lack of progress  [] Patient is not progressing as expected and requires additional follow up with physician  [] Other    Prognosis for POC: [] Good [x] Fair  [x] Poor      Patient requires continued skilled intervention: [x] Yes  [] No    Treatment/Activity Tolerance:  [x] Patient able to complete treatment  [] Patient limited by fatigue  [x] Patient limited by pain    [] Patient limited by other medical complications  [] Other:                     PLAN: Re-assess N.V. D/C PT due to lack of improvement,     [] Continue per plan of care [x] Alter current plan (see comments above)  [] Plan of care initiated [] Hold pending MD visit [] Discharge      Electronically signed by:  Radha Dash, PT [id number]  Note: If patient does not return for scheduled/ recommended follow up visits, this note will serve as a discharge from care along with most recent update on progress.

## 2022-10-24 ENCOUNTER — HOSPITAL ENCOUNTER (OUTPATIENT)
Dept: PHYSICAL THERAPY | Age: 67
Setting detail: THERAPIES SERIES
End: 2022-10-24
Payer: MEDICARE

## 2022-10-24 ENCOUNTER — OFFICE VISIT (OUTPATIENT)
Dept: ORTHOPEDIC SURGERY | Age: 67
End: 2022-10-24
Payer: MEDICARE

## 2022-10-24 VITALS — BODY MASS INDEX: 30.61 KG/M2 | WEIGHT: 195 LBS | HEIGHT: 67 IN

## 2022-10-24 DIAGNOSIS — G89.29 CHRONIC PAIN OF RIGHT KNEE: Primary | ICD-10-CM

## 2022-10-24 DIAGNOSIS — M22.41 CHONDROMALACIA OF BOTH PATELLAE: ICD-10-CM

## 2022-10-24 DIAGNOSIS — M22.42 CHONDROMALACIA OF BOTH PATELLAE: ICD-10-CM

## 2022-10-24 DIAGNOSIS — M17.0 BILATERAL PRIMARY OSTEOARTHRITIS OF KNEE: ICD-10-CM

## 2022-10-24 DIAGNOSIS — M25.562 CHRONIC PAIN OF LEFT KNEE: ICD-10-CM

## 2022-10-24 DIAGNOSIS — M25.561 CHRONIC PAIN OF RIGHT KNEE: Primary | ICD-10-CM

## 2022-10-24 DIAGNOSIS — G89.29 CHRONIC PAIN OF LEFT KNEE: ICD-10-CM

## 2022-10-24 PROCEDURE — 1036F TOBACCO NON-USER: CPT | Performed by: FAMILY MEDICINE

## 2022-10-24 PROCEDURE — G8400 PT W/DXA NO RESULTS DOC: HCPCS | Performed by: FAMILY MEDICINE

## 2022-10-24 PROCEDURE — G8417 CALC BMI ABV UP PARAM F/U: HCPCS | Performed by: FAMILY MEDICINE

## 2022-10-24 PROCEDURE — 3017F COLORECTAL CA SCREEN DOC REV: CPT | Performed by: FAMILY MEDICINE

## 2022-10-24 PROCEDURE — 1123F ACP DISCUSS/DSCN MKR DOCD: CPT | Performed by: FAMILY MEDICINE

## 2022-10-24 PROCEDURE — 1090F PRES/ABSN URINE INCON ASSESS: CPT | Performed by: FAMILY MEDICINE

## 2022-10-24 PROCEDURE — G8427 DOCREV CUR MEDS BY ELIG CLIN: HCPCS | Performed by: FAMILY MEDICINE

## 2022-10-24 PROCEDURE — G8484 FLU IMMUNIZE NO ADMIN: HCPCS | Performed by: FAMILY MEDICINE

## 2022-10-24 PROCEDURE — 99214 OFFICE O/P EST MOD 30 MIN: CPT | Performed by: FAMILY MEDICINE

## 2022-10-24 RX ORDER — METHYLPREDNISOLONE 4 MG/1
TABLET ORAL
Qty: 21 KIT | Refills: 0 | Status: SHIPPED | OUTPATIENT
Start: 2022-10-24 | End: 2022-11-15

## 2022-10-24 NOTE — PROGRESS NOTES
Chief Complaint    Shoulder Pain (CK RIGHT SHOULDER) and Knee Pain    FU new onset right shoulder pain status post lifting injury roughly 7/1/2022 with right shoulder weakness and motion loss and difficulty sleeping    History of Present Illness:  Gamaliel Tamayo is a 78 y.o. 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 Osbaldo Cai is being seen by today for an also helps care for her mother who is seen today for separate orthopedic condition. She states that in early July 2022 she was lifting up a potted plant and attempting to move this and felt a pop in her right shoulder. This was associated with the pain and since that time has had difficulty with a constant dull achy pain in her shoulder at 2-3 out of 10 with sharper pain with active elevation with diminished motion and inability to golf. She really does not have any history of substantial shoulder injuries. She tried icing and has continued with her diclofenac but despite this her pain symptoms in her shoulder have improved. She has noticed crepitation and popping but no active locking or catching and denies substantial cervical pain or radicular symptoms. She never really has had a problem with her shoulder and does leave for a family vacation to [place] on 8/4 to 8/12/2022. She is having a difficult time sleeping in her shoulder but her knees which she was seen for recently are doing somewhat better. Her major goal is to diminish her pain and return to golf. She has not golfed in the last couple weeks and is being seen today for orthopedic and sports consultation with initial imaging. We last saw Lizbet Steward in the office on 7/20/2022 for her right shoulder. Following her initial evaluation she did notice a slight improvement which was fairly short-lived.   She was able to get through her vacation in [place] but is unable to comfortably golf and is now actually feeling worse than 6 weeks ago. She does have limited ability to actively elevate does feel weak and has had some crepitation and popping. Her plain films did seem to suggest some moderate shoulder osteoarthritis and despite therapy 4 sessions and compliance with her home exercises she does have continued pain and limited functionality with ongoing discomfort at night. We were supposed to see her a couple weeks ago but she contracted COVID after coming back from her trip in [place] and continues to remain symptomatic with regard to her shoulder with shoulder pain and weakness and difficulty with overhead activities. Shara Dias was last seen in the office on 9/7/2022 for ongoing pain to her right shoulder. Once again she is known to have right shoulder osteoarthritis and probable cuff straining and we had seen her just prior to her leaving for vacation to [place]. She was experiencing worsening pain symptoms which did prompt us to send her for an updated MRI of her shoulder. This was performed on 9/8/2022 which did show evidence of significant high-grade chondromalacia patella with partial thickness supra and infraspinatus tears with diffuse peritendinobursitis. She did have labral fraying and some progression of her partial-thickness tears compared to her previous scan. There was capsulitis noted as well as ablator related impingement to the [facility] joint with AC arthropathy and a type II acromion. There was evidence of biceps tendinopathy with some perching. She has been more symptomatic at this point as she has been cleaning out her parents house which are transitioning over to a nursing care facilities. She has gotten a marginal improvement in her pain symptoms with her Medrol pack but seems to do much better with her diclofenac for the pain relief perspective.   She is currently in therapy and denies locking catching or true instability symptoms. She would like to avoid surgery if at all possible. Saw Naveen Knowles in the office on 9/12/2022 for continued treatment of her right shoulder MRI documented partial-thickness rotator cuff tear with impingement and AC arthropathy and type II acromion. She states that her shoulder symptoms have improved about 55 to 60% with ongoing discomfort with end range of motion. She has been working on her therapy exercises continued with her diclofenac. Her golf season is in the process of winding down and her knees have been bothering her substantially. Once again she is known to have significant bilateral knee osteoarthritis effectively bone-on-bone to the medial compartment of her left knee and lateral compartment of her right knee. She did have a twisting episode a few weeks ago. Denies locking catching or true instability symptoms although she does have popping and only mild swelling. We had previously finished up viscosupplementation for her knees on 4/6/2022 and have had numerous discussions regarding knee arthroplasty and she is now interested in considering this during the wintertime as it will be her off time from golfing. Medical History  Patient's medications, allergies, past medical, surgical, social and family histories were reviewed and updated as appropriate. Review of Systems  Relevant review of systems reviewed on 7/20/2022 and available in the patient's chart under the medial tab. Vital Signs  There were no vitals filed for this visit. General Exam:   Constitutional: Patient is adequately groomed with no evidence of malnutrition  DTRs: Deep tendon reflexes are intact  Mental Status: The patient is oriented to time, place and person. The patient's mood and affect are appropriate. Lymphatic: The lymphatic examination bilaterally reveals all areas to be without enlargement or induration. Vascular: Examination reveals no swelling or calf tenderness.   Peripheral pulses are palpable and 2+. Neurological: The patient has good coordination. There is no weakness or sensory deficit. Shoulder Examination    Inspection: There is no high-grade deformity tense effusion or soft tissue swelling. She does have before meals and glenohumeral crepitation. Palpation: She is less clinically tender over the posterior cuff greater tuberosity and to lesser degree biceps and AC joint. No Donavan deformities. Rang of Motion: She does have improved pain associated reductions in motion. Less prominent pain with abduction at 120 forward flexion better to about 130 external rotation limited to 25 to 30 with internal rotation to about L4. She is less prominently stiff. Strength: She does have residual weakness 4-5 with both supra and infraspinatus testing. Subscap 4+ out of 5. Special Tests: She has negative drop and lift off testing but does have 4-5 out of 10 pain with impingement testing. Less prominent pain with glenohumeral grind testing. Negative speeds testing. Now negative George. Negative screening cervical testing. Skin: There are no rashes, ulcerations or lesions. Distal motor sensory and vascular exam is intact. Gait: Reasonable gait. Reflexes:  Symmetrically preserved. Additional Comments: Evaluation of her knees which are more problematic area at this point does reveal trace knee joint effusions with patellofemoral crepitation. There is no high-grade malalignments. She does have diffuse joint line tenderness more so on the lateral compartment of the right knee and medial compartment of the left knee. There is no obvious instability. She is stiff in terminal 5 degrees of extension with flexion to about 115 today. Fair quad tone. Hamstrings tight. Pain with crepitation with medial Jose's on the left lateral Jose's on the right. No high-grade meniscal clicks or instability. Pain with patellar grind testing.        Additional Examinations:  Contralateral Exam: Examination of the left shoulder reveals no atrophy or deformity. The skin is warm and dry. Range of motion is within normal limits. There is no focal tenderness with palpation. No AC joint tenderness. Negative Neer's and George-Derek exams. Strength is graded 5/5 throughout. Right Upper Extremity:  Examination of the right upper extremity does not show any tenderness, deformity or injury. Range of motion is unremarkable. There is no gross instability. There are no rashes, ulcerations or lesions. Strength and tone are normal.  Left Upper Extremity: Examination of the left upper extremity does not show any tenderness, deformity or injury. Range of motion is unremarkable. There is no gross instability. There are no rashes, ulcerations or lesions. Strength and tone are normal.  Neck: Examination of the neck does not show any tenderness, deformity or injury. Range of motion is unremarkable. There is no gross instability. There are no rashes, ulcerations or lesions. Strength and tone are normal.         Diagnostic Test Findings:   Right shoulder true AP outlet and axillary films were reviewed from 7/20/2022 and does show moderate to significant right glenohumeral osteoarthritis with inferior spurring and evidence of AC arthropathy without evidence of acute high-grade osseous injury. Right shoulder MRI obtained at [facility] AT [facility] [place] 44,783 is listed above     Site: [facility] #: [id number] #: [id number] Procedure: MR Right Shoulder joint w/o Contrast ; Reason for Exam: right shoulder pain; rotator cuff strain; primary osteoarthritis; evaluate OA, rule out rotator cuff tear, impingement   This document is confidential medical information. Unauthorized disclosure or use of this information is prohibited by law. If you are not the intended recipient of this document, please advise us by calling immediately [phone].        [facility]   null [address]           Patient Name: Malcolm Dee   Case ID: [id number]   Patient : 1955   Referring Physician: Jose Carlos Jarquin MD   Exam Date: 2022   Exam Description: MR Right Shoulder joint w/o Contrast            HISTORY:  Right shoulder pain. Rotator cuff strain. Primary osteoarthritis. Evaluate    osteoarthritis. Evaluate for rotator cuff tear, impingement. TECHNICAL FACTORS:  Long- and short-axis fat- and water-weighted images were performed. COMPARISON:  None. FINDINGS:  Arcuate and extra-articular segment biceps long head tendinopathy. Tendon is    perched on a hypertrophied lesser tuberosity as it exits the joint. Posterior and superior as    well as anterior and inferior labrum is torn. High-grade chondromalacia present. Remodeling    present. Hypertrophic cuff tendinopathy and peritendinobursitis. Interstitial and articular surface    tearing mid to distal supraspinatus and infraspinatus. Region of tearing measures    approximately 2.35 x 2.15 cm. Partial-thickness tearing occupies up to 70% of the depth. Regions of pinhole perforation may be contributory. Interstitial fraying and microtearing    distal subscapularis also present. Outlet-related cuff impingement [facility] to [facility] joint hypertrophy and a lateral downsloping    hypertrophied type 2 acromion. AC joint is degenerated. When compared with the prior study, findings similar. Glenohumeral remodeling,    capsulosynovitis, and labral tearing has progressed. Cuff tearing slightly progressed. CONCLUSION:   1. Moderate cuff tendinopathy and peritendinobursitis. Region of interstitial and articular    surface partial-thickness tearing mid to distal supraspinatus and infraspinatus measures 2.35 x    2.15 cm. Regions of pinhole perforation may be contributory. Findings similar to prior    although tearing slightly progressed.    2. High-grade glenohumeral chondromalacia, remodeling and diffuse labral tearing. Capsulitis or    capsulosynovitis present. This has progressed. 3. Outlet-related cuff impingement secondary to [facility] joint hypertrophy and a lateral downsloping    hypertrophied type 2 acromion. AC joint is degenerated. 4. Arcuate segment biceps long head tendinopathy. Tendon is perched on a hypertrophied lesser    tuberosity as it exits the joint at site of partial-thickness subscapularis tear. Thank you for the opportunity to provide your interpretation. MD Rebecca Lewis 09/09/2022 10:33 AM     Updated bilateral knee AP and PA weightbearing sunrise and lateral films were obtained today and does show advanced bone-on-bone changes to the medial compartment of the left knee lateral compartment of the right knee with patellofemoral arthropathy and spurring. Assessment: #1. Nearly 4 months status post lifting injury right shoulder with slowly improving right shoulder pain MRI documented partial-thickness rotator cuff tears with tendinopathy, capsulitis high-grade glenohumeral osteoarthritis with biceps tendinopathy. #2.  Recurrent symptomatic of bilateral knee advanced osteoarthritis with bone-on-bone changes medial compartment of left knee lateral compartment of right knee with patellofemoral arthropathy and recurrent synovitis       Impression:    Encounter Diagnoses   Name Primary?     Chronic pain of right knee Yes    Chronic pain of left knee     Bilateral primary osteoarthritis of knee     Chondromalacia of both patellae        Office Procedures:     Orders Placed This Encounter   Procedures    XR KNEE RIGHT (MIN 4 VIEWS)     Standing Status:   Future     Number of Occurrences:   1     Standing Expiration Date:   10/24/2023     Order Specific Question:   Reason for exam:     Answer:   pain    XR KNEE LEFT (MIN 4 VIEWS)     Standing Status:   Future     Number of Occurrences:   1     Standing Expiration Date:   10/24/2023     Order Specific Question:   Reason for exam:     Answer:   yumiko Nunez MD, Orthopedic Surgery (Shoulder; Hip; Knee), [facility]     Referral Priority:   Routine     Referral Type:   Eval and Treat     Referral Reason:   Specialty Services Required     Referred to Provider:   Gisella Juárez MD     Requested Specialty:   Orthopedic Surgery     Number of Visits Requested:   1         Treatment Plan: Treatment options were discussed with Vasquez Val today. We did once again review her plain films, recent right shoulder MRI and exam findings. She has been experiencing persistent right shoulder pain and night pain with her right shoulder since lifting a potted plant on roughly 7/1/2022. She does unfortunately have fairly substantial glenohumeral arthropathy with AC arthropathy with impingement and partial-thickness tearing to the rotator cuff with biceps tendinopathy. She is partially improved with her shoulder continue with her home exercise program.  She will continue with her diclofenac 75 mg 1 pill twice daily although we did place her on a Medrol Dosepak particular with her knees bothering her. We looked at her updated knee plain films and did have a long discussion once again regarding knee arthroplasty. As she is an avid golfer she has been seriously thinking about getting these done this winter and as such I would like for her to sit down with Dr. Taj Stiles to discuss knee arthroplasty. She is uncertain as to which knee she would start with but should she elect to do this, I think she should consider contralateral knee viscosupplementation and/or steroid injections to allow for adequate rehab. She will continue with her bracing and exercise program for her knees and shoulder. Right now her knees are much bigger deal than her shoulder although we can see her back for that in a few months as well.   She has been in the process of cleaning of her parents home as they is transitioned to a nursing facility. We will see her back in a few months and she will contact us in the interim with questions or concerns. This dictation was performed with a verbal recognition program (DRAGON) and it was checked for errors. It is possible that there are still dictated errors within this office note. If so, please bring any errors to my attention for an addendum. All efforts were made to ensure that this office note is accurate.

## 2022-10-25 ENCOUNTER — OFFICE VISIT (OUTPATIENT)
Dept: ORTHOPEDIC SURGERY | Age: 67
End: 2022-10-25
Payer: MEDICARE

## 2022-10-25 VITALS — HEIGHT: 67 IN | BODY MASS INDEX: 30.61 KG/M2 | WEIGHT: 195 LBS

## 2022-10-25 DIAGNOSIS — G89.29 CHRONIC PAIN OF RIGHT KNEE: Primary | ICD-10-CM

## 2022-10-25 DIAGNOSIS — M17.0 BILATERAL PRIMARY OSTEOARTHRITIS OF KNEE: ICD-10-CM

## 2022-10-25 DIAGNOSIS — M25.561 CHRONIC PAIN OF RIGHT KNEE: Primary | ICD-10-CM

## 2022-10-25 PROCEDURE — G8427 DOCREV CUR MEDS BY ELIG CLIN: HCPCS | Performed by: STUDENT IN AN ORGANIZED HEALTH CARE EDUCATION/TRAINING PROGRAM

## 2022-10-25 PROCEDURE — 99214 OFFICE O/P EST MOD 30 MIN: CPT | Performed by: STUDENT IN AN ORGANIZED HEALTH CARE EDUCATION/TRAINING PROGRAM

## 2022-10-25 PROCEDURE — G8484 FLU IMMUNIZE NO ADMIN: HCPCS | Performed by: STUDENT IN AN ORGANIZED HEALTH CARE EDUCATION/TRAINING PROGRAM

## 2022-10-25 PROCEDURE — G8417 CALC BMI ABV UP PARAM F/U: HCPCS | Performed by: STUDENT IN AN ORGANIZED HEALTH CARE EDUCATION/TRAINING PROGRAM

## 2022-10-25 PROCEDURE — 1090F PRES/ABSN URINE INCON ASSESS: CPT | Performed by: STUDENT IN AN ORGANIZED HEALTH CARE EDUCATION/TRAINING PROGRAM

## 2022-10-25 NOTE — LETTER
[facility]  Surgery Scheduling Form:    10/25/22     DEMOGRAPHICS    Patient Name:  Chalres Dumont  Patient :  1955   Patient SS#:  [id number]    Patient Phone:  [phone] (home) [phone] (work) Alt. Patient Phone:    Patient Address:  [place] [id number]    PCP:  Denisse Powell DO  Insurance:  Payor: MEDICARE / Plan: MEDICARE PART A AND B / Product Type: *No Product type* /   Insurance ID Number:  Payer/Plan Subscr  Sex Relation Sub. Ins. ID Effective Group Num   1. [address] 1955 Female Self [id number]                                    [address]    [address]. [employer] - Natalya Fairly 1955 Female Self [id number]                                   [address]       DIAGNOSIS & PROCEDURE    Diagnosis:   M17.11 Primary Osteoarthritis    Operation: RIGHT  [address] Total Knee Arthroplasty    Provider:  Alexus Lorenzana MD      Location:  [facility] INFORMATION    Requested Date:  See comments   Requested Time:  Can be PM        OR Time Required:  120 Minutes  Admission:  []Outpatient   []23 hour  []Same Day Admit:   1-2  days  [x]Inpatient    Anesthesia:  [x]General  []Spinal  []MAC/Sedation  Regional Anesthesia:  []None  []Lumbar Plexus Block  []Fascia Iliaca  []Femoral  [x]Adductor canal  []Interscalene Block  []Insert Catheter  []OrthoMix []Exparel   [x] iPAC       EQUIPMENT    Position:  [x]Supine  []Lateral  []Beach-chair  []Prone    OR Bed:  [x]Regular  [x]Dugan boot  []Sven    Radiology:  []Large C-arm  []Small C-arm  []Portable X-ray    Implants:  [employer] Knee:  [x]Primary Set  []Revision Set      SUTURE: Standard TJA  DRESSING:  [x]Prineo dermabond  []4x4 gauze  [x]ABDs  []Tegaderm                         []Staples  []Pravena incisional vac  BRACE: []Pelvic Binder  []Hip X-ACT  []Knee TROM  []Knee immobilizer                 []Shoulder Immob. (w/abd. pillow)  []Sling  [x]Ice Unit [x]Prashant      [x][employer]:  Amanda Watson [phone], [email or web address]  []Medacta: No Milly [phone], [email or web address]  []Fx Shoulder: Shey Isbell [phone], Celine Murillo. [email or web address]  [][place]: Marcelo Mcintosh [phone], [email or web address]    Comments: Patient should stop eliquis 1-2 days before surgery. Will resume eliquis  POD 1 for DVT ppx. Scheduled to undergo cardiac stress test 11/4. Will then follow up with PCP and get pre op labs. Ok to schedule for late November.  Stage L TKA 6 weeks later.   Kellee Loera MD

## 2022-10-25 NOTE — LETTER
[address] [place] [address] 66 [id number]  Phone: [phone]  Fax: [phone]           Yousuf Gómez MD      October 25, 2022     Patient: Senaida Seip   MR Number: [id number]   YOB: 1955   Date of Visit: 10/25/2022       Dear Dr. Carson Scott    Thank you for referring Eric Weston to me for evaluation/treatment.  I have scheduled her for staged TKA, following clearence per upcoming cardiac stress test. Thank you for the referral      Sincerely,      Yousuf Gómez MD    CC providers:  Roxanne Murphy MD  [address] [place] [address]  [address] [place]

## 2022-10-25 NOTE — PROGRESS NOTES
Dr Mahesh Ward      Date /Time 10/25/2022       10:12 AM EDT  Name Bel Oconnor             1955   Location  [facility]  MRN [id number]                Chief Complaint   Patient presents with    Knee Pain     Patient is referred by Dr Catrachita Rhodes. Patient has bilateral knee pain but right is worse than left. History of Present Illness  Bel Oconnor is a 78 y.o. female who presents with bilateral knee pain, currently the right one is worse. She has an extensive conservative treatment course consisting of steroid and gel injections, physical therapy and both oral steroids and Voltaren. At this point her quality of life is severely inhibited by her bilateral knee pain she does live on a second story house and has difficulty with stairs and getting around her house in general.  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           Sent in consultation by Dr. Catrachita Rhodes        Past History  Past Medical History:   Diagnosis Date    Arthritis     Cancer ([address]     skin-back    DVT (deep venous thrombosis) ([address]     2015 - on Eliquis    Hearing decreased     Hypoglycemia     Melanoma ([address]     Overactive bladder     Reflux     Wears glasses      Past Surgical History:   Procedure Laterality Date    APPENDECTOMY      HYSTEROSCOPY  7/15    d and c    KNEE ARTHROSCOPY  11    left    NECK SURGERY      benign tumor    PAIN MANAGEMENT PROCEDURE Right 2022    RIGHT LUMBAR FIVE SACRAL ONE EPIDURAL STEROID INJECTION SITE CONFIRMED BY FLUOROSCOPY performed by Clarice Esquivel MD at [address] [place] [address]       Social History     Tobacco Use    Smoking status: Former     Types: Cigarettes     Quit date: 2004     Years since quittin.8    Smokeless tobacco: Never   Substance Use Topics    Alcohol use: Yes     Comment: monthly      Current Outpatient Medications on File Prior to Visit Medication Sig Dispense Refill    MULTIPLE VITAMINS PO Take 1 tablet by mouth daily      ascorbic acid (VITAMIN C) 100 MG tablet VITAMIN C 100 MG TABS      methylPREDNISolone (MEDROL DOSEPACK) 4 MG tablet Take by mouth as directed. 21 kit 0    oxybutynin (DITROPAN-XL) 10 MG extended release tablet       diclofenac (VOLTAREN) 75 MG EC tablet TAKE ONE TABLET BY MOUTH TWICE A DAY 60 tablet 2    atorvastatin (LIPITOR) 20 MG tablet Take 1 tablet by mouth daily 30 tablet 3     No current facility-administered medications on file prior to visit. ASCVD 10-YEAR RISK SCORE  The ASCVD Risk score (Daniel HE, et al., 2019) failed to calculate for the following reasons:    Cannot find a previous HDL lab    Cannot find a previous total cholesterol lab     Review of Systems  10-point ROS is negative other than HPI. Physical Exam  Ht 5' 7\" (1.702 m)   Wt 195 lb (88.5 kg)   BMI 30.54 kg/m²      Constitutional:       General: He is not in acute distress. Appearance: Normal appearance. Cardiovascular:      Rate and Rhythm: Normal rate and regular rhythm. Pulses: Normal pulses. Pulmonary:      Effort: Pulmonary effort is normal. No respiratory distress. Neurological:      Mental Status: He is alert and oriented to person, place, and time. Mental status is at baseline. Skin: Mean, dry, intact.   No open sores  Lymphatics: No palpable lymph nodes    Musculoskeletal:  Gait:  altered    Oswaldo Hip: No pain or limitation to B IR    R Knee: Skin in tact without ulcerations or previous scars  Tender to palpation at lateral joint line  ROM: 0 - 110  No major deformity, grossly stable to varus / valgus stress and posterior drawer   No evidence of neurovascular compromise distally  Special tests: None    L Knee: Skin in tact without ulcerations or previous scars  Tender to palpation at medial joint line  ROM: 0 - 110  No major deformity, grossly stable to varus / valgus stress and posterior drawer   No evidence of neurovascular compromise distally  Special tests: None      Imaging  Multiple views of the B knee ordered obtained reviewed and interpreted, show Kellgren Peter grade 4 changes with varus OA on the left and valgus on the right. Orders Placed This Encounter   Procedures    Culture, MRSA, Screening    CBC with Auto Differential     Standing Status:   Future     Standing Expiration Date:   10/25/2023    Hemoglobin A1C     Standing Status:   Future     Standing Expiration Date:   10/25/2023    Albumin     Standing Status:   Future     Standing Expiration Date:   10/25/2023    Transferrin     Standing Status:   Future     Standing Expiration Date:   91/28/4846    Basic Metabolic Panel     Standing Status:   Future     Standing Expiration Date:   10/25/2023    APTT     Standing Status:   Future     Standing Expiration Date:   10/25/2023     Order Specific Question:   Daily Heparin Dose? Answer:   No    Protime-INR     Standing Status:   Future     Standing Expiration Date:   10/25/2023     Order Specific Question:   Daily Coumadin Dose? Answer:   No    Vitamin D 25 Hydroxy     Standing Status:   Future     Standing Expiration Date:   10/25/2023    Urinalysis     Standing Status:   Future     Standing Expiration Date:   10/25/2023       Assessment and Plan  [facility] was seen today for knee pain. Diagnoses and all orders for this visit:    Chronic pain of right knee  -     CBC with Auto Differential; Future  -     Hemoglobin A1C; Future  -     Albumin; Future  -     Transferrin; Future  -     Basic Metabolic Panel; Future  -     APTT; Future  -     Protime-INR; Future  -     Vitamin D 25 Hydroxy; Future  -     Urinalysis; Future  -     Culture, MRSA, Screening    Bilateral primary osteoarthritis of knee  -     CBC with Auto Differential; Future  -     Hemoglobin A1C; Future  -     Albumin; Future  -     Transferrin; Future  -     Basic Metabolic Panel; Future  -     APTT;  Future  -     Protime-INR;

## 2022-10-26 ENCOUNTER — TELEPHONE (OUTPATIENT)
Dept: ORTHOPEDIC SURGERY | Age: 67
End: 2022-10-26

## 2022-10-26 NOTE — PROGRESS NOTES
Anna Boards    Age 78 y.o.    female    1955    MRN [id number]    11/17/2022  Arrival Time_____________  OR Time____________[address]     Procedure(s):  RIGHT TOTAL KNEE ARTHROPLASTY WITH ADDUCTOR CANAL BLOCK AND IPACK                    TOM BIOMET                      General   Surgeon(s):  Ariane Mills, MD      DAY ADMIT ___  SDS/OP ___  OUTPT IN BED ___        Phone [phone] (home) [phone] (work)    PCP _____________________ Phone_________________ [address] ( ) Epic CE ( ) Appt ________    ADDITIONAL INFO __________________________________ Cardio/Consult _____________    NOTES _____________________________________________________________________    ____________________________________________________________________________    PAT APPT DATE:________ TIME: ________  FAXED QAD: _______  (__) H&P w/ Hospitalist  __________________________________________________________________________  Preop Nurse phone screen complete: _____________  (__) CBC     (__) W/ DIFF ___________     (__) Hgb A1C    ___________  (__) CHEST X RAY   __________  (__) LIPID PROFILE  ___________  (__) EKG   __________  (__) PT/PTT   ___________  (__) PFT's   __________  (__) BMP   ___________  (__) CAROTIDS  __________  (__) CMP   ___________  (__) VEIN MAPPING  __________  (__) U/A   ___________  (__) HISTORY & PHYSICAL __________  (__) URINE C & S  ___________  (__) CARDIAC CLEARANCE __________  (__) U/A W/ FLEX  ___________  (__) PULM.  CLEARANCE __________  (__) SERUM PREGNANCY ___________  (__) Check Epic DOS orders __________  (__) TYPE & SCREEN __________repeat ( ) (__)  __________________ __________  (__) ALBUMIN Marshal Duty ___________  (__)  __________________ __________  (__) TRANSFERRIN  ___________  (__)  __________________ __________  (__) LIVER PROFILE  ___________  (__)  __________________ __________  (__) MRSA NASAL SWAB ___________  (__) URINE PREG DOS __________  (__) SED RATE  ___________  (__) BLOOD SUGAR DOS __________  (__) C-REACTIVE PROTEIN ___________    (__) VITAMIN D HYDROXY ___________  (__) BLOOD THINNERS __________    (__) ACE/ ARBS: _____________________     (__) BETABLOCKERS __________________

## 2022-10-31 ENCOUNTER — OFFICE VISIT (OUTPATIENT)
Dept: ORTHOPEDIC SURGERY | Age: 67
End: 2022-10-31
Payer: MEDICARE

## 2022-10-31 DIAGNOSIS — M22.42 CHONDROMALACIA OF LEFT PATELLA: ICD-10-CM

## 2022-10-31 DIAGNOSIS — M17.12 PRIMARY OSTEOARTHRITIS OF LEFT KNEE: ICD-10-CM

## 2022-10-31 DIAGNOSIS — M25.562 CHRONIC PAIN OF LEFT KNEE: Primary | ICD-10-CM

## 2022-10-31 DIAGNOSIS — G89.29 CHRONIC PAIN OF LEFT KNEE: Primary | ICD-10-CM

## 2022-10-31 PROCEDURE — G8400 PT W/DXA NO RESULTS DOC: HCPCS | Performed by: FAMILY MEDICINE

## 2022-10-31 PROCEDURE — 99213 OFFICE O/P EST LOW 20 MIN: CPT | Performed by: FAMILY MEDICINE

## 2022-10-31 PROCEDURE — 1123F ACP DISCUSS/DSCN MKR DOCD: CPT | Performed by: FAMILY MEDICINE

## 2022-10-31 PROCEDURE — 20610 DRAIN/INJ JOINT/BURSA W/O US: CPT | Performed by: FAMILY MEDICINE

## 2022-10-31 PROCEDURE — G8484 FLU IMMUNIZE NO ADMIN: HCPCS | Performed by: FAMILY MEDICINE

## 2022-10-31 PROCEDURE — 1090F PRES/ABSN URINE INCON ASSESS: CPT | Performed by: FAMILY MEDICINE

## 2022-10-31 PROCEDURE — G8428 CUR MEDS NOT DOCUMENT: HCPCS | Performed by: FAMILY MEDICINE

## 2022-10-31 PROCEDURE — 3017F COLORECTAL CA SCREEN DOC REV: CPT | Performed by: FAMILY MEDICINE

## 2022-10-31 PROCEDURE — G8417 CALC BMI ABV UP PARAM F/U: HCPCS | Performed by: FAMILY MEDICINE

## 2022-10-31 PROCEDURE — 1036F TOBACCO NON-USER: CPT | Performed by: FAMILY MEDICINE

## 2022-10-31 RX ORDER — LIDOCAINE HYDROCHLORIDE 10 MG/ML
1 INJECTION, SOLUTION INFILTRATION; PERINEURAL ONCE
Status: COMPLETED | OUTPATIENT
Start: 2022-10-31 | End: 2022-10-31

## 2022-10-31 RX ORDER — BETAMETHASONE SODIUM PHOSPHATE AND BETAMETHASONE ACETATE 3; 3 MG/ML; MG/ML
12 INJECTION, SUSPENSION INTRA-ARTICULAR; INTRALESIONAL; INTRAMUSCULAR; SOFT TISSUE ONCE
Status: COMPLETED | OUTPATIENT
Start: 2022-10-31 | End: 2022-10-31

## 2022-10-31 RX ORDER — BUPIVACAINE HYDROCHLORIDE 2.5 MG/ML
2 INJECTION, SOLUTION INFILTRATION; PERINEURAL ONCE
Status: COMPLETED | OUTPATIENT
Start: 2022-10-31 | End: 2022-10-31

## 2022-10-31 RX ADMIN — BETAMETHASONE SODIUM PHOSPHATE AND BETAMETHASONE ACETATE 12 MG: 3; 3 INJECTION, SUSPENSION INTRA-ARTICULAR; INTRALESIONAL; INTRAMUSCULAR; SOFT TISSUE at 09:47

## 2022-10-31 RX ADMIN — LIDOCAINE HYDROCHLORIDE 1 ML: 10 INJECTION, SOLUTION INFILTRATION; PERINEURAL at 09:52

## 2022-10-31 RX ADMIN — BUPIVACAINE HYDROCHLORIDE 5 MG: 2.5 INJECTION, SOLUTION INFILTRATION; PERINEURAL at 09:52

## 2022-10-31 NOTE — PROGRESS NOTES
Chief Complaint  Knee Pain (Ration of left knee Euflexxa pending right total knee arthroplasty 30/96/9968 per Dr. Stephen Collier)    Sirisha Chavez is a 60 y.o. 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 Elissa Bamberger is being seen by today for recurrent worsening of her right knee pain with underlying osteoarthritis. He is known to have grade IV chondromalacia to the weightbearing portion of her lateral compartment of her knee as well as partial pseudo-extrusion of the lateral meniscus with a chronic complex flap tear with synovitis. Patient status post completion of viscosupplementation bilateral knee 4/6/2022    History of Present Illness for Follow Up Patient:      Shankar Stout is being seen in for follow up today to review MRI results for ongoing right knee pain. Shankar Stout is 12 weeks out from aggravation of right knee after a twisting injury. The patient rates their current pain as a 5 out of 10 on the pain scale but can increase with activities such as going from a seated to standing position or having to go up and down stairs. Treatment to date includes: rest, ice, NSAID- Diclofeac, physical therapy, home exercises, knee bracing, cortisone injection on 4/1/2019 and her first attempt at visco supplementation completed on 7/17/2019 to treat this problem. Her MRI was obtained at [facility] on 11/15/2019 did show evidence of grade IV chondromalacia of the weightbearing and posterior nonweightbearing portion of the lateral compartment with full-thickness chondral loss and some stress edema but no evidence of insufficiency fracture. She did have a lateral meniscal pseudo-extrusion with complex flap tear to the anterior horn to posterior root with chronic notch synovitis and ACL inflammation likely related to her twisting injury about a month ago.   Denies locking catching or true instability symptoms and she would like to avoid surgery if at all possible. Prior to her twisting injury 3 months ago, she states her knee was doing very well after completing Visco supplementation in July 2019 bilaterally. She did get a benefit from her previous round of Visco supplementation has been considering this once again. His mom also been working on her home-based exercise program.     We last saw Naveen Knowles in the office on 4/6/2022 when we finished up viscosupplementation to her knees bilaterally for underlying knee osteoarthritis chondromalacia patella. She is becoming increasingly symptomatic and does leave for [place] in August 2022 for vacation. There is no history of injury no activity prior to becoming symptomatic. She is a little bit fearful that she is can be doing frequent walking on the beach and is requesting a steroid injection. No interim history of injury no activity prior to becoming symptomatic. Pain does range between a 3-6 out of 10. No substantial rest or night pain. She is hoping to put off knee surgery as long as she can. She is trying to work on her exercise program is continue with her bracing and does continue with her diclofenac. She does have a known history of substantial knee osteoarthritis with patellofemoral arthropathy and does utilize her brace occasionally. We last saw Naveen Knowles in the office on 10/24/2022 for her significant bilateral knee osteoarthritis with patellofemoral arthropathy. Once again she has progressively worsened with regard to her knee pain which has been limiting her ability to golf and walk and exercise as she would wish and following a very long discussion with her last visit, we had her sit down with Dr. Linsey Michele and she is now set up for right total knee arthroplasty on 11/17/2022. She would like to consider viscosupplementation to her contralateral left knee given advanced left knee is going to be under with her upcoming arthroplasty. She has had reasonable partial success with viscosupplementation in the past and would like to continue with this. She is continue with her diclofenac and we previously placed her on a Medrol Dosepak with her last visit as her knees are bothering her which did help moderately. She is working on her exercise and stretching program.  She occasionally does utilize her brace and does ice. They had originally discussed setting her up tentatively for arthroplasty on her contralateral left knee but she wants to hold off on this to see how she does with her right initially. Denies locking catching or true instability symptoms    Attest: I have reviewed and attest the documentation of the HPI documented by my . I will make any changes if necessary. Enc Date: 10/31/2022  Time: 9:47 AM  Provider: Almita Delgado MD        Social History     Tobacco Use    Smoking status: Former     Types: Cigarettes     Quit date: 2004     Years since quittin.9    Smokeless tobacco: Never   Substance Use Topics    Alcohol use: Yes     Comment: monthly    Drug use: No        Review of Systems  Pertinent items are noted in HPI  Review of systems reviewed from Patient History Form dated on 3/23/2022 and available in the patient's chart under the Media tab. Vital Signs     There were no vitals taken for this visit. General Exam:   Constitutional: Patient is adequately groomed with no evidence of malnutrition  DTRs: Deep tendon reflexes are intact  Mental Status: The patient is oriented to time, place and person. The patient's mood and affect are appropriate. Lymphatic: The lymphatic examination bilaterally reveals all areas to be without enlargement or induration. Vascular: Examination reveals no swelling or calf tenderness. Peripheral pulses are palpable and 2+. Neurological: The patient has good coordination. There is no weakness or sensory deficit.       Knee Examination  Inspection:  There is no high-grade deformity other she does have bilaterally patellofemoral crepitation and trace knee joint effusions. Palpation:  She does have more moderate tenderness over the medial and lateral patellofemoral facet bilaterally primarily on the right knee today. This does seem to be more anterior in nature today. She does have l recurrent tenderness clinically over the Lateral compartment of her right knee and medial compartment of her left knee with some mild crepitation. Rang of Motion:  She is stiff in the terminal 5 of extension with flexion limited to about 110-120. Hamstrings are mildly tight. Fair quad tone. Strength: 4 out of 5 with flexion as extension. Special Tests:  She does have moderate recurrent pain primarily reproduced with the patellar grind testing on the right today. Negative apprehension testing. She does have moderate pain with crepitation with lateral Jose's on the right knee and medial Jose's on the left. I do not sense a high-grade click. I do not sense high-grade instability of screening testing is fairly benign. Skin: There are no rashes, ulcerations or lesions. Distal neurovascular exam is intact. Gait: Reasonable gait with less pain with positional change. Reflex symmetrically preserved     Additional Comments:      Additional Examinations:  Right Lower Extremity: Examination of the right lower extremity does not show any tenderness, deformity or injury. Range of motion is unremarkable. There is no gross instability. There are no rashes, ulcerations or lesions. Strength and tone are normal.  Left Lower Extremity: Examination of the left lower extremity does not show any tenderness, deformity or injury. Range of motion is unremarkable. There is no gross instability. There are no rashes, ulcerations or lesions. Strength and tone are normal.  Examination of the biateral hip reveals intact skin.   The patient demonstrates full painless range of motion with regards to flexion, abduction, internal and external rotation. There is not tenderness about the greater trochanter. There is a negative straight leg raise against resistance. Strength is 5/5 thorough out all planes. Diagnostic Test Findings: bilateral knee AP and PA weight-bearing sunrise and lateral films were reviewed from 10/24/2022 and show fairly prominent nearly bone-on-bone changes to the lateral compartment of her right knee and medial compartment of her left knee with left greater than right knee patellofemoral arthropathy with spurring. MRI right knee obtained at [facility] 11/15/2019 as listed above  Narrative   Site: [facility] #: [id number] #: [id number] Procedure: MR Right Knee w/o Contrast ; Reason for Exam: dx;MMT, OSTEOARTHRITIS, CHONDROMALACIA OF BOTH PATELLA   This document is confidential medical information. Unauthorized disclosure or use of this information is prohibited by law. If you are not the intended recipient of this document, please advise us by calling immediately [phone]. [facility] [place] [address]           Patient Name: Pao Swift   Case ID: [id number]   Patient : 1955   Referring Physician: Rain Delgado MD   Exam Date: 11/15/2019   Exam Description: MR Right Knee w/o Contrast            HISTORY:  Evaluate meniscal tear. TECHNICAL FACTORS:  Long- and short-axis fat- and water-weighted images were performed. COMPARISON:  None. FINDINGS:  No acute fracture. Full-thickness chondral loss of weightbearing and posterior    nonweightbearing lateral femorotibial compartment with stress osseous edema. Pseudoextruded    and partially macerated lateral meniscus with complex flap tear from anterior horn to posterior    horn. Medial meniscus intact. No high-grade chondromalacia in the medial femorotibial    compartment. Moderate ACL inflammation. PCL intact.   Medial and lateral collateral ligament complexes    intact. Mild lateral subluxation of patella. No high-grade chondromalacia in the patellofemoral    articulation. Quadriceps and patellar tendons intact. Moderate-large amount of joint effusion. Moderate gastrocnemius/semimembranous reflection cyst    with partial dehiscence. No acute muscle strain. CONCLUSION:   1. Grade 4 chondromalacia of weightbearing and posterior nonweightbearing lateral femorotibial    compartment with full-thickness chondral loss and stress osseous edema. Partial    pseudoextrusion of the lateral meniscus with complex flap tear from anterior horn to posterior    horn. 2. Chronic notch synovitis with inflamed ACL. Thank you for the opportunity to provide your interpretation. Riccardo Stark M.D. A: Guero 11/18/2019 8:43 AM   T: REINA 11/18/2019 8:11 AM         Assessment & Plan:    Encounter Diagnoses   Name Primary? Chronic pain of left knee Yes    Primary osteoarthritis of left knee     Chondromalacia of left patella        Orders Placed This Encounter   Procedures    [id number] - WI DRAIN/INJECT LARGE JOINT/BURSA           Treatment Plan:  Treatment options were discussed with Roberta Sam. She does have quite prominent arthritic changes to the right knee lateral compartment and left knee medial compartment does have patellofemoral arthropathy particularly on the left knee compared with right knee. .   She has tentatively set up a right total knee arthroplasty with Dr. Rick Torres on 97/10/1343 which I think is an excellent decision as her arthritic symptoms have limited her ability to golf and is affecting her quality of life. Given her upcoming arthroplasty on the right and soreness to her left knee, we did opt to inject her left knee today using 2 cc of Celestone, 2 cc of Marcaine, 1 cc Xylocaine. She will continue with her diclofenac 75 mg 1 pill twice daily continue with her exercise program and bracing. She once again is done excellent job losing weight losing 50 pounds over the past year. She will continue with her spine care with the spine team and Dr. Alicia Henderson. We did tell her than we could potentially do viscosupplementation on her contralateral left knee if she opts to put off knee arthroplasty imminently. She will contact us in the interim with questions or concerns     This dictation was performed with a verbal recognition program (DRAGON) and it was checked for errors. It is possible that there are still dictated errors within this office note. If so, please bring any errors to my attention for an addendum. All efforts were made to ensure that this office note is accurate.

## 2022-11-02 ENCOUNTER — OFFICE VISIT (OUTPATIENT)
Dept: ORTHOPEDIC SURGERY | Age: 67
End: 2022-11-02
Payer: MEDICARE

## 2022-11-02 ENCOUNTER — TELEPHONE (OUTPATIENT)
Dept: ORTHOPEDIC SURGERY | Age: 67
End: 2022-11-02

## 2022-11-02 DIAGNOSIS — G89.29 CHRONIC PAIN OF LEFT KNEE: Primary | ICD-10-CM

## 2022-11-02 DIAGNOSIS — M25.562 CHRONIC PAIN OF LEFT KNEE: Primary | ICD-10-CM

## 2022-11-02 DIAGNOSIS — M22.42 CHONDROMALACIA OF LEFT PATELLA: ICD-10-CM

## 2022-11-02 DIAGNOSIS — M17.12 PRIMARY OSTEOARTHRITIS OF LEFT KNEE: ICD-10-CM

## 2022-11-02 PROCEDURE — G8400 PT W/DXA NO RESULTS DOC: HCPCS | Performed by: FAMILY MEDICINE

## 2022-11-02 PROCEDURE — 1036F TOBACCO NON-USER: CPT | Performed by: FAMILY MEDICINE

## 2022-11-02 PROCEDURE — G8428 CUR MEDS NOT DOCUMENT: HCPCS | Performed by: FAMILY MEDICINE

## 2022-11-02 PROCEDURE — 1090F PRES/ABSN URINE INCON ASSESS: CPT | Performed by: FAMILY MEDICINE

## 2022-11-02 PROCEDURE — G8484 FLU IMMUNIZE NO ADMIN: HCPCS | Performed by: FAMILY MEDICINE

## 2022-11-02 PROCEDURE — 20610 DRAIN/INJ JOINT/BURSA W/O US: CPT | Performed by: FAMILY MEDICINE

## 2022-11-02 PROCEDURE — 3017F COLORECTAL CA SCREEN DOC REV: CPT | Performed by: FAMILY MEDICINE

## 2022-11-02 PROCEDURE — G8417 CALC BMI ABV UP PARAM F/U: HCPCS | Performed by: FAMILY MEDICINE

## 2022-11-02 PROCEDURE — 1123F ACP DISCUSS/DSCN MKR DOCD: CPT | Performed by: FAMILY MEDICINE

## 2022-11-02 RX ORDER — HYALURONATE SODIUM 10 MG/ML
20 SYRINGE (ML) INTRAARTICULAR ONCE
Status: COMPLETED | OUTPATIENT
Start: 2022-11-02 | End: 2022-11-02

## 2022-11-02 RX ADMIN — Medication 20 MG: at 13:49

## 2022-11-02 NOTE — TELEPHONE ENCOUNTER
ORTHOPAEDIC NURSE NAVIGATOR SUMMARY NOTE      Anticipated Date of Surgery: 11/17/22    Recieved Pre-Op Education: yes   In person class:Yes 11/7/22  Pt used educational link:No   Pt completed pre and post test to measure learning:Yes    If pt did not complete either, why not? N/A    ERAS protocol explained to pt. Pt does not have the following medical conditions:  -Diabetes  -Gastroparesis  -CHF  -Fluid restricted diet  -Known difficult airway  Pt instructed to drink up to 40 oz of Gatorade type drink the evening prior to surgery. Pt informed they can have up to 40 oz of water and that it must be completed 2 hours prior to scheduled surgery. Pt verbalized understanding. PCP: Kamlesh Fofana DO   Phone #: [phone]    Date of PCP Visit for H&P: 11/10/22    Any Noted Concerns from PCP prior to surgery:  No-    If Yes, what concerns?:    IS THE PATIENT IN A PAIN MANAGEMENT PROGRAM?:   No     Review of Past Medical History Reveals History of:      Critical Lab Values:   Hgb/Hct:   Hemoglobin (g/dL)   Date Value   11/15/2015 13.3   /  Hematocrit (%)   Date Value   11/15/2015 40.3      HgbA1C:    Lab Results   Component Value Date    LABA1C 5.5 11/14/2015      Albumin:    Lab Results   Component Value Date    LABALBU 3.9 11/16/2015      BUN/Cr:   BUN (mg/dL)   Date Value   11/16/2015 17   /  Creatinine (mg/dL)   Date Value   11/16/2015 <0.5 (L)      BMI:    BMI Readings from Last 1 Encounters:   10/25/22 30.54 kg/m²        Coronary Artery Disease/HTN/CHF History: No- PCP ordered stress test. Once completed PCP referred pt to cardio      Cardiologist:     Cardiac Clearance Necessary: Yes    Date of Cardiac Clearance Appt: 11/11/22    On Plavix? No,  If YES, when will they stop taking?     Final Cardiac Recommendations:Pt having stress test per PCP 11/4/22- Echo on 11/10/22  11/11/22- Cleared for surgery    -On any anticoagulation-none       Diabetes History: No    Most Recent HgbA1C: N/A    PCP or Endocrine Recommendations: N/A    Nutritionist/Dietician Consult Scheduled: N/A    Final Plan For Diabetic Control: N/A   Pulmonary: COPD/Emphysema/ Use of home oxygen: NONE     Alcohol use:        DVT Risk Stratification:  Low      Vascular Consult Ordered:  NA    Date of Vascular Appt:     Hematology/Oncology Consult Ordered:  NA    Date of Hematology/Oncology Appt:     Final Recommendation For DVT Prophylaxis:   -Smoking history or use of estrogen-         BMI Greater than 40 at time of scheduling?: No    Has Surgeon been notified of BMI concern? Not Applicable    Weight Loss Clinic Consult Ordered: Not Applicable    Date of Wt Loss Clinic Appt:     BMI at time of surgery (if went through [facility]): Additional Medical Concerns:         Discharge Disposition Information:     Attended Pre-Hab Program: no     Anticipated Discharge Disposition:  SNF - ARU is first preference. Grown children do not have rooms for patient on first level. Rn discussed with pt about placement and the ARU may not be available or accept pt. Had long discussion with patient about rehab facility and how the process works after surgery. Pt will have Physical therapy and they will give recommendations. If PT does not recommend a rehab facility insurance will not cover it. Who will be with patient at home following discharge? Pt lives alone on second floor.        Equipment pt already has:  Cane, walker    Bedroom on first or second floor: Second   Bathroom on first or second floor: Second   Weight bearing status: full    Pre-op ambulatory status: [facility]   Number of entry steps: 14 steps to main level   Caregiver assistance: Won't have full time care    Pt plan to DC same day: no   [facility] preference: SERGIO Martin RN  11/2/2022

## 2022-11-02 NOTE — PROGRESS NOTES
Chief Complaint  Knee Pain (EUFLEXXA #1 L KNEE )    Indayton Sol is a 60 y.o. 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 Bryan Prajapati is being seen by today for recurrent worsening of her right knee pain with underlying osteoarthritis. He is known to have grade IV chondromalacia to the weightbearing portion of her lateral compartment of her knee as well as partial pseudo-extrusion of the lateral meniscus with a chronic complex flap tear with synovitis. Patient status post completion of viscosupplementation bilateral knee 4/6/2022 pending right total knee arthroplasty per Dr. Ted De Souza 41/41/8243    History of Present Illness for Follow Up Patient:      Alvino Velasquez is being seen in for follow up today to review MRI results for ongoing right knee pain. Alvino Velasquez is 12 weeks out from aggravation of right knee after a twisting injury. The patient rates their current pain as a 5 out of 10 on the pain scale but can increase with activities such as going from a seated to standing position or having to go up and down stairs. Treatment to date includes: rest, ice, NSAID- Diclofeac, physical therapy, home exercises, knee bracing, cortisone injection on 4/1/2019 and her first attempt at visco supplementation completed on 7/17/2019 to treat this problem. Her MRI was obtained at [facility] on 11/15/2019 did show evidence of grade IV chondromalacia of the weightbearing and posterior nonweightbearing portion of the lateral compartment with full-thickness chondral loss and some stress edema but no evidence of insufficiency fracture. She did have a lateral meniscal pseudo-extrusion with complex flap tear to the anterior horn to posterior root with chronic notch synovitis and ACL inflammation likely related to her twisting injury about a month ago.   Denies locking catching or true instability symptoms and she would like to avoid surgery if at all possible. Prior to her twisting injury 3 months ago, she states her knee was doing very well after completing Visco supplementation in July 2019 bilaterally. She did get a benefit from her previous round of Visco supplementation has been considering this once again. His mom also been working on her home-based exercise program.     We last saw Fredrik Essex in the office on 4/6/2022 when we finished up viscosupplementation to her knees bilaterally for underlying knee osteoarthritis chondromalacia patella. She is becoming increasingly symptomatic and does leave for [place] in August 2022 for vacation. There is no history of injury no activity prior to becoming symptomatic. She is a little bit fearful that she is can be doing frequent walking on the beach and is requesting a steroid injection. No interim history of injury no activity prior to becoming symptomatic. Pain does range between a 3-6 out of 10. No substantial rest or night pain. She is hoping to put off knee surgery as long as she can. She is trying to work on her exercise program is continue with her bracing and does continue with her diclofenac. She does have a known history of substantial knee osteoarthritis with patellofemoral arthropathy and does utilize her brace occasionally. We last saw Fredrik Essex in the office on 10/24/2022 for her significant bilateral knee osteoarthritis with patellofemoral arthropathy. Once again she has progressively worsened with regard to her knee pain which has been limiting her ability to golf and walk and exercise as she would wish and following a very long discussion with her last visit, we had her sit down with Dr. Colin Moran and she is now set up for right total knee arthroplasty on 11/17/2022. She would like to consider viscosupplementation to her contralateral left knee given advanced left knee is going to be under with her upcoming arthroplasty.   She has had reasonable partial success with viscosupplementation in the past and would like to continue with this. She is continue with her diclofenac and we previously placed her on a Medrol Dosepak with her last visit as her knees are bothering her which did help moderately. She is working on her exercise and stretching program.  She occasionally does utilize her brace and does ice. They had originally discussed setting her up tentatively for arthroplasty on her contralateral left knee but she wants to hold off on this to see how she does with her right initially. Denies locking catching or true instability symptoms. We last saw Shankar Stout in the office on 10/31/2022 and was continued on treatment for bilateral knee osteoarthritis. Once again she does have a pending right total knee arthroplasty per Dr. Stephen Collier on . She presents back today stating her left knee is feeling much better but she is still having pain obviously in her right knee. She is feeling at least 50% better with regard to her treatment on her left knee over the last week or so. She is working her exercise program bilaterally in preparation for her total knee arthroplasty. She has continue with her diclofenac twice daily although she is where she did need to hold this prior to surgery and does utilize her brace. Denies locking catching or true instability symptoms and would like to consider viscosupplementation once again to her left knee given the upcoming demands it will be under with her knee replacement coming up on the right. Attest: I have reviewed and attest the documentation of the HPI documented by my . I will make any changes if necessary.      Enc Date: 2022  Time: 1:59 PM  Provider: Kirk Arias MD        Social History     Tobacco Use    Smoking status: Former     Types: Cigarettes     Quit date: 2004     Years since quittin.9    Smokeless tobacco: Never   Substance Use Topics Alcohol use: Yes     Comment: monthly    Drug use: No        Review of Systems  Pertinent items are noted in HPI  Review of systems reviewed from Patient History Form dated on 3/23/2022 and available in the patient's chart under the Media tab. Vital Signs     There were no vitals taken for this visit. General Exam:   Constitutional: Patient is adequately groomed with no evidence of malnutrition  DTRs: Deep tendon reflexes are intact  Mental Status: The patient is oriented to time, place and person. The patient's mood and affect are appropriate. Lymphatic: The lymphatic examination bilaterally reveals all areas to be without enlargement or induration. Vascular: Examination reveals no swelling or calf tenderness. Peripheral pulses are palpable and 2+. Neurological: The patient has good coordination. There is no weakness or sensory deficit. Knee Examination  Inspection:  There is no high-grade deformity other she does have bilaterally patellofemoral crepitation and trace knee joint effusions. Palpation:  She does have more mild tenderness over the medial and lateral patellofemoral facet bilaterally primarily on the right knee today. This does seem to be more anterior in nature today. She does have l recurrent tenderness clinically over the Lateral compartment of her right knee and is less tenderness over the medial compartment of her left knee with some mild crepitation. Rang of Motion:  She is stiff in the terminal 5 of extension with flexion limited to about 110-120. Hamstrings are mildly tight. Fair quad tone. Strength: 4 out of 5 with flexion as extension. Special Tests:  She does have moderate recurrent pain primarily reproduced with the patellar grind testing on the right today. Negative apprehension testing. She does have moderate pain with crepitation with lateral Jose's on the right knee and medial Jose's on the left. I do not sense a high-grade click.   I do not sense high-grade instability of screening testing is fairly benign. She has less medial joint line tenderness on the left they have less pain with patellar grind testing. Skin: There are no rashes, ulcerations or lesions. Distal neurovascular exam is intact. Gait: Reasonable gait with less pain with positional change. Reflex symmetrically preserved     Additional Comments:      Additional Examinations:  Right Lower Extremity: Examination of the right lower extremity does not show any tenderness, deformity or injury. Range of motion is unremarkable. There is no gross instability. There are no rashes, ulcerations or lesions. Strength and tone are normal.  Left Lower Extremity: Examination of the left lower extremity does not show any tenderness, deformity or injury. Range of motion is unremarkable. There is no gross instability. There are no rashes, ulcerations or lesions. Strength and tone are normal.  Examination of the biateral hip reveals intact skin. The patient demonstrates full painless range of motion with regards to flexion, abduction, internal and external rotation. There is not tenderness about the greater trochanter. There is a negative straight leg raise against resistance. Strength is 5/5 thorough out all planes. Diagnostic Test Findings: bilateral knee AP and PA weight-bearing sunrise and lateral films were reviewed from 10/24/2022 and show fairly prominent nearly bone-on-bone changes to the lateral compartment of her right knee and medial compartment of her left knee with left greater than right knee patellofemoral arthropathy with spurring. MRI right knee [facility] 11/15/2019 as listed above  Narrative   [facility] [place] [facility] [id number] #: [id number] Procedure: MR Right Knee w/o Contrast ; Reason for Exam: dx;MMT, OSTEOARTHRITIS, CHONDROMALACIA OF BOTH PATELLA   This document is confidential medical information.   Unauthorized disclosure or use of this information is prohibited by law. If you are not the intended recipient of this document, please advise us by calling immediately [phone]. [employer] [facility] OUMAR Aguirre           Patient Name: Roseann Contreras   Case ID: [id number]   Patient : 1955   Referring Physician: Samir Kay MD   Exam Date: 11/15/2019   Exam Description: MR Right Knee w/o Contrast            HISTORY:  Evaluate meniscal tear. TECHNICAL FACTORS:  Long- and short-axis fat- and water-weighted images were performed. COMPARISON:  None. FINDINGS:  No acute fracture. Full-thickness chondral loss of weightbearing and posterior    nonweightbearing lateral femorotibial compartment with stress osseous edema. Pseudoextruded    and partially macerated lateral meniscus with complex flap tear from anterior horn to posterior    horn. Medial meniscus intact. No high-grade chondromalacia in the medial femorotibial    compartment. Moderate ACL inflammation. PCL intact. Medial and lateral collateral ligament complexes    intact. Mild lateral subluxation of patella. No high-grade chondromalacia in the patellofemoral    articulation. Quadriceps and patellar tendons intact. Moderate-large amount of joint effusion. Moderate gastrocnemius/semimembranous reflection cyst    with partial dehiscence. No acute muscle strain. CONCLUSION:   1. Grade 4 chondromalacia of weightbearing and posterior nonweightbearing lateral femorotibial    compartment with full-thickness chondral loss and stress osseous edema. Partial    pseudoextrusion of the lateral meniscus with complex flap tear from anterior horn to posterior    horn. 2. Chronic notch synovitis with inflamed ACL. Thank you for the opportunity to provide your interpretation. Riccardo Stark M.D.        A: Guero 2019 8:43 AM   T: REINA 2019 8:11 AM         Assessment & Plan:    Encounter Diagnoses   Name Primary? Chronic pain of left knee Yes    Primary osteoarthritis of left knee     Chondromalacia of left patella        Orders Placed This Encounter   Procedures    [id number] - [place] DRAIN/INJECT LARGE JOINT/BURSA           Treatment Plan:  Treatment options were discussed with Bessy Guzman. She does have quite prominent arthritic changes to the right knee lateral compartment and left knee medial compartment does have patellofemoral arthropathy particularly on the left knee compared with right knee. .   She has tentatively set up a right total knee arthroplasty with Dr. Kateryna Conti on 04/80/9359 which I think is an excellent decision as her arthritic symptoms have limited her ability to golf and is affecting her quality of life. Given her upcoming arthroplasty on the right she is undergoing rotation bilaterally and much better with regard to her left knee. After discussion of pros and cons of viscosupplementation, she did receive her first injection of Euflexxa to her left knee. This was performed using a standard prefilled 2 cc syringe via intra-articular approach. She will continue with her diclofenac 75 mg 1 pill twice daily continue with her exercise program and bracing. She once again is done excellent job losing weight losing 50 pounds over the past year. She will continue with her spine care with the spine team and Dr. Chavez Schmidt. She will be seen back each in the next 2 weeks to finish up her Euflexxa series. Icing and activity modification was discussed as was importance of strengthening in preparation for upcoming knee arthroplasty with Dr. Kateryna Conti on the right knee on 11/17/2022. She will contact us in the interim with questions or concerns. This dictation was performed with a verbal recognition program (DRAGON) and it was checked for errors. It is possible that there are still dictated errors within this office note.  If so, please bring any errors to my attention for an addendum. All efforts were made to ensure that this office note is accurate.

## 2022-11-04 ENCOUNTER — HOSPITAL ENCOUNTER (OUTPATIENT)
Dept: NON INVASIVE DIAGNOSTICS | Age: 67
Discharge: HOME | End: 2022-11-04
Payer: MEDICARE

## 2022-11-04 ENCOUNTER — HOSPITAL ENCOUNTER (OUTPATIENT)
Dept: NUCLEAR MEDICINE | Age: 67
Discharge: HOME | End: 2022-11-04
Payer: MEDICARE

## 2022-11-04 DIAGNOSIS — R07.89 OTHER CHEST PAIN: ICD-10-CM

## 2022-11-04 LAB
LV EF: 58 %
LVEF MODALITY: NORMAL

## 2022-11-04 PROCEDURE — 6360000002 HC RX W HCPCS: Performed by: FAMILY MEDICINE

## 2022-11-04 PROCEDURE — 78452 HT MUSCLE IMAGE SPECT MULT: CPT

## 2022-11-04 PROCEDURE — 93017 CV STRESS TEST TRACING ONLY: CPT

## 2022-11-04 PROCEDURE — 3430000000 HC RX DIAGNOSTIC RADIOPHARMACEUTICAL: Performed by: FAMILY MEDICINE

## 2022-11-04 PROCEDURE — A9502 TC99M TETROFOSMIN: HCPCS | Performed by: FAMILY MEDICINE

## 2022-11-04 RX ADMIN — TETROFOSMIN 33 MILLICURIE: 1.38 INJECTION, POWDER, LYOPHILIZED, FOR SOLUTION INTRAVENOUS at 13:20

## 2022-11-04 RX ADMIN — TETROFOSMIN 10.2 MILLICURIE: 1.38 INJECTION, POWDER, LYOPHILIZED, FOR SOLUTION INTRAVENOUS at 11:40

## 2022-11-04 RX ADMIN — REGADENOSON 0.4 MG: 0.08 INJECTION, SOLUTION INTRAVENOUS at 13:20

## 2022-11-04 NOTE — PROGRESS NOTES
A Lexiscan Myoview stress test was completed on this patient vs GXT ailyn r/t bilat knee pain / knee surg in 2 weeks. The patient tolerated the procedure well.

## 2022-11-07 ENCOUNTER — OFFICE VISIT (OUTPATIENT)
Dept: ORTHOPEDIC SURGERY | Age: 67
End: 2022-11-07
Payer: MEDICARE

## 2022-11-07 VITALS — HEIGHT: 67 IN | BODY MASS INDEX: 30.61 KG/M2 | WEIGHT: 195 LBS

## 2022-11-07 DIAGNOSIS — M25.562 CHRONIC PAIN OF LEFT KNEE: ICD-10-CM

## 2022-11-07 DIAGNOSIS — M22.42 CHONDROMALACIA OF LEFT PATELLA: ICD-10-CM

## 2022-11-07 DIAGNOSIS — M17.12 PRIMARY OSTEOARTHRITIS OF LEFT KNEE: Primary | ICD-10-CM

## 2022-11-07 DIAGNOSIS — G89.29 CHRONIC PAIN OF LEFT KNEE: ICD-10-CM

## 2022-11-07 PROCEDURE — 20610 DRAIN/INJ JOINT/BURSA W/O US: CPT | Performed by: FAMILY MEDICINE

## 2022-11-07 PROCEDURE — 99999 PR OFFICE/OUTPT VISIT,PROCEDURE ONLY: CPT | Performed by: FAMILY MEDICINE

## 2022-11-07 RX ORDER — HYALURONATE SODIUM 10 MG/ML
20 SYRINGE (ML) INTRAARTICULAR ONCE
Status: COMPLETED | OUTPATIENT
Start: 2022-11-07 | End: 2022-11-07

## 2022-11-07 RX ADMIN — Medication 20 MG: at 09:10

## 2022-11-07 NOTE — PROGRESS NOTES
CC:  FU Knee Osteoarthritis with Viscosupplementation        Mary Denny is a 60 y.o. 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 Teddy Triplett is being seen by today for recurrent worsening of her right knee pain with underlying osteoarthritis. He is known to have grade IV chondromalacia to the weightbearing portion of her lateral compartment of her knee as well as partial pseudo-extrusion of the lateral meniscus with a chronic complex flap tear with synovitis. Patient status post completion of viscosupplementation bilateral knee 4/6/2022 pending right total knee arthroplasty per Dr. Ying Shin 91/53/6399    History of Present Illness for Follow Up Patient:      Jenifer Lopez is being seen in for follow up today to review MRI results for ongoing right knee pain. Jenifer Lopez is 12 weeks out from aggravation of right knee after a twisting injury. The patient rates their current pain as a 5 out of 10 on the pain scale but can increase with activities such as going from a seated to standing position or having to go up and down stairs. Treatment to date includes: rest, ice, NSAID- Diclofeac, physical therapy, home exercises, knee bracing, cortisone injection on 4/1/2019 and her first attempt at visco supplementation completed on 7/17/2019 to treat this problem. Her MRI was obtained at [facility] on 11/15/2019 did show evidence of grade IV chondromalacia of the weightbearing and posterior nonweightbearing portion of the lateral compartment with full-thickness chondral loss and some stress edema but no evidence of insufficiency fracture. She did have a lateral meniscal pseudo-extrusion with complex flap tear to the anterior horn to posterior root with chronic notch synovitis and ACL inflammation likely related to her twisting injury about a month ago.   Denies locking catching or true instability symptoms and she would like to avoid surgery if at all possible. Prior to her twisting injury 3 months ago, she states her knee was doing very well after completing Visco supplementation in July 2019 bilaterally. She did get a benefit from her previous round of Visco supplementation has been considering this once again. His mom also been working on her home-based exercise program.     We last saw Faith Weiss in the office on 4/6/2022 when we finished up viscosupplementation to her knees bilaterally for underlying knee osteoarthritis chondromalacia patella. She is becoming increasingly symptomatic and does leave for [place] in August 2022 for vacation. There is no history of injury no activity prior to becoming symptomatic. She is a little bit fearful that she is can be doing frequent walking on the beach and is requesting a steroid injection. No interim history of injury no activity prior to becoming symptomatic. Pain does range between a 3-6 out of 10. No substantial rest or night pain. She is hoping to put off knee surgery as long as she can. She is trying to work on her exercise program is continue with her bracing and does continue with her diclofenac. She does have a known history of substantial knee osteoarthritis with patellofemoral arthropathy and does utilize her brace occasionally. We last saw Faith Weiss in the office on 10/24/2022 for her significant bilateral knee osteoarthritis with patellofemoral arthropathy. Once again she has progressively worsened with regard to her knee pain which has been limiting her ability to golf and walk and exercise as she would wish and following a very long discussion with her last visit, we had her sit down with Dr. Shanna Quevedo and she is now set up for right total knee arthroplasty on 11/17/2022. She would like to consider viscosupplementation to her contralateral left knee given advanced left knee is going to be under with her upcoming arthroplasty. She has had reasonable partial success with viscosupplementation in the past and would like to continue with this. She is continue with her diclofenac and we previously placed her on a Medrol Dosepak with her last visit as her knees are bothering her which did help moderately. She is working on her exercise and stretching program.  She occasionally does utilize her brace and does ice. They had originally discussed setting her up tentatively for arthroplasty on her contralateral left knee but she wants to hold off on this to see how she does with her right initially. Denies locking catching or true instability symptoms. We last saw Jarrett [place] in the office on 10/31/2022 and was continued on treatment for bilateral knee osteoarthritis. Once again she does have a pending right total knee arthroplasty per Dr. Roshan Collier on 46/47/3141. She presents back today stating her left knee is feeling much better but she is still having pain obviously in her right knee. She is feeling at least 50% better with regard to her treatment on her left knee over the last week or so. She is working her exercise program bilaterally in preparation for her total knee arthroplasty. She has continue with her diclofenac twice daily although she is where she did need to hold this prior to surgery and does utilize her brace. Denies locking catching or true instability symptoms and would like to consider viscosupplementation once again to her left knee given the upcoming demands it will be under with her knee replacement coming up on the right. We last saw Jarrett [place] in the office on 10/2/2022 we will start with Euflexxa for her left knee underlying osteoarthritis and chondromalacia patella. Once again she does have pending knee arthroplasty on the right with Dr. Roshan Collier on 91/45/0166 and is not having substantial pain with regard to her left knee and is working on her exercise program bilaterally.   She is tolerating her diclofenac although she is aware she will need to hold this prior to surgery. She occasionally does utilize her brace and denies locking catching or true instability symptoms. 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 Her chemical stress test did show signs consistent with some scar tissue but they felt that the echo would be advantageous for cardiac clearance prior to knee arthroplasty on the right. She is arranging this this week      PE: no substantial change in exam.    Assessment:  Knee Osteoarthritis with viscosupplementation      PROCEDURE NOTE:    PRE-PROCEDURE DIAGNOSIS: DJD knee    POST-PROCEDURE DIAGNOSIS: DJD knee    Injection #2 of Euflexxa     PROCEDURE:  With the patient's permission, her left knee was prepped in standard sterile fashion with Betadine and Alcohol and the prefilled 2cc injection of Euflexxa was injected intra-articularly into the left knee via a superolateral approach without difficulty. The patient tolerated this well without difficulty. A band-aid was applied. POST-PROCEDURE INSTRUCTIONS GIVEN TO PATIENT: The patient was advised to ice the knee for 15-20 minutes to relieve any injection site related pain. FOLLOW-UP: as directed. She will continue with her diclofenac 75 mg 1 pill twice daily. Continue attempts at weight loss were recommended as well as continuation of her bracing exercise program.  Once again she is going to be having right total knee arthroplasty pending her cardiac clearance which will include an echo later this week hopefully. She is aware she can have repeat viscosupplementation 6-month intervals if she is hoping to put off knee arthroplasty on the left she want to get through her right side initially. She will contact us in the interim with questions or concerns we will wrap up her viscosupplementation in a week.

## 2022-11-10 ENCOUNTER — HOSPITAL ENCOUNTER (OUTPATIENT)
Dept: CARDIOLOGY | Age: 67
Discharge: HOME | End: 2022-11-10
Payer: MEDICARE

## 2022-11-10 DIAGNOSIS — R07.89 OTHER CHEST PAIN: ICD-10-CM

## 2022-11-10 LAB
LV EF: 55 %
LVEF MODALITY: NORMAL

## 2022-11-10 PROCEDURE — 93306 TTE W/DOPPLER COMPLETE: CPT

## 2022-11-11 ENCOUNTER — HOSPITAL ENCOUNTER (OUTPATIENT)
Age: 67
Discharge: HOME | End: 2022-11-11
Payer: MEDICARE

## 2022-11-11 ENCOUNTER — TELEPHONE (OUTPATIENT)
Dept: ORTHOPEDIC SURGERY | Age: 67
End: 2022-11-11

## 2022-11-11 ENCOUNTER — TELEPHONE (OUTPATIENT)
Dept: CARDIOLOGY CLINIC | Age: 67
End: 2022-11-11

## 2022-11-11 ENCOUNTER — OFFICE VISIT (OUTPATIENT)
Dept: CARDIOLOGY CLINIC | Age: 67
End: 2022-11-11
Payer: MEDICARE

## 2022-11-11 VITALS
SYSTOLIC BLOOD PRESSURE: 136 MMHG | BODY MASS INDEX: 31.08 KG/M2 | DIASTOLIC BLOOD PRESSURE: 86 MMHG | HEIGHT: 67 IN | HEART RATE: 85 BPM | WEIGHT: 198 LBS | OXYGEN SATURATION: 96 %

## 2022-11-11 DIAGNOSIS — R00.2 PALPITATIONS: ICD-10-CM

## 2022-11-11 DIAGNOSIS — M17.0 BILATERAL PRIMARY OSTEOARTHRITIS OF KNEE: ICD-10-CM

## 2022-11-11 DIAGNOSIS — G89.29 CHRONIC PAIN OF RIGHT KNEE: ICD-10-CM

## 2022-11-11 DIAGNOSIS — R07.2 PRECORDIAL PAIN: ICD-10-CM

## 2022-11-11 DIAGNOSIS — M25.561 CHRONIC PAIN OF RIGHT KNEE: ICD-10-CM

## 2022-11-11 DIAGNOSIS — Z01.810 PREOPERATIVE CARDIOVASCULAR EXAMINATION: Primary | ICD-10-CM

## 2022-11-11 LAB
ABO/RH: NORMAL
ANTIBODY SCREEN: NORMAL
TRANSFERRIN: 259 MG/DL (ref 200–360)

## 2022-11-11 PROCEDURE — 1036F TOBACCO NON-USER: CPT | Performed by: INTERNAL MEDICINE

## 2022-11-11 PROCEDURE — 86901 BLOOD TYPING SEROLOGIC RH(D): CPT

## 2022-11-11 PROCEDURE — G8427 DOCREV CUR MEDS BY ELIG CLIN: HCPCS | Performed by: INTERNAL MEDICINE

## 2022-11-11 PROCEDURE — 86850 RBC ANTIBODY SCREEN: CPT

## 2022-11-11 PROCEDURE — 99204 OFFICE O/P NEW MOD 45 MIN: CPT | Performed by: INTERNAL MEDICINE

## 2022-11-11 PROCEDURE — 3017F COLORECTAL CA SCREEN DOC REV: CPT | Performed by: INTERNAL MEDICINE

## 2022-11-11 PROCEDURE — 87086 URINE CULTURE/COLONY COUNT: CPT

## 2022-11-11 PROCEDURE — 93000 ELECTROCARDIOGRAM COMPLETE: CPT | Performed by: INTERNAL MEDICINE

## 2022-11-11 PROCEDURE — G8400 PT W/DXA NO RESULTS DOC: HCPCS | Performed by: INTERNAL MEDICINE

## 2022-11-11 PROCEDURE — 84466 ASSAY OF TRANSFERRIN: CPT

## 2022-11-11 PROCEDURE — 36415 COLL VENOUS BLD VENIPUNCTURE: CPT

## 2022-11-11 PROCEDURE — G8484 FLU IMMUNIZE NO ADMIN: HCPCS | Performed by: INTERNAL MEDICINE

## 2022-11-11 PROCEDURE — 86900 BLOOD TYPING SEROLOGIC ABO: CPT

## 2022-11-11 PROCEDURE — 1090F PRES/ABSN URINE INCON ASSESS: CPT | Performed by: INTERNAL MEDICINE

## 2022-11-11 PROCEDURE — G8417 CALC BMI ABV UP PARAM F/U: HCPCS | Performed by: INTERNAL MEDICINE

## 2022-11-11 PROCEDURE — 87641 MR-STAPH DNA AMP PROBE: CPT

## 2022-11-11 PROCEDURE — 1123F ACP DISCUSS/DSCN MKR DOCD: CPT | Performed by: INTERNAL MEDICINE

## 2022-11-11 NOTE — PROGRESS NOTES
[facility]   Cardiac Consultation    Referring Provider:  Isiah Max DO     Chief Complaint   Patient presents with    New Patient    Cardiac Clearance     Right knee replacement    Palpitations    Dizziness      Anna James   1955      History of Present Illness:    Anna James is a 78 y.o. female who is here today as a new patient consult at the request of Isiah Max DO for preoperative cardiac risk assessment for total right knee surgery. She is also her for palpitations and dizziness. Stress test 11/4/2022- Small, fixed, anteroapical/apical cap perfusion defect with subtle apical hypokinesis. Findings likely represent distal LAD territory scar from prior infarction though breast attenuation remains possible. Echocardiogram 11/10/2022- EF 55%, Trace aortic, mitral and tricuspid regurgitation. Today she states she has been feeling well overall. She states on days that she is very active working she starts to have chest pressure, dizziness, and palpitations- feels like a fast heart rate. States she has to sit or bend over and rest for a few minutes and resolve. Symptoms started around 1 year ago, last episode occurred this past October. States this can last for around one hour after she stops to rest. She has been on statins since 2015, was off it this past August for one week- felt better off the medication. Patient currently denies any weight gain, edema, shortness of breath, and syncope. Past Medical History:   has a past medical history of Arthritis, Cancer ([address] DVT (deep venous thrombosis) ([address] Hearing decreased, Hypoglycemia, Melanoma ([address] Overactive bladder, Reflux, and Wears glasses. Surgical History:   has a past surgical history that includes Appendectomy; Tonsillectomy; Neck surgery; Tubal ligation; Knee arthroscopy (12/14/11); hysteroscopy (7/15); Varicose vein surgery; and Pain management procedure (Right, 1/6/2022).      Social History:   reports that she quit smoking about 17 years ago. Her smoking use included cigarettes. She has never used smokeless tobacco. She reports current alcohol use. She reports that she does not use drugs. Family History:  family history includes Diabetes in her maternal grandfather; Heart Attack in her paternal grandfather; Heart Disease in her father and mother. Home Medications:  Prior to Admission medications    Medication Sig Start Date End Date Taking? Authorizing Provider   MULTIPLE VITAMINS PO Take 1 tablet by mouth daily    Historical Provider, MD   ascorbic acid (VITAMIN C) 100 MG tablet VITAMIN C 100 MG TABS    Historical Provider, MD   methylPREDNISolone (MEDROL DOSEPACK) 4 MG tablet Take by mouth as directed. 10/24/22   Hoang Dimas MD   oxybutynin (DITROPAN-XL) 10 MG extended release tablet  6/29/22   Historical Provider, MD   diclofenac (VOLTAREN) 75 MG EC tablet TAKE ONE TABLET BY MOUTH TWICE A DAY 9/16/20   Hoang Dimas MD   atorvastatin (LIPITOR) 20 MG tablet Take 1 tablet by mouth daily 11/16/15   Ellis Blackmon MD        Allergies:  Vibramycin [doxycycline calcium]     Review of Systems:   Constitutional: there has been no unanticipated weight loss. There's been no change in energy level, sleep pattern, or activity level. Eyes: No visual changes or diplopia. No scleral icterus. ENT: No Headaches, hearing loss or vertigo. No mouth sores or sore throat. Cardiovascular: Reviewed in HPI  Respiratory: No cough or wheezing, no sputum production. No hematemesis. Gastrointestinal: No abdominal pain, appetite loss, blood in stools. No change in bowel or bladder habits. Genitourinary: No dysuria, trouble voiding, or hematuria. Musculoskeletal:  No gait disturbance, weakness or joint complaints. Integumentary: No rash or pruritis. Neurological: No headache, diplopia, change in muscle strength, numbness or tingling.  No change in gait, balance, coordination, mood, affect, memory, mentation, behavior. Psychiatric: No anxiety, no depression. Endocrine: No malaise, fatigue or temperature intolerance. No excessive thirst, fluid intake, or urination. No tremor. Hematologic/Lymphatic: No abnormal bruising or bleeding, blood clots or swollen lymph nodes. Allergic/Immunologic: No nasal congestion or hives. Physical Examination:    Vitals:    11/11/22 1152   BP: 136/86   Pulse: 85   SpO2: 96%        Constitutional and General Appearance: NAD   Respiratory:  Normal excursion and expansion without use of accessory muscles  Resp Auscultation: Normal breath sounds without dullness  Cardiovascular: The apical impulses not displaced  Heart tones are crisp and normal  Cervical veins are not engorged  The carotid upstroke is normal in amplitude and contour without delay or bruit  Normal S1S2, No S3, No Murmur  Peripheral pulses are symmetrical and full  There is no clubbing, cyanosis of the extremities. No edema  Femoral Arteries: 2+ and equal  Pedal Pulses: 2+ and equal   Abdomen:  No masses or tenderness  Liver/Spleen: No Abnormalities Noted  Neurological/Psychiatric:  Alert and oriented in all spheres  Moves all extremities well  Exhibits normal gait balance and coordination  No abnormalities of mood, affect, memory, mentation, or behavior are noted      Echocardiogram 12/2015  Summary   Global ejection fraction is normal and estimated from 55 % to 60 %. Mild concentric left ventricular hypertrophy is present. Systolic pulmonary artery pressure (SPAP) is normal and estimated at 21 mmHg   (RA pressure 3 mm Hg). Stress test 11/4/2022  Summary  Small, fixed, anteroapical/apical cap perfusion defect with subtle apical hypokinesis. Findings likely represent distal LAD territory scar from prior infarction though breast attenuation remains possible. No inducible  ischemia. Post-stress LVEF is normal at 58%. TID< 1.3. Overall findings represent a low-intermediate risk scan.        Echocardiogram 11/10/2022  Summary   -- Normal left ventricle systolic function with an estimated ejection   fraction of 55%. No regional wall motion abnormalities are seen. Normal left   ventricular diastolic filling pressure. Trace aortic, mitral and tricuspid regurgitation. Systolic pulmonary artery pressure (SPAP) is normal and estimated at 21 mmHg   (right atrial pressure 3 mmHg). Assessment:   Preoperative cardiac risk assessment for right total knee replacement   Palpitations   Chest pain - recent stress test reviewed. No ischemia. Dizziness   Hyperlipidemia   History of DVT  Arthritis   Family history of heart diease  Former smoker- quit 20 years ago   Myalgias on Lipitor     Plan:  2 week cardiac event monitor- to be mailed   [facility] for knee surgery   Recommend a CT calcium score which is a test used as a screening tool for coronary artery disease. If the score is above 0, then would recommend Aspirin and Statin therapy. This test is considered a screening tool so it is not covered by insurance. Discussed options to treat high cholesterol other than Lipitor. Repeatha therapy which is an injection or Zetia. She would like to continue Lipitor for now   Cardiac medications reviewed including indications and pertinent side effects. Medication list updated at this visit. Check blood pressure and heart rate at home a few times per week- keep a log with dates and times and bring to office visit   Regular exercise and following a healthy diet encouraged   Follow up with me in around 6-8 weeks     Scribe's attestation: This note was scribed in the presence of Dr. Cristiane Ayala M.D. By Carmelita Barboza RN    The scribes documentation has been prepared under my direction and personally reviewed by me in its entirety. I confirm that the note above accurately reflects all work, treatment, procedures, and medical decision making performed by me.     Dr. Cristiane Ayala MD      Thank you for allowing me to participate in the care of this individual.      Ck Lopes.  Jazmin Rapp M.D., [place]

## 2022-11-11 NOTE — PATIENT INSTRUCTIONS
Plan:  2 week cardiac event monitor- to be mailed   [facility] for knee surgery   Recommend a CT calcium score which is a test used as a screening tool for coronary artery disease. If the score is above 0, then would recommend Aspirin and Statin therapy. This test is considered a screening tool so it is not covered by insurance. Discussed options to treat high cholesterol other than Lipitor. Repeatha therapy which is an injection or Zetia. She would like to continue Lipitor for now   Cardiac medications reviewed including indications and pertinent side effects. Medication list updated at this visit. Check blood pressure and heart rate at home a few times per week- keep a log with dates and times and bring to office visit   Regular exercise and following a healthy diet encouraged   Follow up with me in around 6-8 weeks     Your provider has ordered testing for further evaluation. An order/prescription has been included in your paper work. To schedule outpatient testing, contact Central Scheduling by calling [address] [place] [address] ([phone]).

## 2022-11-11 NOTE — LETTER
[facility]  Phone: [phone]  Fax: [phone]    Sharyn Ware MD        November 11, 2022     Jim May  [address] [place] [address]  1955    To whom it may concern,           Jim May has no cardiac contraindications to surgery. If you have any questions or concerns, please don't hesitate to call.     Sincerely,        Sharyn Ware MD

## 2022-11-11 NOTE — TELEPHONE ENCOUNTER
Monitor placed by to be mailed to pt home  Monitor company [employer]  Length of monitor 14 day  Monitor ordered by [facility]  Serial number   Kit ID   Activation successful prior to pt leaving office?  NA

## 2022-11-11 NOTE — TELEPHONE ENCOUNTER
Pt said she went to cardio today and he wasn't sure if he wanted her on beta blockers.   She has low bp and isnt sure about taking it   [phone]

## 2022-11-12 LAB — URINE CULTURE, ROUTINE: NORMAL

## 2022-11-13 LAB — MRSA SCREEN RT-PCR: NORMAL

## 2022-11-15 NOTE — PROGRESS NOTES
1. Do not eat or drink anything after 12 midnight prior to surgery. This includes no water, chewing gum mints, or ice chips. You may brush your teeth and gargle the day of surgery but DO NOT SWALLOW THE WATER. 2. Please see your family doctor/pediatrician for a history and physical and/or concerning medications. Bring any test results/reports from your physician's office. If you are under the care of a heart doctor or specialist please be aware that you may be asked to see him or her for clearance. 3. You may be asked to stop blood thinners such as Coumadin, Plavix, Fragmin, and Lovenox or Anti-inflammatories such as Aspirin, Ibuprofen, Advil, and Naproxen prior to your surgery. Please check with your doctor before stopping these or any other medications. 4. Do not smoke, and do not drink any alcoholic beverages 24 hours prior to surgery. 5. You MUST make arrangements for a responsible adult to take you home after your surgery. For your safety, you will not be allowed to leave alone or drive yourself home. Your surgery will be cancelled if you do not have a ride home. Also for your safety, it is strongly suggested someone stay with you the first 24 hrs after your surgery. 6. A parent/legal guardian must accompany a child scheduled for surgery and plan to stay at the hospital until the child is discharged. Please do not bring other children with you. 7. For your comfort,please wear simple, loose fitting clothing to the hospital.  Please do not bring valuables (money, credit cards, checkbooks, etc.) Do not wear any makeup (including no eye makeup) or nail polish on your fingers or toes. 8. For your safety, please DO NOT wear any jewelry or piercings on day of surgery. All body piercing jewelry must be removed. 9. If you have dentures, they will be removed before going to the OR; for your convenience we will provide you with a container.   If you wear contact lenses or glasses, they will be removed, they will be removed, please bring a case for them. 10. If appicable,Please see your family doctor/pediatrician for a history & physical and/or concerning medications. Bring any test results/reports from your physician's office. 11. Remember to bring Blood Bank bracelet to the hospital on the day of surgery. 12. If you have a Living Will and Durable Power of  for Healthcare, please bring in a copy. 15. Notify your Surgeon if you develop any illness between now and surgery  time, cough, cold, fever, sore throat, nausea, vomiting, etc.  Please notify your surgeon if you experience dizziness, shortness of breath or blurred vision between now & the time of your surgery   14. DO NOT shave your operative site 96 hours prior to surgery. For face & neck surgery, men may use an electric razor 48 hours prior to surgery. 15. Shower the night before surgery with __X_Antibacterial soap _X__Hibiclens   16. To provide excellent care visitors will be limited to one in the room at any given time. 17.  Please bring picture ID and insurance card. 18.  Visit our web site for additional information:  [facility]. [facility]/surgery.

## 2022-11-16 ENCOUNTER — ANESTHESIA EVENT (OUTPATIENT)
Dept: OPERATING ROOM | Age: 67
End: 2022-11-16
Payer: MEDICARE

## 2022-11-16 ENCOUNTER — OFFICE VISIT (OUTPATIENT)
Dept: ORTHOPEDIC SURGERY | Age: 67
End: 2022-11-16
Payer: MEDICARE

## 2022-11-16 VITALS — HEIGHT: 67 IN | BODY MASS INDEX: 28.56 KG/M2 | WEIGHT: 182 LBS

## 2022-11-16 DIAGNOSIS — M22.42 CHONDROMALACIA OF LEFT PATELLA: Primary | ICD-10-CM

## 2022-11-16 DIAGNOSIS — M17.12 PRIMARY OSTEOARTHRITIS OF LEFT KNEE: ICD-10-CM

## 2022-11-16 DIAGNOSIS — M25.562 CHRONIC PAIN OF LEFT KNEE: ICD-10-CM

## 2022-11-16 DIAGNOSIS — G89.29 CHRONIC PAIN OF LEFT KNEE: ICD-10-CM

## 2022-11-16 DIAGNOSIS — M17.0 BILATERAL PRIMARY OSTEOARTHRITIS OF KNEE: ICD-10-CM

## 2022-11-16 PROCEDURE — 99999 PR OFFICE/OUTPT VISIT,PROCEDURE ONLY: CPT | Performed by: FAMILY MEDICINE

## 2022-11-16 PROCEDURE — MISCCOLD COLD THERAPY UNIT AND PAD: Performed by: FAMILY MEDICINE

## 2022-11-16 PROCEDURE — 20610 DRAIN/INJ JOINT/BURSA W/O US: CPT | Performed by: FAMILY MEDICINE

## 2022-11-16 RX ORDER — HYALURONATE SODIUM 10 MG/ML
20 SYRINGE (ML) INTRAARTICULAR ONCE
Status: COMPLETED | OUTPATIENT
Start: 2022-11-16 | End: 2022-11-16

## 2022-11-16 RX ADMIN — Medication 20 MG: at 09:41

## 2022-11-16 NOTE — PROGRESS NOTES
CC:  FU Knee Osteoarthritis with Viscosupplementation        Jim May is a 60 y.o. 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 Ebenezer Braga is being seen by today for recurrent worsening of her right knee pain with underlying osteoarthritis. He is known to have grade IV chondromalacia to the weightbearing portion of her lateral compartment of her knee as well as partial pseudo-extrusion of the lateral meniscus with a chronic complex flap tear with synovitis. Patient status post completion of viscosupplementation bilateral knee 4/6/2022 pending right total knee arthroplasty per Dr. Colin Moran 56/00/2755    History of Present Illness for Follow Up Patient:      Fredrik Essex is being seen in for follow up today to review MRI results for ongoing right knee pain. Fredrik Essex is 12 weeks out from aggravation of right knee after a twisting injury. The patient rates their current pain as a 5 out of 10 on the pain scale but can increase with activities such as going from a seated to standing position or having to go up and down stairs. Treatment to date includes: rest, ice, NSAID- Diclofeac, physical therapy, home exercises, knee bracing, cortisone injection on 4/1/2019 and her first attempt at visco supplementation completed on 7/17/2019 to treat this problem. Her MRI was obtained at [facility] on 11/15/2019 did show evidence of grade IV chondromalacia of the weightbearing and posterior nonweightbearing portion of the lateral compartment with full-thickness chondral loss and some stress edema but no evidence of insufficiency fracture. She did have a lateral meniscal pseudo-extrusion with complex flap tear to the anterior horn to posterior root with chronic notch synovitis and ACL inflammation likely related to her twisting injury about a month ago.   Denies locking catching or true instability symptoms and she would like to avoid surgery if at all possible. Prior to her twisting injury 3 months ago, she states her knee was doing very well after completing Visco supplementation in July 2019 bilaterally. She did get a benefit from her previous round of Visco supplementation has been considering this once again. His mom also been working on her home-based exercise program.     We last saw Darryle Sinclair in the office on 4/6/2022 when we finished up viscosupplementation to her knees bilaterally for underlying knee osteoarthritis chondromalacia patella. She is becoming increasingly symptomatic and does leave for [place] in August 2022 for vacation. There is no history of injury no activity prior to becoming symptomatic. She is a little bit fearful that she is can be doing frequent walking on the beach and is requesting a steroid injection. No interim history of injury no activity prior to becoming symptomatic. Pain does range between a 3-6 out of 10. No substantial rest or night pain. She is hoping to put off knee surgery as long as she can. She is trying to work on her exercise program is continue with her bracing and does continue with her diclofenac. She does have a known history of substantial knee osteoarthritis with patellofemoral arthropathy and does utilize her brace occasionally. We last saw Darryle Sinclair in the office on 10/24/2022 for her significant bilateral knee osteoarthritis with patellofemoral arthropathy. Once again she has progressively worsened with regard to her knee pain which has been limiting her ability to golf and walk and exercise as she would wish and following a very long discussion with her last visit, we had her sit down with Dr. Anthony Roe and she is now set up for right total knee arthroplasty on 11/17/2022. She would like to consider viscosupplementation to her contralateral left knee given advanced left knee is going to be under with her upcoming arthroplasty. She has had reasonable partial success with viscosupplementation in the past and would like to continue with this. She is continue with her diclofenac and we previously placed her on a Medrol Dosepak with her last visit as her knees are bothering her which did help moderately. She is working on her exercise and stretching program.  She occasionally does utilize her brace and does ice. They had originally discussed setting her up tentatively for arthroplasty on her contralateral left knee but she wants to hold off on this to see how she does with her right initially. Denies locking catching or true instability symptoms. We last saw Alisha Wong in the office on 10/31/2022 and was continued on treatment for bilateral knee osteoarthritis. Once again she does have a pending right total knee arthroplasty per Dr. Nicole Molina on 40/22/8309. She presents back today stating her left knee is feeling much better but she is still having pain obviously in her right knee. She is feeling at least 50% better with regard to her treatment on her left knee over the last week or so. She is working her exercise program bilaterally in preparation for her total knee arthroplasty. She has continue with her diclofenac twice daily although she is where she did need to hold this prior to surgery and does utilize her brace. Denies locking catching or true instability symptoms and would like to consider viscosupplementation once again to her left knee given the upcoming demands it will be under with her knee replacement coming up on the right. We last saw Alisha Wong in the office on 10/2/2022 we will start with Euflexxa for her left knee underlying osteoarthritis and chondromalacia patella. Once again she does have pending knee arthroplasty on the right with Dr. Nicole Molina on 98/26/4493 and is not having substantial pain with regard to her left knee and is working on her exercise program bilaterally.   She is tolerating her diclofenac although she is aware she will need to hold this prior to surgery. She occasionally does utilize her brace and denies locking catching or true instability symptoms. 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 Her chemical stress test did show signs consistent with some scar tissue but they felt that the echo would be advantageous for cardiac clearance prior to knee arthroplasty on the right. She is arranging this this week. We last saw Stephanie Mcclendon in the office on 11/7/2022 and was continued on viscosupplementation for her left knee. She states her left knee is doing better but has been a little bit sore she was getting ready for her knee arthroplasty which we will call her tomorrow on 90/85/1493 by Dr. Jacklyn Hogan on the right knee. She is hoping to get into a rehab facility. She has had to hold her diclofenac with her upcoming surgery tomorrow. Denies locking catching or true instability. She is here today for final Euflexxa injection to her left knee. PE: no substantial change in exam.    Assessment:  Knee Osteoarthritis with viscosupplementation      PROCEDURE NOTE:    PRE-PROCEDURE DIAGNOSIS: DJD knee    POST-PROCEDURE DIAGNOSIS: DJD knee    Injection #7 of Euflexxa     PROCEDURE:  With the patient's permission, her left knee was prepped in standard sterile fashion with Betadine and Alcohol and the prefilled 2cc injection of Euflexxa was injected intra-articularly into the left knee via a superolateral approach without difficulty. The patient tolerated this well without difficulty. A band-aid was applied. POST-PROCEDURE INSTRUCTIONS GIVEN TO PATIENT: The patient was advised to ice the knee for 15-20 minutes to relieve any injection site related pain. FOLLOW-UP: as directed. She will continue with her diclofenac 75 mg 1 pill twice daily.   Continue attempts at weight loss were recommended as well as continuation of her bracing exercise program.  Once again she is going to be having right total knee arthroplasty pending her cardiac clearance which will include an echo later this week hopefully. She is aware she can have repeat viscosupplementation 6-month intervals if she is hoping to put off knee arthroplasty on the left she want to get through her right side initially. She will contact us in the interim with questions or concerns we will wrap up her viscosupplementation in a week.

## 2022-11-17 ENCOUNTER — APPOINTMENT (OUTPATIENT)
Dept: VASCULAR LAB | Age: 67
End: 2022-11-17
Attending: STUDENT IN AN ORGANIZED HEALTH CARE EDUCATION/TRAINING PROGRAM
Payer: MEDICARE

## 2022-11-17 ENCOUNTER — HOSPITAL ENCOUNTER (OUTPATIENT)
Age: 67
Setting detail: OBSERVATION
Discharge: NURSING, REHAB OR LONG-TERM CARE | End: 2022-11-20
Attending: STUDENT IN AN ORGANIZED HEALTH CARE EDUCATION/TRAINING PROGRAM | Admitting: STUDENT IN AN ORGANIZED HEALTH CARE EDUCATION/TRAINING PROGRAM
Payer: MEDICARE

## 2022-11-17 ENCOUNTER — ANESTHESIA (OUTPATIENT)
Dept: OPERATING ROOM | Age: 67
End: 2022-11-17
Payer: MEDICARE

## 2022-11-17 DIAGNOSIS — Z96.651 S/P TOTAL KNEE ARTHROPLASTY, RIGHT: Primary | ICD-10-CM

## 2022-11-17 LAB
ABO/RH: NORMAL
ANION GAP SERPL CALCULATED.3IONS-SCNC: 12 MMOL/L (ref 3–16)
ANTIBODY SCREEN: NORMAL
BUN BLDV-MCNC: 22 MG/DL (ref 7–20)
CALCIUM SERPL-MCNC: 9.4 MG/DL (ref 8.3–10.6)
CHLORIDE BLD-SCNC: 104 MMOL/L (ref 99–110)
CO2: 26 MMOL/L (ref 21–32)
CREAT SERPL-MCNC: <0.5 MG/DL (ref 0.6–1.2)
GFR SERPL CREATININE-BSD FRML MDRD: >60 ML/MIN/{1.73_M2}
GLUCOSE BLD-MCNC: 107 MG/DL (ref 70–99)
GLUCOSE BLD-MCNC: 110 MG/DL (ref 70–99)
HCT VFR BLD CALC: 38.9 % (ref 36–48)
HEMOGLOBIN: 12.9 G/DL (ref 12–16)
MCH RBC QN AUTO: 32 PG (ref 26–34)
MCHC RBC AUTO-ENTMCNC: 33.1 G/DL (ref 31–36)
MCV RBC AUTO: 96.6 FL (ref 80–100)
PDW BLD-RTO: 13.2 % (ref 12.4–15.4)
PERFORMED ON: ABNORMAL
PLATELET # BLD: 188 K/UL (ref 135–450)
PMV BLD AUTO: 8.9 FL (ref 5–10.5)
POTASSIUM REFLEX MAGNESIUM: 3.9 MMOL/L (ref 3.5–5.1)
RBC # BLD: 4.03 M/UL (ref 4–5.2)
SODIUM BLD-SCNC: 142 MMOL/L (ref 136–145)
WBC # BLD: 6.4 K/UL (ref 4–11)

## 2022-11-17 PROCEDURE — 3600000015 HC SURGERY LEVEL 5 ADDTL 15MIN: Performed by: STUDENT IN AN ORGANIZED HEALTH CARE EDUCATION/TRAINING PROGRAM

## 2022-11-17 PROCEDURE — 6360000002 HC RX W HCPCS: Performed by: STUDENT IN AN ORGANIZED HEALTH CARE EDUCATION/TRAINING PROGRAM

## 2022-11-17 PROCEDURE — 85027 COMPLETE CBC AUTOMATED: CPT

## 2022-11-17 PROCEDURE — 6360000002 HC RX W HCPCS: Performed by: ANESTHESIOLOGY

## 2022-11-17 PROCEDURE — 6370000000 HC RX 637 (ALT 250 FOR IP): Performed by: STUDENT IN AN ORGANIZED HEALTH CARE EDUCATION/TRAINING PROGRAM

## 2022-11-17 PROCEDURE — 7100000001 HC PACU RECOVERY - ADDTL 15 MIN: Performed by: STUDENT IN AN ORGANIZED HEALTH CARE EDUCATION/TRAINING PROGRAM

## 2022-11-17 PROCEDURE — 64447 NJX AA&/STRD FEMORAL NRV IMG: CPT | Performed by: ANESTHESIOLOGY

## 2022-11-17 PROCEDURE — 2580000003 HC RX 258

## 2022-11-17 PROCEDURE — 3700000001 HC ADD 15 MINUTES (ANESTHESIA): Performed by: STUDENT IN AN ORGANIZED HEALTH CARE EDUCATION/TRAINING PROGRAM

## 2022-11-17 PROCEDURE — 2720000010 HC SURG SUPPLY STERILE: Performed by: STUDENT IN AN ORGANIZED HEALTH CARE EDUCATION/TRAINING PROGRAM

## 2022-11-17 PROCEDURE — 7100000000 HC PACU RECOVERY - FIRST 15 MIN: Performed by: STUDENT IN AN ORGANIZED HEALTH CARE EDUCATION/TRAINING PROGRAM

## 2022-11-17 PROCEDURE — 2700000000 HC OXYGEN THERAPY PER DAY

## 2022-11-17 PROCEDURE — 2580000003 HC RX 258: Performed by: STUDENT IN AN ORGANIZED HEALTH CARE EDUCATION/TRAINING PROGRAM

## 2022-11-17 PROCEDURE — 86901 BLOOD TYPING SEROLOGIC RH(D): CPT

## 2022-11-17 PROCEDURE — 3700000000 HC ANESTHESIA ATTENDED CARE: Performed by: STUDENT IN AN ORGANIZED HEALTH CARE EDUCATION/TRAINING PROGRAM

## 2022-11-17 PROCEDURE — 3600000005 HC SURGERY LEVEL 5 BASE: Performed by: STUDENT IN AN ORGANIZED HEALTH CARE EDUCATION/TRAINING PROGRAM

## 2022-11-17 PROCEDURE — C1713 ANCHOR/SCREW BN/BN,TIS/BN: HCPCS | Performed by: STUDENT IN AN ORGANIZED HEALTH CARE EDUCATION/TRAINING PROGRAM

## 2022-11-17 PROCEDURE — 2500000003 HC RX 250 WO HCPCS

## 2022-11-17 PROCEDURE — G0378 HOSPITAL OBSERVATION PER HR: HCPCS

## 2022-11-17 PROCEDURE — 2500000003 HC RX 250 WO HCPCS: Performed by: STUDENT IN AN ORGANIZED HEALTH CARE EDUCATION/TRAINING PROGRAM

## 2022-11-17 PROCEDURE — 86900 BLOOD TYPING SEROLOGIC ABO: CPT

## 2022-11-17 PROCEDURE — 80048 BASIC METABOLIC PNL TOTAL CA: CPT

## 2022-11-17 PROCEDURE — C1776 JOINT DEVICE (IMPLANTABLE): HCPCS | Performed by: STUDENT IN AN ORGANIZED HEALTH CARE EDUCATION/TRAINING PROGRAM

## 2022-11-17 PROCEDURE — A4217 STERILE WATER/SALINE, 500 ML: HCPCS | Performed by: STUDENT IN AN ORGANIZED HEALTH CARE EDUCATION/TRAINING PROGRAM

## 2022-11-17 PROCEDURE — 2709999900 HC NON-CHARGEABLE SUPPLY: Performed by: STUDENT IN AN ORGANIZED HEALTH CARE EDUCATION/TRAINING PROGRAM

## 2022-11-17 PROCEDURE — 6370000000 HC RX 637 (ALT 250 FOR IP): Performed by: ANESTHESIOLOGY

## 2022-11-17 PROCEDURE — 2580000003 HC RX 258: Performed by: ANESTHESIOLOGY

## 2022-11-17 PROCEDURE — 2500000003 HC RX 250 WO HCPCS: Performed by: ANESTHESIOLOGY

## 2022-11-17 PROCEDURE — 93971 EXTREMITY STUDY: CPT

## 2022-11-17 PROCEDURE — 6360000002 HC RX W HCPCS

## 2022-11-17 PROCEDURE — 27447 TOTAL KNEE ARTHROPLASTY: CPT | Performed by: STUDENT IN AN ORGANIZED HEALTH CARE EDUCATION/TRAINING PROGRAM

## 2022-11-17 PROCEDURE — 86850 RBC ANTIBODY SCREEN: CPT

## 2022-11-17 DEVICE — CEMENT BNE 40GM W/ GENT HI VISC RADPQ FOR REV SURG: Type: IMPLANTABLE DEVICE | Status: FUNCTIONAL

## 2022-11-17 DEVICE — PSN MC VE ASF R 11MM 6-7/EF: Type: IMPLANTABLE DEVICE | Site: KNEE | Status: FUNCTIONAL

## 2022-11-17 DEVICE — PSN TIB STM 5 DEG SZ F R: Type: IMPLANTABLE DEVICE | Site: KNEE | Status: FUNCTIONAL

## 2022-11-17 DEVICE — IMPLANTABLE DEVICE
Type: IMPLANTABLE DEVICE | Site: KNEE | Status: FUNCTIONAL
Brand: PERSONA®

## 2022-11-17 DEVICE — SCREW BNE L25MM DIA2.5MM KNEE FULL THRD HEX FEM PERSONA: Type: IMPLANTABLE DEVICE | Site: KNEE | Status: FUNCTIONAL

## 2022-11-17 DEVICE — COMPONENT PAT DIA32MM THK8.5MM STD KNEE VIVACIT-E CEM: Type: IMPLANTABLE DEVICE | Site: KNEE | Status: FUNCTIONAL

## 2022-11-17 RX ORDER — OXYCODONE HYDROCHLORIDE 5 MG/1
5 TABLET ORAL EVERY 4 HOURS PRN
Status: DISCONTINUED | OUTPATIENT
Start: 2022-11-17 | End: 2022-11-20 | Stop reason: HOSPADM

## 2022-11-17 RX ORDER — MIDAZOLAM HYDROCHLORIDE 1 MG/ML
INJECTION INTRAMUSCULAR; INTRAVENOUS PRN
Status: DISCONTINUED | OUTPATIENT
Start: 2022-11-17 | End: 2022-11-17 | Stop reason: SDUPTHER

## 2022-11-17 RX ORDER — SODIUM CHLORIDE 0.9 % (FLUSH) 0.9 %
5-40 SYRINGE (ML) INJECTION PRN
Status: DISCONTINUED | OUTPATIENT
Start: 2022-11-17 | End: 2022-11-17 | Stop reason: HOSPADM

## 2022-11-17 RX ORDER — ONDANSETRON 2 MG/ML
4 INJECTION INTRAMUSCULAR; INTRAVENOUS
Status: DISCONTINUED | OUTPATIENT
Start: 2022-11-17 | End: 2022-11-17 | Stop reason: HOSPADM

## 2022-11-17 RX ORDER — SODIUM CHLORIDE, SODIUM LACTATE, POTASSIUM CHLORIDE, CALCIUM CHLORIDE 600; 310; 30; 20 MG/100ML; MG/100ML; MG/100ML; MG/100ML
INJECTION, SOLUTION INTRAVENOUS CONTINUOUS
Status: DISCONTINUED | OUTPATIENT
Start: 2022-11-17 | End: 2022-11-20 | Stop reason: HOSPADM

## 2022-11-17 RX ORDER — HYDRALAZINE HYDROCHLORIDE 20 MG/ML
10 INJECTION INTRAMUSCULAR; INTRAVENOUS
Status: DISCONTINUED | OUTPATIENT
Start: 2022-11-17 | End: 2022-11-17 | Stop reason: HOSPADM

## 2022-11-17 RX ORDER — ONDANSETRON 2 MG/ML
INJECTION INTRAMUSCULAR; INTRAVENOUS PRN
Status: DISCONTINUED | OUTPATIENT
Start: 2022-11-17 | End: 2022-11-17 | Stop reason: SDUPTHER

## 2022-11-17 RX ORDER — IPRATROPIUM BROMIDE AND ALBUTEROL SULFATE 2.5; .5 MG/3ML; MG/3ML
1 SOLUTION RESPIRATORY (INHALATION)
Status: DISCONTINUED | OUTPATIENT
Start: 2022-11-17 | End: 2022-11-17 | Stop reason: HOSPADM

## 2022-11-17 RX ORDER — DEXAMETHASONE SODIUM PHOSPHATE 4 MG/ML
4 INJECTION, SOLUTION INTRA-ARTICULAR; INTRALESIONAL; INTRAMUSCULAR; INTRAVENOUS; SOFT TISSUE
Status: DISCONTINUED | OUTPATIENT
Start: 2022-11-17 | End: 2022-11-17 | Stop reason: HOSPADM

## 2022-11-17 RX ORDER — FAMOTIDINE 10 MG/ML
20 INJECTION, SOLUTION INTRAVENOUS ONCE
Status: COMPLETED | OUTPATIENT
Start: 2022-11-17 | End: 2022-11-17

## 2022-11-17 RX ORDER — SODIUM CHLORIDE 9 MG/ML
INJECTION, SOLUTION INTRAVENOUS PRN
Status: DISCONTINUED | OUTPATIENT
Start: 2022-11-17 | End: 2022-11-17 | Stop reason: HOSPADM

## 2022-11-17 RX ORDER — ACETAMINOPHEN 325 MG/1
650 TABLET ORAL
Status: DISCONTINUED | OUTPATIENT
Start: 2022-11-17 | End: 2022-11-17 | Stop reason: HOSPADM

## 2022-11-17 RX ORDER — ASPIRIN 81 MG/1
81 TABLET ORAL 2 TIMES DAILY
Status: DISCONTINUED | OUTPATIENT
Start: 2022-11-17 | End: 2022-11-20 | Stop reason: HOSPADM

## 2022-11-17 RX ORDER — POLYETHYLENE GLYCOL 3350 17 G/17G
17 POWDER, FOR SOLUTION ORAL DAILY
Status: DISCONTINUED | OUTPATIENT
Start: 2022-11-17 | End: 2022-11-20 | Stop reason: HOSPADM

## 2022-11-17 RX ORDER — MORPHINE SULFATE 2 MG/ML
2 INJECTION, SOLUTION INTRAMUSCULAR; INTRAVENOUS
Status: DISCONTINUED | OUTPATIENT
Start: 2022-11-17 | End: 2022-11-18

## 2022-11-17 RX ORDER — ATORVASTATIN CALCIUM 10 MG/1
20 TABLET, FILM COATED ORAL DAILY
Status: DISCONTINUED | OUTPATIENT
Start: 2022-11-18 | End: 2022-11-20 | Stop reason: HOSPADM

## 2022-11-17 RX ORDER — SODIUM CHLORIDE 0.9 % (FLUSH) 0.9 %
5-40 SYRINGE (ML) INJECTION EVERY 12 HOURS SCHEDULED
Status: DISCONTINUED | OUTPATIENT
Start: 2022-11-17 | End: 2022-11-17 | Stop reason: HOSPADM

## 2022-11-17 RX ORDER — SODIUM CHLORIDE, SODIUM LACTATE, POTASSIUM CHLORIDE, CALCIUM CHLORIDE 600; 310; 30; 20 MG/100ML; MG/100ML; MG/100ML; MG/100ML
INJECTION, SOLUTION INTRAVENOUS CONTINUOUS
Status: DISCONTINUED | OUTPATIENT
Start: 2022-11-17 | End: 2022-11-17 | Stop reason: HOSPADM

## 2022-11-17 RX ORDER — SENNA PLUS 8.6 MG/1
1 TABLET ORAL DAILY PRN
Status: DISCONTINUED | OUTPATIENT
Start: 2022-11-17 | End: 2022-11-20 | Stop reason: HOSPADM

## 2022-11-17 RX ORDER — SODIUM CHLORIDE 0.9 % (FLUSH) 0.9 %
5-40 SYRINGE (ML) INJECTION PRN
Status: DISCONTINUED | OUTPATIENT
Start: 2022-11-17 | End: 2022-11-20 | Stop reason: HOSPADM

## 2022-11-17 RX ORDER — MORPHINE SULFATE 4 MG/ML
4 INJECTION, SOLUTION INTRAMUSCULAR; INTRAVENOUS
Status: DISCONTINUED | OUTPATIENT
Start: 2022-11-17 | End: 2022-11-18

## 2022-11-17 RX ORDER — MAGNESIUM HYDROXIDE 1200 MG/15ML
LIQUID ORAL CONTINUOUS PRN
Status: COMPLETED | OUTPATIENT
Start: 2022-11-17 | End: 2022-11-17

## 2022-11-17 RX ORDER — ACETAMINOPHEN 325 MG/1
650 TABLET ORAL EVERY 6 HOURS
Status: DISCONTINUED | OUTPATIENT
Start: 2022-11-17 | End: 2022-11-20 | Stop reason: HOSPADM

## 2022-11-17 RX ORDER — PROPOFOL 10 MG/ML
INJECTION, EMULSION INTRAVENOUS PRN
Status: DISCONTINUED | OUTPATIENT
Start: 2022-11-17 | End: 2022-11-17 | Stop reason: SDUPTHER

## 2022-11-17 RX ORDER — FENTANYL CITRATE 50 UG/ML
INJECTION, SOLUTION INTRAMUSCULAR; INTRAVENOUS PRN
Status: DISCONTINUED | OUTPATIENT
Start: 2022-11-17 | End: 2022-11-17 | Stop reason: SDUPTHER

## 2022-11-17 RX ORDER — OXYBUTYNIN CHLORIDE 5 MG/1
10 TABLET, EXTENDED RELEASE ORAL EVERY EVENING
Status: DISCONTINUED | OUTPATIENT
Start: 2022-11-17 | End: 2022-11-20 | Stop reason: HOSPADM

## 2022-11-17 RX ORDER — DEXAMETHASONE SODIUM PHOSPHATE 4 MG/ML
INJECTION, SOLUTION INTRA-ARTICULAR; INTRALESIONAL; INTRAMUSCULAR; INTRAVENOUS; SOFT TISSUE PRN
Status: DISCONTINUED | OUTPATIENT
Start: 2022-11-17 | End: 2022-11-17 | Stop reason: SDUPTHER

## 2022-11-17 RX ORDER — BUPIVACAINE HYDROCHLORIDE 5 MG/ML
INJECTION, SOLUTION EPIDURAL; INTRACAUDAL PRN
Status: DISCONTINUED | OUTPATIENT
Start: 2022-11-17 | End: 2022-11-17 | Stop reason: ALTCHOICE

## 2022-11-17 RX ORDER — TRANEXAMIC ACID 100 MG/ML
1000 INJECTION, SOLUTION INTRAVENOUS ONCE
Status: COMPLETED | OUTPATIENT
Start: 2022-11-17 | End: 2022-11-17

## 2022-11-17 RX ORDER — SODIUM CHLORIDE, SODIUM LACTATE, POTASSIUM CHLORIDE, CALCIUM CHLORIDE 600; 310; 30; 20 MG/100ML; MG/100ML; MG/100ML; MG/100ML
INJECTION, SOLUTION INTRAVENOUS CONTINUOUS
Status: DISCONTINUED | OUTPATIENT
Start: 2022-11-17 | End: 2022-11-17

## 2022-11-17 RX ORDER — GLYCOPYRROLATE 0.2 MG/ML
INJECTION INTRAMUSCULAR; INTRAVENOUS PRN
Status: DISCONTINUED | OUTPATIENT
Start: 2022-11-17 | End: 2022-11-17 | Stop reason: SDUPTHER

## 2022-11-17 RX ORDER — BUPIVACAINE HYDROCHLORIDE 5 MG/ML
INJECTION, SOLUTION EPIDURAL; INTRACAUDAL
Status: COMPLETED | OUTPATIENT
Start: 2022-11-17 | End: 2022-11-17

## 2022-11-17 RX ORDER — OXYCODONE HYDROCHLORIDE 5 MG/1
5 TABLET ORAL
Status: DISCONTINUED | OUTPATIENT
Start: 2022-11-17 | End: 2022-11-17 | Stop reason: HOSPADM

## 2022-11-17 RX ORDER — M-VIT,TX,IRON,MINS/CALC/FOLIC 27MG-0.4MG
1 TABLET ORAL DAILY
Status: DISCONTINUED | OUTPATIENT
Start: 2022-11-17 | End: 2022-11-20 | Stop reason: HOSPADM

## 2022-11-17 RX ORDER — CELECOXIB 100 MG/1
200 CAPSULE ORAL ONCE
Status: COMPLETED | OUTPATIENT
Start: 2022-11-17 | End: 2022-11-17

## 2022-11-17 RX ORDER — KETAMINE HCL IN NACL, ISO-OSM 100MG/10ML
SYRINGE (ML) INJECTION PRN
Status: DISCONTINUED | OUTPATIENT
Start: 2022-11-17 | End: 2022-11-17 | Stop reason: SDUPTHER

## 2022-11-17 RX ORDER — OXYCODONE HYDROCHLORIDE 5 MG/1
10 TABLET ORAL EVERY 4 HOURS PRN
Status: DISCONTINUED | OUTPATIENT
Start: 2022-11-17 | End: 2022-11-20 | Stop reason: HOSPADM

## 2022-11-17 RX ORDER — ACETAMINOPHEN 500 MG
1000 TABLET ORAL ONCE
Status: COMPLETED | OUTPATIENT
Start: 2022-11-17 | End: 2022-11-17

## 2022-11-17 RX ORDER — MEPERIDINE HYDROCHLORIDE 50 MG/ML
12.5 INJECTION INTRAMUSCULAR; INTRAVENOUS; SUBCUTANEOUS EVERY 5 MIN PRN
Status: DISCONTINUED | OUTPATIENT
Start: 2022-11-17 | End: 2022-11-17 | Stop reason: HOSPADM

## 2022-11-17 RX ORDER — DIPHENHYDRAMINE HYDROCHLORIDE 50 MG/ML
12.5 INJECTION INTRAMUSCULAR; INTRAVENOUS
Status: DISCONTINUED | OUTPATIENT
Start: 2022-11-17 | End: 2022-11-17 | Stop reason: HOSPADM

## 2022-11-17 RX ORDER — MIDAZOLAM HYDROCHLORIDE 1 MG/ML
2 INJECTION INTRAMUSCULAR; INTRAVENOUS
Status: COMPLETED | OUTPATIENT
Start: 2022-11-17 | End: 2022-11-17

## 2022-11-17 RX ORDER — ACETAMINOPHEN 325 MG/1
650 TABLET ORAL ONCE
Status: DISCONTINUED | OUTPATIENT
Start: 2022-11-17 | End: 2022-11-17 | Stop reason: HOSPADM

## 2022-11-17 RX ORDER — MELOXICAM 7.5 MG/1
3.75 TABLET ORAL DAILY
Status: COMPLETED | OUTPATIENT
Start: 2022-11-17 | End: 2022-11-19

## 2022-11-17 RX ORDER — SODIUM CHLORIDE 9 MG/ML
INJECTION, SOLUTION INTRAVENOUS PRN
Status: DISCONTINUED | OUTPATIENT
Start: 2022-11-17 | End: 2022-11-20 | Stop reason: HOSPADM

## 2022-11-17 RX ORDER — LIDOCAINE HYDROCHLORIDE 20 MG/ML
INJECTION, SOLUTION EPIDURAL; INFILTRATION; INTRACAUDAL; PERINEURAL PRN
Status: DISCONTINUED | OUTPATIENT
Start: 2022-11-17 | End: 2022-11-17 | Stop reason: SDUPTHER

## 2022-11-17 RX ORDER — SODIUM CHLORIDE 0.9 % (FLUSH) 0.9 %
5-40 SYRINGE (ML) INJECTION EVERY 12 HOURS SCHEDULED
Status: DISCONTINUED | OUTPATIENT
Start: 2022-11-17 | End: 2022-11-20 | Stop reason: HOSPADM

## 2022-11-17 RX ADMIN — MORPHINE SULFATE 4 MG: 4 INJECTION, SOLUTION INTRAMUSCULAR; INTRAVENOUS at 18:53

## 2022-11-17 RX ADMIN — PROPOFOL 40 MG: 10 INJECTION, EMULSION INTRAVENOUS at 15:08

## 2022-11-17 RX ADMIN — PROPOFOL 100 MG: 10 INJECTION, EMULSION INTRAVENOUS at 14:45

## 2022-11-17 RX ADMIN — CEFAZOLIN 2000 MG: 10 INJECTION, POWDER, FOR SOLUTION INTRAVENOUS at 14:49

## 2022-11-17 RX ADMIN — HYDROMORPHONE HYDROCHLORIDE 0.5 MG: 1 INJECTION, SOLUTION INTRAMUSCULAR; INTRAVENOUS; SUBCUTANEOUS at 16:45

## 2022-11-17 RX ADMIN — FAMOTIDINE 20 MG: 10 INJECTION, SOLUTION INTRAVENOUS at 09:02

## 2022-11-17 RX ADMIN — SODIUM CHLORIDE, SODIUM LACTATE, POTASSIUM CHLORIDE, AND CALCIUM CHLORIDE: .6; .31; .03; .02 INJECTION, SOLUTION INTRAVENOUS at 14:34

## 2022-11-17 RX ADMIN — MIDAZOLAM HYDROCHLORIDE 2 MG: 2 INJECTION, SOLUTION INTRAMUSCULAR; INTRAVENOUS at 11:00

## 2022-11-17 RX ADMIN — Medication 30 MG: at 14:45

## 2022-11-17 RX ADMIN — BUPIVACAINE HYDROCHLORIDE 50 ML: 5 INJECTION, SOLUTION EPIDURAL; INTRACAUDAL; PERINEURAL at 11:00

## 2022-11-17 RX ADMIN — ONDANSETRON 4 MG: 2 INJECTION INTRAMUSCULAR; INTRAVENOUS at 14:37

## 2022-11-17 RX ADMIN — PROPOFOL 20 MG: 10 INJECTION, EMULSION INTRAVENOUS at 14:47

## 2022-11-17 RX ADMIN — DEXAMETHASONE SODIUM PHOSPHATE 4 MG: 4 INJECTION, SOLUTION INTRAMUSCULAR; INTRAVENOUS at 14:37

## 2022-11-17 RX ADMIN — MELOXICAM 3.75 MG: 7.5 TABLET ORAL at 20:40

## 2022-11-17 RX ADMIN — PROPOFOL 40 MG: 10 INJECTION, EMULSION INTRAVENOUS at 15:02

## 2022-11-17 RX ADMIN — OXYCODONE 10 MG: 5 TABLET ORAL at 20:40

## 2022-11-17 RX ADMIN — FENTANYL CITRATE 50 MCG: 50 INJECTION, SOLUTION INTRAMUSCULAR; INTRAVENOUS at 14:45

## 2022-11-17 RX ADMIN — GLYCOPYRROLATE 0.1 MG: 0.2 INJECTION, SOLUTION INTRAMUSCULAR; INTRAVENOUS at 14:52

## 2022-11-17 RX ADMIN — LIDOCAINE HYDROCHLORIDE 100 MG: 20 INJECTION, SOLUTION EPIDURAL; INFILTRATION; INTRACAUDAL; PERINEURAL at 14:45

## 2022-11-17 RX ADMIN — ACETAMINOPHEN 1000 MG: 500 TABLET ORAL at 09:00

## 2022-11-17 RX ADMIN — Medication 1 TABLET: at 20:41

## 2022-11-17 RX ADMIN — DEXMEDETOMIDINE HYDROCHLORIDE 5 MCG: 100 INJECTION, SOLUTION INTRAVENOUS at 14:58

## 2022-11-17 RX ADMIN — SODIUM CHLORIDE, SODIUM LACTATE, POTASSIUM CHLORIDE, AND CALCIUM CHLORIDE: .6; .31; .03; .02 INJECTION, SOLUTION INTRAVENOUS at 15:42

## 2022-11-17 RX ADMIN — FENTANYL CITRATE 50 MCG: 50 INJECTION, SOLUTION INTRAMUSCULAR; INTRAVENOUS at 14:59

## 2022-11-17 RX ADMIN — ASPIRIN 81 MG: 81 TABLET, COATED ORAL at 20:39

## 2022-11-17 RX ADMIN — DEXMEDETOMIDINE HYDROCHLORIDE 5 MCG: 100 INJECTION, SOLUTION INTRAVENOUS at 14:42

## 2022-11-17 RX ADMIN — OXYBUTYNIN CHLORIDE 10 MG: 5 TABLET, EXTENDED RELEASE ORAL at 20:39

## 2022-11-17 RX ADMIN — TRANEXAMIC ACID 1000 MG: 100 INJECTION, SOLUTION INTRAVENOUS at 14:53

## 2022-11-17 RX ADMIN — FENTANYL CITRATE 100 MCG: 50 INJECTION INTRAMUSCULAR; INTRAVENOUS at 11:00

## 2022-11-17 RX ADMIN — MIDAZOLAM 2 MG: 1 INJECTION INTRAMUSCULAR; INTRAVENOUS at 16:51

## 2022-11-17 RX ADMIN — PHENYLEPHRINE HYDROCHLORIDE 50 MCG: 10 INJECTION INTRAVENOUS at 14:52

## 2022-11-17 RX ADMIN — ACETAMINOPHEN 650 MG: 325 TABLET ORAL at 20:40

## 2022-11-17 RX ADMIN — CELECOXIB 200 MG: 100 CAPSULE ORAL at 09:00

## 2022-11-17 RX ADMIN — CEFAZOLIN 2000 MG: 10 INJECTION, POWDER, FOR SOLUTION INTRAVENOUS at 22:57

## 2022-11-17 RX ADMIN — SODIUM CHLORIDE, PRESERVATIVE FREE 10 ML: 5 INJECTION INTRAVENOUS at 20:42

## 2022-11-17 RX ADMIN — PROPOFOL 40 MG: 10 INJECTION, EMULSION INTRAVENOUS at 16:21

## 2022-11-17 ASSESSMENT — PAIN DESCRIPTION - DESCRIPTORS
DESCRIPTORS: STABBING;THROBBING
DESCRIPTORS: ACHING
DESCRIPTORS: SORE

## 2022-11-17 ASSESSMENT — PAIN - FUNCTIONAL ASSESSMENT
PAIN_FUNCTIONAL_ASSESSMENT: PREVENTS OR INTERFERES SOME ACTIVE ACTIVITIES AND ADLS
PAIN_FUNCTIONAL_ASSESSMENT: PREVENTS OR INTERFERES WITH ALL ACTIVE AND SOME PASSIVE ACTIVITIES

## 2022-11-17 ASSESSMENT — PAIN SCALES - GENERAL
PAINLEVEL_OUTOF10: 8
PAINLEVEL_OUTOF10: 3
PAINLEVEL_OUTOF10: 8
PAINLEVEL_OUTOF10: 10
PAINLEVEL_OUTOF10: 3

## 2022-11-17 ASSESSMENT — PAIN DESCRIPTION - LOCATION
LOCATION: LEG
LOCATION: KNEE
LOCATION: KNEE

## 2022-11-17 ASSESSMENT — ENCOUNTER SYMPTOMS: SHORTNESS OF BREATH: 1

## 2022-11-17 ASSESSMENT — PAIN DESCRIPTION - ORIENTATION
ORIENTATION: RIGHT

## 2022-11-17 NOTE — ANESTHESIA PRE PROCEDURE
Department of Anesthesiology  Preprocedure Note       Name:  Greer Lazaro   Age:  78 y.o.  :  1955                                          MRN:  [id number]         Date:  2022      Surgeon: Kash Yen):  Gisella Juárez MD    Procedure: Procedure(s):  RIGHT TOTAL KNEE ARTHROPLASTY WITH ADDUCTOR CANAL BLOCK AND IPACK                    TOM BIOMET    Medications prior to admission:   Prior to Admission medications    Medication Sig Start Date End Date Taking?  Authorizing Provider   Multiple Vitamins-Minerals (IMMUNE SUPPORT PO) Take by mouth daily   Yes Historical Provider, MD   MULTIPLE VITAMINS PO Take 1 tablet by mouth daily    Historical Provider, MD   ascorbic acid (VITAMIN C) 100 MG tablet Take 1,500 mg by mouth daily    Historical Provider, MD   oxybutynin (DITROPAN-XL) 10 MG extended release tablet Take 10 mg by mouth every evening 22   Historical Provider, MD   diclofenac (VOLTAREN) 75 MG EC tablet TAKE ONE TABLET BY MOUTH TWICE A DAY 20   Carmelina Kang MD   atorvastatin (LIPITOR) 20 MG tablet Take 1 tablet by mouth daily 11/16/15   Goldie Singh MD       Current medications:    Current Facility-Administered Medications   Medication Dose Route Frequency Provider Last Rate Last Admin    famotidine (PEPCID) injection 20 mg  20 mg IntraVENous Once Elisha Orozco MD        lactated ringers infusion   IntraVENous Continuous Elisha Orozco MD        sodium chloride flush 0.9 % injection 5-40 mL  5-40 mL IntraVENous 2 times per day Elisha Orozco MD        sodium chloride flush 0.9 % injection 5-40 mL  5-40 mL IntraVENous PRN Elisha Orozco MD        0.9 % sodium chloride infusion   IntraVENous PRN Elisha Orozco MD        acetaminophen (TYLENOL) tablet 650 mg  650 mg Oral Once Gisella Juárez MD        tranexamic acid (CYKLOKAPRON) injection 1,000 mg  1,000 mg IntraVENous Once Gisella Juárez MD        ceFAZolin (ANCEF) 2000 mg in dextrose 5 % 100 mL IVPB  2,000 mg IntraVENous Once Marleen Harden MD        celecoxib (CELEBREX) capsule 200 mg  200 mg Oral Once Marleen Harden MD        acetaminophen (TYLENOL) tablet 1,000 mg  1,000 mg Oral Once Rocky Hooper MD        CEFAZOLIN 2000 MG D5W 100 ML IVPB IVPB                Allergies:     Allergies   Allergen Reactions    Immune Support [Actical]     Vibramycin [Doxycycline Calcium]      Rash         Problem List:    Patient Active Problem List   Diagnosis Code    Chest pain on exertion R07.9    Shortness of breath on exertion R06.02    DVT (deep venous thrombosis) ([facility]) I82.409    Acute chest pain R07.9    Chronic pain of left knee M25.562, G89.29    Bilateral primary osteoarthritis of knee M17.0    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, 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 melanoma    DVT (deep venous thrombosis) ([address] 2015 11/2015 - on Eliquis right calf    Hearing decreased     Hypoglycemia     Melanoma ([address]     Overactive bladder     Reflux     Wears glasses        Past Surgical History:        Procedure Laterality Date    APPENDECTOMY      CERVICAL SPINE SURGERY  2018    discectomy c3,4,5    HYSTEROSCOPY  07/01/2015    d and c    KNEE ARTHROSCOPY Left 12/14/2011    meniscectomy    NECK SURGERY      benign tumor, catch scratch fever    PAIN MANAGEMENT PROCEDURE Right 01/06/2022    RIGHT LUMBAR FIVE SACRAL ONE EPIDURAL STEROID INJECTION SITE CONFIRMED BY FLUOROSCOPY performed by Jayce Penaloza MD at [facility]         Social History:    Social History     Tobacco Use    Smoking status: Former     Types: Cigarettes     Quit date: 2000     Years since quittin.9    Smokeless tobacco: Never   Substance Use Topics    Alcohol use: Yes     Comment: monthly, social                                Counseling given: Not Answered      Vital Signs (Current):   Vitals:    11/15/22 0935   Weight: 182 lb (82.6 kg)                                              BP Readings from Last 3 Encounters:   22 136/86   22 (!) 142/95   19 113/69       NPO Status:                                                                                 BMI:   Wt Readings from Last 3 Encounters:   11/15/22 182 lb (82.6 kg)   22 182 lb (82.6 kg)   22 198 lb (89.8 kg)     Body mass index is 28.51 kg/m².     CBC:   Lab Results   Component Value Date/Time    WBC 5.0 11/15/2015 07:23 AM    RBC 4.19 11/15/2015 07:23 AM    HGB 13.3 11/15/2015 07:23 AM    HCT 40.3 11/15/2015 07:23 AM    MCV 96.3 11/15/2015 07:23 AM    RDW 13.1 11/15/2015 07:23 AM     11/15/2015 07:23 AM       CMP:   Lab Results   Component Value Date/Time     2015 05:37 AM    K 3.7 2015 05:37 AM     2015 05:37 AM    CO2 27 2015 05:37 AM    BUN 17 2015 05:37 AM    CREATININE <0.5 2015 05:37 AM    GFRAA >60 2015 05:37 AM    AGRATIO 1.8 2015 06:51 PM    LABGLOM >60 2015 05:37 AM    GLUCOSE 94 2015 05:37 AM    PROT 7.0 2015 06:51 PM    CALCIUM 8.5 2015 05:37 AM    BILITOT <0.2 2015 06:51 PM    ALKPHOS 69 2015 06:51 PM    AST 15 2015 06:51 PM    ALT 17 2015 06:51 PM       POC Tests: No results for input(s): POCGLU, POCNA, POCK, POCCL, POCBUN, POCHEMO, POCHCT in the last 72 hours. Coags:   Lab Results   Component Value Date/Time    PROTIME 11.3 11/14/2015 06:51 PM    INR 0.99 11/14/2015 06:51 PM       HCG (If Applicable): No results found for: PREGTESTUR, PREGSERUM, HCG, HCGQUANT     ABGs: No results found for: PHART, PO2ART, QWW5CRQ, ALY0ZSA, BEART, S0PQUCKH     Type & Screen (If Applicable):  No results found for: LABABO, LABRH    Drug/Infectious Status (If Applicable):  No results found for: HIV, HEPCAB    COVID-19 Screening (If Applicable): No results found for: COVID19        Anesthesia Evaluation  Patient summary reviewed and Nursing notes reviewed  Airway:           Dental:          Pulmonary:   (+) shortness of breath:                             Cardiovascular:    (+) HEMPHILL:,                   Neuro/Psych:   (+) neuromuscular disease:,             GI/Hepatic/Renal: Neg GI/Hepatic/Renal ROS            Endo/Other: Negative Endo/Other ROS                    Abdominal:             Vascular: negative vascular ROS.          Other Findings:           Anesthesia Plan        JOSE Pfeiffer MD   11/17/2022

## 2022-11-17 NOTE — ANESTHESIA POSTPROCEDURE EVALUATION
Department of Anesthesiology  Postprocedure Note    Patient: Arcelia Qureshi  MRN: [id number]  YOB: 1955  Date of evaluation: 11/17/2022      Procedure Summary     Date: 11/17/22 Room / Location: [facility]    Anesthesia Start: 9662 Anesthesia Stop: 8989    Procedure: RIGHT TOTAL KNEE ARTHROPLASTY WITH ADDUCTOR CANAL BLOCK AND IPACK                    Hipolito Dougie (Right: Knee) Diagnosis:       Primary osteoarthritis of right knee      (OSTEOARTHRITIS RIGHT KNEE)    Surgeons: Mary Anne Martinez MD Responsible Provider: Reddy Peguero MD    Anesthesia Type: general ASA Status: 2          Anesthesia Type: No value filed.     Cherry Phase I: Cherry Score: 8    Cherry Phase II:      Vitals:    11/15/22 0935 11/17/22 0904 11/17/22 1634   BP:  111/69 (!) 148/83   Pulse:  76 71   Resp:  12    Temp:  98.8 °F (37.1 °C) (!) 96.5 °F (35.8 °C)   TempSrc:  Temporal Temporal   SpO2:  98%    Weight: 182 lb (82.6 kg)       Anesthesia Post Evaluation    Patient location during evaluation: bedside  Patient participation: complete - patient participated  Level of consciousness: awake and alert  Airway patency: patent  Nausea & Vomiting: no nausea  Complications: no  Cardiovascular status: hemodynamically stable  Respiratory status: acceptable  Hydration status: euvolemic

## 2022-11-17 NOTE — ANESTHESIA PRE PROCEDURE
Department of Anesthesiology  Preprocedure Note       Name:  Diann Zhong   Age:  78 y.o.  :  1955                                          MRN:  [id number]         Date:  2022      Surgeon: Leesa Carrion):  Kodak Call MD    Procedure: Procedure(s):  RIGHT TOTAL KNEE ARTHROPLASTY WITH ADDUCTOR CANAL BLOCK AND IPACK                    TOM BIOMET    Medications prior to admission:   Prior to Admission medications    Medication Sig Start Date End Date Taking?  Authorizing Provider   Multiple Vitamins-Minerals (IMMUNE SUPPORT PO) Take by mouth daily   Yes Historical Provider, MD   MULTIPLE VITAMINS PO Take 1 tablet by mouth daily    Historical Provider, MD   ascorbic acid (VITAMIN C) 100 MG tablet Take 1,500 mg by mouth daily    Historical Provider, MD   oxybutynin (DITROPAN-XL) 10 MG extended release tablet Take 10 mg by mouth every evening 22   Historical Provider, MD   diclofenac (VOLTAREN) 75 MG EC tablet TAKE ONE TABLET BY MOUTH TWICE A DAY 20   Odilon Blair MD   atorvastatin (LIPITOR) 20 MG tablet Take 1 tablet by mouth daily 11/16/15   Charito Hammer MD       Current medications:    Current Facility-Administered Medications   Medication Dose Route Frequency Provider Last Rate Last Admin    lactated ringers infusion   IntraVENous Continuous Briana Beltran MD        sodium chloride flush 0.9 % injection 5-40 mL  5-40 mL IntraVENous 2 times per day Briana Beltran MD        sodium chloride flush 0.9 % injection 5-40 mL  5-40 mL IntraVENous PRN Briana Beltran MD        0.9 % sodium chloride infusion   IntraVENous PRN Briana Beltran MD        acetaminophen (TYLENOL) tablet 650 mg  650 mg Oral Once Kodak Call MD        tranexamic acid (CYKLOKAPRON) injection 1,000 mg  1,000 mg IntraVENous Once Kodak Call MD        ceFAZolin (ANCEF) 2000 mg in dextrose 5 % 100 mL IVPB  2,000 mg IntraVENous Once MD Ubaldo Hair CEFAZOLIN 2000 MG D5W 100 ML IVPB IVPB                Allergies:     Allergies   Allergen Reactions    Immune Support [Actical]     Vibramycin [Doxycycline Calcium]      Rash         Problem List:    Patient Active Problem List   Diagnosis Code    Chest pain on exertion R07.9    Shortness of breath on exertion R06.02    [facility] ([facility]) I82.409    Acute chest pain R07.9    Chronic pain of left knee M25.562, G89.29    Bilateral primary osteoarthritis of knee M17.0    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, 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 melanoma    DVT (deep venous thrombosis) ([address] 2015 11/2015 - on Eliquis right calf    Hearing decreased     Hypoglycemia     Melanoma ([address]     Overactive bladder     Reflux     Wears glasses        Past Surgical History:        Procedure Laterality Date    APPENDECTOMY      CERVICAL SPINE SURGERY  2018    discectomy c3,4,5    HYSTEROSCOPY  07/01/2015    d and c    KNEE ARTHROSCOPY Left 12/14/2011    meniscectomy    NECK SURGERY      benign tumor, catch scratch fever    PAIN MANAGEMENT PROCEDURE Right 01/06/2022    RIGHT LUMBAR FIVE SACRAL ONE EPIDURAL STEROID INJECTION SITE CONFIRMED BY FLUOROSCOPY performed by Negra Ch MD at [facility]         Social History:    Social History     Tobacco Use    Smoking status: Former     Types: Cigarettes     Quit date: 2000     Years since quittin.9    Smokeless tobacco: Never   Substance Use Topics    Alcohol use: Yes     Comment: monthly, social                                Counseling given: Not Answered      Vital Signs (Current):   Vitals:    11/15/22 0935 22 0904   BP:  111/69   Pulse:  76   Resp:  12   Temp:  98.8 °F (37.1 °C)   TempSrc:  Temporal   SpO2:  98%   Weight: 182 lb (82.6 kg)                                               BP Readings from Last 3 Encounters:   22 111/69   22 136/86   22 (!) 142/95       NPO Status: Time of last liquid consumption: 0800                        Time of last solid consumption: 2300                        Date of last liquid consumption: 22                        Date of last solid food consumption: 22    BMI:   Wt Readings from Last 3 Encounters:   11/15/22 182 lb (82.6 kg)   22 182 lb (82.6 kg)   22 198 lb (89.8 kg)     Body mass index is 28.51 kg/m².     CBC:   Lab Results   Component Value Date/Time    WBC 6.4 2022 08:56 AM    RBC 4.03 2022 08:56 AM    HGB 12.9 2022 08:56 AM    HCT 38.9 2022 08:56 AM    MCV 96.6 2022 08:56 AM    RDW 13.2 2022 08:56 AM     2022 08:56 AM       CMP:   Lab Results   Component Value Date/Time     2022 08:56 AM    K 3.9 2022 08:56 AM     2022 08:56 AM    CO2 26 2022 08:56 AM    BUN 22 2022 08:56 AM    CREATININE <0.5 2022 08:56 AM    GFRAA >60 2015 05:37 AM    AGRATIO 1.8 2015 06:51 PM    LABGLOM >60 2022 08:56 AM    GLUCOSE 110 2022 08:56 AM    PROT 7.0 2015 06:51 PM    CALCIUM 9.4 2022 08:56 AM    BILITOT <0.2 2015 06:51 PM    ALKPHOS 69 2015 06:51 PM    AST 15 2015 06:51 PM    ALT 17 2015 06:51 PM       POC Tests:   Recent Labs     22  0900   POCGLU 107*       Coags:   Lab Results Component Value Date/Time    PROTIME 11.3 11/14/2015 06:51 PM    INR 0.99 11/14/2015 06:51 PM       HCG (If Applicable): No results found for: PREGTESTUR, PREGSERUM, HCG, HCGQUANT     ABGs: No results found for: PHART, PO2ART, JXZ3JSJ, UCK7RKR, BEART, Y3LKOMTD     Type & Screen (If Applicable):  No results found for: LABABO, LABRH    Drug/Infectious Status (If Applicable):  No results found for: HIV, HEPCAB    COVID-19 Screening (If Applicable): No results found for: COVID19        Anesthesia Evaluation  Patient summary reviewed and Nursing notes reviewed  Airway: Mallampati: II  TM distance: >3 FB   Neck ROM: full  Mouth opening: > = 3 FB   Dental: normal exam         Pulmonary:normal exam    (+) shortness of breath:                             Cardiovascular:    (+) HEMPHILL:,                   Neuro/Psych:   (+) neuromuscular disease:,             GI/Hepatic/Renal: Neg GI/Hepatic/Renal ROS            Endo/Other: Negative Endo/Other ROS                    Abdominal:             Vascular: negative vascular ROS. Other Findings:           Anesthesia Plan      general     ASA 2       Induction: intravenous. MIPS: Postoperative opioids intended. Plan discussed with CRNA.     Attending anesthesiologist reviewed and agrees with Preprocedure content      Post-op pain plan if not by surgeon: single peripheral nerve block            JOSE Marcos MD   11/17/2022

## 2022-11-17 NOTE — DISCHARGE INSTRUCTIONS
*** Please contact [address] with any questions or concerns after your discharge. *** Mon- Fri 9am- 5pm [phone]. If you have any issues or concerns after 5pm or on the weekend please call your surgeon's office. I will be contacting you in a few days to follow up. If you need a pain medication refill please contact your surgeon's office. Total Knee Replacement  Discharge Instructions    To prevent Clot formation, you have been placed on the following medication:  Aspirin 81mg twice daily by mouth for 30 days post-op  Surgical Site Care:  Dressing change every 5-7 days with Mepilex dressing at home until incision healed  If you have sutures or staples, they will be removed on post-operative day 10-12 and steri-strips applied  If you have glue, this will be removed at your appointment or gradually wear off as your incision heals  Showering is permitted if waterproof Mepilex dressing is applied or when staples are removed and all areas of incision are healed. Physical Therapy:  Weight Bearing Status:  Weight bearing as tolerated  Start outpatient therapy as soon as possible  Precautions  Per Physical Therapy Handout  Pain Medications  You were given oxycodone (Oxycontin, Oxyir)  Wean off pain medications as you deem appropriate as long as pain is under control  Be sure to drink plenty of fluids (recommend water) while taking narcotic pain medications to prevent constipation   You may take an over the counter laxative or stool softener as needed to prevent/treat constipation as well, we recommend Senokot S OTC. We recommend that you consider taking these medications the entire time you are taking pain medication.   Cold packs/Ice packs/Machine  May be used as much as necessary to control swelling/inflammation/soreness  Be sure to have a barrier (cloth, clothing, towel) between the site and the ice pack to prevent frostbite  Contact [facility]'s office if  Increased redness, swelling, drainage of any kind, and/or pain to surgery site. As well as new onset fevers and or chills. These could signify an infection. Calf or thigh tenderness to touch as well as increased swelling or redness. This could signify a clot formation. Numbness or tingling to an area around the incision site or below the incision site (toes). Any rash appears, increased  or new onset nausea/vomiting occur. This may indicate a reaction to a medication. Phone # [phone]. [facility]. Follow up with Surgeon at scheduled appointment time. I acknowledge that I have received santana hose and understand the instructions on how and when to wear them   __________________________________  Discharging RN who has gone over instructions and acknowledges santana hose have been received   ____________________________________________   Please remove Blue [employer]arel wrist band on Post Operative Day #4    Please continue to use your Incentive Spirometer every hour while awake. [address] [place] [address] is participating in [address] [place] [address] for Joint Replacement (CJR) [address] [place] [address] is participating in a Medicare initiative called the [address] [place] [address] for Joint Replacement (CJR) model. Medicare designed this model to encourage higher quality care and greater financial accountability from hospitals when Medicare beneficiaries receive lower-extremity joint replacement procedures (Daylene Fears), typically hip or knee replacements. [address] [place] [address] participation in the [address] [place] [address],[address] should not restrict your access to care for your medical condition or your freedom to choose your health care providers and services. All existing Medicare beneficiary protections continue to be available to you. The CJR model aims to help give you better care.      The R model aims to support better and more efficient care for beneficiaries undergoing Daylene Fears believe that your care is adversely affected or have concerns about substandard care, you may call 1-800-MEDICARE or contact your states Quality Improvement Organization by going to: Will. To find a different doctor, visit [address] Physician Compare website, [email or web address], or call 1-800-MEDICARE (7-321.756.4446). TTY users should call 5-841.990.8984. To find a different hospital, visit [facility], or  call 1-800-MEDICARE (1- 489.415.5440). TTY users should call  9-947.906.8901. To find a different skilled nursing facility, visit [address] website, [email or web address] or call  1-800-MEDICARE (9-164.326.2769). TTY users should call 0-803-678- 1958. To find a different home health agency, visit [address] [place] [address] website, Tvinci[employer][email or web address], or call 1-800-MEDICARE (4-677.673.2271). TTY users should call 6-641-794- 7789. For an explanation of how patients can access their health care records and beneficiary claims data, please visit [email or web address]- [email or web address]/blue-button/about-blue-button    Get more information     If you have questions or want more information about the Comprehensive Care for Joint Replacement (CJR) model, call [facility] at  or call 1-800-MEDICARE. You can also find additional information at https://innovation.cms.gov/initiatives/cjr.

## 2022-11-17 NOTE — PROGRESS NOTES
Pt complaining of left calf pain. Homans positive pt denies any chest pain or sob but does have a history of a right  DVT. Pt not on any blood thinners. Anesthesia and erlichman notified. Vascular us ordered. Vascular called and will complete us in about 30min. Family at bedside.

## 2022-11-17 NOTE — PROGRESS NOTES
Patient to PACU from OR. Report received from RN and CRNA. Patient with eyes closed, alert to voice and stable on 2L O2 per NC. Patient without s/s of pain/distress noted when assessed. SCD's in place. VS obtained and filed. Bedside ICE therapy machine being used. Will continue to monitor.

## 2022-11-17 NOTE — OP NOTE
Operative Note      Patient: Lynne García  YOB: 1955  MRN: [id number]    Date of Procedure: 11/17/2022    Pre-Op Diagnosis: OSTEOARTHRITIS RIGHT KNEE    Post-Op Diagnosis: Same       Procedure(s):  RIGHT TOTAL KNEE ARTHROPLASTY WITH ADDUCTOR CANAL BLOCK AND IPACK                    TOM [employer]    Surgeon(s):  Scott Marie MD    Assistant:   Surgical Assistant: Isma Rhodes; Chiquita Denver; Marcio Coffey    Anesthesia: General    Estimated Blood Loss (mL): less than 521     Complications: None    Specimens:   * No specimens in log *    Implants:  Implant Name Type Inv. Item Serial No.  Lot No. LRB No. Used Action   CEMENT BNE 40GM W/ GENT HI VISC RADPQ FOR REV SURG - [id number]  CEMENT BNE 40GM W/ GENT HI VISC RADPQ FOR REV SURG  [employer]- [id number] Right 2 Implanted   SCREW BNE L25MM DIA2. 5MM KNEE FULL THRD HEX FEM PERSONA - [id number]  SCREW BNE L25MM DIA2. 5MM KNEE FULL THRD HEX FEM PERSONA  [employer]- [id number] Right 1 Implanted   PSN TIB STM 5 DEG SZ F R - [id number]  PSN TIB STM 5 DEG SZ F R  [employer]- [id number] Right 1 Implanted   COMPONENT PAT [id number] THK8. 5MM STD KNEE VIVACIT-E BHASKAR - [id number]  COMPONENT PAT [id number] THK8. 5MM STD KNEE VIVACIT-E BHASKAR  [employer]- [id number] Right 1 Implanted   COMPONENT FEM SZ 7 STD R KNEE CO CHROM BHASKAR CRUCE RET COR - [id number]  COMPONENT FEM SZ 7 STD R KNEE CO CHROM BHASKAR CRUCE RET COR  [employer]- [id number] Right 1 Implanted   PSN MC VE ASF R 11MM 6-7/EF - [id number]  PSN MC VE ASF R 11MM 6-7/EF  [employer]- [id number] Right 1 Implanted         Drains: * No LDAs found *    Findings: darkly discolored soft tissue and bone, severe lateral OA    Detailed Description of Procedure:   Clinical Indications:  This is a 77 y/o F that was evaluated for B knee OA by myself, noted to have failed extensive conservative measures and desired to have surgical intervention. Risks benefits limitations and alternatives to R TKA were discussed with the patient who wished to proceed. The patient was met in the preoperative holding area last-minute questions were answered, the R knee was marked, consent was verified. An adductor canal and ipac Block was administered per anesthesia. Ancef and TXA were administered. The patient was taken to the OR where general anesthesia was administered. She was positioned in the supine position. The right lower extremity was prepped and draped in standard orthopedic fashion. Timeout was performed according hospital protocol. The operative extremity was exsanguinated and tourniquet inflated. Midline incision marked and made with scalpel. Dissection was carried down to the capsule with electrocautery. Median parapatellar arthrotomy was made with bovie. Medial release was performed. Once adequate exposure was achieved, attention was turned to the femur. The femoral canal was entered at the middle of the notch with the entry reamer. IM guide was inserted. In this case the neither worn distal femoral guide was utilized as the lateral femoral wear was posterior. Distal femoral resection was made, bone cuts were measured and were 6mm laterally, 7mm medially. I thought this was acceptable, and planned to put less varus on the tibia and ER the femur 3 degrees. We then proceeded to use the femoral sizer to measure a size 7 femur and set our rotation 3 deg ER referencing the posterior condyles. Appropriate bone cuts were completed with the 4-1 block. Attention was then turned to the tibia. The tibial plateau was exposed and translated anteriorly. Once adequate exposure was achieved, the proximal tibial guide was used to resect the tibia, referencing the base of the tibial spines for equal resection medially and laterally and replicating native tibial slope.    We then proceeded with meniscus resection and posterior osteophyte resection both medially and laterally. Once this was achieved I assessed our flexion and extension gaps. Additional tibia / femoral resection was performed as necessary until gaps were balanced appropriately. Patella was resurfaced using a free hand technique, targeting the osteochondral margins medially and laterally. Trial components were then placed with a 11mm trial insert. Satisfactory ligamentous balance, range of motion and patellar tracking were confirmed. The femoral drill pegs were drilled, tibia was prepared for the keel. The wound was thoroughly irrigated with saline and dilute betadine, prophylactic hemostasis in the posterolateral and posteromedial corners was performed. The knee was then exposed and dried for cementing. The tibia was cemented in place making sure to sufficiently fill the canal with cement and pre coating the bone and tibial component. The femur was also cemented in place with cement on the posterior condyles of the implant and pre coated on the femoral bone. The 6 MC final poly was placed. The patella was cemented in place with cement on both the component and bone. Tourniquet was released. Adequate range of motion and patellar tracking again confirmed. Hemostasis was ensured. Wound was again throughly irrigated. Closure then ensured in standard fashion with #2 stratafix for the capsule, 2-0 stratafix for the subQ and 3-0 stratafix for the skin. Perneo glue and Soft compressive dressing were applied. The patient was then awoken from anesthesia and taken to PACU in stable condition. Post operatively the patient will have no activity or weight bearing restrictions. Pain control will be multimodal and minimize narcotics. They will take ASA 81mg BID x 1 month for DVT ppx. Plan to discharge to inpatient rehab when appropriate if approved. Family strongly desires to admit to IPR at [facility] as the patient does not have help at home.     Electronically signed by Kaden Norton MD on 11/17/2022 at 5:52 PM

## 2022-11-17 NOTE — H&P
Update History & Physical    The patient's History and Physical of November 10, 2022 was reviewed with the patient and I examined the patient. There was no change. The surgical site was confirmed by the patient and me. Plan: The risks, benefits, expected outcome, and alternative to the recommended procedure have been discussed with the patient. Patient understands and wants to proceed with the procedure.      Electronically signed by Rayne Prasad MD on 32/71/9989 at 2:20 PM

## 2022-11-17 NOTE — ANESTHESIA PROCEDURE NOTES
Peripheral Block    Patient location during procedure: pre-op  Reason for block: post-op pain management and at surgeon's request  Start time: 11/17/2022 11:00 AM  End time: 11/17/2022 11:30 AM  Staffing  Performed: anesthesiologist   Anesthesiologist: Kathrine Palencia MD  Preanesthetic Checklist  Completed: patient identified, IV checked, site marked, risks and benefits discussed, surgical/procedural consents, equipment checked, pre-op evaluation, timeout performed, anesthesia consent given, oxygen available and monitors applied/VS acknowledged  Peripheral Block   Patient position: supine  Prep: ChloraPrep  Provider prep: sterile gloves  Patient monitoring: continuous pulse ox, IV access, oxygen and responsive to questions  Block type: Femoral and iPacks  Laterality: left  Injection technique: single-shot  Guidance: ultrasound guided  Local infiltration: lidocaine  Infiltration strength: 1 %  Local infiltration: lidocaine  Dose: 3 mL    Needle   Needle type: insulated echogenic nerve stimulator needle   Needle gauge: 22 G  Needle localization: anatomical landmarks and ultrasound guidance  Needle insertion depth: 5 cm  Test dose: negative  Needle length: 10 cm  Assessment   Injection assessment: negative aspiration for heme, no paresthesia on injection, local visualized surrounding nerve on ultrasound and no intravascular symptoms  Paresthesia pain: none  Slow fractionated injection: yes  Hemodynamics: stable  Real-time US image taken/store: yes  Outcomes: uncomplicated    Medications Administered  bupivacaine (PF) 0.5 % - Perineural   50 mL - 11/17/2022 11:00:00 AM

## 2022-11-17 NOTE — PROGRESS NOTES
Pt states her sbp is typically just above 100. Anesthesia notified and aware of bp in [address]

## 2022-11-18 LAB
ANION GAP SERPL CALCULATED.3IONS-SCNC: 8 MMOL/L (ref 3–16)
BASOPHILS ABSOLUTE: 0 K/UL (ref 0–0.2)
BASOPHILS RELATIVE PERCENT: 0.1 %
BUN BLDV-MCNC: 13 MG/DL (ref 7–20)
CALCIUM SERPL-MCNC: 8.6 MG/DL (ref 8.3–10.6)
CHLORIDE BLD-SCNC: 103 MMOL/L (ref 99–110)
CO2: 27 MMOL/L (ref 21–32)
CREAT SERPL-MCNC: <0.5 MG/DL (ref 0.6–1.2)
EOSINOPHILS ABSOLUTE: 0 K/UL (ref 0–0.6)
EOSINOPHILS RELATIVE PERCENT: 0.1 %
GFR SERPL CREATININE-BSD FRML MDRD: >60 ML/MIN/{1.73_M2}
GLUCOSE BLD-MCNC: 124 MG/DL (ref 70–99)
HCT VFR BLD CALC: 33.4 % (ref 36–48)
HEMOGLOBIN: 11 G/DL (ref 12–16)
LYMPHOCYTES ABSOLUTE: 1.2 K/UL (ref 1–5.1)
LYMPHOCYTES RELATIVE PERCENT: 14 %
MCH RBC QN AUTO: 32.4 PG (ref 26–34)
MCHC RBC AUTO-ENTMCNC: 32.8 G/DL (ref 31–36)
MCV RBC AUTO: 98.6 FL (ref 80–100)
MONOCYTES ABSOLUTE: 0.8 K/UL (ref 0–1.3)
MONOCYTES RELATIVE PERCENT: 9.6 %
NEUTROPHILS ABSOLUTE: 6.5 K/UL (ref 1.7–7.7)
NEUTROPHILS RELATIVE PERCENT: 76.2 %
PDW BLD-RTO: 13.4 % (ref 12.4–15.4)
PLATELET # BLD: 153 K/UL (ref 135–450)
PMV BLD AUTO: 8.7 FL (ref 5–10.5)
POTASSIUM REFLEX MAGNESIUM: 3.7 MMOL/L (ref 3.5–5.1)
RBC # BLD: 3.38 M/UL (ref 4–5.2)
SODIUM BLD-SCNC: 138 MMOL/L (ref 136–145)
WBC # BLD: 8.5 K/UL (ref 4–11)

## 2022-11-18 PROCEDURE — 97530 THERAPEUTIC ACTIVITIES: CPT

## 2022-11-18 PROCEDURE — 6370000000 HC RX 637 (ALT 250 FOR IP): Performed by: STUDENT IN AN ORGANIZED HEALTH CARE EDUCATION/TRAINING PROGRAM

## 2022-11-18 PROCEDURE — 97110 THERAPEUTIC EXERCISES: CPT

## 2022-11-18 PROCEDURE — 36415 COLL VENOUS BLD VENIPUNCTURE: CPT

## 2022-11-18 PROCEDURE — 80048 BASIC METABOLIC PNL TOTAL CA: CPT

## 2022-11-18 PROCEDURE — 6370000000 HC RX 637 (ALT 250 FOR IP): Performed by: NURSE PRACTITIONER

## 2022-11-18 PROCEDURE — 97116 GAIT TRAINING THERAPY: CPT

## 2022-11-18 PROCEDURE — 94761 N-INVAS EAR/PLS OXIMETRY MLT: CPT

## 2022-11-18 PROCEDURE — 99024 POSTOP FOLLOW-UP VISIT: CPT | Performed by: SPECIALIST/TECHNOLOGIST

## 2022-11-18 PROCEDURE — G0378 HOSPITAL OBSERVATION PER HR: HCPCS

## 2022-11-18 PROCEDURE — APPNB60 APP NON BILLABLE TIME 46-60 MINS: Performed by: SPECIALIST/TECHNOLOGIST

## 2022-11-18 PROCEDURE — 97161 PT EVAL LOW COMPLEX 20 MIN: CPT

## 2022-11-18 PROCEDURE — 97535 SELF CARE MNGMENT TRAINING: CPT

## 2022-11-18 PROCEDURE — 2700000000 HC OXYGEN THERAPY PER DAY

## 2022-11-18 PROCEDURE — 85025 COMPLETE CBC W/AUTO DIFF WBC: CPT

## 2022-11-18 PROCEDURE — 2580000003 HC RX 258: Performed by: STUDENT IN AN ORGANIZED HEALTH CARE EDUCATION/TRAINING PROGRAM

## 2022-11-18 PROCEDURE — 6360000002 HC RX W HCPCS: Performed by: STUDENT IN AN ORGANIZED HEALTH CARE EDUCATION/TRAINING PROGRAM

## 2022-11-18 PROCEDURE — 6370000000 HC RX 637 (ALT 250 FOR IP): Performed by: SPECIALIST/TECHNOLOGIST

## 2022-11-18 PROCEDURE — 97165 OT EVAL LOW COMPLEX 30 MIN: CPT

## 2022-11-18 RX ORDER — POLYETHYLENE GLYCOL 3350 17 G/17G
17 POWDER, FOR SOLUTION ORAL DAILY
Qty: 10 EACH | Refills: 0 | Status: SHIPPED | OUTPATIENT
Start: 2022-11-19 | End: 2022-11-19 | Stop reason: SDUPTHER

## 2022-11-18 RX ORDER — OXYCODONE HYDROCHLORIDE 5 MG/1
5 TABLET ORAL EVERY 6 HOURS PRN
Qty: 28 TABLET | Refills: 0 | Status: SHIPPED | OUTPATIENT
Start: 2022-11-18 | End: 2022-11-19 | Stop reason: SDUPTHER

## 2022-11-18 RX ORDER — CYCLOBENZAPRINE HCL 10 MG
10 TABLET ORAL 3 TIMES DAILY
Qty: 30 TABLET | Refills: 0 | Status: SHIPPED | OUTPATIENT
Start: 2022-11-18 | End: 2022-11-19 | Stop reason: SDUPTHER

## 2022-11-18 RX ORDER — CYCLOBENZAPRINE HCL 10 MG
10 TABLET ORAL 3 TIMES DAILY PRN
Status: DISCONTINUED | OUTPATIENT
Start: 2022-11-18 | End: 2022-11-18

## 2022-11-18 RX ORDER — CYCLOBENZAPRINE HCL 10 MG
10 TABLET ORAL 3 TIMES DAILY
Status: DISCONTINUED | OUTPATIENT
Start: 2022-11-18 | End: 2022-11-20 | Stop reason: HOSPADM

## 2022-11-18 RX ORDER — ASPIRIN 81 MG/1
81 TABLET ORAL 2 TIMES DAILY
Qty: 60 TABLET | Refills: 0 | Status: SHIPPED | OUTPATIENT
Start: 2022-11-18 | End: 2022-11-19 | Stop reason: SDUPTHER

## 2022-11-18 RX ADMIN — ACETAMINOPHEN 650 MG: 325 TABLET ORAL at 01:46

## 2022-11-18 RX ADMIN — OXYCODONE 10 MG: 5 TABLET ORAL at 19:07

## 2022-11-18 RX ADMIN — ACETAMINOPHEN 650 MG: 325 TABLET ORAL at 06:32

## 2022-11-18 RX ADMIN — CEFAZOLIN 2000 MG: 10 INJECTION, POWDER, FOR SOLUTION INTRAVENOUS at 06:32

## 2022-11-18 RX ADMIN — OXYCODONE 10 MG: 5 TABLET ORAL at 15:08

## 2022-11-18 RX ADMIN — ATORVASTATIN CALCIUM 20 MG: 10 TABLET, FILM COATED ORAL at 08:05

## 2022-11-18 RX ADMIN — SODIUM CHLORIDE, PRESERVATIVE FREE 10 ML: 5 INJECTION INTRAVENOUS at 21:00

## 2022-11-18 RX ADMIN — ACETAMINOPHEN 650 MG: 325 TABLET ORAL at 19:07

## 2022-11-18 RX ADMIN — OXYCODONE 5 MG: 5 TABLET ORAL at 06:46

## 2022-11-18 RX ADMIN — Medication 1 TABLET: at 08:04

## 2022-11-18 RX ADMIN — OXYCODONE 10 MG: 5 TABLET ORAL at 10:51

## 2022-11-18 RX ADMIN — ASPIRIN 81 MG: 81 TABLET, COATED ORAL at 08:04

## 2022-11-18 RX ADMIN — OXYBUTYNIN CHLORIDE 10 MG: 5 TABLET, EXTENDED RELEASE ORAL at 21:00

## 2022-11-18 RX ADMIN — CYCLOBENZAPRINE 10 MG: 10 TABLET, FILM COATED ORAL at 21:00

## 2022-11-18 RX ADMIN — MELOXICAM 3.75 MG: 7.5 TABLET ORAL at 08:04

## 2022-11-18 RX ADMIN — ASPIRIN 81 MG: 81 TABLET, COATED ORAL at 21:00

## 2022-11-18 RX ADMIN — CYCLOBENZAPRINE 10 MG: 10 TABLET, FILM COATED ORAL at 14:25

## 2022-11-18 RX ADMIN — SODIUM CHLORIDE, POTASSIUM CHLORIDE, SODIUM LACTATE AND CALCIUM CHLORIDE: 600; 310; 30; 20 INJECTION, SOLUTION INTRAVENOUS at 02:17

## 2022-11-18 RX ADMIN — ACETAMINOPHEN 650 MG: 325 TABLET ORAL at 14:25

## 2022-11-18 RX ADMIN — POLYETHYLENE GLYCOL 3350 17 G: 17 POWDER, FOR SOLUTION ORAL at 08:05

## 2022-11-18 RX ADMIN — CYCLOBENZAPRINE 10 MG: 10 TABLET, FILM COATED ORAL at 03:03

## 2022-11-18 ASSESSMENT — PAIN DESCRIPTION - LOCATION
LOCATION: LEG;KNEE
LOCATION: KNEE;LEG
LOCATION: LEG;KNEE
LOCATION: KNEE
LOCATION: KNEE;LEG
LOCATION: LEG
LOCATION: LEG

## 2022-11-18 ASSESSMENT — PAIN DESCRIPTION - ORIENTATION
ORIENTATION: RIGHT

## 2022-11-18 ASSESSMENT — PAIN DESCRIPTION - DESCRIPTORS
DESCRIPTORS: ACHING
DESCRIPTORS: ACHING
DESCRIPTORS: ACHING;SHARP
DESCRIPTORS: ACHING
DESCRIPTORS: STABBING
DESCRIPTORS: CRAMPING

## 2022-11-18 ASSESSMENT — PAIN SCALES - GENERAL
PAINLEVEL_OUTOF10: 8
PAINLEVEL_OUTOF10: 6
PAINLEVEL_OUTOF10: 4
PAINLEVEL_OUTOF10: 6
PAINLEVEL_OUTOF10: 10
PAINLEVEL_OUTOF10: 9
PAINLEVEL_OUTOF10: 10

## 2022-11-18 NOTE — DISCHARGE SUMMARY
Department of Orthopedic Surgery  Physician Assistant   Discharge Summary    The Senaida Seip is a 78 y.o. female underwent total knee replacement procedure without complication. Senaida Seip was admitted to the floor following Her recovery in the PACU. Discharge Diagnosis  right Knee Replacement    Current Inpatient Medications    Current Facility-Administered Medications: cyclobenzaprine (FLEXERIL) tablet 10 mg, 10 mg, Oral, TID  atorvastatin (LIPITOR) tablet 20 mg, 20 mg, Oral, Daily  oxybutynin (DITROPAN-XL) extended release tablet 10 mg, 10 mg, Oral, QPM  therapeutic multivitamin-minerals 1 tablet, 1 tablet, Oral, Daily  lactated ringers infusion, , IntraVENous, Continuous  sodium chloride flush 0.9 % injection 5-40 mL, 5-40 mL, IntraVENous, 2 times per day  sodium chloride flush 0.9 % injection 5-40 mL, 5-40 mL, IntraVENous, PRN  0.9 % sodium chloride infusion, , IntraVENous, PRN  acetaminophen (TYLENOL) tablet 650 mg, 650 mg, Oral, Q6H  meloxicam (MOBIC) tablet 3.75 mg, 3.75 mg, Oral, Daily  oxyCODONE (ROXICODONE) immediate release tablet 5 mg, 5 mg, Oral, Q4H PRN **OR** oxyCODONE (ROXICODONE) immediate release tablet 10 mg, 10 mg, Oral, Q4H PRN  polyethylene glycol (GLYCOLAX) packet 17 g, 17 g, Oral, Daily  senna (SENOKOT) tablet 8.6 mg, 1 tablet, Oral, Daily PRN  aspirin EC tablet 81 mg, 81 mg, Oral, BID    Post-operatively the patients diet was advanced as tolerated and their incision was checked on POD #1. The incision is dressing in place, clean, dry, and intact with no signs of infection. The patient remained neurovascularly intact in the lower extremity and had intact pulses distally. Patients calf remained soft and showed no evidence of DVT. The patient was able to move their leg and ankle/foot without any problems post-operatively. Physical therapy and occupational therapy were consulted and began working with the patient post-operatively.   The patient progressed with PT/OT as would be expected and continued to improve through their stay. The patients pain was initially controlled with IV medications but we were able to transition to oral pain medications soon after arrival to the floor and their pain remained under good control through their hospital stay. From a medical standpoint the patient remained stable and continued to have the medicine team follow throughout their stay. The patients dressing was changed/incison was checked on day of d/c. The patient will be discharged at this time to [address] [place] [address]  with their current diet restrictions and will continue to follow the total knee precautions outlined to them by us and PT/OT. Condition on Discharge: Stable    Plan  Return visit in 2 weeks. .  Patient was instructed on the use of pain medications, the signs and symptoms of infection, and was given our number to call should they have any questions or concerns following discharge. For opioid prescriptions given at discharge the following statement is provided for compliance with [employer] rules. Patient being given increased dosage/quantity of opoid pain medication in excess of [employer] guidelines which noted a 30 MED daily of opioids due to the fact that he/she has undergone major orthopaedic surgery as outlined in rule 4731-11-13. Dosages and further duration of the pain medication will be re-evaluated at her post op visit in 2 weeks. Patient was educated on dosing expectations and limits of prescribing as a result of the new [facility] Board rules enacted August 31, 2017. Please also note that this is not the initial opoid prescription issued to this patient but a continuation of medication utilized during the hospital admission as noted in the medical record. [place]ARRS report has also been utilized to screen for any abuse history or suspicious activity as outlined in [place].   All efforts have been taken to prevent abuse potential and misuse of opioid medications including education, screening, and close clinical follow up.

## 2022-11-18 NOTE — PROGRESS NOTES
Physical Therapy  Facility/Department: [facility] C5 - MED SURG/ORTHO  Daily Treatment Note  NAME: Diann Zhong  : 1955  MRN: [id number]    Date of Service: 2022    Discharge Recommendations:  Subacute/Skilled Nursing Facility   PT Equipment Recommendations  Equipment Needed: No  Other: defer to facility    Patient Diagnosis(es): The encounter diagnosis was S/P total knee arthroplasty, right. Assessment   Assessment: Pt seen today for follow up tx. Pt fatigued from several trips to bathroom and back throughout the day, but able to progress mobility in bed and transfers to only needing min A for sit to supine at end of session. Pt ambulates with quicker pace this session, but with increased pain levels did not attempt stairs. Would continue to benefit from skilled therapy to progress mobility and endurance/pain control. Recommending SNF at d/c due to inaccessible home environment with limited support. Activity Tolerance: Patient limited by pain; Patient limited by fatigue;Patient tolerated treatment well  Equipment Needed: No  Other: defer to facility     Plan    Physcial Therapy Plan  General Plan: 2 times a day 7 days a week  Specific Instructions for Next Treatment: progress functional mobility as tolerated, ther ex  Current Treatment Recommendations: Strengthening;ROM;Balance training;Functional mobility training;Transfer training;ADL/Self-care training;Gait training; Endurance training;Stair training;Neuromuscular re-education;Pain management;Home exercise program;Safety education & training; Therapeutic activities     Restrictions  Restrictions/Precautions  Restrictions/Precautions: General Precautions, Fall Risk, Weight Bearing  Required Braces or Orthoses?: Yes (KI when OOB)  Lower Extremity Weight Bearing Restrictions  Right Lower Extremity Weight Bearing: Weight Bearing As Tolerated  Required Braces or Orthoses  Right Lower Extremity Brace: Knee Immobilizer (when OOB)  Position Activity Restriction  Other position/activity restrictions: knee immobilizer     Subjective    Subjective  Subjective: Pt reports having been up several times with staff today and is sore, but agreeable to therapy. Pt's daughter is present. Pain: 9/10 pain R knee  Orientation  Overall Orientation Status: Within Normal Limits  Orientation Level: Oriented X4  Cognition  Overall Cognitive Status: WNL     Objective   Vitals  Heart Rate: 100  Heart Rate Source: Monitor  BP: 116/66  BP Location: Right upper arm  BP Method: Automatic  MAP (Calculated): 83  SpO2: 92 %  O2 Device: None (Room air)  Comment: 1.5 L  Bed Mobility Training  Bed Mobility Training: Yes  Interventions: Manual cues; Verbal cues; Safety awareness training  Rolling: Modified independent (hand rail)  Supine to Sit: Contact-guard assistance (pt able to manage RLE with UE's)  Sit to Supine: Minimum assistance;Contact-guard assistance (for RLE)  Balance  Sitting: Intact  Standing: With support (RW)  Transfer Training  Transfer Training: Yes  Interventions: Verbal cues; Safety awareness training;Manual cues  Sit to Stand: Contact-guard assistance; Adaptive equipment (RW)  Stand to Sit: Contact-guard assistance; Adaptive equipment (RW)  Stand Pivot Transfers: Contact-guard assistance  Bed to Chair: Contact-guard assistance  Toilet Transfer: Adaptive equipment;Contact-guard assistance; Additional time (pt used L side grab bars with BUE to get up from toilet, but only CGA from PT)  Gait Training  Gait Training: Yes  Right Side Weight Bearing: As tolerated  Gait  Overall Level of Assistance: Contact-guard assistance  Interventions: Verbal cues; Safety awareness training; Tactile cues  Speed/Emi: Slow;Pace decreased (< 100 feet/min)  Step Length: Left shortened;Right shortened  Stance: Right decreased  Gait Abnormalities: Antalgic;Decreased step clearance  Distance (ft):  (2x15 ft)  Assistive Device: Walker, rolling;Gait belt  Stairs - Level of Assistance:  (not assessed due to pt fatigue and pain level)     PT Exercises  Exercise Treatment: BLE exercises: AP, quad set, SLR (attempted), SAQ (with assistance), x15 each     Safety Devices  Type of Devices: All fall risk precautions in place;Call light within reach; Patient at risk for falls;Gait belt;Nurse notified; Bed alarm in place; Left in bed (ice machine on RLE)  Restraints  Restraints Initially in Place: No     [facility] 6 Clicks Inpatient Mobility:  AM-PAC Mobility Inpatient   How much difficulty turning over in bed?: None  How much difficulty sitting down on / standing up from a chair with arms?: None  How much difficulty moving from lying on back to sitting on side of bed?: A Little  How much help from another person moving to and from a bed to a chair?: A Little  How much help from another person needed to walk in hospital room?: A Little  How much help from another person for climbing 3-5 steps with a railing?: A Lot  AM-PAC Inpatient Mobility Raw Score : 19  AM-PAC Inpatient T-Scale Score : 45.44  Mobility Inpatient CMS 0-100% Score: 41.77  Mobility Inpatient CMS G-Code Modifier : CK    Goals  Short Term Goals  Time Frame for Short Term Goals: 11/25/22  Short Term Goal 1: pt will complete bed mobility independently  Short Term Goal 2: pt will perform transfers independently - goal met 11/18  Short Term Goal 3: pt will ambulate 75 ft using LRAD with CGA  Short Term Goal 4: pt will perform 12-15 reps of BLE to improve strength and ROM by 11/21/22  Short Term Goal 5: pt will perform stair training (3-5 stairs) with CGA using LRAD  Additional Goals?: Yes  Short Term Goal 6: pt will perform simulated car transfer as able with SBA using LRAD  Patient Goals   Patient Goals : \"to go to the bathroom\" - goal met 11/18    Education  Patient Education  Education Given To: Patient; Family (daughter present)  Education Provided: Role of Therapy;Plan of Care;Home Exercise Program;Precautions;Transfer Training;Family Education  Education Provided Comments: role of PT, d/c settings and need for therapy, importance of mobility/exercises  Education Method: Verbal  Barriers to Learning: None  Education Outcome: Verbalized understanding;Demonstrated understanding    Therapy Time   Individual Concurrent Group Co-treatment   Time In 1421         Time Out 1502         Minutes 41         Timed Code Treatment Minutes: 41 Minutes     If pt is unable to be seen after this session, please let this note serve as discharge summary. Please see case management note for discharge disposition. Thank you.     Aleyda Cardoza, PT, DPT

## 2022-11-18 NOTE — DISCHARGE INSTR - COC
Continuity of Care Form    Patient Name: Tino Garzon   :  1955  MRN:  [id number]    Admit date:  2022  Discharge date:  22    Code Status Order: Full Code   Advance Directives:     Admitting Physician:  Oscar Good MD  PCP: Prashanth De Jesus DO    Discharging Nurse: [place] Unit/Room#: 5212/2726-13  Discharging Unit Phone Number: [phone]    Emergency Contact:   Extended Emergency Contact Information  Primary Emergency Contact: Tatianna Leo [address] Phone: [phone]  Relation: Child    Past Surgical History:  Past Surgical History:   Procedure Laterality Date    APPENDECTOMY      CERVICAL SPINE SURGERY  2018    discectomy c3,4,5    HYSTEROSCOPY  2015    d and c    KNEE ARTHROSCOPY Left 2011    meniscectomy    NECK SURGERY      benign tumor, catch scratch fever    PAIN MANAGEMENT PROCEDURE Right 2022    RIGHT LUMBAR FIVE SACRAL ONE EPIDURAL STEROID INJECTION SITE CONFIRMED BY FLUOROSCOPY performed by Damaris Shane MD at [address] [facility]         Immunization History:   Immunization History   Administered Date(s) Administered    COVID-19, PFIZER PURPLE top, DILUTE for use, (age 15 y+), 30mcg/0.3mL 2021, 2021    Pneumococcal Polysaccharide (Rlkwifyym18) 11/15/2015    Tdap (Boostrix, Adacel) 2016       Active Problems:  Patient Active Problem List   Diagnosis Code    Chest pain on exertion R07.9    Shortness of breath on exertion R06.02    DVT (deep venous thrombosis) ([facility]) I82.409    Acute chest pain R07.9    Chronic pain of left knee M25.562, G89.29    Bilateral primary osteoarthritis of knee M17.0    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, M22.42    Lumbar pain M54.50    Bilateral leg pain M79.604, M79.605    Lumbar spondylosis M47.816    DDD (degenerative disc disease), lumbar M51.36    Lumbar radiculopathy M54.16    Right foot pain M79.671 Rotator cuff strain, right, initial encounter S46.011A    Right shoulder pain M25.511    Primary osteoarthritis of right shoulder M19.011    Primary osteoarthritis of left knee M17.12    S/P total knee arthroplasty, right Z96.651       Isolation/Infection:   Isolation            No Isolation          Patient Infection Status       None to display            Nurse Assessment:  Last Vital Signs: /69   Pulse 95   Temp 98.6 °F (37 °C) (Oral)   Resp 16   Ht 5' 7\" (1.702 m)   Wt 182 lb (82.6 kg)   SpO2 95%   BMI 28.51 kg/m²     Last documented pain score (0-10 scale): Pain Level: 4  Last Weight:   Wt Readings from Last 1 Encounters:   11/17/22 182 lb (82.6 kg)     Mental Status:  oriented    IV Access:  - None    Nursing Mobility/ADLs:  Walking   Assisted  Transfer  Assisted  Bathing  Assisted  Dressing  Assisted  Toileting  Assisted  Feeding  Independent  Med Admin  Independent  Med Delivery   whole    Wound Care Documentation and Therapy:  Incision 12/14/11 Knee Left (Active)   Number of days: 3991       Incision 11/17/22 Knee Anterior;Right (Active)   Dressing Status Clean;Dry; Intact 11/18/22 0825   Dressing/Treatment Ace wrap 11/18/22 0825   Closure Sutures 11/17/22 1755   Margins Approximated 11/17/22 1627   Drainage Amount None 11/18/22 0825   Number of days: 0        Elimination:  Continence: Bowel: Yes  Bladder: Yes  Urinary Catheter: None   Colostomy/Ileostomy/Ileal Conduit: No       Date of Last BM: 11/18/2022      Intake/Output Summary (Last 24 hours) at 11/18/2022 1035  Last data filed at 11/18/2022 1028  Gross per 24 hour   Intake 1100 ml   Output 750 ml   Net 350 ml     I/O last 3 completed shifts: In: 1100 [I.V.:1000; IV Piggyback:100]  Out: 400 [Urine:300; Blood:100]    Safety Concerns:     None    Impairments/Disabilities:      None    Nutrition Therapy:  Current Nutrition Therapy:   - Oral Diet:  General    Routes of Feeding: Oral  Liquids:  Thin Liquids  Daily Fluid Restriction: no  Last Modified Barium Swallow with Video (Video Swallowing Test): not done    Treatments at the Time of Hospital Discharge:   Respiratory Treatments: ***  Oxygen Therapy:  {Therapy; copd oxygen:46402}  Ventilator:    {MH CC Vent FVHW:373780750}    Rehab Therapies: {THERAPEUTIC INTERVENTION:5399831680}  Weight Bearing Status/Restrictions: [address] Weight Bearin}  Other Medical Equipment (for information only, NOT a DME order):  {EQUIPMENT:799979295}  Other Treatments: ***    Patient's personal belongings (please select all that are sent with patient):  {CHP DME Belongings:071867210}    RN SIGNATURE:  Electronically signed by Andrés Meyer RN on 22 at 8:16 AM EST    CASE MANAGEMENT/SOCIAL WORK SECTION    Inpatient Status Date: ***    Readmission Risk Assessment Score:  Readmission Risk              Risk of Unplanned Readmission:  0           Discharging to Facility/ [facility]  Address:  [address] [place] [address]    Dialysis Facility (if applicable)   Name:  Address:  Dialysis Schedule:  Phone:  Fax:    / signature: Electronically signed by Trixie Rivera RN on 22 at 2:41 PM EST    PHYSICIAN SECTION    Prognosis: Good    Condition at Discharge: Stable    Rehab Potential (if transferring to Rehab): Good    Recommended Follow-up, Labs or Other Treatments After Discharge:    WBAT to the RLE with assistive device. Encourage knee flexion and extension  Aspirin 81mg twice daily by mouth for 30 days post op  Follow up with Dr Rick Torres as scheduled. Call [employer] at [phone] with any quesions               Physician Certification: I certify the above information and transfer of Roberta Sam  is necessary for the continuing treatment of the diagnosis listed and that she requires [facility] for less 30 days.      Update Admission H&P: No change in H&P    PHYSICIAN SIGNATURE:  Electronically signed by BLAYNE Rodríguez on 11/19/22 at 7:36 AM EST

## 2022-11-18 NOTE — PROGRESS NOTES
Physical Therapy  Facility/Department: [place] [address] - MED SURG/ORTHO  Physical Therapy Initial Assessment/Treatment    Name: Rocio Kong  : 1955  MRN: [id number]  Date of Service: 2022    Discharge Recommendations:  Subacute/Skilled Nursing Facility   PT Equipment Recommendations  Equipment Needed: No  Other: defer to facility      Patient Diagnosis(es): The encounter diagnosis was S/P total knee arthroplasty, right. Past Medical History:  has a past medical history of Anesthesia complication, Arthritis, Cancer ([facility] [address] DVT (deep venous thrombosis) ([facility] [address] Hearing decreased, Hypoglycemia, Melanoma ([facility] [address] Overactive bladder, Reflux, and Wears glasses. Past Surgical History:  has a past surgical history that includes Appendectomy; Tonsillectomy; Neck surgery; Tubal ligation; Knee arthroscopy (Left, 2011); hysteroscopy (2015); Varicose vein surgery; Pain management procedure (Right, 2022); and Cervical spine surgery (2018). Assessment   Body Structures, Functions, Activity Limitations Requiring Skilled Therapeutic Intervention: Decreased functional mobility ; Decreased ADL status; Decreased ROM; Decreased strength;Decreased balance;Decreased endurance; Increased pain  Assessment: Pt seen for PT eval following R TKA on . Pt presents with impaired functional mobility secondary to pain and decreased strength and ROM. Pt was able to perform bed mobility with min A for moving RLE and ambulated using RW to bathroom and back. Pt able to toilet with SBA. Pt required SBA for sit to stand, except from toilet with lower surface and one hand rail required min A. Pt would benefit from continued skilled therapy to return to OF. Pt unable to return home due to accessibility and lack of support and recommending SNF following d/c, pt not likely to require IPR.   Treatment Diagnosis: decreased functional mobility and strength  Specific Instructions for Next Treatment: progress functional mobility as tolerated  Therapy Prognosis: Good  Decision Making: Low Complexity  Requires PT Follow-Up: Yes  Activity Tolerance  Activity Tolerance: Patient tolerated evaluation without incident;Patient limited by pain; Patient limited by fatigue  Activity Tolerance Comments: pt reported feeling out of breath after ambulating to bathroom and back, vitals remained stable     Plan   Physcial Therapy Plan  General Plan: 2 times a day 7 days a week  Specific Instructions for Next Treatment: progress functional mobility as tolerated  Current Treatment Recommendations: Strengthening, ROM, Balance training, Functional mobility training, Transfer training, ADL/Self-care training, Gait training, Endurance training, Stair training, Neuromuscular re-education, Pain management, Home exercise program, Safety education & training, Therapeutic activities  Safety Devices  Type of Devices:  All fall risk precautions in place, Call light within reach, Patient at risk for falls, Gait belt, Left in chair, Chair alarm in place, Nurse notified  Restraints  Restraints Initially in Place: No     Restrictions  Restrictions/Precautions  Restrictions/Precautions: General Precautions, Fall Risk, Weight Bearing  Required Braces or Orthoses?: Yes (KI when OOB)  Lower Extremity Weight Bearing Restrictions  Right Lower Extremity Weight Bearing: Weight Bearing As Tolerated  Required Braces or Orthoses  Right Lower Extremity Brace: Knee Immobilizer (when OOB)  Position Activity Restriction  Other position/activity restrictions: L IV, 1.5 L O2, knee immobilizer     Subjective   Pain: 8/10 pain in R knee  General  Chart Reviewed: Yes  Patient assessed for rehabilitation services?: Yes  Additional Pertinent Hx: Per chart review: PMH of DVT, HLD, neck tumor  Response To Previous Treatment: Not applicable  Family / Caregiver Present: No  Referring Practitioner: Callie Alicia MD  Referral Date : 11/17/22  Diagnosis: R TKA  Follows Commands: Within Functional Limits  Subjective  Subjective: pt agreeable to therapy         Social/Functional History  Social/Functional History  Lives With:  (on her own)  Type of Home: Condo (second floor)  Home Layout: One level  Home Access: Stairs to enter with rails  Entrance Stairs - Number of Steps: 14  Entrance Stairs - Rails: Right  Bathroom Shower/Tub: Tub/Shower unit  Bathroom Toilet: Standard  Home Equipment: [employer], [employer]  Has the patient had two or more falls in the past year or any fall with injury in the past year?: Yes (\"knee gives out\")  ADL Assistance: [address]: Independent  Homemaking Responsibilities: Yes  Ambulation Assistance: Independent  Transfer Assistance: Independent  Active : Yes  Occupation: Retired  Type of Occupation: [employer]  Additional Comments: pt reports desire to go to [facility] at d/c due to home accessibility limitations  Vision/Hearing  Vision  Vision: Impaired  Vision Exceptions: Wears glasses at all times  Hearing  Hearing: Exceptions to [facility]  Hearing Exceptions: Hard of hearing/hearing concerns    Cognition   Orientation  Overall Orientation Status: Within Normal Limits  Orientation Level: Oriented X4  Cognition  Overall Cognitive Status: WNL     Objective   Heart Rate: 95  Heart Rate Source: Monitor  BP: 116/66  BP Location: Right upper arm  BP Method: Automatic  Patient Position: High fowlers  MAP (Calculated): 83  Resp: 16  SpO2: 95 %  O2 Device: Nasal cannula  Comment: 1.5 L     Observation/Palpation  Posture: Fair (pt demonstrates kyphotic posture in standing)  Gross Assessment  AROM: Grossly decreased, non-functional (R decreased, not formally assessed due to recent surgery)  Strength: Generally decreased, functional (R decreased)                 Bed Mobility Training  Bed Mobility Training: Yes  Interventions: Manual cues; Verbal cues; Safety awareness training  Rolling: Modified independent (using hand rail)  Supine to Sit: Minimum assistance (for RLE)  Sit to Supine: Minimum assistance (for RLE)  Balance  Sitting: Intact  Standing: With support (RW)  Transfer Training  Transfer Training: Yes  Sit to Stand: Stand-by assistance  Stand to Sit: Stand-by assistance  Stand Pivot Transfers: Contact-guard assistance  Bed to Chair: Contact-guard assistance  Toilet Transfer: Minimum assistance  Gait Training  Gait Training: Yes  Right Side Weight Bearing: As tolerated  Gait  Overall Level of Assistance: Contact-guard assistance  Interventions: Verbal cues; Safety awareness training; Tactile cues  Speed/Emi: Slow;Pace decreased (< 100 feet/min)  Step Length: Left shortened;Right shortened  Stance: Right decreased  Gait Abnormalities: Antalgic;Decreased step clearance  Distance (ft): 20 Feet  Assistive Device: Walker, rolling;Gait belt  Stairs - Level of Assistance:  (not assessed due to pt fatigue)                 Exercise Treatment: BLE exercises: AP, quad set, glute set, SLR, SAQ, heel slide, x15 each        AM-PAC Score  AM-PAC Inpatient Mobility Raw Score : 18 (11/18/22 1045)  AM-PAC Inpatient T-Scale Score : 43.63 (11/18/22 1045)  Mobility Inpatient CMS 0-100% Score: 46.58 (11/18/22 1045)  Mobility Inpatient CMS G-Code Modifier : CK (11/18/22 1045)          Goals  Short Term Goals  Time Frame for Short Term Goals: 11/25/22  Short Term Goal 1: pt will complete bed mobility independently  Short Term Goal 2: pt will perform transfers independently  Short Term Goal 3: pt will ambulate 75 ft using LRAD with CGA  Short Term Goal 4: pt will perform 12-15 reps of BLE to improve strength and ROM by 11/21/22  Short Term Goal 5: pt will perform stair training (3-5 stairs) with CGA using LRAD  Additional Goals?: Yes  Short Term Goal 6: pt will perform simulated car transfer as able with SBA using LRAD  Patient Goals   Patient Goals : \"to go to the bathroom\" - goal met 11/18       Education  Patient Education  Education Given To: Patient  Education Provided: Role of Therapy;Plan of Care;Home Exercise Program;Precautions;Transfer Training  Education Provided Comments: role of PT, d/c settings and need for therapy, importance of mobility  Education Method: Verbal  Barriers to Learning: None  Education Outcome: Verbalized understanding;Demonstrated understanding    Patient Educated in safety with car transfers and  dispensed instruction on car transfers with use of assistive device with patient demonstrating:  [x] Verbalizing understanding of appropriate technique, maintaining any ordered precautions for car transfer training s/p TJR  [] [place] with simulated car transfer with walker  [] He/she requires __ assist and will have someone at discharge to help with transfers with patient verbalizing understanding of any ordered TJR precautions employing appropriate technique. [] Other: Educated patient in written car transfer instruction with patient verbalizing understanding of appropriate techniques. Therapy Time   Individual Concurrent Group Co-treatment   Time In 0810         Time Out 0936         Minutes 86         Timed Code Treatment Minutes: 71 Minutes (15 min eval)       If pt is unable to be seen after this session, please let this note serve as discharge summary. Please see case management note for discharge disposition. Thank you.     Amadou Melgar, PT, DPT

## 2022-11-18 NOTE — CONSULTS
Hospital Medicine  Consult History & Physical        Reason for consult: Postop medical management    Date of Service: Pt seen/examined in consultation on 11/17/2022    History Of Present Illness:      78 y.o. female who we are asked to see/evaluate by Kodak Call MD for medical management   Pt presented today for an elective surgery right TKA by Dr. Alex Merrill. Surgery went as planned without any complications, EBL was less than 100 cc as reported on surgical procedure note. Pt is currently seen on the floor post op. She seems to be doing fairly well, c/o mild soreness in the right knee region that is well controlled with the pain medications ordered, apart from that she denies any chest pain, dyspnea, abdominal pain, fevers or chills, nausea or vomiting. Hospitalist team was consulted for medical management. Past Medical History:        Diagnosis Date    Anesthesia complication     emotional as soon as meds are given and awakes very emotional    Arthritis     Cancer ([address]     skin-back,. melanoma    DVT (deep venous thrombosis) ([address] 2015 11/2015 - on Eliquis right calf    Hearing decreased     Hypoglycemia     Melanoma ([address]     Overactive bladder     Reflux     Wears glasses        Past Surgical History:        Procedure Laterality Date    APPENDECTOMY      CERVICAL SPINE SURGERY  2018    discectomy c3,4,5    HYSTEROSCOPY  07/01/2015    d and c    KNEE ARTHROSCOPY Left 12/14/2011    meniscectomy    NECK SURGERY      benign tumor, catch scratch fever    PAIN MANAGEMENT PROCEDURE Right 01/06/2022    RIGHT LUMBAR FIVE SACRAL ONE EPIDURAL STEROID INJECTION SITE CONFIRMED BY FLUOROSCOPY performed by Shanice Johnson MD at [address]         Medications Prior to Admission:    Prior to Admission medications    Medication Sig Start Date End Date Taking?  Authorizing Provider   Multiple Vitamins-Minerals (IMMUNE SUPPORT PO) Take by mouth daily   Yes Historical Provider, MD   MULTIPLE VITAMINS PO Take 1 tablet by mouth daily    Historical Provider, MD   ascorbic acid (VITAMIN C) 100 MG tablet Take 1,500 mg by mouth daily    Historical Provider, MD   oxybutynin (DITROPAN-XL) 10 MG extended release tablet Take 10 mg by mouth every evening 6/29/22   Historical Provider, MD   diclofenac (VOLTAREN) 75 MG EC tablet TAKE ONE TABLET BY MOUTH TWICE A DAY 9/16/20   Marcelo Harding MD   atorvastatin (LIPITOR) 20 MG tablet Take 1 tablet by mouth daily  Patient taking differently: Take 10 mg by mouth daily 11/16/15   Shaggy Dumont MD       Allergies:  Immune support [actical] and Vibramycin [doxycycline calcium]    Social History:      The patient currently lives at home    TOBACCO:   reports that she quit smoking about 21 years ago. Her smoking use included cigarettes. She has never used smokeless tobacco.  ETOH:   reports current alcohol use. Family History:     Reviewed in detail and negative for DM, CAD, Cancer, CVA. Positive as follows:        Problem Relation Age of Onset    Heart Disease Mother         irregular heartbeat    Heart Disease Father     Heart Attack Paternal Grandfather     Diabetes Maternal Grandfather        REVIEW OF SYSTEMS COMPLETED:   Pertinent positives as noted in the HPI. All other systems reviewed and negative. PHYSICAL EXAM PERFORMED:  /78   Pulse 72   Temp 98.5 °F (36.9 °C) (Oral)   Resp 16   Ht 5' 7\" (1.702 m)   Wt 182 lb (82.6 kg)   SpO2 98%   BMI 28.51 kg/m²   General appearance: No apparent distress, appears stated age and cooperative. HEENT: Normal cephalic, atraumatic without obvious deformity. Pupils equal, round, and reactive to light. Extra ocular muscles intact. Conjunctivae/corneas clear. Neck: Supple, with full range of motion. No jugular venous distention. Trachea midline. Respiratory:  Normal respiratory effort. Clear to auscultation, bilaterally without Rales/Wheezes/Rhonchi.   Cardiovascular: Regular rate and rhythm with normal S1/S2 without murmurs, rubs or gallops. Abdomen: Soft, non-tender, non-distended with normal bowel sounds. Musculoskeletal: No clubbing, cyanosis or edema bilaterally. Right knee OR dressing in place  Skin: Skin color, texture, turgor normal.  No rashes or lesions. Neurologic:  Neurovascularly intact without any focal sensory/motor deficits. Cranial nerves: II-XII intact, grossly non-focal.  Psychiatric: Alert and oriented, thought content appropriate, normal insight  Capillary Refill: Brisk,3 seconds, normal   Peripheral Pulses: +2 palpable, equal bilaterally     Labs:     Recent Labs     11/17/22  0856   WBC 6.4   HGB 12.9   HCT 38.9        Recent Labs     11/17/22  0856      K 3.9      CO2 26   BUN 22*   CREATININE <0.5*   CALCIUM 9.4     No results for input(s): AST, ALT, BILIDIR, BILITOT, ALKPHOS in the last 72 hours. No results for input(s): INR in the last 72 hours. No results for input(s): Citlaly Smack in the last 72 hours. Urinalysis:  No results found for: Chauncey Altamirano Professor Jay Delgadillo [place] [address], [address] [place] [address] Leana Damian [place] [address]    Radiology: I have reviewed the radiology reports with the following interpretation:     VL Extremity Venous Left   Final Result               ASSESSMENT:PLAN:    Active Hospital Problems    Diagnosis Date Noted    S/P total knee arthroplasty, right [Z96.651] 11/17/2022     Priority: Medium     Right knee OA  S/p total knee arthroplasty  Perioperative antibiotics, pain management per orthopedics  On aspirin twice daily for DVT prophylaxis  PT/OT, plan for IPR placement    Hyperlipidemia-resume statin    Overactive bladder-resume Ditropan XL    Remote history of DVT      DVT Prophylaxis: Aspirin twice daily  Diet: ADULT DIET; Regular  Code Status: Full Code    PT/OT Eval Status: Active and ongoing    Dispo -per orthopedics    Thank you for the consultation, will sign off.   Please contact us with any questions you may have.    Electronically signed by Jazlyn Reyes MD on 11/17/22 at 7:54 PM EST

## 2022-11-18 NOTE — PROGRESS NOTES
Occupational Therapy  Facility/Department: [place] [address] - MED SURG/ORTHO  Occupational Therapy Initial Assessment & Treatment    Name: Jim May  : 1955  MRN: [id number]  Date of Service: 2022    Discharge Recommendations:  Subacute/Skilled Nursing Facility  OT Equipment Recommendations  Other: Defer to facility to obtain     Patient Diagnosis(es): The encounter diagnosis was S/P total knee arthroplasty, right. Past Medical History:  has a past medical history of Anesthesia complication, Arthritis, Cancer [address] DVT (deep venous thrombosis) ([facility] Hearing decreased, Hypoglycemia, Melanoma ([facility] Overactive bladder, Reflux, and Wears glasses. Past Surgical History:  has a past surgical history that includes Appendectomy; Tonsillectomy; Neck surgery; Tubal ligation; Knee arthroscopy (Left, 2011); hysteroscopy (2015); Varicose vein surgery; Pain management procedure (Right, 2022); Cervical spine surgery (); and Total knee arthroplasty (Right, 2022). Assessment   Performance deficits / Impairments: Decreased functional mobility ; Decreased ADL status; Decreased strength;Decreased endurance;Decreased balance;Decreased high-level IADLs  Assessment: Pt is a 77 yo F who presented to [facility] for R TKA. Pt lives alone in [place], [address]; normally IND w/ ADLs and amb at baseline. Upon eval, pt req Del for bed mob and CGA for func mob/transfers w/ RW. Pt req increased assist for toilet transfer 2/2 pain and decreased ROM. Pt maxA for LB ADLs despite education on adaptive dressing techniques. Pt is functioning below her PLOF and will benefit from inpatient OT services to address deficits. Rec SNF at d/c 2/2 pt having to climb 14 stairs to enter home and pt being unable to ascend stairs at this time 2/2 px. SNF will help maximize pt's functional status and safety prior to safe d/c to home envrionment.   Prognosis: Good  Decision Making: Low Complexity  REQUIRES OT FOLLOW-UP: Yes  Activity Tolerance  Activity Tolerance: Patient Tolerated treatment well;Patient limited by pain        AM-[facility] Daily Activity Inpatient   How much help for putting on and taking off regular lower body clothing?: Total  How much help for Bathing?: A Lot  How much help for Toileting?: A Lot  How much help for putting on and taking off regular upper body clothing?: A Little  How much help for taking care of personal grooming?: None  How much help for eating meals?: None  [facility] Inpatient Daily Activity Raw Score: 16  AM-[facility] Inpatient ADL T-Scale Score : 35.96  ADL Inpatient CMS 0-100% Score: 53.32  ADL Inpatient CMS G-Code Modifier : CK     Plan   Occupational Therapy Plan  Times Per Week: 4-6x/wk  Current Treatment Recommendations: Strengthening, Balance training, Functional mobility training, Endurance training, Gait training, Safety education & training, Self-Care / ADL, Home management training     Restrictions  Restrictions/Precautions  Restrictions/Precautions: General Precautions, Fall Risk, Weight Bearing  Required Braces or Orthoses?: Yes (KI when OOB)  Lower Extremity Weight Bearing Restrictions  Right Lower Extremity Weight Bearing: Weight Bearing As Tolerated  Required Braces or Orthoses  Right Lower Extremity Brace: Knee Immobilizer (when OOB)  Position Activity Restriction  Other position/activity restrictions: L IV, 1.5 L O2, knee immobilizer    Subjective   General  Chart Reviewed: Yes, Orders, Progress Notes, History and Physical  Patient assessed for rehabilitation services?: Yes  Subjective  Subjective: pt resting in bed upon arrival, agreeable to OT eval  Pain: 9/10 pain R knee    Social/Functional History  Social/Functional History  Lives With: Alone (on her own)  Type of Home: Condo (second floor)  Home Layout: One level, Two level (garage is bottom level)  Home Access: Stairs to enter with rails  Entrance Stairs - Number of Steps: 2+12 DEAN  Entrance Stairs - Rails: Right  Bathroom Shower/Tub: Tub/Shower unit  Bathroom Toilet: Standard  Home Equipment: Rhett Sciara, rolling, Cane, Clemon Gambles, quad, Rollator (pt endorsing wanting to add grab bars 2/2 increased difficulty getting on/off the toilet)  Has the patient had two or more falls in the past year or any fall with injury in the past year?: Yes (\"knee gives out\")  ADL Assistance: [address]: Independent  Homemaking Responsibilities: Yes  Ambulation Assistance: Independent  Transfer Assistance: Independent  Active : Yes  Occupation: Retired  Type of Occupation: [employer]  Additional Comments: pt reports desire to go to [facility] at d/c due to home accessibility limitations     Vision/Hearing  Vision  Vision: Impaired  Vision Exceptions: Wears glasses at all times  Hearing  Hearing: Exceptions to [facility]  Hearing Exceptions: Hard of hearing/hearing concerns      Cognition   Orientation  Overall Orientation Status: Within Normal Limits  Orientation Level: Oriented X4  Cognition  Overall Cognitive Status: WNL     Objective   Heart Rate: 100  Heart Rate Source: Monitor  BP: 116/66  BP Location: Right upper arm  BP Method: Automatic  Patient Position: High fowlers  MAP (Calculated): 83  Resp: 16  SpO2: 92 %  O2 Device: None (Room air)  Comment: 1.5 L       Observation/Palpation  Posture: Fair (pt demonstrates kyphotic posture in standing)  Safety Devices  Type of Devices: All fall risk precautions in place;Call light within reach; Patient at risk for falls;Gait belt;Nurse notified; Bed alarm in place; Left in bed  Restraints  Restraints Initially in Place: No    Bed Mobility Training  Bed Mobility Training: Yes  Interventions: Manual cues; Verbal cues; Safety awareness training  Rolling: Modified independent (using hand rail)  Supine to Sit: Minimum assistance (for RLE)  Sit to Supine: Minimum assistance (for RLE)  Balance  Sitting: Intact  Standing: With support (RW)  Transfer Training  Transfer Training: Yes  Sit to Stand: verbalized understanding      Goals  Short Term Goals  Time Frame for Short Term Goals: 1 week (11/25) unless otherwise specified  Short Term Goal 1: Pt will complete STS transfer to LRAD w/ SBA (11/20)  Short Term Goal 2: Pt will complete LB dressing w/ Del and LRAD  Short Term Goal 3: Pt will complete toilet transfer w/ SBA and LRAD  Short Term Goal 4: Pt will ambulate household distance w/ SBA and LRAD  Patient Goals   Patient goals : \"I want to walk to the bathroom with a little help\"       Therapy Time   Individual Concurrent Group Co-treatment   Time In 1325         Time Out 1415         Minutes 50         Timed Code Treatment Minutes: 40 Minutes (10 min eval)     If pt is unable to be seen after this session, please let this note serve as discharge summary. Please see case management note for discharge disposition. Thank you.      Juliette Rico, OTR/L

## 2022-11-18 NOTE — PROGRESS NOTES
Lg from [employer]. Rt TKA. VSS, 02 2L. Oriented to room, call light and staff. See mar for prn meds given. Educated on ISE and pt demonstrated up to 1000. Enc pt to use hourly WA. Family at bedside. All questions answered.

## 2022-11-18 NOTE — CARE COORDINATION
Writer received call from pt's daughter, Monisha Ch, pt and family want her to go to ARU. In Op Note, Dr Katie Murphy also writes for IPR at [facility]. PT/OT notes pending now. Will see what they recommend, then confirm ok with Meghan/Jesusita, then make referral to ARU if appropriate. Writer unsure if ARU has bed available. Of note, pt's only SNF preference would be [facility], but writer aware they are requiring 3night IP stay per Medicare guidelines, and pt is currently in Observation. Other SNFs  have not brought back the 3night IP stay yet. VIKA Pope     1787 Addendum:  Pt discussed during huddle, PT recommending SNF. Meghan/Jesusita PA to review PT note and discuss discharge planning with pt. VIKA Poep     5207 Addendum:  Jasbir Diaz agrees with PT that SNF is most appropriate for pt. Writer met with pt and daughter Monisha Ch at bedside, provided Medicare SNF list.  Writer spoke with admissions at [facility] and [facility], both full. Writer gave referral to [facility], they will review, and Monisha Ch will tour tomorrow.   VIKA Pope

## 2022-11-19 LAB
BASOPHILS ABSOLUTE: 0 K/UL (ref 0–0.2)
BASOPHILS RELATIVE PERCENT: 0.2 %
EOSINOPHILS ABSOLUTE: 0 K/UL (ref 0–0.6)
EOSINOPHILS RELATIVE PERCENT: 0.6 %
HCT VFR BLD CALC: 31.6 % (ref 36–48)
HEMOGLOBIN: 10.9 G/DL (ref 12–16)
LYMPHOCYTES ABSOLUTE: 1.6 K/UL (ref 1–5.1)
LYMPHOCYTES RELATIVE PERCENT: 22.6 %
MCH RBC QN AUTO: 33.5 PG (ref 26–34)
MCHC RBC AUTO-ENTMCNC: 34.4 G/DL (ref 31–36)
MCV RBC AUTO: 97.4 FL (ref 80–100)
MONOCYTES ABSOLUTE: 0.9 K/UL (ref 0–1.3)
MONOCYTES RELATIVE PERCENT: 12.4 %
NEUTROPHILS ABSOLUTE: 4.6 K/UL (ref 1.7–7.7)
NEUTROPHILS RELATIVE PERCENT: 64.2 %
PDW BLD-RTO: 13.3 % (ref 12.4–15.4)
PLATELET # BLD: 127 K/UL (ref 135–450)
PMV BLD AUTO: 8.6 FL (ref 5–10.5)
RBC # BLD: 3.25 M/UL (ref 4–5.2)
WBC # BLD: 7.1 K/UL (ref 4–11)

## 2022-11-19 PROCEDURE — 6370000000 HC RX 637 (ALT 250 FOR IP): Performed by: STUDENT IN AN ORGANIZED HEALTH CARE EDUCATION/TRAINING PROGRAM

## 2022-11-19 PROCEDURE — 36415 COLL VENOUS BLD VENIPUNCTURE: CPT

## 2022-11-19 PROCEDURE — 2700000000 HC OXYGEN THERAPY PER DAY

## 2022-11-19 PROCEDURE — 97535 SELF CARE MNGMENT TRAINING: CPT

## 2022-11-19 PROCEDURE — 85025 COMPLETE CBC W/AUTO DIFF WBC: CPT

## 2022-11-19 PROCEDURE — G0378 HOSPITAL OBSERVATION PER HR: HCPCS

## 2022-11-19 PROCEDURE — 2580000003 HC RX 258: Performed by: STUDENT IN AN ORGANIZED HEALTH CARE EDUCATION/TRAINING PROGRAM

## 2022-11-19 PROCEDURE — 97530 THERAPEUTIC ACTIVITIES: CPT

## 2022-11-19 PROCEDURE — 99024 POSTOP FOLLOW-UP VISIT: CPT | Performed by: SPECIALIST/TECHNOLOGIST

## 2022-11-19 PROCEDURE — APPNB60 APP NON BILLABLE TIME 46-60 MINS: Performed by: SPECIALIST/TECHNOLOGIST

## 2022-11-19 PROCEDURE — 97110 THERAPEUTIC EXERCISES: CPT

## 2022-11-19 PROCEDURE — 97116 GAIT TRAINING THERAPY: CPT

## 2022-11-19 PROCEDURE — 6370000000 HC RX 637 (ALT 250 FOR IP): Performed by: SPECIALIST/TECHNOLOGIST

## 2022-11-19 RX ORDER — ASPIRIN 81 MG/1
81 TABLET ORAL 2 TIMES DAILY
Qty: 60 TABLET | Refills: 0
Start: 2022-11-19 | End: 2022-12-19

## 2022-11-19 RX ORDER — CELECOXIB 100 MG/1
100 CAPSULE ORAL 2 TIMES DAILY
Status: DISCONTINUED | OUTPATIENT
Start: 2022-11-19 | End: 2022-11-20 | Stop reason: HOSPADM

## 2022-11-19 RX ORDER — CYCLOBENZAPRINE HCL 10 MG
10 TABLET ORAL 3 TIMES DAILY
Qty: 30 TABLET | Refills: 0
Start: 2022-11-19 | End: 2022-11-29

## 2022-11-19 RX ORDER — CELECOXIB 100 MG/1
100 CAPSULE ORAL 2 TIMES DAILY
Qty: 60 CAPSULE | Refills: 3
Start: 2022-11-19

## 2022-11-19 RX ORDER — POLYETHYLENE GLYCOL 3350 17 G/17G
17 POWDER, FOR SOLUTION ORAL DAILY
Qty: 10 EACH | Refills: 0
Start: 2022-11-19 | End: 2022-11-29

## 2022-11-19 RX ORDER — OXYCODONE HYDROCHLORIDE 5 MG/1
5 TABLET ORAL EVERY 6 HOURS PRN
Qty: 28 TABLET | Refills: 0 | Status: SHIPPED | OUTPATIENT
Start: 2022-11-19 | End: 2022-11-26

## 2022-11-19 RX ADMIN — Medication 1 TABLET: at 07:58

## 2022-11-19 RX ADMIN — OXYCODONE 10 MG: 5 TABLET ORAL at 11:59

## 2022-11-19 RX ADMIN — OXYCODONE 10 MG: 5 TABLET ORAL at 07:59

## 2022-11-19 RX ADMIN — MELOXICAM 3.75 MG: 7.5 TABLET ORAL at 07:58

## 2022-11-19 RX ADMIN — ACETAMINOPHEN 650 MG: 325 TABLET ORAL at 14:58

## 2022-11-19 RX ADMIN — ASPIRIN 81 MG: 81 TABLET, COATED ORAL at 07:59

## 2022-11-19 RX ADMIN — ACETAMINOPHEN 650 MG: 325 TABLET ORAL at 07:59

## 2022-11-19 RX ADMIN — CYCLOBENZAPRINE 10 MG: 10 TABLET, FILM COATED ORAL at 14:58

## 2022-11-19 RX ADMIN — CYCLOBENZAPRINE 10 MG: 10 TABLET, FILM COATED ORAL at 20:45

## 2022-11-19 RX ADMIN — OXYCODONE 10 MG: 5 TABLET ORAL at 20:45

## 2022-11-19 RX ADMIN — SODIUM CHLORIDE, PRESERVATIVE FREE 10 ML: 5 INJECTION INTRAVENOUS at 07:59

## 2022-11-19 RX ADMIN — ASPIRIN 81 MG: 81 TABLET, COATED ORAL at 20:45

## 2022-11-19 RX ADMIN — OXYCODONE 10 MG: 5 TABLET ORAL at 01:14

## 2022-11-19 RX ADMIN — OXYBUTYNIN CHLORIDE 10 MG: 5 TABLET, EXTENDED RELEASE ORAL at 18:02

## 2022-11-19 RX ADMIN — CELECOXIB 100 MG: 100 CAPSULE ORAL at 20:45

## 2022-11-19 RX ADMIN — ATORVASTATIN CALCIUM 20 MG: 10 TABLET, FILM COATED ORAL at 07:59

## 2022-11-19 RX ADMIN — CYCLOBENZAPRINE 10 MG: 10 TABLET, FILM COATED ORAL at 07:59

## 2022-11-19 RX ADMIN — ACETAMINOPHEN 650 MG: 325 TABLET ORAL at 01:14

## 2022-11-19 RX ADMIN — POLYETHYLENE GLYCOL 3350 17 G: 17 POWDER, FOR SOLUTION ORAL at 07:59

## 2022-11-19 RX ADMIN — OXYCODONE 10 MG: 5 TABLET ORAL at 16:04

## 2022-11-19 RX ADMIN — ACETAMINOPHEN 650 MG: 325 TABLET ORAL at 20:44

## 2022-11-19 RX ADMIN — SODIUM CHLORIDE, PRESERVATIVE FREE 10 ML: 5 INJECTION INTRAVENOUS at 20:47

## 2022-11-19 RX ADMIN — CELECOXIB 100 MG: 100 CAPSULE ORAL at 11:59

## 2022-11-19 ASSESSMENT — PAIN SCALES - GENERAL
PAINLEVEL_OUTOF10: 8
PAINLEVEL_OUTOF10: 9
PAINLEVEL_OUTOF10: 0
PAINLEVEL_OUTOF10: 6
PAINLEVEL_OUTOF10: 9
PAINLEVEL_OUTOF10: 7
PAINLEVEL_OUTOF10: 9
PAINLEVEL_OUTOF10: 8

## 2022-11-19 ASSESSMENT — PAIN DESCRIPTION - DESCRIPTORS
DESCRIPTORS: ACHING

## 2022-11-19 ASSESSMENT — PAIN DESCRIPTION - LOCATION
LOCATION: LEG;KNEE
LOCATION: KNEE
LOCATION: KNEE
LOCATION: LEG;KNEE
LOCATION: LEG;KNEE

## 2022-11-19 ASSESSMENT — PAIN DESCRIPTION - ORIENTATION
ORIENTATION: RIGHT

## 2022-11-19 NOTE — CARE COORDINATION
CASE MANAGEMENT DISCHARGE SUMMARY      Discharge to: [facility] skilled    Precertification completed: [facility] Exemption Notification (HENS) completed: yes    IMM given: (date)     New Durable Medical Equipment ordered/agency: na    Transportation:    Medical Transport explained to [facility]. Pt/family voice no agency preference. Agency used:[facility]   time: [phone] 11/20/22   Ambulance form completed: Yes    Confirmed discharge plan with:RN,Teddy,daughter     Patient: yes       Facility/Agency, name:  [facility]   Phone number for report to facility: [phone] [place] [phone], name: Nicolas Nogueira    Note: Discharging nurse to complete [place], reconcile AVS, and place final copy with patient's discharge packet. RN to ensure that written prescriptions for  Level II medications are sent with patient to the facility as per protocol.

## 2022-11-19 NOTE — CARE COORDINATION
[facility] Daughter touring today and will call CM if they are okay with this facility. Daughter may transport and will let CM know as soon as decision made. Daughter aware that patient has discharge order in. Addendum [phone]: daughter toured [facility] and is okay with discharge to this facility but wants transportation. CM called [facility] and no one has rides available today. Will set up transport for Sunday.

## 2022-11-19 NOTE — PROGRESS NOTES
Department of Orthopedic Surgery  Physician Assistant   Progress Note    Subjective:       Systemic or Specific Complaints: pain control is better this morning, again had some pain overnight, was unable to get pan medication due to low O2/HoTN.  No family at bedside    Objective:     Patient Vitals for the past 24 hrs:   BP Temp Temp src Pulse Resp SpO2   11/18/22 2325 114/71 99.1 °F (37.3 °C) Oral (!) 109 18 94 %   11/18/22 2058 -- -- -- 96 -- --   11/18/22 2054 99/61 98.8 °F (37.1 °C) Oral 100 20 (!) 86 %   11/18/22 1615 -- -- -- -- 20 --   11/18/22 1603 -- 97.8 °F (36.6 °C) Oral -- -- --   11/18/22 1432 116/66 -- -- 100 -- 92 %   11/18/22 1331 113/74 -- -- (!) 105 -- 90 %   11/18/22 1051 116/66 -- -- -- -- --   11/18/22 0825 100/69 98.6 °F (37 °C) Oral 95 16 95 %       General: alert, appears stated age, cooperative, and no distress   Wound: Post-op dressing in place, no drainage to outside of dressing   Motion: Painful range of Motion in affected extremity   DVT Exam: No evidence of DVT seen on physical exam.     Additional exam:  Pt seen sitting up in bed at time of interview  Post-op dressing applied, no complications  Rotational alignment at neutral  Dressing taken down for inspection  Mild ecchymosis to the distal incision  Mepilex CDI  EHL FHL gastroc ant tib motor intact  Sensation intact to light touch  DP pulse 2+, foot WWP    Data Review  CBC:   Lab Results   Component Value Date/Time    WBC 7.1 11/19/2022 05:38 AM    RBC 3.25 11/19/2022 05:38 AM    HGB 10.9 11/19/2022 05:38 AM    HCT 31.6 11/19/2022 05:38 AM     11/19/2022 05:38 AM       Renal:   Lab Results   Component Value Date/Time     11/18/2022 06:11 AM    K 3.7 11/18/2022 06:11 AM     11/18/2022 06:11 AM    CO2 27 11/18/2022 06:11 AM    BUN 13 11/18/2022 06:11 AM    CREATININE <0.5 11/18/2022 06:11 AM    GLUCOSE 124 11/18/2022 06:11 AM    CALCIUM 8.6 11/18/2022 06:11 AM            Assessment:     S/p right total knee arthroplasty with adductor canal block. POD 2  DOS 31/45/96 by Dr Sang Marques:      1:  WBAT to the RLE with assistive device, knee immobilizer with ambulation  2:  Continue Deep venous thrombosis prophylaxis- ASA 81mg BID for 4 weeks post-op  3:  Continue Pain Control- scheduled tylenol, flexeril. Oxycodone PRN  4: two doses IV ancef completed post-op  5: PT/OT recommending SNF, CM following  6: Discharge pending placement.  DCP updated, script in chart    Ernie Lux

## 2022-11-19 NOTE — PROGRESS NOTES
Physical Therapy  Facility/Department: [facility] C5 - MED SURG/ORTHO  Daily Treatment Note  NAME: Lynne García  : 1955  MRN: [id number]    Date of Service: 2022    Discharge Recommendations:  Subacute/Skilled Nursing Facility   PT Equipment Recommendations  Equipment Needed: No  Other: defer to next level of care. Patient Diagnosis(es): The encounter diagnosis was S/P total knee arthroplasty, right. Assessment   Assessment: pt found in chair, agreable to PT treatment. pt performed many LE exercises in chair with VCs for form and speed. pt ambulated 20' with RW and CGA, no overt LOB but gait speed very slow and methodical with step to gait pattern, pt educated on safety with gait and safe use of RW.  pt educated on using non-surgical leg to assist surgical leg into bed and demonstrated understanding. pt had all her questions answered and left in bed after treatment. pt continues to demonstrate decreased mobility, as fatigue and pain limits ambulation distance on today's date. pt demonstrates decreased strength in operative knee and has difficulty moving her operative LE.  pt woudl continue to benefit from skilled PT to address the above stated deficits. Activity Tolerance: Patient tolerated treatment well  Equipment Needed: No  Other: defer to next level of care. Second session:  Pt found in bed with daughter present, agreeable to PT treatment. Pt performed supine bed exercises consisting of AP, QS, glute sets, HS (with assist on RLE) 1X15 each. Pt transferred to sitting at EOB with SBA. Pt performed LAQ (with assist on RLE) HS with towel under foot, hip flexion, hip add/abd. Pt ambulated to toilet with RW and CGA, demonstrating slow methodical gait pattern. After using the bathroom pt ambulated back to bed and rested.   PT retrieved WC from other end of hospital and pt transferred into [facility] and was assisted down to stairs and performed stair training, being able to ascend and descend 2 steps with proper sequencing and MIN VCs for safety, pt limited by pain and fatigue on this date. Pt transferred back to [facility] and was taken back to her room. Pt ambulated 25' to bed from [facility] with CGA and RW. Patient Educated in safety with car transfers and  dispensed instruction on car transfers with use of assistive device with patient demonstrating:  [x] Verbalizing understanding of appropriate technique, maintaining any ordered precautions for car transfer training s/p TJR  [] [place] with simulated car transfer with walker  [x] He/she requires CGA assist and will have someone at discharge to help with transfers with patient verbalizing understanding of any ordered TJR precautions employing appropriate technique. Able to verbalize and demonstrate appropriatly using a lauri beside bed. [] Other: Educated patient in written car transfer instruction with patient verbalizing understanding of appropriate techniques. Pt left in bed, bed alarm on, call light within reach, all needs within reach, daughter present in the room, nursing notified. Plan    Physcial Therapy Plan  General Plan: 2 times a day 7 days a week  Specific Instructions for Next Treatment: progress functional mobility as tolerated, ther ex  Current Treatment Recommendations: Strengthening;ROM;Balance training;Functional mobility training;Transfer training;ADL/Self-care training;Gait training; Endurance training;Stair training;Neuromuscular re-education;Pain management;Home exercise program;Safety education & training; Therapeutic activities     Restrictions  Restrictions/Precautions  Restrictions/Precautions: General Precautions, Fall Risk, Weight Bearing  Required Braces or Orthoses?: Yes (KI when OOB)  Lower Extremity Weight Bearing Restrictions  Right Lower Extremity Weight Bearing: Weight Bearing As Tolerated  Required Braces or Orthoses  Right Lower Extremity Brace: Knee Immobilizer (when OOB)  Position Activity Restriction  Other position/activity restrictions: knee immobilizer     Subjective    Subjective  Subjective: pt agreeable to PT treatment. Pain: 8-9/10 in R knee. Orientation  Overall Orientation Status: Within Normal Limits  Orientation Level: Oriented X4  Cognition  Overall Cognitive Status: WNL     Objective   Vitals  Heart Rate: (!) 108  BP: 97/69  MAP (Calculated): 78  SpO2: 90 %  Comment:  BPM, SPO2 90% on room air, BP 97/69 in seated in chair at start of PT,  BPM, SPO2 89% on room air, /67 in supine at end of session in bed. Bed Mobility Training  Bed Mobility Training: Yes  Overall Level of Assistance:  (Pt reporting concerns with bed mobility; education provided on use of leg lift to manuever RLE in/out of bed with pt able to perform sit<>supine with SBA x2 trials)  Interventions: Manual cues; Verbal cues; Safety awareness training  Supine to Sit: Other (comment) (AL[place], pt found in chair at start of session.)  Sit to Supine: Stand-by assistance; Adaptive equipment; Additional time  Scooting: Stand-by assistance; Additional time (to EOB)  Balance  Sitting: Intact  Standing: Impaired (with RW.)  Standing - Static: Constant support;Good  Standing - Dynamic: Constant support; Fair  Transfer Training  Transfer Training: Yes (with RW.)  Overall Level of Assistance: Stand-by assistance  Interventions: Verbal cues; Safety awareness training;Manual cues  Sit to Stand: Stand-by assistance  Stand to Sit: Stand-by assistance  Stand Pivot Transfers: Stand-by assistance  Bed to Chair: Stand-by assistance  Gait Training  Gait Training: Yes  Right Side Weight Bearing: As tolerated  Gait  Overall Level of Assistance: Contact-guard assistance  Interventions: Verbal cues; Safety awareness training; Tactile cues  Speed/Emi: Slow;Pace decreased (< 100 feet/min)  Step Length: Left shortened;Right shortened  Stance: Right decreased  Gait Abnormalities: Antalgic;Decreased step clearance; Step to gait  Distance (ft): 20 Feet  Assistive Device: Walker, rolling;Gait belt     [facility] 6 Clicks Inpatient Mobility:  AM-PAC Mobility Inpatient   How much difficulty turning over in bed?: None  How much difficulty sitting down on / standing up from a chair with arms?: None  How much difficulty moving from lying on back to sitting on side of bed?: A Little  How much help from another person moving to and from a bed to a chair?: A Little  How much help from another person needed to walk in hospital room?: A Little  How much help from another person for climbing 3-5 steps with a railing?: A Lot  AM-PAC Inpatient Mobility Raw Score : 19  AM-PAC Inpatient T-Scale Score : 45.44  Mobility Inpatient CMS 0-100% Score: 41.77  Mobility Inpatient CMS G-Code Modifier : CK     PT Exercises  Exercise Treatment: AP, HS with towel under foot and assist, quad sets, glute sets, hip abd, hip add with pillow, all performed in seated 1X15 reps each with rest breaks in between secondary to pain. Safety Devices  Type of Devices: All fall risk precautions in place;Call light within reach; Patient at risk for falls;Gait belt;Nurse notified; Bed alarm in place; Left in bed  Restraints  Restraints Initially in Place: No       Goals  Short Term Goals  Time Frame for Short Term Goals: 11/25/22  Short Term Goal 1: pt will complete bed mobility independently  Short Term Goal 2: pt will perform transfers independently - goal met 11/18  Short Term Goal 3: pt will ambulate 75 ft using LRAD with CGA  Short Term Goal 4: pt will perform 12-15 reps of BLE to improve strength and ROM by 11/21/22  Short Term Goal 5: pt will perform stair training (3-5 stairs) with CGA using LRAD  Additional Goals?: Yes  Short Term Goal 6: pt will perform simulated car transfer as able with SBA using LRAD  Patient Goals   Patient Goals : \"to go to the bathroom\" - goal met 11/18    Education  Patient Education  Education Given To: Patient  Education Provided: Role of Therapy;Plan of Care;Home Exercise Program;Precautions;Transfer Training;Family Education  Education Provided Comments: role of PT, d/c settings and need for therapy, importance of mobility/exercises  Education Method: Verbal  Barriers to Learning: None  Education Outcome: Verbalized understanding;Demonstrated understanding    Therapy Time   Individual Concurrent Group Co-treatment   Time In 1037         Time Out 1124         Minutes 47         Timed Code Treatment Minutes: [address] Time:   Individual Concurrent Group Co-treatment   Time In 1515         Time Out 1610         Minutes 55           Timed Code Treatment Minutes: 55    Total Treatment Minutes:  [address], PT, DPT  If pt is unable to be seen after this session, please let this note serve as discharge summary. Please see case management note for discharge disposition. Thank you.

## 2022-11-19 NOTE — PROGRESS NOTES
Occupational Therapy  Facility/Department: [facility] C5 - MED SURG/ORTHO  Daily Treatment Note  NAME: Bessy Guzman  : 1955  MRN: [id number]    Date of Service: 2022    Discharge Recommendations:  Subacute/Skilled Nursing Facility  OT Equipment Recommendations  Equipment Needed: No  Other: Defer to facility to obtain    Barriers to home discharge:   [x] Steps to access home entry or bed/bath: 14 DEAN   [] No rail with steps to access home entry or bed/bath   [x] Reported available assist at home upon discharge limited   [x] Patient or family requests DC to other than home    [x] Patient reports expectation of post acute Discharge disposition to other than home       Patient Diagnosis(es): The encounter diagnosis was S/P total knee arthroplasty, right. Assessment    Assessment: Pt tolerated treatment well. Pt is SBA for functional mobility with RW and CGA for LB dressing. Pt demo'd good understanding of education provided this date. Reviewed car transfers with pt in preparation for upcoming discharge (family present and reporting use of personal vehicle to SNF). Pt able to complete BUE exercises while seated in chair. Cont to recommend SNF. Activity Tolerance: Patient tolerated treatment well  Discharge Recommendations: Subacute/Skilled Nursing Facility  Equipment Needed: No  Other: Defer to facility to obtain      Plan   Occupational Therapy Plan  Times Per Week: 4-6x/wk  Current Treatment Recommendations: Strengthening;Balance training;Functional mobility training; Endurance training;Gait training; Safety education & training;Self-Care / ADL; Home management training     Restrictions  Restrictions/Precautions  Restrictions/Precautions: General Precautions; Fall Risk;Weight Bearing  Required Braces or Orthoses?: Yes (KI when OOB)  Lower Extremity Weight Bearing Restrictions  Right Lower Extremity Weight Bearing: Weight Bearing As Tolerated  Required Braces or Orthoses  Right Lower Extremity Brace: Knee Immobilizer (when OOB)  Position Activity Restriction  Other position/activity restrictions: knee immobilizer    Subjective   Subjective  Subjective: Pt seated in chair with daughter present  and requesting to don pants upon therapy staff entry  Pain: 8/10 pain RLE. RN aware  Orientation  Overall Orientation Status: Within Normal Limits  Orientation Level: Oriented X4  Pain: 8-9/10 in R knee. Cognition  Overall Cognitive Status: WNL        Objective    Vitals:       BP: 107/62   Pulse: 98   Resp:    Temp:    SpO2: 90%       Bed Mobility Training  Bed Mobility Training: Yes  Overall Level of Assistance:  (Pt reporting concerns with bed mobility; education provided on use of leg lift to manuever RLE in/out of bed with pt able to perform sit<>supine with SBA x2 trials)  Supine to Sit: Stand-by assistance; Adaptive equipment  Sit to Supine: Stand-by assistance; Adaptive equipment  Scooting: Stand-by assistance (to EOB,)  Balance  Sitting: Intact  Standing: Impaired (w/ RW)  Standing - Static: Constant support;Good  Standing - Dynamic: Constant support; Fair  Transfer Training  Transfer Training: Yes  Overall Level of Assistance: Stand-by assistance (x4 STS from chair during LB dressing, ambulatory transfer bed<>chair w/ RW, pt endorsing min UE fatigue after transfers)  Sit to Stand: Stand-by assistance (w/ RW)  Stand to Sit: Stand-by assistance (w/ RW)  Stand Pivot Transfers: Stand-by assistance (w/ RW)  Bed to Chair: Stand-by assistance (w/RW)     ADL  LE Dressing: Contact guard assistance  LE Dressing Skilled Clinical Factors: education provided on donning/doffing KI with pt demonstrating understanding of education provided. Pt able to doff briefs and don briefs+pants with CGA provided for standing balance. Verbal cues provided for technique.  No use of AE needed  Additional Comments: Pt declining further ADLs  OT Exercises  Resistive Exercises: Pt completed x10 reps of the following unilateral UE exercises with red theraband: shoulder flexion, horizontal abduction, tricep extension           Patient Education  Education Given To: Patient; Family  Education Provided: Role of Therapy;Plan of Care;Precautions;IADL Safety; Energy Conservation;Transfer Training;ADL Adaptive Strategies; Fall Prevention Strategies  Education Provided Comments: LB dressing techniques, bed mobilty strategies, review of car transfers. Pt demonstrated and educated patient on safe car transfers. Patient verbalized with good understanding of technique for safe car transfers. with handout provided. Education Method: Demonstration;Verbal  Barriers to Learning: None  Education Outcome: Verbalized understanding;Demonstrated understanding    AM-PAC score  AM-[facility] Inpatient Daily Activity Raw Score: 17 (11/19/22 1738)  AM-PAC Inpatient ADL T-Scale Score : 37.26 (11/19/22 1738)  ADL Inpatient CMS 0-100% Score: 50.11 (11/19/22 1738)  ADL Inpatient CMS G-Code Modifier : CK (11/19/22 1738)    Goals  Short Term Goals  Time Frame for Short Term Goals: 1 week (11/25) unless otherwise specified  Short Term Goal 1: Pt will complete STS transfer to LRAD w/ SBA (11/20)  Short Term Goal 2: Pt will complete LB dressing w/ Del and LRAD- Goal met 11/19  Short Term Goal 3: Pt will complete toilet transfer w/ SBA and LRAD  Short Term Goal 4: Pt will ambulate household distance w/ SBA and LRAD  Patient Goals   Patient goals : \"I want to walk to the bathroom with a little help\"       Therapy Time   Individual Concurrent Group Co-treatment   Time In 1356         Time Out 1440         Minutes 44         Timed Code Treatment Minutes: 44 Minutes     If pt is unable to be seen after this session, please let this note serve as discharge summary. Please see case management note for discharge disposition. Thank you.     Jennifer Roberts OT

## 2022-11-19 NOTE — PROGRESS NOTES
Department of Orthopedic Surgery  Physician Assistant   Progress Note    Subjective:       Systemic or Specific Complaints:Pain Control and did not feel her pain was adequately controlled overnight. Feeling better now. Still some numbness to the RLE from nerve block, improving. Worked with therapy. Tolerating PO and voiding appropriately. Daughter at bedside    Objective:     Patient Vitals for the past 24 hrs:   BP Temp Temp src Pulse Resp SpO2   11/18/22 2325 114/71 99.1 °F (37.3 °C) Oral (!) 109 18 94 %   11/18/22 2058 -- -- -- 96 -- --   11/18/22 2054 99/61 98.8 °F (37.1 °C) Oral 100 20 (!) 86 %   11/18/22 1615 -- -- -- -- 20 --   11/18/22 1603 -- 97.8 °F (36.6 °C) Oral -- -- --   11/18/22 1432 116/66 -- -- 100 -- 92 %   11/18/22 1331 113/74 -- -- (!) 105 -- 90 %   11/18/22 1051 116/66 -- -- -- -- --   11/18/22 0825 100/69 98.6 °F (37 °C) Oral 95 16 95 %       General: alert, appears stated age, cooperative, and no distress   Wound: Post-op dressing in place, no drainage to outside of dressing   Motion: Painful range of Motion in affected extremity   DVT Exam: No evidence of DVT seen on physical exam.     Additional exam: Pt seen sitting up in bed at time of interview  Post-op dressing applied, no complications  EHL FHL gastroc ant tib motor intact  Sensation intact to light touch    Data Review  CBC:   Lab Results   Component Value Date/Time    WBC 7.1 11/19/2022 05:38 AM    RBC 3.25 11/19/2022 05:38 AM    HGB 10.9 11/19/2022 05:38 AM    HCT 31.6 11/19/2022 05:38 AM     11/19/2022 05:38 AM       Renal:   Lab Results   Component Value Date/Time     11/18/2022 06:11 AM    K 3.7 11/18/2022 06:11 AM     11/18/2022 06:11 AM    CO2 27 11/18/2022 06:11 AM    BUN 13 11/18/2022 06:11 AM    CREATININE <0.5 11/18/2022 06:11 AM    GLUCOSE 124 11/18/2022 06:11 AM    CALCIUM 8.6 11/18/2022 06:11 AM            Assessment:      right total knee arthroplasty with adductor canal block.  POD 1  DOS 11/17/22 by Dr Alston Check:      1:  WBAT to the RLE with assistive device, knee immobilizer with ambulation  2:  Continue Deep venous thrombosis prophylaxis- ASA 81mg BID for 4 weeks post-op  3:  Continue Pain Control- scheduled tylenol, flexeril.  Oxycodone PRN  4: two doses IV ancef completed post-op  5: PT/OT recommending SNF, CM following  6: Discharge pending placement    BLAYNE Thomas

## 2022-11-20 VITALS
RESPIRATION RATE: 16 BRPM | DIASTOLIC BLOOD PRESSURE: 70 MMHG | TEMPERATURE: 97.6 F | HEART RATE: 106 BPM | SYSTOLIC BLOOD PRESSURE: 114 MMHG | WEIGHT: 182 LBS | HEIGHT: 67 IN | BODY MASS INDEX: 28.56 KG/M2 | OXYGEN SATURATION: 94 %

## 2022-11-20 LAB
BASOPHILS ABSOLUTE: 0 K/UL (ref 0–0.2)
BASOPHILS RELATIVE PERCENT: 0.5 %
EOSINOPHILS ABSOLUTE: 0.1 K/UL (ref 0–0.6)
EOSINOPHILS RELATIVE PERCENT: 2.4 %
HCT VFR BLD CALC: 30.4 % (ref 36–48)
HEMOGLOBIN: 10.3 G/DL (ref 12–16)
LYMPHOCYTES ABSOLUTE: 1.4 K/UL (ref 1–5.1)
LYMPHOCYTES RELATIVE PERCENT: 24.1 %
MCH RBC QN AUTO: 33.6 PG (ref 26–34)
MCHC RBC AUTO-ENTMCNC: 34 G/DL (ref 31–36)
MCV RBC AUTO: 98.8 FL (ref 80–100)
MONOCYTES ABSOLUTE: 0.7 K/UL (ref 0–1.3)
MONOCYTES RELATIVE PERCENT: 12.2 %
NEUTROPHILS ABSOLUTE: 3.7 K/UL (ref 1.7–7.7)
NEUTROPHILS RELATIVE PERCENT: 60.8 %
PDW BLD-RTO: 13.3 % (ref 12.4–15.4)
PLATELET # BLD: 117 K/UL (ref 135–450)
PMV BLD AUTO: 8.9 FL (ref 5–10.5)
RBC # BLD: 3.07 M/UL (ref 4–5.2)
WBC # BLD: 6 K/UL (ref 4–11)

## 2022-11-20 PROCEDURE — 6370000000 HC RX 637 (ALT 250 FOR IP): Performed by: STUDENT IN AN ORGANIZED HEALTH CARE EDUCATION/TRAINING PROGRAM

## 2022-11-20 PROCEDURE — G0378 HOSPITAL OBSERVATION PER HR: HCPCS

## 2022-11-20 PROCEDURE — 97116 GAIT TRAINING THERAPY: CPT

## 2022-11-20 PROCEDURE — 97110 THERAPEUTIC EXERCISES: CPT

## 2022-11-20 PROCEDURE — 99024 POSTOP FOLLOW-UP VISIT: CPT | Performed by: ORTHOPAEDIC SURGERY

## 2022-11-20 PROCEDURE — 6370000000 HC RX 637 (ALT 250 FOR IP): Performed by: SPECIALIST/TECHNOLOGIST

## 2022-11-20 PROCEDURE — 94761 N-INVAS EAR/PLS OXIMETRY MLT: CPT

## 2022-11-20 PROCEDURE — 36415 COLL VENOUS BLD VENIPUNCTURE: CPT

## 2022-11-20 PROCEDURE — 2700000000 HC OXYGEN THERAPY PER DAY

## 2022-11-20 PROCEDURE — 85025 COMPLETE CBC W/AUTO DIFF WBC: CPT

## 2022-11-20 RX ADMIN — OXYCODONE 10 MG: 5 TABLET ORAL at 00:43

## 2022-11-20 RX ADMIN — ATORVASTATIN CALCIUM 20 MG: 10 TABLET, FILM COATED ORAL at 08:29

## 2022-11-20 RX ADMIN — CYCLOBENZAPRINE 10 MG: 10 TABLET, FILM COATED ORAL at 08:29

## 2022-11-20 RX ADMIN — ASPIRIN 81 MG: 81 TABLET, COATED ORAL at 08:29

## 2022-11-20 RX ADMIN — OXYCODONE 10 MG: 5 TABLET ORAL at 10:38

## 2022-11-20 RX ADMIN — Medication 1 TABLET: at 08:29

## 2022-11-20 RX ADMIN — OXYCODONE 10 MG: 5 TABLET ORAL at 06:15

## 2022-11-20 RX ADMIN — ACETAMINOPHEN 650 MG: 325 TABLET ORAL at 03:21

## 2022-11-20 RX ADMIN — POLYETHYLENE GLYCOL 3350 17 G: 17 POWDER, FOR SOLUTION ORAL at 08:29

## 2022-11-20 RX ADMIN — CELECOXIB 100 MG: 100 CAPSULE ORAL at 08:29

## 2022-11-20 ASSESSMENT — PAIN SCALES - GENERAL
PAINLEVEL_OUTOF10: 0
PAINLEVEL_OUTOF10: 8
PAINLEVEL_OUTOF10: 6
PAINLEVEL_OUTOF10: 6
PAINLEVEL_OUTOF10: 8
PAINLEVEL_OUTOF10: 6

## 2022-11-20 ASSESSMENT — PAIN DESCRIPTION - LOCATION
LOCATION: KNEE;LEG
LOCATION: KNEE;LEG

## 2022-11-20 ASSESSMENT — PAIN DESCRIPTION - DESCRIPTORS
DESCRIPTORS: ACHING
DESCRIPTORS: ACHING

## 2022-11-20 ASSESSMENT — PAIN DESCRIPTION - ORIENTATION: ORIENTATION: RIGHT

## 2022-11-20 NOTE — PROGRESS NOTES
[id number] [facility] Orthopedic Surgery   Progress Note      SUBJECTIVE:  Knee feels ok. OBJECTIVE:  /70   Pulse 91   Temp 97.6 °F (36.4 °C) (Oral)   Resp 16   Ht 5' 7\" (1.702 m)   Wt 182 lb (82.6 kg)   SpO2 91%   BMI 28.51 kg/m²   Patient is awake, alert, and in no acute distress. Dressing / incision clean, dry, intact. Cold therapy pad in place. Sensation is intact to light touch throughout the Right foot. Dorsiflexion / plantarflexion intact. The Right foot is warm and well perfused. CBC:   Lab Results   Component Value Date/Time    WBC 6.0 11/20/2022 05:21 AM    RBC 3.07 11/20/2022 05:21 AM    HGB 10.3 11/20/2022 05:21 AM    HCT 30.4 11/20/2022 05:21 AM    MCV 98.8 11/20/2022 05:21 AM    MCH 33.6 11/20/2022 05:21 AM    MCHC 34.0 11/20/2022 05:21 AM    RDW 13.3 11/20/2022 05:21 AM     11/20/2022 05:21 AM    MPV 8.9 11/20/2022 05:21 AM       PT/INR:    Lab Results   Component Value Date/Time    PROTIME 11.3 11/14/2015 06:51 PM    INR 0.99 11/14/2015 06:51 PM       Chem Basic:   Lab Results   Component Value Date/Time     11/18/2022 06:11 AM    K 3.7 11/18/2022 06:11 AM     11/18/2022 06:11 AM    CO2 27 11/18/2022 06:11 AM    GLUCOSE 124 11/18/2022 06:11 AM    BUN 13 11/18/2022 06:11 AM    CREATININE <0.5 11/18/2022 06:11 AM         ASSESSMENT:  POD#3 s/p right TKA      PLAN:    --Pain control  --PT/OT  --WBAT  --DVT prophylaxis. ASA for 4 weeks postop  --Scheduled for transport to SNF today. Follow up in office with Dr. Kateryna Conti in 2 weeks as already scheduled.          Electronically signed by John Valdez MD on 11/20/2022 at 9:07 AM

## 2022-11-20 NOTE — PROGRESS NOTES
Gave report to [facility] at [address]. Transport scheduled for 1100.

## 2022-11-20 NOTE — PROGRESS NOTES
Physical Therapy  Facility/Department: [place] [address] - MED SURG/ORTHO  Daily Treatment Note  NAME: Katelyn Pederson  : 1955  MRN: [id number]    Date of Service: 2022    Discharge Recommendations:  Subacute/Skilled Nursing Facility   PT Equipment Recommendations  Equipment Needed: No  Other: defer to next level of care. Patient Diagnosis(es): The encounter diagnosis was S/P total knee arthroplasty, right. Assessment   Assessment: pt found in bed, agreable to PT treatment on today's date. pt requires SBA for bed mobility, SBA for functinoal trasnfers with RW, CGA for ambulation up to 25 feet with RW.  pt stood and ambulated into bathroom and sat on commode, using RW and requiring SBA for transfers and CGA for ambulation. pt ambulated in room 25' but limited by pain and endurance on today's date, appeared very fatigued after ambulation and reuired seated rest break. pt performed exercises in seated in chair with instruction to continue performing exercises demonstrated as part of her HEP.  during pt exercises transport arrived to take pt to her next level of care. pt was left in care of her nurse and transport. Activity Tolerance: Patient tolerated treatment well  Equipment Needed: No  Other: defer to next level of care. Plan    Physcial Therapy Plan  General Plan: 2 times a day 7 days a week  Specific Instructions for Next Treatment: progress functional mobility as tolerated, ther ex  Current Treatment Recommendations: Strengthening;ROM;Balance training;Functional mobility training;Transfer training;ADL/Self-care training;Gait training; Endurance training;Stair training;Neuromuscular re-education;Pain management;Home exercise program;Safety education & training; Therapeutic activities     Restrictions  Restrictions/Precautions  Restrictions/Precautions: General Precautions, Fall Risk, Weight Bearing  Required Braces or Orthoses?: Yes (KI when OOB)  Lower Extremity Weight Bearing Restrictions  Right Lower Extremity Weight Bearing: Weight Bearing As Tolerated  Required Braces or Orthoses  Right Lower Extremity Brace: Knee Immobilizer (when OOB)  Position Activity Restriction  Other position/activity restrictions: knee immobilizer     Subjective    Subjective  Subjective: pt agreeable to PT treatment. Pain: 7-8/10 in R knee at rest, 9/10 with movement. Orientation  Overall Orientation Status: Within Normal Limits  Orientation Level: Oriented X4  Cognition  Overall Cognitive Status: WNL     Objective   Vitals  Heart Rate: (!) 106  BP: 114/70  MAP (Calculated): 85  SpO2: 94 %  Comment:  BPM, SPO2 94% on room air, /70 in seated in chair  Bed Mobility Training  Bed Mobility Training: Yes  Interventions: Manual cues; Verbal cues; Safety awareness training  Supine to Sit: Stand-by assistance  Sit to Supine:  ([place], pt up in chair at end of session.)  Scooting: Stand-by assistance; Additional time (to EOB.)  Balance  Sitting: Intact  Standing: Impaired (with RW.)  Standing - Static: Constant support;Good  Standing - Dynamic: Constant support; Fair  Transfer Training  Transfer Training: Yes (with RW.)  Overall Level of Assistance: Stand-by assistance  Interventions: Verbal cues; Safety awareness training;Manual cues  Sit to Stand: Stand-by assistance  Stand to Sit: Stand-by assistance  Toilet Transfer: Adaptive equipment; Additional time;Stand-by assistance  Gait Training  Gait Training: Yes  Right Side Weight Bearing: As tolerated  Gait  Overall Level of Assistance: Contact-guard assistance  Interventions: Verbal cues; Safety awareness training; Tactile cues  Speed/Emi: Slow;Pace decreased (< 100 feet/min)  Step Length: Left shortened;Right shortened  Gait Abnormalities: Antalgic;Decreased step clearance; Step to gait  Distance (ft): 25 Feet (15'+25')  Assistive Device: Walker, rolling;Gait belt     [facility] 6 Clicks Inpatient Mobility:  AM-PAC Mobility Inpatient   How much difficulty turning over in bed?: Patient  Education Provided: Role of Therapy;Plan of Care;Home Exercise Program;Precautions;Transfer Training;Family Education  Education Provided Comments: role of PT, d/c settings and need for therapy, importance of mobility/exercises  Education Method: Verbal  Barriers to Learning: None  Education Outcome: Verbalized understanding;Demonstrated understanding    Therapy Time   Individual Concurrent Group Co-treatment   Time In 1015         Time Out 1040         Minutes 25         Timed Code Treatment Minutes: [address] [facility], PT, DPT  If pt is unable to be seen after this session, please let this note serve as discharge summary. Please see case management note for discharge disposition. Thank you.

## 2022-11-28 ENCOUNTER — TELEPHONE (OUTPATIENT)
Dept: ORTHOPEDIC SURGERY | Age: 67
End: 2022-11-28

## 2022-12-02 ENCOUNTER — OFFICE VISIT (OUTPATIENT)
Dept: ORTHOPEDIC SURGERY | Age: 67
End: 2022-12-02

## 2022-12-02 VITALS — WEIGHT: 182 LBS | BODY MASS INDEX: 28.56 KG/M2 | HEIGHT: 67 IN

## 2022-12-02 DIAGNOSIS — M25.561 RIGHT KNEE PAIN, UNSPECIFIED CHRONICITY: ICD-10-CM

## 2022-12-02 DIAGNOSIS — Z98.890 POST-OPERATIVE STATE: Primary | ICD-10-CM

## 2022-12-02 RX ORDER — OXYCODONE HYDROCHLORIDE 5 MG/1
5 TABLET ORAL EVERY 6 HOURS PRN
Qty: 28 TABLET | Refills: 0 | Status: SHIPPED | OUTPATIENT
Start: 2022-12-02 | End: 2022-12-09

## 2022-12-02 NOTE — LETTER
[address] [place] [address] Dr Mick Peters [facility] [address]  Phone: [phone]  Fax: [phone]    Ariane Mills MD        December 2, 2022     Patient: Anna James   YOB: 1955   Date of Visit: 12/2/2022       To Whom it May Concern:    Matias Bernard was seen in my clinic on 12/2/2022. She may return to work January 15th 2023 - due to post op recovery. If you have any questions or concerns, please don't hesitate to call.     Sincerely,           Ariane Mills MD

## 2022-12-02 NOTE — PROGRESS NOTES
Subjective: This is a 76-year-old female that underwent right total knee arthroplasty with me on November 17, 2022. She has been in a rehab facility doing well with physical therapy. At this time she just notes some swelling and tightness in the front of the knee but otherwise has been progressing well. She is to be discharged home today. Objective: Dressings removed and the incision is healing well. No signs of drainage or erythema. Swelling and tenderness consistent with routine postop course. She already has range of motion from 0-120. No sign of neurovascular compromise distally. X-rays: Multiple views of the right knee show acceptable alignment and position of implants. Assessment: This is a 76-year-old female 2 weeks status post right total knee. Plan: We will transition her to home physical therapy. I expect she will only need to do this for 2 or 3 weeks for some quad strengthening and hamstring stretching. Her range of motion is already at an acceptable level. For pain control I did refill her oxycodone today and advised her to wean off of this to tolerate Tylenol and Celebrex alone. We can plan to follow-up as needed if she has any setbacks but I am already satisfied with her range of motion. Regarding left knee we discussed scheduling total knee if it becomes significantly symptomatic. Otherwise we may wait until next year where it becomes significantly symptomatic.       Ariane Mills MD

## 2022-12-06 ENCOUNTER — TELEPHONE (OUTPATIENT)
Dept: ORTHOPEDIC SURGERY | Age: 67
End: 2022-12-06

## 2022-12-06 DIAGNOSIS — M17.0 BILATERAL PRIMARY OSTEOARTHRITIS OF KNEE: Primary | ICD-10-CM

## 2022-12-06 RX ORDER — CYCLOBENZAPRINE HCL 10 MG
10 TABLET ORAL NIGHTLY PRN
Qty: 30 TABLET | Refills: 0 | Status: SHIPPED | OUTPATIENT
Start: 2022-12-06 | End: 2022-12-16

## 2022-12-06 NOTE — TELEPHONE ENCOUNTER
Prescription Refill     Medication Name:  FLEXERIL  Pharmacy: [facility] [id number] - [place], [place] - [address] - P [phone] - F [phone]  Patient Contact Number:  [phone]    PHARMACY DID NOT RECEIVE THIS MEDICATION FILL. PATIENT WOULD LIKE A CALL BACK WHEN THIS HAS BEEN SENT

## 2022-12-08 NOTE — TELEPHONE ENCOUNTER
[employer] connect calling to let us know that the patient has not yet applied the patch. I spoke with patient and she just ha knee replacement surgery. She place the patch mid January.

## 2022-12-17 NOTE — FLOWSHEET NOTE
[facility]        Pt Name: Kisha Gayle  MRN: [id number]  [place] 1933  Date of evaluation: 12/17/2022  Provider: Stacie Leal PA-C  PCP: ROSE Preciado CNP  Note Started: 1:21 PM EST       GEOVANNA. I have evaluated this patient. My supervising physician was available for consultation. CHIEF COMPLAINT       Chief Complaint   Patient presents with    Cough     Cough, chest congestion x \"several weeks\", saw doctor ~2 weeks ago and was put on abx. Symptoms improved somewhat, but are now worse again. HISTORY OF PRESENT ILLNESS   (Location, Timing/Onset, Context/Setting, Quality, Duration, Modifying Factors, Severity, Associated Signs and Symptoms)  Note limiting factors. Chief Complaint: Des Gayle is a 81 y.o. female with past medical history of hypertension, prior pneumonia, asthma who presents with cough for 2 weeks. Patient reports she had chest congestion that started 2 weeks ago, took antibiotics, had mild improvement, has not returned for the past few days. Denies fever, using inhalers more, shortness of breath, chest pain. Cough is productive, yellow. Denies GI symptoms, back pain, syncope. Not currently taking antibiotics. Nursing Notes were all reviewed and agreed with or any disagreements were addressed in the HPI. REVIEW OF SYSTEMS    (2-9 systems for level 4, 10 or more for level 5)     Review of Systems   All other systems reviewed and are negative. Positives and Pertinent negatives as per HPI. Except as noted above in the ROS, all other systems were reviewed and negative.        PAST MEDICAL HISTORY     Past Medical History:   Diagnosis Date    Anxiety 2/24/2022    Bleeding ulcer     Cancer ([facility] [address]     melanoma L IRIS    Gastroesophagitis     GERD (gastroesophageal reflux disease)     Hyperlipidemia     Hypertension     Moderate persistent asthma 2/12/2021    Pneumonia [facility], [address] [place] [address]  Phone: [phone]  Fax [phone]        Date:  2021    Patient Name:  Marleen Lemus    :  1955  MRN: [id number]  Restrictions/Precautions:    Medical/Treatment Diagnosis Information:  · Diagnosis: cervical radiculopathy M54.12  · Treatment Diagnosis: cervical radiculopathy D05.57  Insurance/Certification information:  PT Insurance Information: [place] 30 visits, no auth 80/20 co-ins  Physician Information:  Referring Practitioner: Yaneth Burk NP  Has the plan of care been signed (Y/N):        []  Yes  [x]  No     Date of Patient follow up with Physician: 6 weeks      Is this a Progress Report:     []  Yes  [x]  No        If Yes:  Date Range for reporting period:  Beginnin21  Ending:     Progress report will be due (10 Rx or 30 days whichever is less):        Recertification will be due (POC Duration  / 90 days whichever is less): 8 weeks      Visit # Insurance Allowable Auth Required   In-person 1 30 []  Yes []  No    Telehealth   []  Yes []  No    Total            Functional Scale: NDI 38%    Date assessed:  21      Therapy Diagnosis/Practice Pattern:F, conservative      Number of Comorbidities:  []0     [x]1-2    []3+    Latex Allergy:  [x]NO      []YES  Preferred Language for Healthcare:   [x]English       []other:      Pain level:  6-10/10     SUBJECTIVE:  See eval    OBJECTIVE: See eval   Observation:    Test measurements:      RESTRICTIONS/PRECAUTIONS: anterior cervical discectomy and fusion of C4-5, C5-6 and C6-7 in 2018 with Dr Luanne Simental, prior Hx thoracic rib vs disc 2019    Exercises/Interventions:     Therapeutic Ex (13278) Sets/sec/reps Notes/CUES   Supine chin tuck 2\" 2x7 HEP   Seated scap set  Seated no money 2\" x10  2\" x10 HEP start supine  Caused NT/stopped                                                Pt ed: eval findings, POC, HEP, Rheumatic fever with heart involvement     Trigger ring finger of right hand 7/1/2013    Unspecified diseases of blood and blood-forming organs     chronic anemia         SURGICAL HISTORY     Past Surgical History:   Procedure Laterality Date    BLADDER REPAIR N/A 2002    BREAST SURGERY      L breast tumor-benign    CYST REMOVAL Right 8-1-13    GANGLION CYST EXCISION RIGHT WRIST, TRIGGER FINGER RELEASE RIGHT FOURTH    EYE SURGERY      , cataract right eye    FRACTURE SURGERY      R shoulderx2-pinned    HYSTERECTOMY (CERVIX STATUS UNKNOWN)      JOINT REPLACEMENT Right 03/28/2012    Total Elbow - Dr. Enzo Mccarty REMOVAL  2013    carcinoid    UPPER GASTROINTESTINAL ENDOSCOPY  3/7/12    with bx    UPPER GASTROINTESTINAL ENDOSCOPY  11/8/13    EUS    UPPER GASTROINTESTINAL ENDOSCOPY  4/24/14    UPPER GASTROINTESTINAL ENDOSCOPY  3/17/16    push enteroscopy with ablation    UPPER GASTROINTESTINAL ENDOSCOPY  06/29/2017    UPPER GASTROINTESTINAL ENDOSCOPY N/A 8/11/2021    EGD ULTRASOUND OF STOMACH performed by Karey Paz MD at [address]       Discharge Medication List as of 12/17/2022  2:13 PM        CONTINUE these medications which have NOT CHANGED    Details   albuterol sulfate HFA (VENTOLIN HFA) 108 (90 Base) MCG/ACT inhaler Inhale 2 puffs into the lungs 4 times daily as needed for Wheezing, Disp-18 g, R-0Normal      amitriptyline (ELAVIL) 10 MG tablet TAKE 1 TABLET BY MOUTH EVERY NIGHT, Disp-90 tablet, R-0Normal      ondansetron (ZOFRAN ODT) 4 MG disintegrating tablet Take 1 tablet by mouth every 8 hours as needed for Nausea or Vomiting, Disp-20 tablet, R-0Normal      olmesartan-hydroCHLOROthiazide (BENICAR HCT) 40-12.5 MG per tablet TAKE 1 TABLET BY MOUTH DAILY, Disp-90 tablet, R-3Normal      ferrous sulfate (IRON 325) 325 (65 Fe) MG tablet Take 1 tablet by mouth 2 times daily, Disp-60 tablet, R-5Normal posture, symptom centralization, heat vs ice x10'    Manual Intervention (70247)     STM B UT, LS. Scalenes  1st rib mobs R  Upper thoracic PA's x10'  2\"x10  4\"x10                             NMR re-education (59080)  CUES NEEDED                                                Therapeutic Activity (90815)                                         Therapeutic Exercise and NMR EXR  [x] (57869) Provided verbal/tactile cueing for activities related to strengthening, flexibility, endurance, ROM  for improvements in scapular, scapulothoracic and UE control with self care, reaching, carrying, lifting, house/yardwork, driving/computer work. [x] (15793) Provided verbal/tactile cueing for activities related to improving balance, coordination, kinesthetic sense, posture, motor skill, proprioception  to assist with  scapular, scapulothoracic and UE control with self care, reaching, carrying, lifting, house/yardwork, driving/computer work. Therapeutic Activities:    [] (57606 or 01544) Provided verbal/tactile cueing for activities related to improving balance, coordination, kinesthetic sense, posture, motor skill, proprioception and motor activation to allow for proper function of scapular, scapulothoracic and UE control with self care, carrying, lifting, driving/computer work.      Home Exercise Program:    [x] (14654) Reviewed/Progressed HEP activities related to strengthening, flexibility, endurance, ROM of scapular, scapulothoracic and UE control with self care, reaching, carrying, lifting, house/yardwork, driving/computer work  [] (59424) Reviewed/Progressed HEP activities related to improving balance, coordination, kinesthetic sense, posture, motor skill, proprioception of scapular, scapulothoracic and UE control with self care, reaching, carrying, lifting, house/yardwork, driving/computer work      Manual Treatments:  PROM / STM / Oscillations-Mobs:  G-I, II, III, IV (PA's, Inf., Post.)  [x] (97778) Provided manual ibuprofen (ADVIL;MOTRIN) 400 MG tablet Take 400 mg by mouth every 6 hours as needed for PainHistorical Med      TURMERIC PO Take by mouth daily Historical Med      esomeprazole (NEXIUM) 40 MG delayed release capsule Take 40 mg by mouth every morning (before breakfast)Historical Med      Coenzyme Q10 (COQ-10) 100 MG CPCR Take 1 capsule by mouth dailyHistorical Med      cetirizine (ZYRTEC) 10 MG tablet Take 10 mg by mouth as needed Historical Med               ALLERGIES     Simvastatin    FAMILYHISTORY       Family History   Problem Relation Age of Onset    Heart Disease Mother     High Blood Pressure Mother     High Cholesterol Mother     Heart Disease Father     High Blood Pressure Father     High Cholesterol Father     Cancer Sister     Stroke Sister     High Blood Pressure Sister     High Cholesterol Sister     High Blood Pressure Brother     High Cholesterol Brother     Heart Attack Brother     Cancer Brother     Heart Attack Brother     Parkinsonism Brother           SOCIAL HISTORY       Social History     Tobacco Use    Smoking status: Never    Smokeless tobacco: Never   Vaping Use    Vaping Use: Never used   Substance Use Topics    Alcohol use: No    Drug use: No       SCREENINGS    [place] Coma Scale  Eye Opening: Spontaneous  Best Verbal Response: Oriented  Best Motor Response: Obeys commands  [place] Coma Scale Score: 15        PHYSICAL EXAM    (up to 7 for level 4, 8 or more for level 5)     ED Triage Vitals [12/17/22 1301]   BP Temp Temp Source Heart Rate Resp SpO2 Height Weight   (!) 178/91 98 °F (36.7 °C) Oral 99 16 97 % 5' 3\" (1.6 m) 139 lb (63 kg)       Physical Exam  Vitals and nursing note reviewed. Constitutional:       General: She is not in acute distress. Appearance: She is not ill-appearing or toxic-appearing. HENT:      Head: Normocephalic and atraumatic.       Right Ear: External ear normal.      Left Ear: External ear normal.      Nose: Nose normal.      Mouth/Throat:      Mouth: therapy to mobilize soft tissue/joints of cervical/CT, scapular GHJ and UE for the purpose of modulating pain, promoting relaxation,  increasing ROM, reducing/eliminating soft tissue swelling/inflammation/restriction, improving soft tissue extensibility and allowing for proper ROM for normal function with self care, reaching, carrying, lifting, house/yardwork, driving/computer work    Modalities:      Charges:  Timed Code Treatment Minutes: 30   Total Treatment Minutes: 50     BWC time in/time out:     [x] EVAL (LOW) 88940   [] EVAL (MOD) 71210   [] EVAL (HIGH) 13034   [] RE-EVAL     [x] MB(84224) x 1    [] IONTO  [] NMR (08786) x     [] VASO  [x] Manual (43964) x  1    [] Other:  [] TA x      [] Mech Traction (79418)  [] ES(attended) (04254)      [] ES (un) (73489):     GOALS:  Patient stated goal: decreased pain with daily activities  [] Progressing: [] Met: [] Not Met: [] Adjusted    Therapist goals for Patient:   Short Term Goals: To be achieved in: 2 weeks  1. Independent in HEP and progression per patient tolerance, in order to prevent re-injury. [] Progressing: [] Met: [] Not Met: [] Adjusted  2. Patient will have a decrease in pain to facilitate improvement in movement, function, and ADLs as indicated by Functional Deficits. [] Progressing: [] Met: [] Not Met: [] Adjusted    Long Term Goals: To be achieved in: 8 weeks  1. Disability index score of 20% or less for the NDI to assist with reaching prior level of function. [] Progressing: [] Met: [] Not Met: [] Adjusted  2. Patient will demonstrate increased AROM to [facility] of cervical/thoracic spine to allow for proper joint functioning as indicated by patients Functional Deficits. [] Progressing: [] Met: [] Not Met: [] Adjusted  3. Patient will demonstrate an increase in postural awareness and control and activation of  Deep cervical stabilizers to allow for proper functional mobility as indicated by patients Functional Deficits.   [] Progressing: [] Met: [] Mucous membranes are moist.      Pharynx: Oropharynx is clear. Eyes:      Conjunctiva/sclera: Conjunctivae normal.   Cardiovascular:      Rate and Rhythm: Normal rate and regular rhythm. Pulses: Normal pulses. Heart sounds: Normal heart sounds. Pulmonary:      Effort: Pulmonary effort is normal. No respiratory distress. Breath sounds: Normal breath sounds. Comments: Coughs with deep breaths  Abdominal:      General: Abdomen is flat. Bowel sounds are normal. There is no distension. Palpations: Abdomen is soft. Tenderness: There is no abdominal tenderness. There is no guarding or rebound. Musculoskeletal:         General: Normal range of motion. Cervical back: Normal range of motion and neck supple. Skin:     General: Skin is warm and dry. Capillary Refill: Capillary refill takes less than 2 seconds. Neurological:      Mental Status: She is alert and oriented to person, place, and time. Motor: No weakness. Psychiatric:         Mood and Affect: Mood normal.         Behavior: Behavior normal.       DIAGNOSTIC RESULTS   LABS:    Labs Reviewed   COVID-19, RAPID   RAPID INFLUENZA A/B ANTIGENS       When ordered only abnormal lab results are displayed. All other labs were within normal range or not returned as of this dictation. EKG: When ordered, EKG's are interpreted by the Emergency Department Physician in the absence of a cardiologist.  Please see their note for interpretation of EKG. RADIOLOGY:   Non-plain film images such as CT, Ultrasound and MRI are read by the radiologist. Plain radiographic images are visualized and preliminarily interpreted by the ED Provider with the below findings:        Interpretation per the Radiologist below, if available at the time of this note:    XR CHEST PORTABLE   Final Result   No acute cardiopulmonary disease. No results found.         PROCEDURES   Unless otherwise noted below, none     Procedures    CRITICAL Not Met: [] Adjusted   4. Patient will return to turning head to check blind spots when driving without increased symptoms or restriction. [] Progressing: [] Met: [] Not Met: [] Adjusted  5. Pt will be indep with lifting up to 20# with good mechanics in order to safely  grandkids and card boxes as needed for work. [] Progressing: [] Met: [] Not Met: [] Adjusted     Progression Towards Functional goals:  [] Patient is progressing as expected towards functional goals listed. [] Progression is slowed due to complexities listed. [] Progression has been slowed due to co-morbidities. [x] Plan just implemented, too soon to assess goals progression  [] Other:     ASSESSMENT:  See eval    Overall Progression Towards Functional goals/ Treatment Progress Update:  [] Patient is progressing as expected towards functional goals listed. [] Progression is slowed due to complexities/Impairments listed. [] Progression has been slowed due to co-morbidities.   [x] Plan just implemented, too soon to assess goals progression <30days   [] Goals require adjustment due to lack of progress  [] Patient is not progressing as expected and requires additional follow up with physician  [] Other    Prognosis for POC: [x] Good [] Fair  [] Poor      Patient requires continued skilled intervention: [x] Yes  [] No    Treatment/Activity Tolerance:  [x] Patient able to complete treatment  [] Patient limited by fatigue  [] Patient limited by pain    [] Patient limited by other medical complications  [] Other:         Return to Play: (if applicable)   []  Stage 1: Intro to Strength   []  Stage 2: Return to Run and Strength   []  Stage 3: Return to Jump and Strength   []  Stage 4: Dynamic Strength and Agility   []  Stage 5: Sport Specific Training     []  Ready to Return to Play, Meets All Above Stages   []  Not Ready for Return to Sports   Comments:                               PLAN: See eval  [] Continue per plan of care [] Arcelia Rivas CARE TIME       CONSULTS:  None      EMERGENCY DEPARTMENT COURSE and DIFFERENTIAL DIAGNOSIS/MDM:   Vitals:    Vitals:    12/17/22 1301   BP: (!) 178/91   Pulse: 99   Resp: 16   Temp: 98 °F (36.7 °C)   TempSrc: Oral   SpO2: 97%   Weight: 139 lb (63 kg)   Height: 5' 3\" (1.6 m)       Patient was given the following medications:  Medications - No data to display      Is this patient to be included in the SEP-1 Core Measure due to severe sepsis or septic shock? No   Exclusion criteria - the patient is NOT to be included for SEP-1 Core Measure due to: Infection is not suspected    MDM -patient presents with 2 weeks of cough. On exam, lungs clear, does cough with deep breaths. No distress or hypoxia or tachycardia. Chest x-ray without acute finding. Flu and COVID-negative. Will treat with 5-day prednisone course, 7 days of doxycycline, as needed Tessalon Perles. Patient instructed follow-up with primary care, return for any new or worsening symptoms. FINAL IMPRESSION      1. Acute upper respiratory infection          DISPOSITION/PLAN   DISPOSITION Decision To Discharge 12/17/2022 02:12:36 PM      PATIENT REFERRED TO:  ROSE Matthews - CNP  [address] [facility]  [phone]    In 2 days  Return for any new or worsening symptoms.     DISCHARGE MEDICATIONS:  Discharge Medication List as of 12/17/2022  2:13 PM        START taking these medications    Details   benzonatate (TESSALON) 100 MG capsule Take 1 capsule by mouth 3 times daily as needed for Cough, Disp-20 capsule, R-0Normal      predniSONE (DELTASONE) 20 MG tablet Take 1 tablet by mouth daily for 5 days, Disp-5 tablet, R-0Normal      doxycycline hyclate (VIBRA-TABS) 100 MG tablet Take 1 tablet by mouth 2 times daily for 7 days, Disp-14 tablet, R-0Normal             DISCONTINUED MEDICATIONS:  Discharge Medication List as of 12/17/2022  2:13 PM                 (Please note that portions of this note were completed with a voice current plan (see comments above)  [x] Plan of care initiated [] Hold pending MD visit [] Discharge      Electronically signed by:  Arelis Curiel PT    Note: If patient does not return for scheduled/ recommended follow up visits, this note will serve as a discharge from care along with most recent update on progress. Access Code: [email or web address]   URL: [email or web address]   Date: 02/24/2021   Prepared by: Arelis Curiel     Exercises   · Supine Cervical Retraction with Towel - 10 reps - 1-2 sets - 2 seconds hold - 2x daily - 7x weekly   · Supine Scapular Retraction - 10 reps - 1-2 sets - 2 seconds hold - 2x daily - 7x weekly   · Seated Scapular Retraction - 10 reps - 1-2 sets - 2 seconds hold - 2x daily - 7x weekly recognition program.  Efforts were made to edit the dictations but occasionally words are mis-transcribed. )    Cara Coffey PA-C (electronically signed)           Cara Coffey PA-C  12/17/22 3563

## 2022-12-30 ENCOUNTER — HOSPITAL ENCOUNTER (OUTPATIENT)
Dept: CT IMAGING | Age: 67
Discharge: HOME | End: 2022-12-30
Payer: MEDICARE

## 2022-12-30 DIAGNOSIS — R07.2 PRECORDIAL PAIN: ICD-10-CM

## 2022-12-30 DIAGNOSIS — R00.2 PALPITATIONS: ICD-10-CM

## 2022-12-30 PROCEDURE — 75571 CT HRT W/O DYE W/CA TEST: CPT

## 2023-01-03 ENCOUNTER — TELEPHONE (OUTPATIENT)
Dept: ORTHOPEDIC SURGERY | Age: 68
End: 2023-01-03

## 2023-01-03 NOTE — TELEPHONE ENCOUNTER
Pain under right knee, feels like it is not any better, post op R TKA 11-17. Tried to get appt sooner than Friday but not able to.    Please call at [phone]

## 2023-01-04 ENCOUNTER — TELEPHONE (OUTPATIENT)
Dept: CARDIOLOGY CLINIC | Age: 68
End: 2023-01-04

## 2023-01-04 NOTE — TELEPHONE ENCOUNTER
----- Message from Dimas Mckeon MD sent at 1/3/2023  7:35 AM EST -----  CT calcium score of 0 which is very good and indicates no evidence for blockages in heart arteries.

## 2023-01-04 NOTE — TELEPHONE ENCOUNTER
Spoke with patient, informed her Friday the 6th is the soonest appt available. Patient was okay with that.

## 2023-01-04 NOTE — TELEPHONE ENCOUNTER
----- Message from Silvia Veloz MD sent at 1/3/2023  7:35 AM EST -----  CT calcium score of 0 which is very good and indicates no evidence for blockages in heart arteries.

## 2023-01-06 ENCOUNTER — OFFICE VISIT (OUTPATIENT)
Dept: ORTHOPEDIC SURGERY | Age: 68
End: 2023-01-06

## 2023-01-06 VITALS — WEIGHT: 182 LBS | BODY MASS INDEX: 28.56 KG/M2 | HEIGHT: 67 IN

## 2023-01-06 DIAGNOSIS — M25.561 RIGHT KNEE PAIN, UNSPECIFIED CHRONICITY: ICD-10-CM

## 2023-01-06 DIAGNOSIS — M17.12 PRIMARY OSTEOARTHRITIS OF LEFT KNEE: Primary | ICD-10-CM

## 2023-01-06 DIAGNOSIS — M22.42 CHONDROMALACIA OF LEFT PATELLA: ICD-10-CM

## 2023-01-06 RX ORDER — LIDOCAINE HYDROCHLORIDE 10 MG/ML
4 INJECTION, SOLUTION EPIDURAL; INFILTRATION; INTRACAUDAL; PERINEURAL ONCE
Status: COMPLETED | OUTPATIENT
Start: 2023-01-06 | End: 2023-01-06

## 2023-01-06 RX ADMIN — LIDOCAINE HYDROCHLORIDE 4 ML: 10 INJECTION, SOLUTION EPIDURAL; INFILTRATION; INTRACAUDAL; PERINEURAL at 12:14

## 2023-01-06 NOTE — PROGRESS NOTES
History: This is a 71-year-old female that underwent right total knee arthroplasty with me on November 7, 2022. She initially was at a rehab facility and then now has been doing physical therapy at the house. She states this is going well her range of motion is 0 to about 105 at this time and working on getting to 120. She did have some increased activities over the holidays and an incident walking her dog where she may have injured her knee. She has had some increased lateral joint line pain since that time. She also notes her left knee has been bothering her more with these activities which is known to have KL grade 4 osteoarthritis. Currently off the oxycodone pain medication but having a little trouble sleeping for which she takes the muscle relaxer. Exam: Range of motion is indeed 0 to about 110. Skin is intact incision is well-healed. She does have some lateral joint line tenderness. Assessment of her coronal stability shows about 2-3 mm of laxity. Good AP stability. No evidence of distal neurovascular compromise. X-rays: 3 views of the right knee ordered obtained interpreted and reviewed and show stable alignment of implants and no bony abnormalities. Assessment and plan:     Right Knee I reassured her that likely no damage was done to her right knee replacement from her traumatic incident however I am concerned that she might of sprained her knee resulting in some increased laxity. Likely nothing will need to be done for this but I do think it is extra important to get her into outpatient physical therapy for some quadricep strengthening. Otherwise doing well, will start outpatient PT at [facility] and f/u PRN. Left knee with known KL grade 4 varus arthritis. We did a steroid injection to day as she is hoping to postpone TKA. Will continue celebrex as well.  F/u PRN    Procedure: L knee injection:    Risks benefits limitations and alternatives to L knee injection were discussed with patient who wished to proceed. Under aseptic technique 40mg kenalog and 4cc 1% lido were injected into the knee without difficulty. There was no complications in the patient tolerated the procedure well.       Yinka Bermudez MD

## 2023-01-09 ENCOUNTER — HOSPITAL ENCOUNTER (OUTPATIENT)
Dept: PHYSICAL THERAPY | Age: 68
Setting detail: THERAPIES SERIES
Discharge: HOME | End: 2023-01-09
Payer: MEDICARE

## 2023-01-09 PROCEDURE — 97110 THERAPEUTIC EXERCISES: CPT

## 2023-01-09 PROCEDURE — 97140 MANUAL THERAPY 1/> REGIONS: CPT

## 2023-01-09 PROCEDURE — 97161 PT EVAL LOW COMPLEX 20 MIN: CPT

## 2023-01-09 NOTE — PLAN OF CARE
[facility], [address]  Phone: [phone]  Fax [phone]     Sravan Jaquez    Dear Dr. Virgilio Cano MD,    We had the pleasure of evaluating the following patient for physical therapy services at [address] [place] [address]. A summary of our findings can be found in the initial assessment below. This includes our plan of care. If you have any questions or concerns regarding these findings, please do not hesitate to contact me at the office phone number checked above. Thank you for the referral.       Physician Signature:_______________________________Date:__________________  By signing above (or electronic signature), therapists plan is approved by physician    Patient: Mary Denny   : 1955   MRN: [id number]  Referring Physician: Virgilio Cano MD      Evaluation Date: 2023      Medical Diagnosis Information:  Medical Diagnosis:   Treatment Diagnosis: Z96.659 - Presence of unspecified artificial knee joint, M25.561 - Right knee pain, M25.671 - Right knee stiffness. Insurance information: Medicare. $0/$226.00 Individual Deductible. 20% co-insurance. BMN. Precautions/ Contra-indications: None.      According to Dr. Henry Crawley note on 99: \"Right Knee I reassured her that likely no damage was done to her right knee replacement from her traumatic incident however I am concerned that she might of sprained her knee resulting in some increased laxity\"    C-SSRS Triggered by Intake questionnaire (Past 2 wk assessment):   [x] No, Questionnaire did not trigger screening.   [] Yes, Patient intake triggered further evaluation      [] C-SSRS Screening completed  [] PCP notified via Plan of Care  [] Emergency services notified     Latex Allergy:  [x]NO      []YES  Preferred Language for Healthcare:   [x]English []other:    SUBJECTIVE: The patient is a 78 y.o female patient that presents to physical therapy s/p right TKA on November 7th. 2022. The patient reports having knee OA in bilateral knees, however wanted to get R TKA first before proceeding to L TKA. The patient states they went to inpatient rehabilitation and performed at home physical therapy. The patient states they have been compliant with their HEP exercises. Patient states they have been getting around 110 degrees of knee flexion, but recently has decreased. The patient's main compliant is pain with movement, including sitting to standing, getting out of a car, and walking. Patient reports numbness at the top of their R knee. Relevant Medical History: history of back pain. Functional Disability Index: LEFS 54/80    Height: 5'7.5\" Weight: 195 lbs  Pain Scale: 0-4/10  Easing factors: Ice, Tylenol  Provocative factors: Getting in and out of the car. Type: []Constant   [x]Intermittent  []Radiating [x]Localized [x]other: dull, achy, and sometimes sharp      Numbness/Tingling: [place] at right knee. Occupation/School: Part-time . Living Status/Prior Level of Function: Independent with ADLs and IADLs, walks dog, golf. OBJECTIVE:     ROM LEFT RIGHT   Knee ext 142 degrees Lacking 12 degrees   Knee Flex 0 degrees 105 degrees   Strength  LEFT RIGHT   HIP Flexors 4+/5 4/5   HIP Abductors  4/5   HIP Ext     Hip ER     Knee EXT (quad) 5/5 5/5   Knee Flex (HS) 5/5 4+/5   Ankle DF 5/5 5/5   Ankle PF 5/5 5/5     Reflexes/Sensation: L1-S2 dermatomes intact to light touch bilateral. Patient reports numbness on anterior to R patella. [x]Dermatomes/Myotomes intact    []Reflexes equal and normal bilaterally   []Other:    Joint mobility: Tibiofemoral joint    []Normal    [x]Hypo   []Hyper    Palpation: Did not assess. Functional Mobility/Transfers: Decreased weightbearing on R with sit to stands, however independent.  Patient is independent with bed mobility transfers. Posture: increased knee flexion at rest.     Bandages/Dressings/Incisions: Normal healing incision at surgical site. Gait: (include devices/WB status) Antaglic gait, decreased knee flexion and extension with gait. Orthopedic Special Tests: None. [x] Patient history, allergies, meds reviewed. Medical chart reviewed. See intake form. Review Of Systems (ROS):  [x]Performed Review of systems (Integumentary, CardioPulmonary, Neurological) by intake and observation. Intake form has been scanned into medical record. Patient has been instructed to contact their primary care physician regarding ROS issues if not already being addressed at this time.       Co-morbidities/Complexities (which will affect course of rehabilitation):   []None           Arthritic conditions   []Rheumatoid arthritis (M05.9)  [x]Osteoarthritis (M19.91)   Cardiovascular conditions   []Hypertension (I10)  []Hyperlipidemia (E78.5)  []Angina pectoris (I20)  []Atherosclerosis (I70)   Musculoskeletal conditions   []Disc pathology   []Congenital spine pathologies   []Prior surgical intervention  []Osteoporosis (M81.8)  []Osteopenia (M85.8)   Endocrine conditions   []Hypothyroid (E03.9)  []Hyperthyroid Gastrointestinal conditions   []Constipation (U97.66)   Metabolic conditions   []Morbid obesity (E66.01)  []Diabetes type 1(E10.65) or 2 (E11.65)   []Neuropathy (G60.9)     Pulmonary conditions   []Asthma (J45)  []Coughing   []COPD (J44.9)   Psychological Disorders  []Anxiety (F41.9)  []Depression (F32.9)   []Other:   []Other:          Barriers to/and or personal factors that will affect rehab potential:              []Age  []Sex              []Motivation/Lack of Motivation                        []Co-Morbidities              []Cognitive Function, education/learning barriers              []Environmental, home barriers              []profession/work barriers  []past PT/medical experience  []other:  Justification:     Falls Risk Assessment (30 days):   [x] Falls Risk assessed and no intervention required. [] Falls Risk assessed and Patient requires intervention due to being higher risk   TUG score (>12s at risk):     [] Falls education provided, including           ASSESSMENT:   Functional Impairments:     [x]Noted lumbar/proximal hip/LE joint hypomobility   [x]Decreased LE functional ROM   [x]Decreased core/proximal hip strength and neuromuscular control   [x]Decreased LE functional strength   [x]Reduced balance/proprioceptive control   []other:      Functional Activity Limitations (from functional questionnaire and intake)   []Reduced ability to tolerate prolonged functional positions   [x]Reduced ability or difficulty with changes of positions or transfers between positions   []Reduced ability to maintain good posture and demonstrate good body mechanics with sitting, bending, and lifting   [x]Reduced ability to sleep   [] Reduced ability or tolerance with driving and/or computer work   []Reduced ability to perform lifting, carrying tasks   [x]Reduced ability to squat   []Reduced ability to forward bend   [x]Reduced ability to ambulate prolonged functional periods/distances/surfaces   [x]Reduced ability to ascend/descend stairs   [x]Reduced ability to run, hop, cut or jump   []other:    Participation Restrictions   []Reduced participation in self care activities   [x]Reduced participation in home management activities   [x]Reduced participation in work activities   []Reduced participation in social activities. []Reduced participation in sport/recreation activities. Classification :    [x]Signs/symptoms consistent with post-surgical status including decreased ROM, strength and function.    []Signs/symptoms consistent with joint sprain/strain   []Signs/symptoms consistent with patella-femoral syndrome   []Signs/symptoms consistent with knee OA/hip OA   []Signs/symptoms consistent with internal derangement of knee/Hip   []Signs/symptoms consistent with functional hip weakness/NMR control      []Signs/symptoms consistent with tendinitis/tendinosis    []signs/symptoms consistent with pathology which may benefit from Dry needling      []other:      Prognosis/Rehab Potential:      []Excellent   []Good    []Fair   []Poor    Tolerance of evaluation/treatment:    []Excellent   [x]Good    []Fair   []Poor  Physical Therapy Evaluation Complexity Justification  [x] A history of present problem with:  [] no personal factors and/or comorbidities that impact the plan of care;  [x]1-2 personal factors and/or comorbidities that impact the plan of care  []3 personal factors and/or comorbidities that impact the plan of care  [x] An examination of body systems using standardized tests and measures addressing any of the following: body structures and functions (impairments), activity limitations, and/or participation restrictions;:  [x] a total of 1-2 or more elements   [] a total of 3 or more elements   [] a total of 4 or more elements   [x] A clinical presentation with:  [x] stable and/or uncomplicated characteristics   [] evolving clinical presentation with changing characteristics  [] unstable and unpredictable characteristics;   [x] Clinical decision making of [x] low, [] moderate, [] high complexity using standardized patient assessment instrument and/or measurable assessment of functional outcome.     [x] EVAL (LOW) 31145 (typically 20 minutes face-to-face)  [] EVAL (MOD) 27941 (typically 30 minutes face-to-face)  [] EVAL (HIGH) 27016 (typically 45 minutes face-to-face)  [] RE-EVAL       PLAN:   Frequency/Duration:  1-2 days per week for 10 Weeks:  Interventions:  [x]  Therapeutic exercise including: strength training, ROM, for Lower extremity and core   [x]  NMR activation and proprioception for LE, Glutes and Core   [x]  Manual therapy as indicated for LE, Hip and spine to include: Dry Needling/IASTM, STM, PROM, Gr I-IV mobilizations, manipulation. [x] Modalities as needed that may include: thermal agents, E-stim, Biofeedback, US, iontophoresis as indicated  [x] Patient education on joint protection, postural re-education, activity modification, progression of HEP. HEP instruction:   Access Code: [email or web address]  URL: [email or web address]  Date: 01/09/2023  Prepared by: Loki Thompson    Exercises  Supine Quad Set - 1 x daily - 7 x weekly - 3 sets - 10-15 reps  Mini Squat with Counter Support - 1 x daily - 7 x weekly - 3 sets - 10-15 reps  Standing Soleus Stretch on Step - 1 x daily - 7 x weekly - 3-5 sets - 30 seconds hold  Sitting Knee Extension with Resistance - 1 x daily - 7 x weekly - 3 sets - 10-15 reps  Seated Hamstring Stretch - 1 x daily - 7 x weekly - 3-5 sets - 30 seconds hold  Seated Passive Knee Extension with Weight - 1 x daily - 7 x weekly - 1 sets - 5 minutes hold      GOALS:  Patient stated goal: \"To walk without pain\". [] Progressing: [] Met: [] Not Met: [] Adjusted    Therapist goals for Patient:   Short Term Goals: To be achieved in: 2 weeks  1. Independent in HEP and progression per patient tolerance, in order to prevent re-injury. [] Progressing: [] Met: [] Not Met: [] Adjusted  2. Patient will have a decrease in pain to facilitate improvement in movement, function, and ADLs as indicated by Functional Deficits. [] Progressing: [] Met: [] Not Met: [] Adjusted    Long Term Goals: To be achieved in: 10 weeks  1. Disability index score of 90% or more per LEFS to assist with reaching prior level of function. [] Progressing: [] Met: [] Not Met: [] Adjusted  2. Patient will demonstrate increased AROM to 120 degrees or more in knee flexion to allow for proper joint functioning as indicated by patients Functional Deficits. [] Progressing: [] Met: [] Not Met: [] Adjusted  3.  Patient will demonstrate an increase in Strength to 5/5 RLE MMTs to allow for proper functional mobility as indicated by patients Functional Deficits. [] Progressing: [] Met: [] Not Met: [] Adjusted  4. Patient will return to work functional activities without increased symptoms or restriction. [] Progressing: [] Met: [] Not Met: [] Adjusted  5. Patient will demonstrate increased ROM to 0 degrees or more in knee extension (resting) to allow for proper joint functioning as indicated by patients Functional Deficits.    [] Progressing: [] Met: [] Not Met: [] Adjusted     Electronically signed by:  Amber Encinas PT, DPT, CSCS

## 2023-01-09 NOTE — FLOWSHEET NOTE
[facility],  [address] [place]  Phone: [phone]  Fax [phone]    Physical Therapy Treatment Note/ Progress Report:           Date:  2023    Patient Name:  Shirley Helm    :  1955  MRN: [id number]  Restrictions/Precautions:    Medical/Treatment Diagnosis Information:  Medical Diagnosis:   Treatment Diagnosis: Z96.659 - Presence of unspecified artificial knee joint, M25.561 - Right knee pain, M25.671 - Right knee stiffness. Insurance information: Medicare. $0/$226.00 Individual Deductible. 20% co-insurance. BMN. Physician Information: Yesi Taylor MD      Has the plan of care been signed (Y/N):        []  Yes  [x]  No     Date of Patient follow up with Physician: PRN with Dr. Yesi Taylor. Is this a Progress Report:     []  Yes  [x]  No        If Yes:  Date Range for reporting period:  Beginnin23  Ending:     Progress report will be due (10 Rx or 30 days whichever is less):        Recertification will be due (POC Duration  / 90 days whichever is less): 23      Visit # Insurance Allowable Auth Required   In-person 1 BMN []  Yes [x]  No    Telehealth   []  Yes []  No    Total          Functional Scale: LEFS 54/80   Date assessed: 23       Number of Comorbidities:  []0     [x]1-2    []3+    Latex Allergy:  [x]NO      []YES  Preferred Language for Healthcare:   [x]English       []other:      Pain level:  0-4/10     SUBJECTIVE:  See eval    OBJECTIVE: See eval  Observation:   Test measurements:      RESTRICTIONS/PRECAUTIONS: OA in L knee.      According to Dr. Meadows Jurist note on : \"Right Knee I reassured her that likely no damage was done to her right knee replacement from her traumatic incident however I am concerned that she might of sprained her knee resulting in some increased laxity\"    Exercises/Interventions: Therapeutic Ex (94283) Sets/Reps Notes/CUES   Supine quad set 3 x 10    Mini squat 3 x 10 Chair for cue, minimal contact   Calf stretch at stairs 3 x 30\"    LAQ 2 x 10; black TB    Seated hamstring stretch 3 x 30\"         Patient education 8 min Review of HEP, POC, previous HEP exercises, ice schedule, importance of working on knee extension        Manual Intervention (65838)     Knee mobilizations for knee extension 8 min; grades II-III                             NMR re-education (56850)                           HEP:  Access Code: [email or web address]  URL: [email or web address]  Date: 01/09/2023  Prepared by: Geovany Standard    Exercises  Supine Quad Set - 1 x daily - 7 x weekly - 3 sets - 10-15 reps  Mini Squat with Counter Support - 1 x daily - 7 x weekly - 3 sets - 10-15 reps  Standing Soleus Stretch on Step - 1 x daily - 7 x weekly - 3-5 sets - 30 seconds hold  Sitting Knee Extension with Resistance - 1 x daily - 7 x weekly - 3 sets - 10-15 reps  Seated Hamstring Stretch - 1 x daily - 7 x weekly - 3-5 sets - 30 seconds hold  Seated Passive Knee Extension with Weight - 1 x daily - 7 x weekly - 1 sets - 5 minutes hold      Therapeutic Exercise and NMR EXR  [x] (10992) Provided verbal/tactile cueing for activities related to strengthening, flexibility, endurance, ROM for improvements in LE, proximal hip, and core control with self care, mobility, lifting, ambulation.  [] (62659) Provided verbal/tactile cueing for activities related to improving balance, coordination, kinesthetic sense, posture, motor skill, proprioception  to assist with LE, proximal hip, and core control in self care, mobility, lifting, ambulation and eccentric single leg control.      NMR and Therapeutic Activities:    [] (94793 or 43543) Provided verbal/tactile cueing for activities related to improving balance, coordination, kinesthetic sense, posture, motor skill, proprioception and motor activation to allow for proper function of core, proximal hip and LE with self care and ADLs  [] (91271) Gait Re-education- Provided training and instruction to the patient for proper LE, core and proximal hip recruitment and positioning and eccentric body weight control with ambulation re-education including up and down stairs     Home Exercise Program:    [x] (85888) Reviewed/Progressed HEP activities related to strengthening, flexibility, endurance, ROM of core, proximal hip and LE for functional self-care, mobility, lifting and ambulation/stair navigation   [] (50885)Reviewed/Progressed HEP activities related to improving balance, coordination, kinesthetic sense, posture, motor skill, proprioception of core, proximal hip and LE for self care, mobility, lifting, and ambulation/stair navigation      Manual Treatments:  PROM / STM / Oscillations-Mobs:  G-I, II, III, IV (PA's, Inf., Post.)  [x] (65874) Provided manual therapy to mobilize LE, proximal hip and/or LS spine soft tissue/joints for the purpose of modulating pain, promoting relaxation,  increasing ROM, reducing/eliminating soft tissue swelling/inflammation/restriction, improving soft tissue extensibility and allowing for proper ROM for normal function with self care, mobility, lifting and ambulation. Modalities:     [] GAME READY (VASO)- for significant edema, swelling, pain control. Charges  Timed Code Treatment Minutes: 30   Total Treatment Minutes: 45     Medicare total (add KX at $2000)         [x] EVAL (LOW) 67823   [] EVAL (MOD) 58256  [] EVAL (HIGH) 64704   [] RE-EVAL     [x] VT(48263) x  1   [] IONTO  [] NMR (61363) x     [] VASO  [x] Manual (14588) x 1    [] Other:  [] TA x      [] Mech Traction (16371)  [] ES(attended) (38380)     [] ES (un) (94598):       GOALS:  Patient stated goal: \"To walk without pain\". [] Progressing: [] Met: [] Not Met: [] Adjusted     Therapist goals for Patient:   Short Term Goals: To be achieved in: 2 weeks  1.  Independent in HEP and progression per patient tolerance, in order to prevent re-injury. [] Progressing: [] Met: [] Not Met: [] Adjusted  2. Patient will have a decrease in pain to facilitate improvement in movement, function, and ADLs as indicated by Functional Deficits. [] Progressing: [] Met: [] Not Met: [] Adjusted     Long Term Goals: To be achieved in: 10 weeks  1. Disability index score of 90% or more per LEFS to assist with reaching prior level of function. [] Progressing: [] Met: [] Not Met: [] Adjusted  2. Patient will demonstrate increased AROM to 120 degrees or more in knee flexion to allow for proper joint functioning as indicated by patients Functional Deficits. [] Progressing: [] Met: [] Not Met: [] Adjusted  3. Patient will demonstrate an increase in Strength to 5/5 RLE MMTs to allow for proper functional mobility as indicated by patients Functional Deficits. [] Progressing: [] Met: [] Not Met: [] Adjusted  4. Patient will return to work functional activities without increased symptoms or restriction. [] Progressing: [] Met: [] Not Met: [] Adjusted  5. Patient will demonstrate increased ROM to 0 degrees or more in knee extension (resting) to allow for proper joint functioning as indicated by patients Functional Deficits. [] Progressing: [] Met: [] Not Met: [] Adjusted         Progression Towards Functional goals:  [] Patient is progressing as expected towards functional goals listed. [] Progression is slowed due to complexities listed. [] Progression has been slowed due to co-morbidities. [x] Plan just implemented, too soon to assess goals progression  [] Other:         Overall Progression Towards Functional goals/ Treatment Progress Update:  [] Patient is progressing as expected towards functional goals listed. [] Progression is slowed due to complexities/Impairments listed. [] Progression has been slowed due to co-morbidities.   [x] Plan just implemented, too soon to assess goals progression <30days   [] Goals require adjustment due to lack of progress  [] Patient is not progressing as expected and requires additional follow up with physician  [] Other    Prognosis for POC: [x] Good [] Fair  [] Poor      Patient requires continued skilled intervention: [x] Yes  [] No    Treatment/Activity Tolerance:  [x] Patient able to complete treatment  [] Patient limited by fatigue  [] Patient limited by pain    [] Patient limited by other medical complications  [] Other:     ASSESSMENT: See evaluation. Return to Play: (if applicable)   []  Stage 1: Intro to Strength   []  Stage 2: Return to Run and Strength   []  Stage 3: Return to Jump and Strength   []  Stage 4: Dynamic Strength and Agility   []  Stage 5: Sport Specific Training     []  Ready to Return to Play, Meets All Above Stages   []  Not Ready for Return to Sports   Comments:                               PLAN: 1-2 days per week for 10 weeks. Plan to do once a week this week and possibly doing once a week following. [] Continue per plan of care [] Alter current plan (see comments above)  [x] Plan of care initiated [] Hold pending MD visit [] Discharge      Electronically signed by:  Mray Beth Eastman, PT, DPT, CSCS    Note: If patient does not return for scheduled/ recommended follow up visits, this note will serve as a discharge from care along with most recent update on progress.

## 2023-01-13 ENCOUNTER — HOSPITAL ENCOUNTER (OUTPATIENT)
Dept: PHYSICAL THERAPY | Age: 68
Setting detail: THERAPIES SERIES
Discharge: HOME | End: 2023-01-13
Payer: MEDICARE

## 2023-01-13 PROCEDURE — 97110 THERAPEUTIC EXERCISES: CPT

## 2023-01-13 PROCEDURE — 97140 MANUAL THERAPY 1/> REGIONS: CPT

## 2023-01-13 PROCEDURE — 97112 NEUROMUSCULAR REEDUCATION: CPT

## 2023-01-13 NOTE — FLOWSHEET NOTE
[facility],  [address] [place]  Phone: [phone]  Fax [phone]    Physical Therapy Treatment Note/ Progress Report:           Date:  2023    Patient Name:  Rocio Kong    :  1955  MRN: [id number]  Restrictions/Precautions:    Medical/Treatment Diagnosis Information:  Medical Diagnosis:   Treatment Diagnosis: Z96.659 - Presence of unspecified artificial knee joint, M25.561 - Right knee pain, M25.671 - Right knee stiffness. Insurance information: Medicare. $0/$226.00 Individual Deductible. 20% co-insurance. BMN. Physician Information: Barbie Bryan MD      Has the plan of care been signed (Y/N):        [x]  Yes  []  No     Date of Patient follow up with Physician: PRN with Dr. Barbie Bryan. Is this a Progress Report:     []  Yes  [x]  No        If Yes:  Date Range for reporting period:  Beginnin23  Ending:     Progress report will be due (10 Rx or 30 days whichever is less):        Recertification will be due (POC Duration  / 90 days whichever is less): 23      Visit # Insurance Allowable Auth Required   In-person 2 BMN []  Yes [x]  No    Telehealth   []  Yes []  No    Total          Functional Scale: LEFS 54/80   Date assessed: 23       Number of Comorbidities:  []0     [x]1-2    []3+    Latex Allergy:  [x]NO      []YES  Preferred Language for Healthcare:   [x]English       []other:      Pain level:  0-4/10     SUBJECTIVE:  The patient states they are doing okay. The patient reports performing HEP twice a day and reports soreness. The patient states the loading knee extension HEP exercise is discomforting. The patient starts work the following Monday.      OBJECTIVE:   Observation:   Test measurements:        23   ROM LEFT RIGHT    Knee ext 142 degrees Lacking 12 degrees    Knee Flex 0 degrees 105 degrees    Strength  LEFT RIGHT    HIP Flexors 4+/5 4/5    HIP Abductors   4/5    HIP Ext        Hip ER        Knee EXT (quad) 5/5 5/5    Knee Flex (HS) 5/5 4+/5    Ankle DF 5/5 5/5    Ankle PF 5/5 5/5       L/R   SL balance - firm   18/6 seconds        RESTRICTIONS/PRECAUTIONS: OA in L knee. According to Dr. Juan David Valentin note on 34/15/52: \"Right Knee I reassured her that likely no damage was done to her right knee replacement from her traumatic incident however I am concerned that she might of sprained her knee resulting in some increased laxity\"    Exercises/Interventions:      01/13/23 01/09/23    Therapeutic Ex (00022) Sets/Reps Sets/Reps Notes/CUES   Supine quad set  3 x 10    Mini squat  3 x 10 Chair for cue, minimal contact   Calf stretch at stairs  3 x 30\"    LAQ  2 x 10; black TB    Seated hamstring stretch  3 x 30\"    Hamstring curl 1 x 15; #25     Eccentric SL hamstring curl 3 x 8; #25  Double leg concentric, SL lowering   Incline stretch 3 x 30\"     Leg press 2 x 8 #80     Patient education 5 min 8 min Review of HEP, progression and digression including performing HEP once daily, adding SL to program         Manual Intervention (26348)      Knee mobilizations for R knee extension 7 min; grades II-III 8 min; grades II-III    STM to R quadriceps 7 min                 NMR re-education (99283)      SL balance - firm eyes open 3 x 30\" each      Cone walking 4 x 15 yards  Cuing for slowing, CGA. HEP:  Access Code: [email or web address]  URL: [email or web address]  Date: 01/09/2023  Prepared by: Esteban Bray    Exercises  Supine Quad Set - 1 x daily - 7 x weekly - 3 sets - 10-15 reps  Mini Squat with Counter Support - 1 x daily - 7 x weekly - 3 sets - 10-15 reps  Standing Soleus Stretch on Step - 1 x daily - 7 x weekly - 3-5 sets - 30 seconds hold  Sitting Knee Extension with Resistance - 1 x daily - 7 x weekly - 3 sets - 10-15 reps  Seated Hamstring Stretch - 1 x daily - 7 x weekly - 3-5 sets - 30 seconds hold  Seated Passive Knee Extension with Weight - 1 x daily - 7 x weekly - 1 sets - 5 minutes hold      Therapeutic Exercise and NMR EXR  [x] (67233) Provided verbal/tactile cueing for activities related to strengthening, flexibility, endurance, ROM for improvements in LE, proximal hip, and core control with self care, mobility, lifting, ambulation. [x] (42564) Provided verbal/tactile cueing for activities related to improving balance, coordination, kinesthetic sense, posture, motor skill, proprioception  to assist with LE, proximal hip, and core control in self care, mobility, lifting, ambulation and eccentric single leg control.      NMR and Therapeutic Activities:    [] (47035 or 50190) Provided verbal/tactile cueing for activities related to improving balance, coordination, kinesthetic sense, posture, motor skill, proprioception and motor activation to allow for proper function of core, proximal hip and LE with self care and ADLs  [] (54396) Gait Re-education- Provided training and instruction to the patient for proper LE, core and proximal hip recruitment and positioning and eccentric body weight control with ambulation re-education including up and down stairs     Home Exercise Program:    [x] (98157) Reviewed/Progressed HEP activities related to strengthening, flexibility, endurance, ROM of core, proximal hip and LE for functional self-care, mobility, lifting and ambulation/stair navigation   [x] (67503)Reviewed/Progressed HEP activities related to improving balance, coordination, kinesthetic sense, posture, motor skill, proprioception of core, proximal hip and LE for self care, mobility, lifting, and ambulation/stair navigation      Manual Treatments:  PROM / STM / Oscillations-Mobs:  G-I, II, III, IV (PA's, Inf., Post.)  [x] (00735) Provided manual therapy to mobilize LE, proximal hip and/or LS spine soft tissue/joints for the purpose of modulating pain, promoting relaxation,  increasing ROM, reducing/eliminating soft tissue swelling/inflammation/restriction, improving soft tissue extensibility and allowing for proper ROM for normal function with self care, mobility, lifting and ambulation. Modalities:     [] GAME READY (VASO)- for significant edema, swelling, pain control. Charges  Timed Code Treatment Minutes: 40   Total Treatment Minutes: 40     Medicare total (add KX at $2000)         [] EVAL (LOW) 46484   [] EVAL (MOD) 69199  [] EVAL (HIGH) 24454   [] RE-EVAL     [x] RA(33434) x  1   [] IONTO  [x] NMR (26233) x 1    [] VASO  [x] Manual (14752) x 1    [] Other:  [] TA x      [] Mech Traction (78484)  [] ES(attended) (51067)     [] ES (un) (28220):       GOALS:  Patient stated goal: \"To walk without pain\". [] Progressing: [] Met: [] Not Met: [] Adjusted     Therapist goals for Patient:   Short Term Goals: To be achieved in: 2 weeks  1. Independent in HEP and progression per patient tolerance, in order to prevent re-injury. [] Progressing: [] Met: [] Not Met: [] Adjusted  2. Patient will have a decrease in pain to facilitate improvement in movement, function, and ADLs as indicated by Functional Deficits. [] Progressing: [] Met: [] Not Met: [] Adjusted     Long Term Goals: To be achieved in: 10 weeks  1. Disability index score of 90% or more per LEFS to assist with reaching prior level of function. [] Progressing: [] Met: [] Not Met: [] Adjusted  2. Patient will demonstrate increased AROM to 120 degrees or more in knee flexion to allow for proper joint functioning as indicated by patients Functional Deficits. [] Progressing: [] Met: [] Not Met: [] Adjusted  3. Patient will demonstrate an increase in Strength to 5/5 RLE MMTs to allow for proper functional mobility as indicated by patients Functional Deficits. [] Progressing: [] Met: [] Not Met: [] Adjusted  4. Patient will return to work functional activities without increased symptoms or restriction.    [] Progressing: [] Met: [] Not Met: [] Adjusted  5. Patient will demonstrate increased ROM to 0 degrees or more in knee extension (resting) to allow for proper joint functioning as indicated by patients Functional Deficits. [] Progressing: [] Met: [] Not Met: [] Adjusted         Progression Towards Functional goals:  [] Patient is progressing as expected towards functional goals listed. [] Progression is slowed due to complexities listed. [] Progression has been slowed due to co-morbidities. [x] Plan just implemented, too soon to assess goals progression  [] Other:         Overall Progression Towards Functional goals/ Treatment Progress Update:  [] Patient is progressing as expected towards functional goals listed. [] Progression is slowed due to complexities/Impairments listed. [] Progression has been slowed due to co-morbidities. [x] Plan just implemented, too soon to assess goals progression <30days   [] Goals require adjustment due to lack of progress  [] Patient is not progressing as expected and requires additional follow up with physician  [] Other    Prognosis for POC: [x] Good [] Fair  [] Poor      Patient requires continued skilled intervention: [x] Yes  [] No    Treatment/Activity Tolerance:  [x] Patient able to complete treatment  [] Patient limited by fatigue  [] Patient limited by pain    [] Patient limited by other medical complications  [] Other:     ASSESSMENT: The patient did well today. The patient reports pain with end range knee extension with overpressure. Knee extension ROM is improving and plan to reassess at next visit. The patient was able to introduced to new exercises today. The patient needed maxA cuing for cone walking activity for heel contact and pure hip flexion over cone instead of around cone, which may be due to the patient's poor balance. The patient would continue to benefit from skilled physical therapy to return to PLOF.      Return to Play: (if applicable)   []  Stage 1: Intro to Strength   []  Stage 2: Return to Run and Strength   []  Stage 3: Return to Jump and Strength   []  Stage 4: Dynamic Strength and Agility   []  Stage 5: Sport Specific Training     []  Ready to Return to Play, Meets All Above Stages   []  Not Ready for Return to Sports   Comments:                               PLAN: 1-2 days per week for 10 weeks. Plan to do once a week this week and possibly doing once a week following. [] Continue per plan of care [] Alter current plan (see comments above)  [x] Plan of care initiated [] Hold pending MD visit [] Discharge      Electronically signed by:  Trini Levine, PT, DPT, CSCS    Note: If patient does not return for scheduled/ recommended follow up visits, this note will serve as a discharge from care along with most recent update on progress.

## 2023-01-16 ENCOUNTER — HOSPITAL ENCOUNTER (OUTPATIENT)
Dept: PHYSICAL THERAPY | Age: 68
Setting detail: THERAPIES SERIES
Discharge: HOME | End: 2023-01-16
Payer: MEDICARE

## 2023-01-16 PROCEDURE — 97140 MANUAL THERAPY 1/> REGIONS: CPT

## 2023-01-16 PROCEDURE — 97110 THERAPEUTIC EXERCISES: CPT

## 2023-01-16 NOTE — FLOWSHEET NOTE
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 Insurance information: Medicare. $0/$226.00 Individual Deductible. 20% co-insurance. BMN. Physician Information: Josesito Xie MD      Has the plan of care been signed (Y/N):        [x]  Yes  []  No     Date of Patient follow up with Physician: PRN with Dr. Josesito Xie. Is this a Progress Report:     []  Yes  [x]  No        If Yes:  Date Range for reporting period:  Beginnin23  Ending:     Progress report will be due (10 Rx or 30 days whichever is less):        Recertification will be due (POC Duration  / 90 days whichever is less): 23      Visit # Insurance Allowable Auth Required   In-person 3 BMN []  Yes [x]  No    Telehealth   []  Yes []  No    Total          Functional Scale: LEFS 54/80   Date assessed: 23       Number of Comorbidities:  []0     [x]1-2    []3+    Latex Allergy:  [x]NO      []YES  Preferred Language for Healthcare:   [x]English       []other:      Pain level:  0-3/10     SUBJECTIVE:  The patient states they are going into work today for the first time. The patient states they were sore on Saturday (PT visit was on Friday).      OBJECTIVE:   Observation:   Test measurements:        23 ROM LEFT RIGHT RIGHT   Knee ext 0 degrees Lacking 12 degrees Lacking 3 degrees   Knee Flex 142 degrees 105 degrees 125 degrees   Strength  LEFT RIGHT    HIP Flexors 4+/5 4/5    HIP Abductors   4/5    HIP Ext        Hip ER        Knee EXT (quad) 5/5 5/5    Knee Flex (HS) 5/5 4+/5    Ankle DF 5/5 5/5    Ankle PF 5/5 5/5       L/R   SL balance - firm   18/6 seconds        RESTRICTIONS/PRECAUTIONS: OA in L knee. R total knee arthroplasty 11/07/22. According to Dr. Hobert Goltz note on 99/87/85: \"Right Knee I reassured her that likely no damage was done to her right knee replacement from her traumatic incident however I am concerned that she might of sprained her knee resulting in some increased laxity\"    Exercises/Interventions:      01/16/23 01/13/23 01/09/23    Therapeutic Ex (05894) Sets/Reps Sets/Reps Sets/Reps Notes/CUES   Supine quad set 3 x 10  3 x 10    Claim shell 2 x 10; GTB      Bridge 2 x 10; GTB around knees   01/16/23: no abd. today   Mini squat   3 x 10 Chair for cue, minimal contact   Calf stretch at stairs   3 x 30\"    LAQ   2 x 10; black TB    Seated hamstring stretch   3 x 30\"    Hamstring curl  1 x 15; #25     Eccentric SL hamstring curl 3 x 8; #25 3 x 8; #25  Double leg concentric, SL lowering   Incline stretch 3 x 30\" 3 x 30\"     Leg press  2 x 8 #80  With leg kicks   Patient education  5 min 8 min Review of HEP, progression and digression including performing HEP once daily, adding SL to program          Manual Intervention (54286)       Knee mobilizations for R knee extension 6 min 7 min; grades II-III 8 min; grades II-III    Knee mobilizations for R knee flexion  4 min      Knee range of motion (flexion and extension) 3 min      STM to R quadriceps  7 min                   NMR re-education (20442)       SL balance - firm eyes open  3 x 30\" each      Cone walking  4 x 15 yards  Cuing for slowing, CGA. HEP:  Access Code: [email or web address]  URL: [email or web address]  Date: 01/09/2023  Prepared by: Blas Davis    Exercises  Supine Quad Set - 1 x daily - 7 x weekly - 3 sets - 10-15 reps  Mini Squat with Counter Support - 1 x daily - 7 x weekly - 3 sets - 10-15 reps  Standing Soleus Stretch on Step - 1 x daily - 7 x weekly - 3-5 sets - 30 seconds hold  Sitting Knee Extension with Resistance - 1 x daily - 7 x weekly - 3 sets - 10-15 reps  Seated Hamstring Stretch - 1 x daily - 7 x weekly - 3-5 sets - 30 seconds hold  Seated Passive Knee Extension with Weight - 1 x daily - 7 x weekly - 1 sets - 5 minutes hold      Therapeutic Exercise and NMR EXR  [x] (83470) Provided verbal/tactile cueing for activities related to strengthening, flexibility, endurance, ROM for improvements in LE, proximal hip, and core control with self care, mobility, lifting, ambulation. [x] (41507) Provided verbal/tactile cueing for activities related to improving balance, coordination, kinesthetic sense, posture, motor skill, proprioception  to assist with LE, proximal hip, and core control in self care, mobility, lifting, ambulation and eccentric single leg control.      NMR and Therapeutic Activities:    [] (83681 or 32780) Provided verbal/tactile cueing for activities related to improving balance, coordination, kinesthetic sense, posture, motor skill, proprioception and motor activation to allow for proper function of core, proximal hip and LE with self care and ADLs  [] (59452) Gait Re-education- Provided training and instruction to the patient for proper LE, core and proximal hip recruitment and positioning and eccentric body weight control with ambulation re-education including up and down stairs     Home Exercise Program:    [x] (13365) Reviewed/Progressed HEP activities related to strengthening, flexibility, endurance, ROM of core, proximal hip and LE for functional self-care, mobility, lifting and ambulation/stair navigation   [x] (01257)Reviewed/Progressed HEP activities related to improving balance, coordination, kinesthetic sense, posture, motor skill, proprioception of core, proximal hip and LE for self care, mobility, lifting, and ambulation/stair navigation      Manual Treatments:  PROM / STM / Oscillations-Mobs:  G-I, II, III, IV (PA's, Inf., Post.)  [x] (60642) Provided manual therapy to mobilize LE, proximal hip and/or LS spine soft tissue/joints for the purpose of modulating pain, promoting relaxation,  increasing ROM, reducing/eliminating soft tissue swelling/inflammation/restriction, improving soft tissue extensibility and allowing for proper ROM for normal function with self care, mobility, lifting and ambulation. Modalities:     [] GAME READY (VASO)- for significant edema, swelling, pain control. Charges  Timed Code Treatment Minutes: 30   Total Treatment Minutes: 35     Medicare total (add KX at $2000)         [] EVAL (LOW) 14447   [] EVAL (MOD) 25685  [] EVAL (HIGH) 41571   [] RE-EVAL     [x] XH(42670) x  1   [] IONTO  [] NMR (14778) x     [] VASO  [x] Manual (23068) x 1    [] Other:  [] TA x      [] Mech Traction (82193)  [] ES(attended) (41912)     [] ES (un) (83251):       GOALS:  Patient stated goal: \"To walk without pain\". [] Progressing: [] Met: [] Not Met: [] Adjusted     Therapist goals for Patient:   Short Term Goals: To be achieved in: 2 weeks  1. Independent in HEP and progression per patient tolerance, in order to prevent re-injury. [] Progressing: [] Met: [] Not Met: [] Adjusted  2. Patient will have a decrease in pain to facilitate improvement in movement, function, and ADLs as indicated by Functional Deficits. [] Progressing: [] Met: [] Not Met: [] Adjusted     Long Term Goals: To be achieved in: 10 weeks  1. Disability index score of 90% or more per LEFS to assist with reaching prior level of function. [] Progressing: [] Met: [] Not Met: [] Adjusted  2.  Patient will demonstrate increased AROM to 120 degrees or more in knee flexion to allow for proper joint functioning as indicated by patients Functional Deficits. [] Progressing: [] Met: [] Not Met: [] Adjusted  3. Patient will demonstrate an increase in Strength to 5/5 RLE MMTs to allow for proper functional mobility as indicated by patients Functional Deficits. [] Progressing: [] Met: [] Not Met: [] Adjusted  4. Patient will return to work functional activities without increased symptoms or restriction. [] Progressing: [] Met: [] Not Met: [] Adjusted  5. Patient will demonstrate increased ROM to 0 degrees or more in knee extension (resting) to allow for proper joint functioning as indicated by patients Functional Deficits. [] Progressing: [] Met: [] Not Met: [] Adjusted         Progression Towards Functional goals:  [] Patient is progressing as expected towards functional goals listed. [] Progression is slowed due to complexities listed. [] Progression has been slowed due to co-morbidities. [x] Plan just implemented, too soon to assess goals progression  [] Other:         Overall Progression Towards Functional goals/ Treatment Progress Update:  [] Patient is progressing as expected towards functional goals listed. [] Progression is slowed due to complexities/Impairments listed. [] Progression has been slowed due to co-morbidities. [x] Plan just implemented, too soon to assess goals progression <30days   [] Goals require adjustment due to lack of progress  [] Patient is not progressing as expected and requires additional follow up with physician  [] Other    Prognosis for POC: [x] Good [] Fair  [] Poor      Patient requires continued skilled intervention: [x] Yes  [] No    Treatment/Activity Tolerance:  [x] Patient able to complete treatment  [] Patient limited by fatigue  [] Patient limited by pain    [] Patient limited by other medical complications  [] Other:     ASSESSMENT: The patient did well today as the patient reports minimal pain and discomfort (terminal knee extension).  The patient reports pain with end range knee extension with overpressure. Knee extension ROM is improving and plan to reassess at next visit. The patient was able to introduced to new exercises today. The patient would continue to benefit from skilled physical therapy to return to PLOF. Return to Play: (if applicable)   []  Stage 1: Intro to Strength   []  Stage 2: Return to Run and Strength   []  Stage 3: Return to Jump and Strength   []  Stage 4: Dynamic Strength and Agility   []  Stage 5: Sport Specific Training     []  Ready to Return to Play, Meets All Above Stages   []  Not Ready for Return to Sports   Comments:                               PLAN: 1-2 days per week for 10 weeks. Plan to do once a week this week and possibly doing once a week following. [] Continue per plan of care [] Alter current plan (see comments above)  [x] Plan of care initiated [] Hold pending MD visit [] Discharge      Electronically signed by:  Shruthi Mosqueda, PT, DPT, CSCS    Note: If patient does not return for scheduled/ recommended follow up visits, this note will serve as a discharge from care along with most recent update on progress.

## 2023-01-20 ENCOUNTER — HOSPITAL ENCOUNTER (OUTPATIENT)
Dept: PHYSICAL THERAPY | Age: 68
Setting detail: THERAPIES SERIES
Discharge: HOME | End: 2023-01-20
Payer: MEDICARE

## 2023-01-20 PROCEDURE — 97110 THERAPEUTIC EXERCISES: CPT

## 2023-01-20 PROCEDURE — 97140 MANUAL THERAPY 1/> REGIONS: CPT

## 2023-01-20 NOTE — FLOWSHEET NOTE
[facility], [address] [place]  Phone: [phone]  Fax [phone]    Physical Therapy Treatment Note/ Progress Report:           Date:  2023    Patient Name:  Callum Martinez    :  1955  MRN: [id number]  Restrictions/Precautions:    Medical/Treatment Diagnosis Information:  Medical Diagnosis: M25.561 (ICD-10-CM) - Right knee pain, unspecified chronicity, M22.42 (ICD-10-CM) - Chondromalacia of left patella, M17.12 (ICD-10-CM) - Primary osteoarthritis of left knee  Treatment Diagnosis: Z96.659 - Presence of unspecified artificial knee joint, M25.561 - Right knee pain, M25.671 - Right knee stiffness. Insurance information: Medicare. $0/$226.00 Individual Deductible. 20% co-insurance. BMN. Physician Information: Rebecca Domínguez MD      Has the plan of care been signed (Y/N):        [x]  Yes  []  No     Date of Patient follow up with Physician: PRN with Dr. Rebecca Domínguez. Is this a Progress Report:     []  Yes  [x]  No        If Yes:  Date Range for reporting period:  Beginnin23  Ending:     Progress report will be due (10 Rx or 30 days whichever is less):        Recertification will be due (POC Duration  / 90 days whichever is less): 23      Visit # Insurance Allowable Auth Required   In-person 4 BMN []  Yes [x]  No    Telehealth   []  Yes []  No    Total          Functional Scale: LEFS 54/80   Date assessed: 23       Number of Comorbidities:  []0     [x]1-2    []3+    Latex Allergy:  [x]NO      []YES  Preferred Language for Healthcare:   [x]English       []other:      Pain level:  0-3/10     SUBJECTIVE:  The patient states they are going into work today for the first time. The patient states they were sore on Saturday (PT visit was on Friday).      OBJECTIVE:   Observation:   Test measurements:        23 ROM LEFT RIGHT RIGHT   Knee ext 0 degrees Lacking 12 degrees Lacking 3 degrees   Knee Flex 142 degrees 105 degrees 125 degrees   Strength  LEFT RIGHT    HIP Flexors 4+/5 4/5    HIP Abductors   4/5    HIP Ext        Hip ER        Knee EXT (quad) 5/5 5/5    Knee Flex (HS) 5/5 4+/5    Ankle DF 5/5 5/5    Ankle PF 5/5 5/5       L/R   SL balance - firm   18/6 seconds        RESTRICTIONS/PRECAUTIONS: OA in L knee. R total knee arthroplasty 11/07/22.       According to Dr. Tsering Jiang note on 02/28/02: \"Right Knee I reassured her that likely no damage was done to her right knee replacement from her traumatic incident however I am concerned that she might of sprained her knee resulting in some increased laxity\"    Exercises/Interventions:      01/20/23 01/16/23 01/13/23 01/09/23    Therapeutic Ex (41423) Sets/Reps Sets/Reps Sets/Reps Sets/Reps Notes/CUES   Supine quad set  3 x 10  3 x 10    Claim shell  2 x 10; GTB      Bridge  2 x 10; GTB around knees   01/16/23: no abd. today   Mini squat    3 x 10 Chair for cue, minimal contact   Calf stretch at stairs    3 x 30\"    LAQ    2 x 10; black TB    Seated hamstring stretch    3 x 30\"    Hamstring curl   1 x 15; #25     Eccentric SL hamstring curl 3 x 10; #25 3 x 8; #25 3 x 8; #25  Double leg concentric, SL lowering   Incline stretch 3 x 30\"  3 x 30\" 3 x 30\"     [facility] deadlift  2 x 12       Leg press   2 x 8 #80  With leg kicks   Patient education   5 min 8 min Review of HEP, progression and digression including performing HEP once daily, adding SL to program           Manual Intervention (12665)        Knee mobilizations for R knee extension  6 min 7 min; grades II-III 8 min; grades II-III    Knee mobilizations for R knee flexion   4 min      Knee range of motion (flexion and extension)  3 min      STM to R quadriceps   7 min                     NMR re-education (51089)        SL balance - firm eyes open   3 x 30\" each      [facility] walking   4 x 15 yards  Cuing for slowing, CGA.                      HEP:  Access Code: [email or web address]  URL: [email or web address]  Date: 01/09/2023  Prepared by: Guy Rosenberg    Exercises  Supine Quad Set - 1 x daily - 7 x weekly - 3 sets - 10-15 reps  Mini Squat with Counter Support - 1 x daily - 7 x weekly - 3 sets - 10-15 reps  Standing Soleus Stretch on Step - 1 x daily - 7 x weekly - 3-5 sets - 30 seconds hold  Sitting Knee Extension with Resistance - 1 x daily - 7 x weekly - 3 sets - 10-15 reps  Seated Hamstring Stretch - 1 x daily - 7 x weekly - 3-5 sets - 30 seconds hold  Seated Passive Knee Extension with Weight - 1 x daily - 7 x weekly - 1 sets - 5 minutes hold      Therapeutic Exercise and NMR EXR  [x] (14257) Provided verbal/tactile cueing for activities related to strengthening, flexibility, endurance, ROM for improvements in LE, proximal hip, and core control with self care, mobility, lifting, ambulation. [x] (11455) Provided verbal/tactile cueing for activities related to improving balance, coordination, kinesthetic sense, posture, motor skill, proprioception  to assist with LE, proximal hip, and core control in self care, mobility, lifting, ambulation and eccentric single leg control.      NMR and Therapeutic Activities:    [] (61915 or 57213) Provided verbal/tactile cueing for activities related to improving balance, coordination, kinesthetic sense, posture, motor skill, proprioception and motor activation to allow for proper function of core, proximal hip and LE with self care and ADLs  [] (21787) Gait Re-education- Provided training and instruction to the patient for proper LE, core and proximal hip recruitment and positioning and eccentric body weight control with ambulation re-education including up and down stairs     Home Exercise Program:    [x] (22425) Reviewed/Progressed HEP activities related to strengthening, flexibility, endurance, ROM of core, proximal hip and LE for functional self-care, mobility, lifting and ambulation/stair navigation   [x] (16026)Reviewed/Progressed HEP activities related to improving balance, coordination, kinesthetic sense, posture, motor skill, proprioception of core, proximal hip and LE for self care, mobility, lifting, and ambulation/stair navigation      Manual Treatments:  PROM / STM / Oscillations-Mobs:  G-I, II, III, IV (PA's, Inf., Post.)  [x] (54263) Provided manual therapy to mobilize LE, proximal hip and/or LS spine soft tissue/joints for the purpose of modulating pain, promoting relaxation,  increasing ROM, reducing/eliminating soft tissue swelling/inflammation/restriction, improving soft tissue extensibility and allowing for proper ROM for normal function with self care, mobility, lifting and ambulation. Modalities:     [] GAME READY (VASO)- for significant edema, swelling, pain control. Charges  Timed Code Treatment Minutes: 30   Total Treatment Minutes: 35     Medicare total (add KX at $2000)         [] EVAL (LOW) 26405   [] EVAL (MOD) 02165  [] EVAL (HIGH) 68934   [] RE-EVAL     [x] OX(80544) x  1   [] IONTO  [] NMR (44343) x     [] VASO  [x] Manual (63149) x 1    [] Other:  [] TA x      [] Mech Traction (96910)  [] ES(attended) (36997)     [] ES (un) (34187):       GOALS:  Patient stated goal: \"To walk without pain\". [] Progressing: [] Met: [] Not Met: [] Adjusted     Therapist goals for Patient:   Short Term Goals: To be achieved in: 2 weeks  1. Independent in HEP and progression per patient tolerance, in order to prevent re-injury. [] Progressing: [] Met: [] Not Met: [] Adjusted  2. Patient will have a decrease in pain to facilitate improvement in movement, function, and ADLs as indicated by Functional Deficits. [] Progressing: [] Met: [] Not Met: [] Adjusted     Long Term Goals: To be achieved in: 10 weeks  1. Disability index score of 90% or more per LEFS to assist with reaching prior level of function. [] Progressing: [] Met: [] Not Met: [] Adjusted  2.  Patient will demonstrate increased AROM to 120 degrees or more in knee flexion to allow for proper joint functioning as indicated by patients Functional Deficits. [] Progressing: [] Met: [] Not Met: [] Adjusted  3. Patient will demonstrate an increase in Strength to 5/5 RLE MMTs to allow for proper functional mobility as indicated by patients Functional Deficits. [] Progressing: [] Met: [] Not Met: [] Adjusted  4. Patient will return to work functional activities without increased symptoms or restriction. [] Progressing: [] Met: [] Not Met: [] Adjusted  5. Patient will demonstrate increased ROM to 0 degrees or more in knee extension (resting) to allow for proper joint functioning as indicated by patients Functional Deficits. [] Progressing: [] Met: [] Not Met: [] Adjusted         Progression Towards Functional goals:  [] Patient is progressing as expected towards functional goals listed. [] Progression is slowed due to complexities listed. [] Progression has been slowed due to co-morbidities. [x] Plan just implemented, too soon to assess goals progression  [] Other:         Overall Progression Towards Functional goals/ Treatment Progress Update:  [] Patient is progressing as expected towards functional goals listed. [] Progression is slowed due to complexities/Impairments listed. [] Progression has been slowed due to co-morbidities.   [x] Plan just implemented, too soon to assess goals progression <30days   [] Goals require adjustment due to lack of progress  [] Patient is not progressing as expected and requires additional follow up with physician  [] Other    Prognosis for POC: [x] Good [] Fair  [] Poor      Patient requires continued skilled intervention: [x] Yes  [] No    Treatment/Activity Tolerance:  [x] Patient able to complete treatment  [] Patient limited by fatigue  [] Patient limited by pain    [] Patient limited by other medical complications  [] Other:     ASSESSMENT: The patient did well today as the patient reports minimal pain and discomfort (terminal knee extension). The patient reports pain with end range knee extension with overpressure. Knee extension ROM is improving and plan to reassess at next visit. The patient was able to introduced to new exercises today. The patient would continue to benefit from skilled physical therapy to return to PLOF. Return to Play: (if applicable)   []  Stage 1: Intro to Strength   []  Stage 2: Return to Run and Strength   []  Stage 3: Return to Jump and Strength   []  Stage 4: Dynamic Strength and Agility   []  Stage 5: Sport Specific Training     []  Ready to Return to Play, Meets All Above Stages   []  Not Ready for Return to Sports   Comments:                               PLAN: 1-2 days per week for 10 weeks. Plan to do once a week this week and possibly doing once a week following. [] Continue per plan of care [] Alter current plan (see comments above)  [x] Plan of care initiated [] Hold pending MD visit [] Discharge      Electronically signed by:  Boone Andres, PT, DPT, CSCS    Note: If patient does not return for scheduled/ recommended follow up visits, this note will serve as a discharge from care along with most recent update on progress.

## 2023-01-23 ENCOUNTER — HOSPITAL ENCOUNTER (OUTPATIENT)
Dept: PHYSICAL THERAPY | Age: 68
Setting detail: THERAPIES SERIES
Discharge: HOME | End: 2023-01-23
Payer: MEDICARE

## 2023-01-23 NOTE — FLOWSHEET NOTE
[facility],  [address]        Physical Therapy  Cancellation/No-show Note  Patient Name:  Majo Genao  :  1955   Date:  2023  Cancelled visits to date: 1  No-shows to date: 0    For today's appointment patient:  [x]  Cancelled  []  Rescheduled appointment  []  No-show     Reason given by patient:  [x]  Patient ill  []  Conflicting appointment  []  No transportation    []  Conflict with work  []  No reason given  []  Other:     Comments:      Electronically signed by:  Marcia House, PT, DPT, CSCS

## 2023-01-24 ENCOUNTER — APPOINTMENT (OUTPATIENT)
Dept: PHYSICAL THERAPY | Age: 68
End: 2023-01-24
Payer: MEDICARE

## 2023-01-27 ENCOUNTER — HOSPITAL ENCOUNTER (OUTPATIENT)
Dept: PHYSICAL THERAPY | Age: 68
Setting detail: THERAPIES SERIES
Discharge: HOME | End: 2023-01-27
Payer: MEDICARE

## 2023-01-27 PROCEDURE — 97110 THERAPEUTIC EXERCISES: CPT

## 2023-01-27 NOTE — FLOWSHEET NOTE
[facility],  [address]  Phone: [phone]  Fax [phone]    Physical Therapy Treatment Note/ Progress Report:           Date:  2023    Patient Name:  Fabian Cedeño    :  1955  MRN: [id number]  Restrictions/Precautions:    Medical/Treatment Diagnosis Information:  Medical Diagnosis: M25.561 (ICD-10-CM) - Right knee pain, unspecified chronicity, M22.42 (ICD-10-CM) - Chondromalacia of left patella, M17.12 (ICD-10-CM) - Primary osteoarthritis of left knee  Treatment Diagnosis: Z96.659 - Presence of unspecified artificial knee joint, M25.561 - Right knee pain, M25.671 - Right knee stiffness. Insurance information: Medicare. $0/$226.00 Individual Deductible. 20% co-insurance. BMN. Physician Information: Debbie Gonzalez MD      Has the plan of care been signed (Y/N):        [x]  Yes  []  No     Date of Patient follow up with Physician: PRN with Dr. Debbie Gonzalez. Is this a Progress Report:     []  Yes  [x]  No        If Yes:  Date Range for reporting period:  Beginnin23  Ending:     Progress report will be due (10 Rx or 30 days whichever is less):        Recertification will be due (POC Duration  / 90 days whichever is less): 23      Visit # Insurance Allowable Auth Required   In-person 5 BMN []  Yes [x]  No    Telehealth   []  Yes []  No    Total          Functional Scale: LEFS 54/80   Date assessed: 23       Number of Comorbidities:  []0     [x]1-2    []3+    Latex Allergy:  [x]NO      []YES  Preferred Language for Healthcare:   [x]English       []other:      Pain level:  0-3/10     SUBJECTIVE:  The patient states they were sore after working for the first time this week.      OBJECTIVE:   Observation:   Test measurements:        23   ROM LEFT RIGHT RIGHT   Knee ext 0 degrees Lacking 12 degrees Lacking 3 degrees   Knee Flex 142 degrees 105 degrees 125 degrees   Strength  LEFT RIGHT    HIP Flexors 4+/5 4/5    HIP Abductors   4/5    HIP Ext        Hip ER        Knee EXT (quad) 5/5 5/5    Knee Flex (HS) 5/5 4+/5    Ankle DF 5/5 5/5    Ankle PF 5/5 5/5       L/R   SL balance - firm   18/6 seconds        RESTRICTIONS/PRECAUTIONS: OA in L knee. R total knee arthroplasty 11/07/22.       According to Dr. Angy Castañeda note on 27/34/71: \"Right Knee I reassured her that likely no damage was done to her right knee replacement from her traumatic incident however I am concerned that she might of sprained her knee resulting in some increased laxity\"    Exercises/Interventions:      01/27/23 01/20/23 01/16/23 01/13/23 01/09/23    Therapeutic Ex (75442) Sets/Reps Sets/Reps Sets/Reps Sets/Reps Sets/Reps Notes/CUES   Recumbent bike 5 min; level 3        Supine quad set   3 x 10  3 x 10    Claim shell   2 x 10; GTB      Bridge   2 x 10; GTB around knees   01/16/23: no abd. today   Mini squat     3 x 10 Chair for cue, minimal contact   Calf stretch at stairs     3 x 30\"    LAQ     2 x 10; black TB    Seated hamstring stretch     3 x 30\"    Hamstring curl    1 x 15; #25     Leg Press         Eccentric SL hamstring curl 3 x 10; #25 3 x 10; #25 3 x 8; #25 3 x 8; #25  Double leg concentric, SL lowering   Incline stretch 4 x 30\" 3 x 30\"  3 x 30\" 3 x 30\"     [facility]   2 x 12       Leg press 4 x 8; #80   2 x 8 #80  With leg kicks   TKE - standing 3 x 15; blue TB        Patient education    5 min 8 min Review of HEP, progression and digression including performing HEP once daily, adding SL to program            Manual Intervention (70646)         Knee mobilizations for R knee extension  8 min 6 min 7 min; grades II-III 8 min; grades II-III    Knee mobilizations for R knee flexion   - 4 min      Knee range of motion (flexion and extension)  5 min 3 min      STM to R quadriceps    7 min                       NMR re-education (82589)         SL balance - firm eyes open    3 x 30\" each      [facility] walking    4 x 15 yards  Cuing for slowing, CGA. HEP:  Access Code: [email or web address]  URL: [email or web address]  Date: 01/09/2023  Prepared by: Daniela Oar    Exercises  Supine Quad Set - 1 x daily - 7 x weekly - 3 sets - 10-15 reps  Mini Squat with Counter Support - 1 x daily - 7 x weekly - 3 sets - 10-15 reps  Standing Soleus Stretch on Step - 1 x daily - 7 x weekly - 3-5 sets - 30 seconds hold  Sitting Knee Extension with Resistance - 1 x daily - 7 x weekly - 3 sets - 10-15 reps  Seated Hamstring Stretch - 1 x daily - 7 x weekly - 3-5 sets - 30 seconds hold  Seated Passive Knee Extension with Weight - 1 x daily - 7 x weekly - 1 sets - 5 minutes hold      Therapeutic Exercise and NMR EXR  [x] (63218) Provided verbal/tactile cueing for activities related to strengthening, flexibility, endurance, ROM for improvements in LE, proximal hip, and core control with self care, mobility, lifting, ambulation.  [] (23488) Provided verbal/tactile cueing for activities related to improving balance, coordination, kinesthetic sense, posture, motor skill, proprioception  to assist with LE, proximal hip, and core control in self care, mobility, lifting, ambulation and eccentric single leg control.      NMR and Therapeutic Activities:    [] (34915 or 61102) Provided verbal/tactile cueing for activities related to improving balance, coordination, kinesthetic sense, posture, motor skill, proprioception and motor activation to allow for proper function of core, proximal hip and LE with self care and ADLs  [] (38728) Gait Re-education- Provided training and instruction to the patient for proper LE, core and proximal hip recruitment and positioning and eccentric body weight control with ambulation re-education including up and down stairs     Home Exercise Program:    [x] (27879) Reviewed/Progressed HEP activities related to strengthening, flexibility, endurance, ROM of core, proximal hip and LE for functional self-care, mobility, lifting and ambulation/stair navigation   [] (30008)Reviewed/Progressed HEP activities related to improving balance, coordination, kinesthetic sense, posture, motor skill, proprioception of core, proximal hip and LE for self care, mobility, lifting, and ambulation/stair navigation      Manual Treatments:  PROM / STM / Oscillations-Mobs:  G-I, II, III, IV (PA's, Inf., Post.)  [] (20725) Provided manual therapy to mobilize LE, proximal hip and/or LS spine soft tissue/joints for the purpose of modulating pain, promoting relaxation,  increasing ROM, reducing/eliminating soft tissue swelling/inflammation/restriction, improving soft tissue extensibility and allowing for proper ROM for normal function with self care, mobility, lifting and ambulation. Modalities:     [] GAME READY (VASO)- for significant edema, swelling, pain control. Charges  Timed Code Treatment Minutes: 30   Total Treatment Minutes: 30     Medicare total (add KX at $2000)         [] EVAL (LOW) 72247   [] EVAL (MOD) 95523  [] EVAL (HIGH) 03120   [] RE-EVAL     [x] HR(04608) x  2   [] IONTO  [] NMR (97192) x     [] VASO  [] Manual (69450) x     [] Other:  [] TA x      [] Mech Traction (53311)  [] ES(attended) (78362)     [] ES (un) (90891):       GOALS:  Patient stated goal: \"To walk without pain\". [] Progressing: [] Met: [] Not Met: [] Adjusted     Therapist goals for Patient:   Short Term Goals: To be achieved in: 2 weeks  1. Independent in HEP and progression per patient tolerance, in order to prevent re-injury. [] Progressing: [] Met: [] Not Met: [] Adjusted  2. Patient will have a decrease in pain to facilitate improvement in movement, function, and ADLs as indicated by Functional Deficits. [] Progressing: [] Met: [] Not Met: [] Adjusted     Long Term Goals: To be achieved in: 10 weeks  1.  Disability index score of 90% or more per LEFS to assist with reaching prior level of function. [] Progressing: [] Met: [] Not Met: [] Adjusted  2. Patient will demonstrate increased AROM to 120 degrees or more in knee flexion to allow for proper joint functioning as indicated by patients Functional Deficits. [] Progressing: [] Met: [] Not Met: [] Adjusted  3. Patient will demonstrate an increase in Strength to 5/5 RLE MMTs to allow for proper functional mobility as indicated by patients Functional Deficits. [] Progressing: [] Met: [] Not Met: [] Adjusted  4. Patient will return to work functional activities without increased symptoms or restriction. [] Progressing: [] Met: [] Not Met: [] Adjusted  5. Patient will demonstrate increased ROM to 0 degrees or more in knee extension (resting) to allow for proper joint functioning as indicated by patients Functional Deficits. [] Progressing: [] Met: [] Not Met: [] Adjusted         Progression Towards Functional goals:  [] Patient is progressing as expected towards functional goals listed. [] Progression is slowed due to complexities listed. [] Progression has been slowed due to co-morbidities. [x] Plan just implemented, too soon to assess goals progression  [] Other:         Overall Progression Towards Functional goals/ Treatment Progress Update:  [] Patient is progressing as expected towards functional goals listed. [] Progression is slowed due to complexities/Impairments listed. [] Progression has been slowed due to co-morbidities.   [x] Plan just implemented, too soon to assess goals progression <30days   [] Goals require adjustment due to lack of progress  [] Patient is not progressing as expected and requires additional follow up with physician  [] Other    Prognosis for POC: [x] Good [] Fair  [] Poor      Patient requires continued skilled intervention: [x] Yes  [] No    Treatment/Activity Tolerance:  [x] Patient able to complete treatment  [] Patient limited by fatigue  [] Patient limited by pain    [] Patient limited by other medical complications  [] Other:     ASSESSMENT:  Today's session was focused on increasing knee extension ROM and strength. The patient was able to progress in select exercise exercises today. The patient was able to introduced to new exercises today. The patient would continue to benefit from skilled physical therapy to return to PLOF. Return to Play: (if applicable)   []  Stage 1: Intro to Strength   []  Stage 2: Return to Run and Strength   []  Stage 3: Return to Jump and Strength   []  Stage 4: Dynamic Strength and Agility   []  Stage 5: Sport Specific Training     []  Ready to Return to Play, Meets All Above Stages   []  Not Ready for Return to Sports   Comments:                               PLAN: 1-2 days per week for 10 weeks. Plan to do once a week this week and possibly doing once a week following. [x] Continue per plan of care [] Alter current plan (see comments above)  [] Plan of care initiated [] Hold pending MD visit [] Discharge      Electronically signed by:  Jesus Del Real, PT, DPT, CSCS    Note: If patient does not return for scheduled/ recommended follow up visits, this note will serve as a discharge from care along with most recent update on progress.

## 2023-01-30 ENCOUNTER — HOSPITAL ENCOUNTER (OUTPATIENT)
Dept: PHYSICAL THERAPY | Age: 68
Setting detail: THERAPIES SERIES
Discharge: HOME | End: 2023-01-30
Payer: MEDICARE

## 2023-01-30 PROCEDURE — 97110 THERAPEUTIC EXERCISES: CPT | Performed by: PHYSICAL THERAPIST

## 2023-01-30 NOTE — FLOWSHEET NOTE
[facility], [address]  Phone: [phone]  Fax [phone]    Physical Therapy Treatment Note/ Progress Report:           Date:  2023    Patient Name:  Senaida Seip    :  1955  MRN: [id number]  Restrictions/Precautions:    Medical/Treatment Diagnosis Information:  Medical Diagnosis: M25.561 (ICD-10-CM) - Right knee pain, unspecified chronicity, M22.42 (ICD-10-CM) - Chondromalacia of left patella, M17.12 (ICD-10-CM) - Primary osteoarthritis of left knee  Treatment Diagnosis: Z96.659 - Presence of unspecified artificial knee joint, M25.561 - Right knee pain, M25.671 - Right knee stiffness. Insurance information: Medicare. $0/$226.00 Individual Deductible. 20% co-insurance. BMN. Physician Information: Yousuf Gómez MD      Has the plan of care been signed (Y/N):        [x]  Yes  []  No     Date of Patient follow up with Physician: PRN with Dr. Yousuf Gómez. Is this a Progress Report:     []  Yes  [x]  No        If Yes:  Date Range for reporting period:  Beginnin23  Ending:     Progress report will be due (10 Rx or 30 days whichever is less):        Recertification will be due (POC Duration  / 90 days whichever is less): 23      Visit # Insurance Allowable Auth Required   In-person 6 BMN []  Yes [x]  No    Telehealth   []  Yes []  No    Total          Functional Scale: LEFS 54/80   Date assessed: 23       Number of Comorbidities:  []0     [x]1-2    []3+    Latex Allergy:  [x]NO      []YES  Preferred Language for Healthcare:   [x]English       []other:      Pain level:  0-3/10     SUBJECTIVE:  The patient states they were sore after working for the first time this week.      OBJECTIVE:   Observation:   Test measurements:        23   ROM LEFT RIGHT RIGHT   Knee ext 0 degrees Lacking 12 degrees Lacking 3 degrees   Knee Flex 142 degrees 105 degrees 125 degrees   Strength  LEFT RIGHT    HIP Flexors 4+/5 4/5    HIP Abductors   4/5    HIP Ext        Hip ER        Knee EXT (quad) 5/5 5/5    Knee Flex (HS) 5/5 4+/5    Ankle DF 5/5 5/5    Ankle PF 5/5 5/5       L/R   SL balance - firm   18/6 seconds        RESTRICTIONS/PRECAUTIONS: OA in L knee. R total knee arthroplasty 11/07/22.       According to Dr. Ambrosio Dior note on 03/03/91: \"Right Knee I reassured her that likely no damage was done to her right knee replacement from her traumatic incident however I am concerned that she might of sprained her knee resulting in some increased laxity\"    Exercises/Interventions:      01/30/23 01/27/23 01/20/23 01/16/23 01/13/23 01/09/23    Therapeutic Ex (66899)  Sets/Reps Sets/Reps Sets/Reps Sets/Reps Sets/Reps Notes/CUES   Recumbent bike 5 min;L3 5 min; level 3        Supine quad set    3 x 10  3 x 10    Claim shell    2 x 10; GTB      Bridge    2 x 10; GTB around knees   01/16/23: no abd. today   Mini squat      3 x 10 Chair for cue, minimal contact   Calf stretch at stairs      3 x 30\"    LAQ      2 x 10; black TB    Seated hamstring stretch      3 x 30\"    Hamstring curl     1 x 15; #25     Leg Press          Eccentric SL hamstring curl 3x10 ;25# 3 x 10; #25 3 x 10; #25 3 x 8; #25 3 x 8; #25  Double leg concentric, SL lowering   Incline stretch 4x30\" 4 x 30\" 3 x 30\"  3 x 30\" 3 x 30\"     [place] deadlift    2 x 12       Leg press 1x10 85#  3x10 90# 4 x 8; #80   2 x 8 #80  With leg kicks   TKE - standing YANELI 45# R/L  3x10 3 x 15; blue TB        Patient education     5 min 8 min Review of HEP, progression and digression including performing HEP once daily, adding SL to program             Manual Intervention (33704)          Knee mobilizations for R knee extension   8 min 6 min 7 min; grades II-III 8 min; grades II-III    Knee mobilizations for R knee flexion    - 4 min      Knee range of motion (flexion and extension) 5 min 3 min      STM to R quadriceps     7 min                         NMR re-education (30224)          SL balance - firm eyes open     3 x 30\" each      Cone walking     4 x 15 yards  Cuing for slowing, CGA. Pt edu re swelling and relationship to quad tone and function, cont. To utilize compression as needed to keep swelling to minumum 5'           HEP:  Access Code: [email or web address]  URL: [email or web address]  Date: 01/09/2023  Prepared by: Nadeen Scheuermann    Exercises  Supine Quad Set - 1 x daily - 7 x weekly - 3 sets - 10-15 reps  Mini Squat with Counter Support - 1 x daily - 7 x weekly - 3 sets - 10-15 reps  Standing Soleus Stretch on Step - 1 x daily - 7 x weekly - 3-5 sets - 30 seconds hold  Sitting Knee Extension with Resistance - 1 x daily - 7 x weekly - 3 sets - 10-15 reps  Seated Hamstring Stretch - 1 x daily - 7 x weekly - 3-5 sets - 30 seconds hold  Seated Passive Knee Extension with Weight - 1 x daily - 7 x weekly - 1 sets - 5 minutes hold      Therapeutic Exercise and NMR EXR  [x] (61904) Provided verbal/tactile cueing for activities related to strengthening, flexibility, endurance, ROM for improvements in LE, proximal hip, and core control with self care, mobility, lifting, ambulation.  [] (90118) Provided verbal/tactile cueing for activities related to improving balance, coordination, kinesthetic sense, posture, motor skill, proprioception  to assist with LE, proximal hip, and core control in self care, mobility, lifting, ambulation and eccentric single leg control.      NMR and Therapeutic Activities:    [] (53085 or 74418) Provided verbal/tactile cueing for activities related to improving balance, coordination, kinesthetic sense, posture, motor skill, proprioception and motor activation to allow for proper function of core, proximal hip and LE with self care and ADLs  [] (05633) Gait Re-education- Provided training and instruction to the patient for proper LE, core and proximal hip recruitment and positioning and eccentric body weight control with ambulation re-education including up and down stairs     Home Exercise Program:    [x] (90677) Reviewed/Progressed HEP activities related to strengthening, flexibility, endurance, ROM of core, proximal hip and LE for functional self-care, mobility, lifting and ambulation/stair navigation   [] (12745)Reviewed/Progressed HEP activities related to improving balance, coordination, kinesthetic sense, posture, motor skill, proprioception of core, proximal hip and LE for self care, mobility, lifting, and ambulation/stair navigation      Manual Treatments:  PROM / STM / Oscillations-Mobs:  G-I, II, III, IV (PA's, Inf., Post.)  [] (05844) Provided manual therapy to mobilize LE, proximal hip and/or LS spine soft tissue/joints for the purpose of modulating pain, promoting relaxation,  increasing ROM, reducing/eliminating soft tissue swelling/inflammation/restriction, improving soft tissue extensibility and allowing for proper ROM for normal function with self care, mobility, lifting and ambulation. Modalities:     [] GAME READY (VASO)- for significant edema, swelling, pain control. Charges  Timed Code Treatment Minutes: 35   Total Treatment Minutes: 35     Medicare total (add KX at $2000)         [] EVAL (LOW) 79572   [] EVAL (MOD) 86327  [] EVAL (HIGH) 87397   [] RE-EVAL     [x] NP(20114) x  2   [] IONTO  [] NMR (81367) x     [] VASO  [] Manual (27888) x     [] Other:  [] TA x      [] Mech Traction (89170)  [] ES(attended) (18498)     [] ES (un) (96768):       GOALS:  Patient stated goal: \"To walk without pain\". [] Progressing: [] Met: [] Not Met: [] Adjusted     Therapist goals for Patient:   Short Term Goals: To be achieved in: 2 weeks  1. Independent in HEP and progression per patient tolerance, in order to prevent re-injury. [] Progressing: [] Met: [] Not Met: [] Adjusted  2.  Patient will have a decrease in pain to facilitate improvement in movement, function, and ADLs as indicated by Functional Deficits. [] Progressing: [] Met: [] Not Met: [] Adjusted     Long Term Goals: To be achieved in: 10 weeks  1. Disability index score of 90% or more per LEFS to assist with reaching prior level of function. [] Progressing: [] Met: [] Not Met: [] Adjusted  2. Patient will demonstrate increased AROM to 120 degrees or more in knee flexion to allow for proper joint functioning as indicated by patients Functional Deficits. [] Progressing: [] Met: [] Not Met: [] Adjusted  3. Patient will demonstrate an increase in Strength to 5/5 RLE MMTs to allow for proper functional mobility as indicated by patients Functional Deficits. [] Progressing: [] Met: [] Not Met: [] Adjusted  4. Patient will return to work functional activities without increased symptoms or restriction. [] Progressing: [] Met: [] Not Met: [] Adjusted  5. Patient will demonstrate increased ROM to 0 degrees or more in knee extension (resting) to allow for proper joint functioning as indicated by patients Functional Deficits. [] Progressing: [] Met: [] Not Met: [] Adjusted         Progression Towards Functional goals:  [] Patient is progressing as expected towards functional goals listed. [] Progression is slowed due to complexities listed. [] Progression has been slowed due to co-morbidities. [x] Plan just implemented, too soon to assess goals progression  [] Other:         Overall Progression Towards Functional goals/ Treatment Progress Update:  [] Patient is progressing as expected towards functional goals listed. [] Progression is slowed due to complexities/Impairments listed. [] Progression has been slowed due to co-morbidities.   [x] Plan just implemented, too soon to assess goals progression <30days   [] Goals require adjustment due to lack of progress  [] Patient is not progressing as expected and requires additional follow up with physician  [] Other    Prognosis for POC: [x] Good [] Fair  [] Poor      Patient requires continued skilled intervention: [x] Yes  [] No    Treatment/Activity Tolerance:  [x] Patient able to complete treatment  [] Patient limited by fatigue  [] Patient limited by pain    [] Patient limited by other medical complications  [] Other:     ASSESSMENT:  pt. Tolerated ex. Well, requires cues for control with LP and CCTKE. She did report soreness in the knee post ex. But that she felt challenged with the ex. Cont. Skilled PT to further address deficits. Return to Play: (if applicable)   []  Stage 1: Intro to Strength   []  Stage 2: Return to Run and Strength   []  Stage 3: Return to Jump and Strength   []  Stage 4: Dynamic Strength and Agility   []  Stage 5: Sport Specific Training     []  Ready to Return to Play, Meets All Above Stages   []  Not Ready for Return to Sports   Comments:                               PLAN: 1-2 days per week for 10 weeks. Plan to do once a week this week and possibly doing once a week following. [x] Continue per plan of care [] Alter current plan (see comments above)  [] Plan of care initiated [] Hold pending MD visit [] Discharge      Electronically signed by:  Reed Cabot, PT, MSPT, OMT-C [id number]      Note: If patient does not return for scheduled/ recommended follow up visits, this note will serve as a discharge from care along with most recent update on progress.

## 2023-01-31 ENCOUNTER — APPOINTMENT (OUTPATIENT)
Dept: PHYSICAL THERAPY | Age: 68
End: 2023-01-31
Payer: MEDICARE

## 2023-02-03 ENCOUNTER — HOSPITAL ENCOUNTER (OUTPATIENT)
Dept: PHYSICAL THERAPY | Age: 68
Setting detail: THERAPIES SERIES
Discharge: HOME | End: 2023-02-03
Payer: MEDICARE

## 2023-02-03 PROCEDURE — 97140 MANUAL THERAPY 1/> REGIONS: CPT

## 2023-02-03 PROCEDURE — 97110 THERAPEUTIC EXERCISES: CPT

## 2023-02-03 NOTE — FLOWSHEET NOTE
[facility], [address]  Phone: [phone]  Fax [phone]    Physical Therapy Treatment Note/ Progress Report:           Date:  2/3/2023    Patient Name:  Ru Rosario    :  1955  MRN: [id number]  Restrictions/Precautions:    Medical/Treatment Diagnosis Information:  Medical Diagnosis: M25.561 (ICD-10-CM) - Right knee pain, unspecified chronicity, M22.42 (ICD-10-CM) - Chondromalacia of left patella, M17.12 (ICD-10-CM) - Primary osteoarthritis of left knee  Treatment Diagnosis: Z96.659 - Presence of unspecified artificial knee joint, M25.561 - Right knee pain, M25.671 - Right knee stiffness. Insurance information: Medicare. $0/$226.00 Individual Deductible. 20% co-insurance. BMN. Physician Information: Dorothy Merino MD      Has the plan of care been signed (Y/N):        [x]  Yes  []  No     Date of Patient follow up with Physician: PRN with Dr. Dorothy Merino. Is this a Progress Report:     []  Yes  [x]  No        If Yes:  Date Range for reporting period:  Beginnin23  Ending:     Progress report will be due (10 Rx or 30 days whichever is less):        Recertification will be due (POC Duration  / 90 days whichever is less): 23      Visit # Insurance Allowable Auth Required   In-person 7 BMN []  Yes [x]  No    Telehealth   []  Yes []  No    Total          Functional Scale: LEFS    Date assessed: 23       Number of Comorbidities:  []0     [x]1-2    []3+    Latex Allergy:  [x]NO      []YES  Preferred Language for Healthcare:   [x]English       []other:      Pain level:       SUBJECTIVE:  The patient states they are slightly sore this week as they are working and seeing their grandchildren. The patient reports not performing HEP exercises after a long day at work.      OBJECTIVE:   Observation:     23 - slight antalgic gait with decreased knee extension with initial contact through midstance     Test measurements:      02/03/23 - Knee extension (resting ROM) - lacking 3 degrees. With overpressure 0 degrees. 01/09/23 01/09/23 01/13/23   ROM LEFT RIGHT RIGHT   Knee ext 0 degrees Lacking 12 degrees Lacking 3 degrees   Knee Flex 142 degrees 105 degrees 125 degrees   Strength  LEFT RIGHT    HIP Flexors 4+/5 4/5    HIP Abductors   4/5    HIP Ext        Hip ER        Knee EXT (quad) 5/5 5/5    Knee Flex (HS) 5/5 4+/5    Ankle DF 5/5 5/5    Ankle PF 5/5 5/5       L/R   SL balance - firm   18/6 seconds        RESTRICTIONS/PRECAUTIONS: OA in L knee. R total knee arthroplasty 11/07/22.       According to Dr. Zo Barton note on 38/75/63: \"Right Knee I reassured her that likely no damage was done to her right knee replacement from her traumatic incident however I am concerned that she might of sprained her knee resulting in some increased laxity\"    Exercises/Interventions:      02/03/23 01/30/23 01/27/23 01/20/23 01/16/23 01/13/23 01/09/23    Therapeutic Ex (31146) Sets/Reps  Sets/Reps Sets/Reps Sets/Reps Sets/Reps Sets/Reps Notes/CUES   Recumbent bike  5 min;L3 5 min; level 3        Supine quad set     3 x 10  3 x 10    Claim shell     2 x 10; GTB      Bridge     2 x 10; GTB around knees   01/16/23: no abd. today   Mini squat       3 x 10 Chair for cue, minimal contact   Calf stretch at stairs       3 x 30\"    LAQ       2 x 10; black TB    Seated hamstring stretch       3 x 30\"    Hamstring curl      1 x 15; #25     Leg Press           Eccentric SL hamstring curl 3 x 10; #30 3x10 ;25# 3 x 10; #25 3 x 10; #25 3 x 8; #25 3 x 8; #25  Double leg concentric, SL lowering   Incline stretch 4 x 30\" 4x30\" 4 x 30\" 3 x 30\"  3 x 30\" 3 x 30\"     Hamstring stretch at stairs 3 x 30\"           Hip hinge 1 x 10          [place] deadlift  2 x 10; #20 KB   2 x 12       Leg press  1x10 85#  3x10 90# 4 x 8; #80   2 x 8 #80  With leg kicks   TKE - standing  YANELI 45# R/L  3x10 3 x 15; blue TB        Patient education 2 min     5 min 8 min Review of HEP exercises and the importance of working on knee extension and weighted knee extension hangs. Manual Intervention (36748)           Knee mobilizations for R knee extension 12 min   8 min 6 min 7 min; grades II-III 8 min; grades II-III    Hamstring stretch  4 x 30\"          Knee mobilizations for R knee flexion     - 4 min      Knee range of motion (flexion and extension)    5 min 3 min      STM to R quadriceps      7 min                           NMR re-education (47666)           SL balance - firm eyes open      3 x 30\" each      Cone walking      4 x 15 yards  Cuing for slowing, CGA. Pt edu re swelling and relationship to quad tone and function, cont. To utilize compression as needed to keep swelling to minumum  5'           HEP:  Access Code: [email or web address]  URL: [email or web address]  Date: 01/09/2023  Prepared by: Jaxon Coley    Exercises  Supine Quad Set - 1 x daily - 7 x weekly - 3 sets - 10-15 reps  Mini Squat with Counter Support - 1 x daily - 7 x weekly - 3 sets - 10-15 reps  Standing Soleus Stretch on Step - 1 x daily - 7 x weekly - 3-5 sets - 30 seconds hold  Sitting Knee Extension with Resistance - 1 x daily - 7 x weekly - 3 sets - 10-15 reps  Seated Hamstring Stretch - 1 x daily - 7 x weekly - 3-5 sets - 30 seconds hold  Seated Passive Knee Extension with Weight - 1 x daily - 7 x weekly - 1 sets - 5 minutes hold      Therapeutic Exercise and NMR EXR  [x] (08686) Provided verbal/tactile cueing for activities related to strengthening, flexibility, endurance, ROM for improvements in LE, proximal hip, and core control with self care, mobility, lifting, ambulation.  [] (16615) Provided verbal/tactile cueing for activities related to improving balance, coordination, kinesthetic sense, posture, motor skill, proprioception  to assist with LE, proximal hip, and core control in self care, mobility, lifting, ambulation and eccentric single leg control. NMR and Therapeutic Activities:    [] (49454 or 21561) Provided verbal/tactile cueing for activities related to improving balance, coordination, kinesthetic sense, posture, motor skill, proprioception and motor activation to allow for proper function of core, proximal hip and LE with self care and ADLs  [] (61942) Gait Re-education- Provided training and instruction to the patient for proper LE, core and proximal hip recruitment and positioning and eccentric body weight control with ambulation re-education including up and down stairs     Home Exercise Program:    [x] (57991) Reviewed/Progressed HEP activities related to strengthening, flexibility, endurance, ROM of core, proximal hip and LE for functional self-care, mobility, lifting and ambulation/stair navigation   [] (59082)Reviewed/Progressed HEP activities related to improving balance, coordination, kinesthetic sense, posture, motor skill, proprioception of core, proximal hip and LE for self care, mobility, lifting, and ambulation/stair navigation      Manual Treatments:  PROM / STM / Oscillations-Mobs:  G-I, II, III, IV (PA's, Inf., Post.)  [x] (54645) Provided manual therapy to mobilize LE, proximal hip and/or LS spine soft tissue/joints for the purpose of modulating pain, promoting relaxation,  increasing ROM, reducing/eliminating soft tissue swelling/inflammation/restriction, improving soft tissue extensibility and allowing for proper ROM for normal function with self care, mobility, lifting and ambulation. Modalities:     [] GAME READY (VASO)- for significant edema, swelling, pain control.      Charges  Timed Code Treatment Minutes: 30   Total Treatment Minutes: 30     Medicare total (add KX at $2000)         [] EVAL (LOW) 67843   [] EVAL (MOD) 00563  [] EVAL (HIGH) 38285   [] RE-EVAL     [x] CO(08343) x  1   [] IONTO  [] NMR (50611) x     [] VASO  [x] Manual (28248) x  1 [] Other:  [] TA x      [] Mech Traction (20676)  [] ES(attended) (98479)     [] ES (un) (97926):       GOALS:  Patient stated goal: \"To walk without pain\". [] Progressing: [] Met: [] Not Met: [] Adjusted     Therapist goals for Patient:   Short Term Goals: To be achieved in: 2 weeks  1. Independent in HEP and progression per patient tolerance, in order to prevent re-injury. [] Progressing: [] Met: [] Not Met: [] Adjusted  2. Patient will have a decrease in pain to facilitate improvement in movement, function, and ADLs as indicated by Functional Deficits. [] Progressing: [] Met: [] Not Met: [] Adjusted     Long Term Goals: To be achieved in: 10 weeks  1. Disability index score of 90% or more per LEFS to assist with reaching prior level of function. [] Progressing: [] Met: [] Not Met: [] Adjusted  2. Patient will demonstrate increased AROM to 120 degrees or more in knee flexion to allow for proper joint functioning as indicated by patients Functional Deficits. [] Progressing: [] Met: [] Not Met: [] Adjusted  3. Patient will demonstrate an increase in Strength to 5/5 RLE MMTs to allow for proper functional mobility as indicated by patients Functional Deficits. [] Progressing: [] Met: [] Not Met: [] Adjusted  4. Patient will return to work functional activities without increased symptoms or restriction. [] Progressing: [] Met: [] Not Met: [] Adjusted  5. Patient will demonstrate increased ROM to 0 degrees or more in knee extension (resting) to allow for proper joint functioning as indicated by patients Functional Deficits. [] Progressing: [] Met: [] Not Met: [] Adjusted         Progression Towards Functional goals:  [] Patient is progressing as expected towards functional goals listed. [] Progression is slowed due to complexities listed. [] Progression has been slowed due to co-morbidities.   [x] Plan just implemented, too soon to assess goals progression  [] Other:         Overall Progression Towards Functional goals/ Treatment Progress Update:  [] Patient is progressing as expected towards functional goals listed.    [] Progression is slowed due to complexities/Impairments listed.  [] Progression has been slowed due to co-morbidities.  [x] Plan just implemented, too soon to assess goals progression <30days   [] Goals require adjustment due to lack of progress  [] Patient is not progressing as expected and requires additional follow up with physician  [] Other    Prognosis for POC: [x] Good [] Fair  [] Poor      Patient requires continued skilled intervention: [x] Yes  [] No    Treatment/Activity Tolerance:  [x] Patient able to complete treatment  [] Patient limited by fatigue  [] Patient limited by pain    [] Patient limited by other medical complications  [] Other:     ASSESSMENT:  Patient was able to complete exercises today without complaints of paint. The patient reports pain with end range extension in the posterior aspect of the knee. Focused on increasing knee extension ROM today and stretching posterior chain.  Cont. Skilled PT to further address deficits.    Return to Play: (if applicable)   []  Stage 1: Intro to Strength   []  Stage 2: Return to Run and Strength   []  Stage 3: Return to Jump and Strength   []  Stage 4: Dynamic Strength and Agility   []  Stage 5: Sport Specific Training     []  Ready to Return to Play, Meets All Above Stages   []  Not Ready for Return to Sports   Comments:                               PLAN: 1-2 days per week for 10 weeks. Plan to do once a week this week and possibly doing once a week following.   [x] Continue per plan of care [] Alter current plan (see comments above)  [] Plan of care initiated [] Hold pending MD visit [] Discharge      Electronically signed by:  Shlomo Prado, PT, MSPT, OMT-C [id number]      Note: If patient does not return for scheduled/ recommended follow up visits, this note will serve as a discharge from care along with most recent update on  progress.

## 2023-02-06 ENCOUNTER — APPOINTMENT (OUTPATIENT)
Dept: PHYSICAL THERAPY | Age: 68
End: 2023-02-06
Payer: MEDICARE

## 2023-02-08 ENCOUNTER — HOSPITAL ENCOUNTER (OUTPATIENT)
Dept: PHYSICAL THERAPY | Age: 68
Setting detail: THERAPIES SERIES
Discharge: HOME | End: 2023-02-08
Payer: MEDICARE

## 2023-02-08 PROCEDURE — 97140 MANUAL THERAPY 1/> REGIONS: CPT

## 2023-02-08 PROCEDURE — 97110 THERAPEUTIC EXERCISES: CPT

## 2023-02-08 NOTE — FLOWSHEET NOTE
[facility], [address]  Phone: [phone]  Fax [phone]    Physical Therapy Re-Certification Plan of Brina Vaughan      Dear Dr. Isabela Grande MD,    We had the pleasure of treating the following patient for physical therapy services at [address] [place] [address]. A summary of our findings can be found in the updated assessment below. This includes our plan of care. If you have any questions or concerns regarding these findings, please do not hesitate to contact me at the office phone number checked above. Thank you for the referral.     Physician Signature:________________________________Date:__________________  By signing above (or electronic signature), therapists plan is approved by physician    Date Range Of Visits: 23 - 23  Total Visits to Date: 8  Overall Response to Treatment:   [x]Patient is responding well to treatment and improvement is noted with regards  to goals   []Patient should continue to improve in reasonable time if they continue HEP   []Patient has plateaued and is no longer responding to skilled PT intervention    []Patient is getting worse and would benefit from return to referring MD   []Patient unable to adhere to initial POC   []Other:           Physical Therapy Treatment Note/ Progress Report:     Date:  2023    Patient Name:  Stacey Guerrero    :  1955  MRN: [id number]  Restrictions/Precautions:    Medical/Treatment Diagnosis Information:  Medical Diagnosis: M25.561 (ICD-10-CM) - Right knee pain, unspecified chronicity, M22.42 (ICD-10-CM) - Chondromalacia of left patella, M17.12 (ICD-10-CM) - Primary osteoarthritis of left knee  Treatment Diagnosis: Z96.659 - Presence of unspecified artificial knee joint, M25.561 - Right knee pain, M25.671 - Right knee stiffness. Insurance information: Medicare. $0/$226.00 Individual Deductible. 20% co-insurance. BMN. Physician Information: Paco Cox MD      Has the plan of care been signed (Y/N):        [x]  Yes  []  No     Date of Patient follow up with Physician: PRN with Dr. Paco Cox. Is this a Progress Report:     [x]  Yes  []  No        If Yes:  Date Range for reporting period:  Beginnin23  Endin23    Progress report will be due (10 Rx or 30 days whichever is less):       Recertification will be due (POC Duration  / 90 days whichever is less): 23      Visit # Insurance Allowable Auth Required   In-person 8 BMN []  Yes [x]  No    Telehealth   []  Yes []  No    Total          Functional Scale: LEFS 54/80   Date assessed: 23  Functional Scale: LEFS 72/80 (10%)  Date assessed: 23       Number of Comorbidities:  []0     [x]1-2    []3+    Latex Allergy:  [x]NO      []YES  Preferred Language for Healthcare:   [x]English       []other:      Pain level:       SUBJECTIVE:  The patient states they are sore after last treatment at the anterior aspect of the knee and slight inferior to the patella. The patient reports being more functional. The patient states they have been trying to straighten their knee at home with prolong stretches.     OBJECTIVE:   Observation:     23 - slight antalgic gait with decreased knee extension with initial contact through midstance     Test measurements:        23   ROM LEFT RIGHT RIGHT RIGHT   Knee ext 0 degrees Lacking 12 degrees Lacking 3 degrees Lacking 1 degree    With overpressure 0 degrees   Knee Flex 142 degrees 105 degrees 125 degrees 126 degrees   Strength  LEFT RIGHT  RIGHT   HIP Flexors 4+/5 4/5  4+/5   HIP Abductors   4/5     HIP Ext         Knee EXT (quad) 5/5 5/5  5/5   Knee Flex (HS) 5/5 4+/5  5/5   Ankle DF 5/5 5/5  DNT   Ankle PF 5/5 5/5  DNT      L/R    SL balance - firm   18/6 seconds         RESTRICTIONS/PRECAUTIONS: OA in L knee. R total knee arthroplasty 11/07/22. According to Dr. Mehnaz Crisostomo note on 48/98/37: \"Right Knee I reassured her that likely no damage was done to her right knee replacement from her traumatic incident however I am concerned that she might of sprained her knee resulting in some increased laxity\"    Exercises/Interventions:      02/08/23 02/03/23 01/30/23 01/27/23 01/20/23 01/16/23 01/13/23 01/09/23    Therapeutic Ex (12817) Sets/Reps Sets/Reps  Sets/Reps Sets/Reps Sets/Reps Sets/Reps Sets/Reps Notes/CUES   Recumbent bike   5 min;L3 5 min; level 3        Supine quad set 3 x 10     3 x 10  3 x 10    Claim shell      2 x 10; GTB      Bridge      2 x 10; GTB around knees   01/16/23: no abd. today   Mini squat 3 x 10       3 x 10 Chair for cue, minimal contact   Calf stretch at stairs        3 x 30\"    LAQ        2 x 10; black TB    Seated hamstring stretch        3 x 30\"    Hamstring curl       1 x 15; #25     Eccentric SL hamstring curl  3 x 10; #30 3x10 ;25# 3 x 10; #25 3 x 10; #25 3 x 8; #25 3 x 8; #25  Double leg concentric, SL lowering   Incline stretch 4 x 30\" 4 x 30\" 4x30\" 4 x 30\" 3 x 30\"  3 x 30\" 3 x 30\"     Hamstring stretch at stairs  3 x 30\"           Hip hinge  1 x 10          Turkish deadlift  2 x 10; #20 KB 2 x 10; #20 KB   2 x 12       Leg press 3 x 10; #90  1x10 85#  3x10 90# 4 x 8; #80   2 x 8 #80  With leg kicks   TKE - standing   YANELI 45# R/L  3x10 3 x 15; blue TB        Patient education  2 min     5 min 8 min Review of HEP exercises and the importance of working on knee extension and weighted knee extension hangs.                 Manual Intervention (58878)            Knee mobilizations for R knee extension 5 min  Grade II-IV 12 min   8 min 6 min 7 min; grades II-III 8 min; grades II-III    Hamstring stretch   4 x 30\"          Manual quadriceps stretch 5 x 30\" with P-A mobilization           Knee mobilizations for R knee flexion  5 min  Grade II-IV    - 4 min      Knee range of motion (flexion and extension)     5 min 3 min      STM to R quadriceps       7 min                             NMR re-education (88901)            SL balance - firm eyes open       3 x 30\" each      Cone walking       4 x 15 yards  Cuing for slowing, CGA. Pt edu re swelling and relationship to quad tone and function, cont. To utilize compression as needed to keep swelling to minumum   5'           HEP:  Access Code: [email or web address]  URL: [email or web address]  Date: 01/09/2023  Prepared by: Reddy Crooks    Exercises  Supine Quad Set - 1 x daily - 7 x weekly - 3 sets - 10-15 reps  Mini Squat with Counter Support - 1 x daily - 7 x weekly - 3 sets - 10-15 reps  Standing Soleus Stretch on Step - 1 x daily - 7 x weekly - 3-5 sets - 30 seconds hold  Sitting Knee Extension with Resistance - 1 x daily - 7 x weekly - 3 sets - 10-15 reps  Seated Hamstring Stretch - 1 x daily - 7 x weekly - 3-5 sets - 30 seconds hold  Seated Passive Knee Extension with Weight - 1 x daily - 7 x weekly - 1 sets - 5 minutes hold      Therapeutic Exercise and NMR EXR  [x] (30325) Provided verbal/tactile cueing for activities related to strengthening, flexibility, endurance, ROM for improvements in LE, proximal hip, and core control with self care, mobility, lifting, ambulation.  [] (41714) Provided verbal/tactile cueing for activities related to improving balance, coordination, kinesthetic sense, posture, motor skill, proprioception  to assist with LE, proximal hip, and core control in self care, mobility, lifting, ambulation and eccentric single leg control.      NMR and Therapeutic Activities:    [] (28489 or 83985) Provided verbal/tactile cueing for activities related to improving balance, coordination, kinesthetic sense, posture, motor skill, proprioception and motor activation to allow for proper function of core, proximal hip and LE with self care and ADLs  [] (94761) Gait Re-education- Provided training and instruction to the patient for proper LE, core and proximal hip recruitment and positioning and eccentric body weight control with ambulation re-education including up and down stairs     Home Exercise Program:    [x] (93696) Reviewed/Progressed HEP activities related to strengthening, flexibility, endurance, ROM of core, proximal hip and LE for functional self-care, mobility, lifting and ambulation/stair navigation   [] (08560)Reviewed/Progressed HEP activities related to improving balance, coordination, kinesthetic sense, posture, motor skill, proprioception of core, proximal hip and LE for self care, mobility, lifting, and ambulation/stair navigation      Manual Treatments:  PROM / STM / Oscillations-Mobs:  G-I, II, III, IV (PA's, Inf., Post.)  [x] (56663) Provided manual therapy to mobilize LE, proximal hip and/or LS spine soft tissue/joints for the purpose of modulating pain, promoting relaxation,  increasing ROM, reducing/eliminating soft tissue swelling/inflammation/restriction, improving soft tissue extensibility and allowing for proper ROM for normal function with self care, mobility, lifting and ambulation. Modalities:     [] GAME READY (VASO)- for significant edema, swelling, pain control. Charges  Timed Code Treatment Minutes: 32   Total Treatment Minutes: 40     Medicare total (add KX at $2000)         [] EVAL (LOW) 41043   [] EVAL (MOD) 27915  [] EVAL (HIGH) 47533   [] RE-EVAL     [x] LK(07922) x  1   [] IONTO  [] NMR (57048) x     [] VASO  [x] Manual (29352) x  1   [] Other:  [] TA x      [] Mech Traction (60002)  [] ES(attended) (35676)     [] ES (un) (13837):       GOALS:  Patient stated goal: \"To walk without pain\". [x] Progressing: [] Met: [] Not Met: [] Adjusted     Therapist goals for Patient:   Short Term Goals: To be achieved in: 2 weeks  1. Independent in HEP and progression per patient tolerance, in order to prevent re-injury. [] Progressing: [x] Met: [] Not Met: [] Adjusted  2.  Patient will have a decrease in pain to facilitate improvement in movement, function, and ADLs as indicated by Functional Deficits. [] Progressing: [x] Met: [] Not Met: [] Adjusted     Long Term Goals: To be achieved in: 10 weeks  1. Disability index score of 90% or more per LEFS to assist with reaching prior level of function. [] Progressing: [x] Met: [] Not Met: [] Adjusted  2. Patient will demonstrate increased AROM to 120 degrees or more in knee flexion to allow for proper joint functioning as indicated by patients Functional Deficits. [] Progressing: [x] Met: [] Not Met: [] Adjusted  3. Patient will demonstrate an increase in Strength to 5/5 RLE MMTs to allow for proper functional mobility as indicated by patients Functional Deficits. [x] Progressing: [] Met: [] Not Met: [] Adjusted  4. Patient will return to work functional activities without increased symptoms or restriction. [x] Progressing: [] Met: [] Not Met: [] Adjusted  5. Patient will demonstrate increased ROM to 0 degrees or more in knee extension (resting) to allow for proper joint functioning as indicated by patients Functional Deficits. [x] Progressing: [] Met: [] Not Met: [] Adjusted       6. Patient will demonstrate increased AROM to 135 degrees or more in knee flexion to allow for proper joint functioning as indicated by patients Functional Deficits. [x] Progressing: [] Met: [] Not Met: [] Adjusted    Progression Towards Functional goals:  [x] Patient is progressing as expected towards functional goals listed. [] Progression is slowed due to complexities listed. [] Progression has been slowed due to co-morbidities. [] Plan just implemented, too soon to assess goals progression  [] Other:         Overall Progression Towards Functional goals/ Treatment Progress Update:  [] Patient is progressing as expected towards functional goals listed. [] Progression is slowed due to complexities/Impairments listed.   [] Progression has been slowed due to co-morbidities. [x] Plan just implemented, too soon to assess goals progression <30days   [] Goals require adjustment due to lack of progress  [] Patient is not progressing as expected and requires additional follow up with physician  [] Other    Prognosis for POC: [x] Good [] Fair  [] Poor      Patient requires continued skilled intervention: [x] Yes  [] No    Treatment/Activity Tolerance:  [x] Patient able to complete treatment  [] Patient limited by fatigue  [] Patient limited by pain     [] Patient limited by other medical complications  [] Other:     ASSESSMENT:  Patient was able to complete exercises today without complaints of pain. The patient reports pain with mobilizations for knee extension and flexion. Patient demonstrates guarding with mobilization techniques. The patient has decreased flexibility in their quadriceps and hypomobility in their patellofemoral joint, however is improving over time. The patient has an increase of outcome score compared to initial visit. Cont. Skilled PT to further address deficits. Return to Play: (if applicable)   []  Stage 1: Intro to Strength   []  Stage 2: Return to Run and Strength   []  Stage 3: Return to Jump and Strength   []  Stage 4: Dynamic Strength and Agility   []  Stage 5: Sport Specific Training     []  Ready to Return to Play, Meets All Above Stages   []  Not Ready for Return to Sports   Comments:                               PLAN: 1-2 days per week for 10 weeks. Plan to do once a week this week and possibly doing once a week following. [x] Continue per plan of care [] Alter current plan (see comments above)  [] Plan of care initiated [] Hold pending MD visit [] Discharge      Electronically signed by:  Ronda Gary, PT, DPT, CSCS      Note: If patient does not return for scheduled/ recommended follow up visits, this note will serve as a discharge from care along with most recent update on progress.

## 2023-02-09 ENCOUNTER — APPOINTMENT (OUTPATIENT)
Dept: PHYSICAL THERAPY | Age: 68
End: 2023-02-09
Payer: MEDICARE

## 2023-02-13 DIAGNOSIS — G89.29 CHRONIC PAIN OF LEFT KNEE: Primary | ICD-10-CM

## 2023-02-13 DIAGNOSIS — M25.561 CHRONIC PAIN OF RIGHT KNEE: ICD-10-CM

## 2023-02-13 DIAGNOSIS — G89.29 CHRONIC PAIN OF RIGHT KNEE: ICD-10-CM

## 2023-02-13 DIAGNOSIS — M25.562 CHRONIC PAIN OF LEFT KNEE: Primary | ICD-10-CM

## 2023-02-13 RX ORDER — DICLOFENAC SODIUM 75 MG/1
75 TABLET, DELAYED RELEASE ORAL 2 TIMES DAILY
Qty: 60 TABLET | Refills: 3 | Status: SHIPPED | OUTPATIENT
Start: 2023-02-13

## 2023-02-17 ENCOUNTER — APPOINTMENT (OUTPATIENT)
Dept: PHYSICAL THERAPY | Age: 68
End: 2023-02-17
Payer: MEDICARE

## 2023-02-20 ENCOUNTER — HOSPITAL ENCOUNTER (OUTPATIENT)
Dept: PHYSICAL THERAPY | Age: 68
Setting detail: THERAPIES SERIES
Discharge: HOME | End: 2023-02-20
Payer: MEDICARE

## 2023-02-20 PROCEDURE — 97140 MANUAL THERAPY 1/> REGIONS: CPT

## 2023-02-20 NOTE — FLOWSHEET NOTE
[facility], [address] [place]  Phone: [phone]  Fax [phone]        Physical Therapy Treatment Note/ Progress Report:     Date:  2023    Patient Name:  Rashawn Lima    :  1955  MRN: [id number]  Restrictions/Precautions:    Medical/Treatment Diagnosis Information:  Medical Diagnosis: M25.561 (ICD-10-CM) - Right knee pain, unspecified chronicity, M22.42 (ICD-10-CM) - Chondromalacia of left patella, M17.12 (ICD-10-CM) - Primary osteoarthritis of left knee  Treatment Diagnosis: Z96.659 - Presence of unspecified artificial knee joint, M25.561 - Right knee pain, M25.671 - Right knee stiffness. Insurance information: Medicare. $0/$226.00 Individual Deductible. 20% co-insurance. BMN. Physician Information: Elijah Stevens MD      Has the plan of care been signed (Y/N):        [x]  Yes  []  No     Date of Patient follow up with Physician: PRN with Dr. Elijah Stevens. Is this a Progress Report:     []  Yes  [x]  No        If Yes:  Date Range for reporting period:  Beginnin23  Ending:     Progress report will be due (10 Rx or 30 days whichever is less):       Recertification will be due (POC Duration  / 90 days whichever is less): 23      Visit # Insurance Allowable Auth Required   In-person 9 BMN []  Yes [x]  No    Telehealth   []  Yes []  No    Total          Functional Scale: LEFS 54/80   Date assessed: 23  Functional Scale: LEFS 72/80 (10%)  Date assessed: 23       Number of Comorbidities:  []0     [x]1-2    []3+    Latex Allergy:  [x]NO      []YES  Preferred Language for Healthcare:   [x]English       []other:      Pain level:       SUBJECTIVE:  The patient states they are sore after working long hours.  The patient states they are on their feet more than usual.     OBJECTIVE:   Observation:     23 - slight antalgic gait with decreased knee extension with initial contact through midstance     Test measurements:        01/09/23 01/09/23 01/13/23 02/08/23   ROM LEFT RIGHT RIGHT RIGHT   Knee ext 0 degrees Lacking 12 degrees Lacking 3 degrees Lacking 1 degree    With overpressure 0 degrees   Knee Flex 142 degrees 105 degrees 125 degrees 126 degrees   Strength  LEFT RIGHT  RIGHT   HIP Flexors 4+/5 4/5  4+/5   HIP Abductors   4/5     HIP Ext         Knee EXT (quad) 5/5 5/5  5/5   Knee Flex (HS) 5/5 4+/5  5/5   Ankle DF 5/5 5/5  DNT   Ankle PF 5/5 5/5  DNT      L/R    SL balance - firm   18/6 seconds         RESTRICTIONS/PRECAUTIONS: OA in L knee. R total knee arthroplasty 11/07/22.       According to Dr. Alonso Trejo note on 26/32/70: \"Right Knee I reassured her that likely no damage was done to her right knee replacement from her traumatic incident however I am concerned that she might of sprained her knee resulting in some increased laxity\"    Exercises/Interventions:      02/20/23 02/08/23 02/03/23 01/30/23 01/27/23 01/20/23 01/16/23 01/13/23 01/09/23    Therapeutic Ex (79550) Sets/Reps Sets/Reps Sets/Reps  Sets/Reps Sets/Reps Sets/Reps Sets/Reps Sets/Reps Notes/CUES   Recumbent bike    5 min;L3 5 min; level 3        Supine quad set  3 x 10     3 x 10  3 x 10    Claim shell       2 x 10; GTB      Bridge       2 x 10; GTB around knees   01/16/23: no abd. today   SAQ 3 x 10            Mini squat  3 x 10       3 x 10 Chair for cue, minimal contact   Calf stretch at stairs         3 x 30\"    LAQ         2 x 10; black TB    Seated hamstring stretch         3 x 30\"    Hamstring curl        1 x 15; #25     Eccentric SL hamstring curl   3 x 10; #30 3x10 ;25# 3 x 10; #25 3 x 10; #25 3 x 8; #25 3 x 8; #25  Double leg concentric, SL lowering   Incline stretch  4 x 30\" 4 x 30\" 4x30\" 4 x 30\" 3 x 30\"  3 x 30\" 3 x 30\"     Hamstring stretch at stairs   3 x 30\"           Hip hinge   1 x 10          Polish deadlift   2 x 10; #20 KB 2 x 10; #20 KB   2 x 12       Leg press  3 x 10; #90  1x10 85#  3x10 90# 4 x 8; #80   2 x 8 #80  With leg kicks   TKE - standing    YANELI 45# R/L  3x10 3 x 15; blue TB        Patient education   2 min     5 min 8 min Review of HEP exercises and the importance of working on knee extension and weighted knee extension hangs. Manual Intervention (33981)             Knee mobilizations for R knee extension 8 min  Grade II-IV 5 min  Grade II-IV 12 min   8 min 6 min 7 min; grades II-III 8 min; grades II-III    Hamstring stretch    4 x 30\"          Manual quadriceps stretch 5 x 30\" with P-A mobilization 5 x 30\" with P-A mobilization           Knee mobilizations for R knee flexion  5 min  Grade II-IV 5 min  Grade II-IV    - 4 min      Knee range of motion (flexion and extension) 1 min     5 min 3 min      STM to R quadriceps 10 min       7 min                  NMR re-education (48849)             SL balance - firm eyes open        3 x 30\" each      Cone walking        4 x 15 yards  Cuing for slowing, CGA. Pt edu re swelling and relationship to quad tone and function, cont. To utilize compression as needed to keep swelling to minumum    5'           HEP:  Access Code: [email or web address]  URL: [email or web address]  Date: 01/09/2023  Prepared by: Na Flash    Exercises  Supine Quad Set - 1 x daily - 7 x weekly - 3 sets - 10-15 reps  Mini Squat with Counter Support - 1 x daily - 7 x weekly - 3 sets - 10-15 reps  Standing Soleus Stretch on Step - 1 x daily - 7 x weekly - 3-5 sets - 30 seconds hold  Sitting Knee Extension with Resistance - 1 x daily - 7 x weekly - 3 sets - 10-15 reps  Seated Hamstring Stretch - 1 x daily - 7 x weekly - 3-5 sets - 30 seconds hold  Seated Passive Knee Extension with Weight - 1 x daily - 7 x weekly - 1 sets - 5 minutes hold      Therapeutic Exercise and NMR EXR  [x] (85007) Provided verbal/tactile cueing for activities related to strengthening, flexibility, endurance, ROM for improvements in LE, proximal hip, and core control with self care, mobility, lifting, ambulation.  [] (61028) Provided verbal/tactile cueing for activities related to improving balance, coordination, kinesthetic sense, posture, motor skill, proprioception  to assist with LE, proximal hip, and core control in self care, mobility, lifting, ambulation and eccentric single leg control. NMR and Therapeutic Activities:    [] (60282 or 03949) Provided verbal/tactile cueing for activities related to improving balance, coordination, kinesthetic sense, posture, motor skill, proprioception and motor activation to allow for proper function of core, proximal hip and LE with self care and ADLs  [] (28365) Gait Re-education- Provided training and instruction to the patient for proper LE, core and proximal hip recruitment and positioning and eccentric body weight control with ambulation re-education including up and down stairs     Home Exercise Program:    [x] (65037) Reviewed/Progressed HEP activities related to strengthening, flexibility, endurance, ROM of core, proximal hip and LE for functional self-care, mobility, lifting and ambulation/stair navigation   [] (05575)Reviewed/Progressed HEP activities related to improving balance, coordination, kinesthetic sense, posture, motor skill, proprioception of core, proximal hip and LE for self care, mobility, lifting, and ambulation/stair navigation      Manual Treatments:  PROM / STM / Oscillations-Mobs:  G-I, II, III, IV (PA's, Inf., Post.)  [x] (90455) Provided manual therapy to mobilize LE, proximal hip and/or LS spine soft tissue/joints for the purpose of modulating pain, promoting relaxation,  increasing ROM, reducing/eliminating soft tissue swelling/inflammation/restriction, improving soft tissue extensibility and allowing for proper ROM for normal function with self care, mobility, lifting and ambulation.      Modalities:     [] GAME READY (VASO)- for significant edema, swelling, pain control. Charges  Timed Code Treatment Minutes: 28   Total Treatment Minutes: 28     Medicare total (add KX at $2000)         [] EVAL (LOW) 48672   [] EVAL (MOD) 03792  [] EVAL (HIGH) 40757   [] RE-EVAL     [] EN(24255) x    [] IONTO  [] NMR (25692) x     [] VASO  [x] Manual (68114) x  2   [] Other:  [] TA x      [] Mech Traction (19647)  [] ES(attended) (74632)     [] ES (un) (14180):       GOALS:  Patient stated goal: \"To walk without pain\". [x] Progressing: [] Met: [] Not Met: [] Adjusted     Therapist goals for Patient:   Short Term Goals: To be achieved in: 2 weeks  1. Independent in HEP and progression per patient tolerance, in order to prevent re-injury. [] Progressing: [x] Met: [] Not Met: [] Adjusted  2. Patient will have a decrease in pain to facilitate improvement in movement, function, and ADLs as indicated by Functional Deficits. [] Progressing: [x] Met: [] Not Met: [] Adjusted     Long Term Goals: To be achieved in: 10 weeks  1. Disability index score of 90% or more per LEFS to assist with reaching prior level of function. [] Progressing: [x] Met: [] Not Met: [] Adjusted  2. Patient will demonstrate increased AROM to 120 degrees or more in knee flexion to allow for proper joint functioning as indicated by patients Functional Deficits. [] Progressing: [x] Met: [] Not Met: [] Adjusted  3. Patient will demonstrate an increase in Strength to 5/5 RLE MMTs to allow for proper functional mobility as indicated by patients Functional Deficits. [x] Progressing: [] Met: [] Not Met: [] Adjusted  4. Patient will return to work functional activities without increased symptoms or restriction. [x] Progressing: [] Met: [] Not Met: [] Adjusted  5. Patient will demonstrate increased ROM to 0 degrees or more in knee extension (resting) to allow for proper joint functioning as indicated by patients Functional Deficits. [x] Progressing: [] Met: [] Not Met: [] Adjusted       6.  Patient will demonstrate increased AROM to 135 degrees or more in knee flexion to allow for proper joint functioning as indicated by patients Functional Deficits. [x] Progressing: [] Met: [] Not Met: [] Adjusted    Progression Towards Functional goals:  [x] Patient is progressing as expected towards functional goals listed. [] Progression is slowed due to complexities listed. [] Progression has been slowed due to co-morbidities. [] Plan just implemented, too soon to assess goals progression  [] Other:         Overall Progression Towards Functional goals/ Treatment Progress Update:  [] Patient is progressing as expected towards functional goals listed. [] Progression is slowed due to complexities/Impairments listed. [] Progression has been slowed due to co-morbidities. [x] Plan just implemented, too soon to assess goals progression <30days   [] Goals require adjustment due to lack of progress  [] Patient is not progressing as expected and requires additional follow up with physician  [] Other    Prognosis for POC: [x] Good [] Fair  [] Poor      Patient requires continued skilled intervention: [x] Yes  [] No    Treatment/Activity Tolerance:  [x] Patient able to complete treatment  [] Patient limited by fatigue  [] Patient limited by pain     [] Patient limited by other medical complications  [] Other:     ASSESSMENT:  Patient treatment session was focused on increasing ROM in flexion and extension. The patient demonstrates guarding with manual techniques including mobilizations and STM. The patient has decreased flexibility in their quadriceps. Cont. Skilled PT to further address deficits.     Return to Play: (if applicable)   []  Stage 1: Intro to Strength   []  Stage 2: Return to Run and Strength   []  Stage 3: Return to Jump and Strength   []  Stage 4: Dynamic Strength and Agility   []  Stage 5: Sport Specific Training     []  Ready to Return to Play, Meets All Above Stages   []  Not Ready for Return to Sports   Comments:                               PLAN: 1-2 days per week for 10 weeks. Plan to do once a week this week and possibly doing once a week following. [x] Continue per plan of care [] Alter current plan (see comments above)  [] Plan of care initiated [] Hold pending MD visit [] Discharge      Electronically signed by:  Daisy Solomon, PT, DPT, CSCS      Note: If patient does not return for scheduled/ recommended follow up visits, this note will serve as a discharge from care along with most recent update on progress.

## 2023-02-22 ENCOUNTER — TELEPHONE (OUTPATIENT)
Dept: CARDIOLOGY CLINIC | Age: 68
End: 2023-02-22

## 2023-02-22 ENCOUNTER — OFFICE VISIT (OUTPATIENT)
Dept: CARDIOLOGY CLINIC | Age: 68
End: 2023-02-22
Payer: MEDICARE

## 2023-02-22 VITALS
HEART RATE: 73 BPM | WEIGHT: 202 LBS | DIASTOLIC BLOOD PRESSURE: 68 MMHG | BODY MASS INDEX: 30.62 KG/M2 | SYSTOLIC BLOOD PRESSURE: 116 MMHG | OXYGEN SATURATION: 97 % | HEIGHT: 68 IN

## 2023-02-22 DIAGNOSIS — Z79.899 MEDICATION MANAGEMENT: Primary | ICD-10-CM

## 2023-02-22 PROCEDURE — 1090F PRES/ABSN URINE INCON ASSESS: CPT | Performed by: INTERNAL MEDICINE

## 2023-02-22 PROCEDURE — G8484 FLU IMMUNIZE NO ADMIN: HCPCS | Performed by: INTERNAL MEDICINE

## 2023-02-22 PROCEDURE — 1036F TOBACCO NON-USER: CPT | Performed by: INTERNAL MEDICINE

## 2023-02-22 PROCEDURE — G8400 PT W/DXA NO RESULTS DOC: HCPCS | Performed by: INTERNAL MEDICINE

## 2023-02-22 PROCEDURE — 1123F ACP DISCUSS/DSCN MKR DOCD: CPT | Performed by: INTERNAL MEDICINE

## 2023-02-22 PROCEDURE — G8427 DOCREV CUR MEDS BY ELIG CLIN: HCPCS | Performed by: INTERNAL MEDICINE

## 2023-02-22 PROCEDURE — 3017F COLORECTAL CA SCREEN DOC REV: CPT | Performed by: INTERNAL MEDICINE

## 2023-02-22 PROCEDURE — 99214 OFFICE O/P EST MOD 30 MIN: CPT | Performed by: INTERNAL MEDICINE

## 2023-02-22 PROCEDURE — G8417 CALC BMI ABV UP PARAM F/U: HCPCS | Performed by: INTERNAL MEDICINE

## 2023-02-22 NOTE — PATIENT INSTRUCTIONS
Plan:  STOP taking Lipitor  Repeat lipid lab work 2-3 months after stopping Lipitor. May reconsider this medication if cholesterol levels are high-will call with results  Cardiac monitor results discussed in office   Continue taking aspirin 81 mg tablet daily  Referral to EP-arrhythmia MD-4 to 6 weeks-Office will call with appointment date and time  Cardiac medications reviewed including indications and pertinent side effects. Medication list updated at this visit.    Check blood pressure and heart rate at home a few times per week- keep a log with dates and times and bring to office visit   Regular exercise and following a healthy diet encouraged   Follow up with me 6 months

## 2023-02-22 NOTE — PROGRESS NOTES
[address]   Cardiac Consultation    Referring Provider:  Jorge Ziegler DO     Chief Complaint   Patient presents with    Follow-up     Discuss monitor results    Palpitations     A little when she first wore the monitor        Mary Denny   1955      History of Present Illness:    Mary Denny is a 78 y.o. female who is here today as follow up paplitations. . She is also her for palpitations and dizziness. Stress test 11/4/2022- Small, fixed, anteroapical/apical cap perfusion defect with subtle apical hypokinesis. Findings likely represent distal LAD territory scar from prior infarction though breast attenuation remains possible. Echocardiogram 11/10/2022- EF 55%, Trace aortic, mitral and tricuspid regurgitation. Today she states she has been feeling well overall. She states on days that she is very active working she starts to have chest pressure, dizziness, and palpitations- feels like a fast heart rate. States she has to sit or bend over and rest for a few minutes and resolve. Symptoms started around 1 year ago, last episode occurred this past October. States this can last for around one hour after she stops to rest. She has been on statins since 2015, was off it this past August for one week- felt better off the medication. Patient currently denies any weight gain, edema, shortness of breath, and syncope. Cardiac monitor 1/31-2/14/23. Per Dr Jimbo Banks: recorded multiple short episodes of atrial arrhythmia (longest 2.5 minutes) with rapid rates. Sometimes looks like AT but sometimes a bit irregular. Can't make a definitve diagnosis but also cannot exclude AF completely. Maybe need for longer monitoring depending on nature of her symptoms (and if real concern for AF, then maybe loop recorder). Today she is here for follow up discussion of monitor results. She reports that she did get her knee surgery on 11/17/22.  She reports that she did wear the heart monitor and did not experience any symptoms(palpitations) like she did back in October. She reports that she has been taking cholesterol medication that was prescribed by the hospitalist a few years back. She denies chest pain sob dizziness syncope or edema. Past Medical History:   has a past medical history of Anesthesia complication, Arthritis, Cancer ([address] DVT (deep venous thrombosis) ([facility] [address] Hearing decreased, Hypoglycemia, Melanoma ([address] Overactive bladder, Reflux, and Wears glasses. Surgical History:   has a past surgical history that includes Appendectomy; Tonsillectomy; Neck surgery; Tubal ligation; Knee arthroscopy (Left, 12/14/2011); hysteroscopy (07/01/2015); Varicose vein surgery; Pain management procedure (Right, 01/06/2022); Cervical spine surgery (2018); and Total knee arthroplasty (Right, 11/17/2022). Social History:   reports that she quit smoking about 22 years ago. Her smoking use included cigarettes. She has never used smokeless tobacco. She reports current alcohol use. She reports that she does not use drugs. Family History:  family history includes Diabetes in her maternal grandfather; Heart Attack in her paternal grandfather; Heart Disease in her father and mother. Home Medications:  Prior to Admission medications    Medication Sig Start Date End Date Taking?  Authorizing Provider   diclofenac (VOLTAREN) 75 MG EC tablet Take 1 tablet by mouth 2 times daily 2/13/23  Yes Krista Salcido MD   Multiple Vitamins-Minerals (IMMUNE SUPPORT PO) Take by mouth daily   Yes Historical Provider, MD   MULTIPLE VITAMINS PO Take 1 tablet by mouth daily   Yes Historical Provider, MD   ascorbic acid (VITAMIN C) 100 MG tablet Take 1,500 mg by mouth daily   Yes Historical Provider, MD   oxybutynin (DITROPAN-XL) 10 MG extended release tablet Take 10 mg by mouth every evening 6/29/22  Yes Historical Provider, MD   diclofenac (VOLTAREN) 75 MG EC tablet TAKE ONE TABLET BY MOUTH TWICE A DAY 9/16/20  Yes Krista Salcido MD   atorvastatin (LIPITOR) 20 MG tablet Take 1 tablet by mouth daily  Patient taking differently: Take 10 mg by mouth daily 11/16/15  Yes Chago Taylor MD   aspirin 81 MG EC tablet Take 1 tablet by mouth 2 times daily 11/19/22 12/19/22  BLAYNE Zepeda   celecoxib (CELEBREX) 100 MG capsule Take 1 capsule by mouth 2 times daily  Patient not taking: Reported on 2/22/2023 11/19/22   BLAYNE Zepeda        Allergies:  Immune support [actical] and Vibramycin [doxycycline calcium]     Review of Systems:   Constitutional: there has been no unanticipated weight loss. There's been no change in energy level, sleep pattern, or activity level. Eyes: No visual changes or diplopia. No scleral icterus. ENT: No Headaches, hearing loss or vertigo. No mouth sores or sore throat. Cardiovascular: Reviewed in HPI  Respiratory: No cough or wheezing, no sputum production. No hematemesis. Gastrointestinal: No abdominal pain, appetite loss, blood in stools. No change in bowel or bladder habits. Genitourinary: No dysuria, trouble voiding, or hematuria. Musculoskeletal:  No gait disturbance, weakness or joint complaints. Integumentary: No rash or pruritis. Neurological: No headache, diplopia, change in muscle strength, numbness or tingling. No change in gait, balance, coordination, mood, affect, memory, mentation, behavior. Psychiatric: No anxiety, no depression. Endocrine: No malaise, fatigue or temperature intolerance. No excessive thirst, fluid intake, or urination. No tremor. Hematologic/Lymphatic: No abnormal bruising or bleeding, blood clots or swollen lymph nodes. Allergic/Immunologic: No nasal congestion or hives. Physical Examination:    Vitals:    02/22/23 1236   BP: 116/68   Pulse: 73   SpO2: 97%          Constitutional and General Appearance: NAD   Respiratory:  Normal excursion and expansion without use of accessory muscles  Resp Auscultation: Normal breath sounds without dullness  Cardiovascular:   The apical impulses not displaced  Heart tones are crisp and normal  Cervical veins are not engorged  The carotid upstroke is normal in amplitude and contour without delay or bruit  Normal S1S2, No S3, No Murmur  Peripheral pulses are symmetrical and full  There is no clubbing, cyanosis of the extremities. No edema  Femoral Arteries: 2+ and equal  Pedal Pulses: 2+ and equal   Abdomen:  No masses or tenderness  Liver/Spleen: No Abnormalities Noted  Neurological/Psychiatric:  Alert and oriented in all spheres  Moves all extremities well  Exhibits normal gait balance and coordination  No abnormalities of mood, affect, memory, mentation, or behavior are noted      Echocardiogram 12/2015  Summary   Global ejection fraction is normal and estimated from 55 % to 60 %. Mild concentric left ventricular hypertrophy is present. Systolic pulmonary artery pressure (SPAP) is normal and estimated at 21 mmHg   (RA pressure 3 mm Hg). Stress test 11/4/2022  Summary  Small, fixed, anteroapical/apical cap perfusion defect with subtle apical hypokinesis. Findings likely represent distal LAD territory scar from prior infarction though breast attenuation remains possible. No inducible  ischemia. Post-stress LVEF is normal at 58%. TID< 1.3. Overall findings represent a low-intermediate risk scan. Echocardiogram 11/10/2022  Summary   -- Normal left ventricle systolic function with an estimated ejection   fraction of 55%. No regional wall motion abnormalities are seen. Normal left   ventricular diastolic filling pressure. Trace aortic, mitral and tricuspid regurgitation. Systolic pulmonary artery pressure (SPAP) is normal and estimated at 21 mmHg   (right atrial pressure 3 mmHg). Cardiac Monitor: 1/31-2/14/23  Read by Dr. Pastor Finn  Per Dr. Pastor Finn: recorded multiple short episodes of atrial arrhythmia (longest 2.5 minutes) with rapid rates. Sometimes looks like AT but sometimes a bit irregular.  Can't make a definitve diagnosis but also cannot exclude AF completely. Maybe need for longer monitoring depending on nature of her symptoms (and if real concern for AF, then maybe loop recorder)       Assessment:   Palpitations - decreased since last OV  Hyperlipidemia - stable  History of DVT  Arthritis   Family history of heart diease  Former smoker- quit 20 years ago   Myalgias on Lipitor   Ca score = 0  Chest pain - no recurrence  Atrial arrhythmia on monitor. Unclear AF vs AT. Discussed tx options including AC. Will go w/ ASA only. Abnormal stress test appears to be false positive based on symptoms and Ca score     Plan:  STOP taking Lipitor  Repeat lipid lab work 2-3 months after stopping Lipitor. May reconsider this medication if cholesterol levels are high-will call with results  Cardiac monitor results discussed in office   Continue taking aspirin 81 mg tablet daily. R/B/A/E discussed   Referral to EP to assess monitor and consider ILR  Cardiac medications reviewed including indications and pertinent side effects. Medication list updated at this visit. Check blood pressure and heart rate at home a few times per week- keep a log with dates and times and bring to office visit   Regular exercise and following a healthy diet encouraged   Follow up with me 6 months      Scribe's attestation: This note was scribed in the presence of Dr. Dallas Covarrubias MD   by Rain Marie LPN      The scribes documentation has been prepared under my direction and personally reviewed by me in its entirety. I confirm that the note above accurately reflects all work, treatment, procedures, and medical decision making performed by me. Dr. Dallas Covarrubias MD      Thank you for allowing me to participate in the care of this individual.      Keo Gutierrez.  Mary Herrera M.D., Michelle Ortez

## 2023-02-22 NOTE — TELEPHONE ENCOUNTER
----- Message from Jessica Toney MD sent at 2/22/2023  8:38 AM EST -----  Regarding: FW: Abnormal finding on event monitor  Call pt  Monitor shows possible arrhythmia  Was to have follow up last month\  Have schedule next avail OV to review testing    ----- Message -----  From: Kong Levy MD  Sent: 2/21/2023   3:57 PM EST  To: Jessica Toney MD  Subject: Abnormal finding on event monitor                Hi Dawson. This patient wore an event monitor for a couple of weeks. It recorded multiple short episodes of atrial arrhythmia (longest 2.5 minutes) with rapid rates. Sometimes looks like AT but sometimes a bit irregular. Can't make a definitive diagnosis but also cannot exclude AF completely. Will have office scan into Epic. Maybe need longer monitoring depending on nature of her symptoms (and if real concern for AF, then maybe loop recorder).      Thank you  Arian Delcid

## 2023-02-23 ENCOUNTER — HOSPITAL ENCOUNTER (OUTPATIENT)
Dept: PHYSICAL THERAPY | Age: 68
Setting detail: THERAPIES SERIES
Discharge: HOME | End: 2023-02-23
Payer: MEDICARE

## 2023-02-23 PROCEDURE — 97140 MANUAL THERAPY 1/> REGIONS: CPT

## 2023-02-23 PROCEDURE — 97110 THERAPEUTIC EXERCISES: CPT

## 2023-02-23 NOTE — FLOWSHEET NOTE
[facility], [address]  Phone: [phone]  Fax [phone]        Physical Therapy Treatment Note/ Progress Report:     Date:  2023    Patient Name:  Bel Oconnor    :  1955  MRN: [id number]  Restrictions/Precautions:    Medical/Treatment Diagnosis Information:  Medical Diagnosis: M25.561 (ICD-10-CM) - Right knee pain, unspecified chronicity, M22.42 (ICD-10-CM) - Chondromalacia of left patella, M17.12 (ICD-10-CM) - Primary osteoarthritis of left knee  Treatment Diagnosis: Z96.659 - Presence of unspecified artificial knee joint, M25.561 - Right knee pain, M25.671 - Right knee stiffness. Insurance information: Medicare. $0/$226.00 Individual Deductible. 20% co-insurance. BMN. Physician Information: Mahesh Ward MD      Has the plan of care been signed (Y/N):        [x]  Yes  []  No     Date of Patient follow up with Physician: PRN with Dr. Mahesh Ward. Is this a Progress Report:     []  Yes  [x]  No        If Yes:  Date Range for reporting period:  Beginnin23  Ending:     Progress report will be due (10 Rx or 30 days whichever is less):       Recertification will be due (POC Duration  / 90 days whichever is less): 23      Visit # Insurance Allowable Auth Required   In-person 10 BMN []  Yes [x]  No    Telehealth   []  Yes []  No    Total          Functional Scale: LEFS 54/80   Date assessed: 23  Functional Scale: LEFS 72/80 (10%)  Date assessed: 23       Number of Comorbidities:  []0     [x]1-2    []3+    Latex Allergy:  [x]NO      []YES  Preferred Language for Healthcare:   [x]English       []other:      Pain level:       SUBJECTIVE:  The patient states they are still sore in their R knee. The patient states they are still working at work. Patient states they feel their joint is stiff (vs muscle). OBJECTIVE:   Observation:     02/23/23 - slight antalgic gait today. Edema noted in R knee. 02/03/23 - slight antalgic gait with decreased knee extension with initial contact through midstance     Test measurements:      02/23/23 - Knee flexion AROM prior to therapy: 123 degrees, after manual to quadriceps 125 degrees, and after exercises 128 degrees. Patient is lacking 0.5 degrees of knee extension, 0 degrees with overpressure. 01/09/23 01/09/23 01/13/23 02/08/23   ROM LEFT RIGHT RIGHT RIGHT   Knee ext 0 degrees Lacking 12 degrees Lacking 3 degrees Lacking 1 degree    With overpressure 0 degrees   Knee Flex 142 degrees 105 degrees 125 degrees 126 degrees   Strength  LEFT RIGHT  RIGHT   HIP Flexors 4+/5 4/5  4+/5   HIP Abductors   4/5     HIP Ext         Knee EXT (quad) 5/5 5/5  5/5   Knee Flex (HS) 5/5 4+/5  5/5   Ankle DF 5/5 5/5  DNT   Ankle PF 5/5 5/5  DNT      L/R    SL balance - firm   18/6 seconds         RESTRICTIONS/PRECAUTIONS: OA in L knee. R total knee arthroplasty 11/07/22.       According to Dr. Mai Graves note on 89/25/58: \"Right Knee I reassured her that likely no damage was done to her right knee replacement from her traumatic incident however I am concerned that she might of sprained her knee resulting in some increased laxity\"    Exercises/Interventions:      02/23/23 02/20/23 02/08/23 02/03/23 01/30/23 01/27/23 01/20/23 01/16/23 01/13/23 01/09/23    Therapeutic Ex (46712) Sets/Reps Sets/Reps Sets/Reps Sets/Reps  Sets/Reps Sets/Reps Sets/Reps Sets/Reps Sets/Reps Notes/CUES   Recumbent bike     5 min;L3 5 min; level 3        Supine quad set   3 x 10     3 x 10  3 x 10    Claim shell        2 x 10; GTB      Bridge        2 x 10; GTB around knees   01/16/23: no abd. today   SAQ  3 x 10            Mini squat   3 x 10       3 x 10 Chair for cue, minimal contact   Calf stretch at stairs          3 x 30\"    LAQ          2 x 10; black TB    Seated hamstring stretch          3 x 30\" Cymraes squat 2 x 10; Black TB             Hamstring curl         1 x 15; #25     Eccentric SL hamstring curl 3 x 15; #30   3 x 10; #30 3x10 ;25# 3 x 10; #25 3 x 10; #25 3 x 8; #25 3 x 8; #25  Double leg concentric, SL lowering   Eccentric SL knee extension 2 x 10; #20             Incline stretch 3 x 30\"  4 x 30\" 4 x 30\" 4x30\" 4 x 30\" 3 x 30\"  3 x 30\" 3 x 30\"     Step up 2 x 5; 18: box             Hamstring stretch at stairs    3 x 30\"           Hip hinge    1 x 10          Spanish deadlift    2 x 10; #20 KB 2 x 10; #20 KB   2 x 12       Leg press   3 x 10; #90  1x10 85#  3x10 90# 4 x 8; #80   2 x 8 #80  With leg kicks   Swish ball wall squat 2 x 10             TKE - standing     YANELI 45# R/L  3x10 3 x 15; blue TB        Patient education    2 min     5 min 8 min Review of HEP exercises and the importance of working on knee extension and weighted knee extension hangs. Manual Intervention (24044)              Knee mobilizations for R knee extension  8 min  Grade II-IV 5 min  Grade II-IV 12 min   8 min 6 min 7 min; grades II-III 8 min; grades II-III    Hamstring stretch     4 x 30\"          Manual quadriceps stretch  5 x 30\" with P-A mobilization 5 x 30\" with P-A mobilization           Knee mobilizations for R knee flexion   5 min  Grade II-IV 5 min  Grade II-IV    - 4 min      Knee range of motion (flexion and extension)  1 min     5 min 3 min      STM to R quadriceps 8 min 10 min       7 min     Manual cj stretch 4 x 30\"                           NMR re-education (25984)              SL balance - firm eyes open         3 x 30\" each      Cone walking         4 x 15 yards  Cuing for slowing, CGA. Pt edu re swelling and relationship to quad tone and function, cont. To utilize compression as needed to keep swelling to minumum     5'           HEP:  Access Code: [email or web address]  URL: [email or web address]  Date: 01/09/2023  Prepared by: Edith Son    Exercises  Supine Quad Set - 1 x daily - 7 x weekly - 3 sets - 10-15 reps  Mini Squat with Counter Support - 1 x daily - 7 x weekly - 3 sets - 10-15 reps  Standing Soleus Stretch on Step - 1 x daily - 7 x weekly - 3-5 sets - 30 seconds hold  Sitting Knee Extension with Resistance - 1 x daily - 7 x weekly - 3 sets - 10-15 reps  Seated Hamstring Stretch - 1 x daily - 7 x weekly - 3-5 sets - 30 seconds hold  Seated Passive Knee Extension with Weight - 1 x daily - 7 x weekly - 1 sets - 5 minutes hold      Therapeutic Exercise and NMR EXR  [x] (83848) Provided verbal/tactile cueing for activities related to strengthening, flexibility, endurance, ROM for improvements in LE, proximal hip, and core control with self care, mobility, lifting, ambulation.  [] (22399) Provided verbal/tactile cueing for activities related to improving balance, coordination, kinesthetic sense, posture, motor skill, proprioception  to assist with LE, proximal hip, and core control in self care, mobility, lifting, ambulation and eccentric single leg control.      NMR and Therapeutic Activities:    [] (16059 or 52046) Provided verbal/tactile cueing for activities related to improving balance, coordination, kinesthetic sense, posture, motor skill, proprioception and motor activation to allow for proper function of core, proximal hip and LE with self care and ADLs  [] (96587) Gait Re-education- Provided training and instruction to the patient for proper LE, core and proximal hip recruitment and positioning and eccentric body weight control with ambulation re-education including up and down stairs     Home Exercise Program:    [x] (04115) Reviewed/Progressed HEP activities related to strengthening, flexibility, endurance, ROM of core, proximal hip and LE for functional self-care, mobility, lifting and ambulation/stair navigation   [] (63977)Reviewed/Progressed HEP activities related to improving balance, coordination, kinesthetic sense, posture, motor skill, proprioception of core, proximal hip and LE for self care, mobility, lifting, and ambulation/stair navigation      Manual Treatments:  PROM / STM / Oscillations-Mobs:  G-I, II, III, IV (PA's, Inf., Post.)  [x] (29056) Provided manual therapy to mobilize LE, proximal hip and/or LS spine soft tissue/joints for the purpose of modulating pain, promoting relaxation,  increasing ROM, reducing/eliminating soft tissue swelling/inflammation/restriction, improving soft tissue extensibility and allowing for proper ROM for normal function with self care, mobility, lifting and ambulation. Modalities:     [] GAME READY (VASO)- for significant edema, swelling, pain control. Charges  Timed Code Treatment Minutes: 40   Total Treatment Minutes: 40     Medicare total (add KX at $2000)         [] EVAL (LOW) 81248   [] EVAL (MOD) 77224  [] EVAL (HIGH) 62442   [] RE-EVAL     [x] ZG(36390) x 2   [] IONTO  [] NMR (23237) x     [] VASO  [x] Manual (97432) x 1   [] Other:  [] TA x      [] Mech Traction (25388)  [] ES(attended) (67662)     [] ES (un) (80687):       GOALS:  Patient stated goal: \"To walk without pain\". [x] Progressing: [] Met: [] Not Met: [] Adjusted     Therapist goals for Patient:   Short Term Goals: To be achieved in: 2 weeks  1. Independent in HEP and progression per patient tolerance, in order to prevent re-injury. [] Progressing: [x] Met: [] Not Met: [] Adjusted  2. Patient will have a decrease in pain to facilitate improvement in movement, function, and ADLs as indicated by Functional Deficits. [] Progressing: [x] Met: [] Not Met: [] Adjusted     Long Term Goals: To be achieved in: 10 weeks  1. Disability index score of 90% or more per LEFS to assist with reaching prior level of function. [] Progressing: [x] Met: [] Not Met: [] Adjusted  2. Patient will demonstrate increased AROM to 120 degrees or more in knee flexion to allow for proper joint functioning as indicated by patients Functional Deficits.    [] Progressing: [x] Met: [] Not Met: [] Adjusted  3. Patient will demonstrate an increase in Strength to 5/5 RLE MMTs to allow for proper functional mobility as indicated by patients Functional Deficits. [x] Progressing: [] Met: [] Not Met: [] Adjusted  4. Patient will return to work functional activities without increased symptoms or restriction. [x] Progressing: [] Met: [] Not Met: [] Adjusted  5. Patient will demonstrate increased ROM to 0 degrees or more in knee extension (resting) to allow for proper joint functioning as indicated by patients Functional Deficits. [x] Progressing: [] Met: [] Not Met: [] Adjusted       6. Patient will demonstrate increased AROM to 135 degrees or more in knee flexion to allow for proper joint functioning as indicated by patients Functional Deficits. [x] Progressing: [] Met: [] Not Met: [] Adjusted    Progression Towards Functional goals:  [x] Patient is progressing as expected towards functional goals listed. [] Progression is slowed due to complexities listed. [] Progression has been slowed due to co-morbidities. [] Plan just implemented, too soon to assess goals progression  [] Other:         Overall Progression Towards Functional goals/ Treatment Progress Update:  [x] Patient is progressing as expected towards functional goals listed. [] Progression is slowed due to complexities/Impairments listed. [] Progression has been slowed due to co-morbidities.   [] Plan just implemented, too soon to assess goals progression <30days   [] Goals require adjustment due to lack of progress  [] Patient is not progressing as expected and requires additional follow up with physician  [] Other    Prognosis for POC: [x] Good [] Fair  [] Poor      Patient requires continued skilled intervention: [x] Yes  [] No    Treatment/Activity Tolerance:  [x] Patient able to complete treatment  [] Patient limited by fatigue  [] Patient limited by pain     [] Patient limited by other medical complications  [] Other: ASSESSMENT:  Patient treatment session was focused on increasing ROM in flexion. The patient demonstrates guarding and reported tenderness with manual STM techniques. The patient has decreased flexibility in their quadriceps. Patient reports feeling irritated in R quadriceps with Zoltan stretch, however patient increases AROM after manual. The patient demonstrates weakness during step ups and CGA was give in case of a fall. Cont. Skilled PT to further address deficits. Return to Play: (if applicable)   []  Stage 1: Intro to Strength   []  Stage 2: Return to Run and Strength   []  Stage 3: Return to Jump and Strength   []  Stage 4: Dynamic Strength and Agility   []  Stage 5: Sport Specific Training     []  Ready to Return to Play, Meets All Above Stages   []  Not Ready for Return to Sports   Comments:                               PLAN: 1-2 days per week for 10 weeks. Plan to do once a week this week and possibly doing once a week following. [x] Continue per plan of care [] Alter current plan (see comments above)  [] Plan of care initiated [] Hold pending MD visit [] Discharge      Electronically signed by:  Chapincito Leung, PT, DPT, CSCS      Note: If patient does not return for scheduled/ recommended follow up visits, this note will serve as a discharge from care along with most recent update on progress.

## 2023-02-27 ENCOUNTER — HOSPITAL ENCOUNTER (OUTPATIENT)
Dept: PHYSICAL THERAPY | Age: 68
Setting detail: THERAPIES SERIES
Discharge: HOME | End: 2023-02-27
Payer: MEDICARE

## 2023-02-27 PROCEDURE — 97140 MANUAL THERAPY 1/> REGIONS: CPT

## 2023-02-27 PROCEDURE — 97110 THERAPEUTIC EXERCISES: CPT

## 2023-02-27 NOTE — FLOWSHEET NOTE
[facility], [address]  Phone: [phone]  Fax [phone]        Physical Therapy Treatment Note/ Progress Report:     Date:  2023    Patient Name:  Lela Rodriguez    :  1955  MRN: [id number]  Restrictions/Precautions:    Medical/Treatment Diagnosis Information:  Medical Diagnosis: M25.561 (ICD-10-CM) - Right knee pain, unspecified chronicity, M22.42 (ICD-10-CM) - Chondromalacia of left patella, M17.12 (ICD-10-CM) - Primary osteoarthritis of left knee  Treatment Diagnosis: Z96.659 - Presence of unspecified artificial knee joint, M25.561 - Right knee pain, M25.671 - Right knee stiffness. Insurance information: Medicare. $0/$226.00 Individual Deductible. 20% co-insurance. BMN. Physician Information: Kiko Etienne MD      Has the plan of care been signed (Y/N):        [x]  Yes  []  No     Date of Patient follow up with Physician: PRN with Dr. Kiko Etienne. Is this a Progress Report:     []  Yes  [x]  No        If Yes:  Date Range for reporting period:  Beginnin23  Ending:     Progress report will be due (10 Rx or 30 days whichever is less):       Recertification will be due (POC Duration  / 90 days whichever is less): 23      Visit # Insurance Allowable Auth Required   In-person 10 BMN []  Yes [x]  No    Telehealth   []  Yes []  No    Total          Functional Scale: LEFS 54/80   Date assessed: 23  Functional Scale: LEFS 72/80 (10%)  Date assessed: 23       Number of Comorbidities:  []0     [x]1-2    []3+    Latex Allergy:  [x]NO      []YES  Preferred Language for Healthcare:   [x]English       []other:      Pain level:       SUBJECTIVE:  The patient states they are still sore in their R knee. The patient states they were sore in their R knee after last physical therapy session.      OBJECTIVE:   Observation: 02/23/23 - slight antalgic gait today. Edema noted in R knee. 02/03/23 - slight antalgic gait with decreased knee extension with initial contact through midstance     Test measurements:      02/23/23 - Knee flexion AROM prior to therapy: 123 degrees, after manual to quadriceps 125 degrees, and after exercises 128 degrees. Patient is lacking 0.5 degrees of knee extension, 0 degrees with overpressure. 01/09/23 01/09/23 01/13/23 02/08/23   ROM LEFT RIGHT RIGHT RIGHT   Knee ext 0 degrees Lacking 12 degrees Lacking 3 degrees Lacking 1 degree    With overpressure 0 degrees   Knee Flex 142 degrees 105 degrees 125 degrees 126 degrees   Strength  LEFT RIGHT  RIGHT   HIP Flexors 4+/5 4/5  4+/5   HIP Abductors   4/5     HIP Ext         Knee EXT (quad) 5/5 5/5  5/5   Knee Flex (HS) 5/5 4+/5  5/5   Ankle DF 5/5 5/5  DNT   Ankle PF 5/5 5/5  DNT      L/R    SL balance - firm   18/6 seconds         RESTRICTIONS/PRECAUTIONS: OA in L knee. R total knee arthroplasty 11/07/22.       According to Dr. Quoc Brown note on 72/76/00: \"Right Knee I reassured her that likely no damage was done to her right knee replacement from her traumatic incident however I am concerned that she might of sprained her knee resulting in some increased laxity\"    Exercises/Interventions:      02/27/23 02/23/23 02/20/23 02/08/23 02/03/23 01/30/23 01/27/23 01/20/23 01/16/23 01/13/23 01/09/23    Therapeutic Ex (01895) Sets/Reps Sets/Reps Sets/Reps Sets/Reps Sets/Reps  Sets/Reps Sets/Reps Sets/Reps Sets/Reps Sets/Reps Notes/CUES   Recumbent bike 5 min; level 3      5 min;L3 5 min; level 3        Supine quad set    3 x 10     3 x 10  3 x 10    Claim shell         2 x 10; GTB      Bridge         2 x 10; GTB around knees   01/16/23: no abd. today   SAQ   3 x 10            Mini squat    3 x 10       3 x 10 Chair for cue, minimal contact   Calf stretch at stairs           3 x 30\"    LAQ           2 x 10; black TB    Seated hamstring stretch           3 x 30\"    Modified Zoltan stretch 3 x 30\"              Heel slide with strap 3 x 10                             Maltese squat  2 x 10; Black TB             Hamstring curl          1 x 15; #25     Eccentric SL hamstring curl  3 x 15; #30   3 x 10; #30 3x10 ;25# 3 x 10; #25 3 x 10; #25 3 x 8; #25 3 x 8; #25  Double leg concentric, SL lowering   Eccentric SL knee extension  2 x 10; #20             Incline stretch 3 x 30\"  3 x 30\"  4 x 30\" 4 x 30\" 4x30\" 4 x 30\" 3 x 30\"  3 x 30\" 3 x 30\"     Step up  2 x 5; 18: box             Hamstring stretch at stairs     3 x 30\"           Hip hinge     1 x 10          Latvian deadlift     2 x 10; #20 KB 2 x 10; #20 KB   2 x 12       Leg press    3 x 10; #90  1x10 85#  3x10 90# 4 x 8; #80   2 x 8 #80  With leg kicks   Swish ball wall squat  2 x 10             TKE - standing      YANELI 45# R/L  3x10 3 x 15; blue TB        Patient education     2 min     5 min 8 min Review of HEP exercises and the importance of working on knee extension and weighted knee extension hangs. Manual Intervention (46598)               Knee mobilizations for R knee extension   8 min  Grade II-IV 5 min  Grade II-IV 12 min   8 min 6 min 7 min; grades II-III 8 min; grades II-III    Hamstring stretch      4 x 30\"          Manual quadriceps stretch 3 min with P-A mobilization  5 x 30\" with P-A mobilization 5 x 30\" with P-A mobilization           Knee mobilizations for R knee flexion  8 min  Grade II-IV  5 min  Grade II-IV 5 min  Grade II-IV    - 4 min      Knee range of motion (flexion and extension)   1 min     5 min 3 min      STM to R quadriceps 8 min 8 min 10 min       7 min     Manual zoltan stretch 4 min 4 x 30\"                            NMR re-education (36403)               SL balance - firm eyes open          3 x 30\" each      [facility] walking          4 x 15 yards  Cuing for slowing, CGA. Pt edu re swelling and relationship to quad tone and function, cont.  To utilize compression as needed to keep swelling to minumum      5'           HEP:  Access Code: [email or web address]  URL: [email or web address]  Date: 02/27/2023  Prepared by: Shlomo Prado    Exercises  Mini Squat with Counter Support - 1 x daily - 7 x weekly - 3 sets - 10-15 reps  Standing Soleus Stretch on Step - 1 x daily - 7 x weekly - 3-5 sets - 30 seconds hold  Sitting Knee Extension with Resistance - 1 x daily - 7 x weekly - 3 sets - 10-15 reps  Seated Hamstring Stretch - 1 x daily - 7 x weekly - 3-5 sets - 30 seconds hold  Supine Heel Slide with Strap - 1 x daily - 7 x weekly - 3 sets - 10 reps  Supine Quadriceps Stretch with Strap on Table - 1 x daily - 7 x weekly - 3-5 sets - 30 seconds hold  Prone Quadriceps Stretch with Strap - 1 x daily - 7 x weekly - 3-5 sets - 30 seconds hold        Therapeutic Exercise and NMR EXR  [x] (90080) Provided verbal/tactile cueing for activities related to strengthening, flexibility, endurance, ROM for improvements in LE, proximal hip, and core control with self care, mobility, lifting, ambulation.  [] (41869) Provided verbal/tactile cueing for activities related to improving balance, coordination, kinesthetic sense, posture, motor skill, proprioception  to assist with LE, proximal hip, and core control in self care, mobility, lifting, ambulation and eccentric single leg control.     NMR and Therapeutic Activities:    [] (90516 or 15766) Provided verbal/tactile cueing for activities related to improving balance, coordination, kinesthetic sense, posture, motor skill, proprioception and motor activation to allow for proper function of core, proximal hip and LE with self care and ADLs  [] (50788) Gait Re-education- Provided training and instruction to the patient for proper LE, core and proximal hip recruitment and positioning and eccentric body weight control with ambulation re-education including up and down stairs     Home Exercise Program:    [x] (98613) Reviewed/Progressed HEP  activities related to strengthening, flexibility, endurance, ROM of core, proximal hip and LE for functional self-care, mobility, lifting and ambulation/stair navigation   [] (16414)Reviewed/Progressed HEP activities related to improving balance, coordination, kinesthetic sense, posture, motor skill, proprioception of core, proximal hip and LE for self care, mobility, lifting, and ambulation/stair navigation      Manual Treatments:  PROM / STM / Oscillations-Mobs:  G-I, II, III, IV (PA's, Inf., Post.)  [x] (48571) Provided manual therapy to mobilize LE, proximal hip and/or LS spine soft tissue/joints for the purpose of modulating pain, promoting relaxation,  increasing ROM, reducing/eliminating soft tissue swelling/inflammation/restriction, improving soft tissue extensibility and allowing for proper ROM for normal function with self care, mobility, lifting and ambulation. Modalities:     [] GAME READY (VASO)- for significant edema, swelling, pain control. Charges  Timed Code Treatment Minutes: 35   Total Treatment Minutes: 35     Medicare total (add KX at $2000)         [] EVAL (LOW) 76259   [] EVAL (MOD) 46903  [] EVAL (HIGH) 39612   [] RE-EVAL     [x] DM(71198) x 1   [] IONTO  [] NMR (50772) x     [] VASO  [x] Manual (75468) x 1   [] Other:  [] TA x      [] Mech Traction (04535)  [] ES(attended) (94316)     [] ES (un) (66781):       GOALS:  Patient stated goal: \"To walk without pain\". [x] Progressing: [] Met: [] Not Met: [] Adjusted     Therapist goals for Patient:   Short Term Goals: To be achieved in: 2 weeks  1. Independent in HEP and progression per patient tolerance, in order to prevent re-injury. [] Progressing: [x] Met: [] Not Met: [] Adjusted  2. Patient will have a decrease in pain to facilitate improvement in movement, function, and ADLs as indicated by Functional Deficits. [] Progressing: [x] Met: [] Not Met: [] Adjusted     Long Term Goals: To be achieved in: 10 weeks  1.  Disability index score of 90% or more per LEFS to assist with reaching prior level of function. [] Progressing: [x] Met: [] Not Met: [] Adjusted  2. Patient will demonstrate increased AROM to 120 degrees or more in knee flexion to allow for proper joint functioning as indicated by patients Functional Deficits. [] Progressing: [x] Met: [] Not Met: [] Adjusted  3. Patient will demonstrate an increase in Strength to 5/5 RLE MMTs to allow for proper functional mobility as indicated by patients Functional Deficits. [x] Progressing: [] Met: [] Not Met: [] Adjusted  4. Patient will return to work functional activities without increased symptoms or restriction. [x] Progressing: [] Met: [] Not Met: [] Adjusted  5. Patient will demonstrate increased ROM to 0 degrees or more in knee extension (resting) to allow for proper joint functioning as indicated by patients Functional Deficits. [] Progressing: [x] Met: [] Not Met: [] Adjusted       6. Patient will demonstrate increased AROM to 135 degrees or more in knee flexion to allow for proper joint functioning as indicated by patients Functional Deficits. [x] Progressing: [] Met: [] Not Met: [] Adjusted    Progression Towards Functional goals:  [x] Patient is progressing as expected towards functional goals listed. [] Progression is slowed due to complexities listed. [] Progression has been slowed due to co-morbidities. [] Plan just implemented, too soon to assess goals progression  [] Other:         Overall Progression Towards Functional goals/ Treatment Progress Update:  [x] Patient is progressing as expected towards functional goals listed. [] Progression is slowed due to complexities/Impairments listed. [] Progression has been slowed due to co-morbidities.   [] Plan just implemented, too soon to assess goals progression <30days   [] Goals require adjustment due to lack of progress  [] Patient is not progressing as expected and requires additional follow up with physician  [] Other    Prognosis for POC: [x] Good [] Fair  [] Poor      Patient requires continued skilled intervention: [x] Yes  [] No    Treatment/Activity Tolerance:  [x] Patient able to complete treatment  [] Patient limited by fatigue  [] Patient limited by pain     [] Patient limited by other medical complications  [] Other:     ASSESSMENT:  Patient treatment session was focused on increasing ROM in flexion as the patient reports increased soreness since last treatment. The patient demonstrates guarding and reported tenderness with manual STM techniques. The patient has decreased flexibility in their quadriceps. Patient reports feeling irritated in R quadriceps with Zoltan stretch, however patient increases AROM after manual. Will shift on increasing flexion ROM at home vs extension as patient demonstrates 0 degrees of knee extension. Cont. Skilled PT to further address deficits.    Return to Play: (if applicable)   []  Stage 1: Intro to Strength   []  Stage 2: Return to Run and Strength   []  Stage 3: Return to Jump and Strength   []  Stage 4: Dynamic Strength and Agility   []  Stage 5: Sport Specific Training     []  Ready to Return to Play, Meets All Above Stages   []  Not Ready for Return to Sports   Comments:                               PLAN: 1-2 days per week for 10 weeks. Plan to do once a week this week and possibly doing once a week following.   [x] Continue per plan of care [] Alter current plan (see comments above)  [] Plan of care initiated [] Hold pending MD visit [] Discharge      Electronically signed by:  Shlomo Prado, PT, DPT, CSCS      Note: If patient does not return for scheduled/ recommended follow up visits, this note will serve as a discharge from care along with most recent update on progress.

## 2023-03-02 ENCOUNTER — TELEPHONE (OUTPATIENT)
Dept: CARDIOLOGY CLINIC | Age: 68
End: 2023-03-02

## 2023-03-02 NOTE — TELEPHONE ENCOUNTER
I attempted to call the patient to relay cardiac event monitor results. Results showed NSR with rare PAC's and PVC's. Likely episodes of SVT up to 2.5 minutes with varying heart rate. No symptoms reported. Follow up with EP as scheduled. LMOV.

## 2023-03-02 NOTE — TELEPHONE ENCOUNTER
Pt called back to see why we were calling her. I explained the monitor results again. She said Dr. Pily Puente explained the results at their visit last week. She does have an appointment 3/22 with Amina Kemp.

## 2023-03-03 ENCOUNTER — HOSPITAL ENCOUNTER (OUTPATIENT)
Dept: PHYSICAL THERAPY | Age: 68
Setting detail: THERAPIES SERIES
Discharge: HOME | End: 2023-03-03
Payer: MEDICARE

## 2023-03-03 PROCEDURE — 97112 NEUROMUSCULAR REEDUCATION: CPT

## 2023-03-03 PROCEDURE — 97110 THERAPEUTIC EXERCISES: CPT

## 2023-03-03 NOTE — FLOWSHEET NOTE
[facility], [address] [place]  Phone: [phone]  Fax [phone]        Physical Therapy Treatment Note/ Progress Report:     Date:  3/3/2023    Patient Name:  Samantha Story    :  1955  MRN: [id number]  Restrictions/Precautions:    Medical/Treatment Diagnosis Information:  Medical Diagnosis: M25.561 (ICD-10-CM) - Right knee pain, unspecified chronicity, M22.42 (ICD-10-CM) - Chondromalacia of left patella, M17.12 (ICD-10-CM) - Primary osteoarthritis of left knee  Treatment Diagnosis: Z96.659 - Presence of unspecified artificial knee joint, M25.561 - Right knee pain, M25.671 - Right knee stiffness. Insurance information: Medicare. $0/$226.00 Individual Deductible. 20% co-insurance. BMN. Physician Information: Reuben Schaeffer MD      Has the plan of care been signed (Y/N):        [x]  Yes  []  No     Date of Patient follow up with Physician: PRN with Dr. Reuben Schaeffer. Is this a Progress Report:     []  Yes  [x]  No        If Yes:  Date Range for reporting period:  Beginnin23  Ending:     Progress report will be due (10 Rx or 30 days whichever is less):       Recertification will be due (POC Duration  / 90 days whichever is less): 23      Visit # Insurance Allowable Auth Required   In-person 11 BMN []  Yes [x]  No    Telehealth   []  Yes []  No    Total          Functional Scale: LEFS 54/80   Date assessed: 23  Functional Scale: LEFS 72/80 (10%)  Date assessed: 23       Number of Comorbidities:  []0     [x]1-2    []3+    Latex Allergy:  [x]NO      []YES  Preferred Language for Healthcare:   [x]English       []other:      Pain level: 0/10; 8/10       SUBJECTIVE:  The patient states they are still sore in their R knee. The patient states they were sore in their R knee after last physical therapy session.      OBJECTIVE: Observation:     02/23/23 - slight antalgic gait today. Edema noted in R knee. 02/03/23 - slight antalgic gait with decreased knee extension with initial contact through midstance     Test measurements:      02/23/23 - Knee flexion AROM prior to therapy: 123 degrees, after manual to quadriceps 125 degrees, and after exercises 128 degrees. Patient is lacking 0.5 degrees of knee extension, 0 degrees with overpressure. 01/09/23 01/09/23 01/13/23 02/08/23   ROM LEFT RIGHT RIGHT RIGHT   Knee ext 0 degrees Lacking 12 degrees Lacking 3 degrees Lacking 1 degree    With overpressure 0 degrees   Knee Flex 142 degrees 105 degrees 125 degrees 126 degrees   Strength  LEFT RIGHT  RIGHT   HIP Flexors 4+/5 4/5  4+/5   HIP Abductors   4/5     HIP Ext         Knee EXT (quad) 5/5 5/5  5/5   Knee Flex (HS) 5/5 4+/5  5/5   Ankle DF 5/5 5/5  DNT   Ankle PF 5/5 5/5  DNT      L/R    SL balance - firm   18/6 seconds         RESTRICTIONS/PRECAUTIONS: OA in L knee. R total knee arthroplasty 11/07/22.       According to Dr. Jenni Harrington note on 88/48/78: \"Right Knee I reassured her that likely no damage was done to her right knee replacement from her traumatic incident however I am concerned that she might of sprained her knee resulting in some increased laxity\"    Exercises/Interventions:      03/03/23 02/27/23 02/23/23 02/20/23 02/08/23 02/03/23 01/30/23 01/27/23 01/20/23 01/16/23 01/13/23 01/09/23    Therapeutic Ex (88452) Sets/Reps Sets/Reps Sets/Reps Sets/Reps Sets/Reps Sets/Reps  Sets/Reps Sets/Reps Sets/Reps Sets/Reps Sets/Reps Notes/CUES   Recumbent bike  3 min; level 3  5 min; level 3      5 min;L3 5 min; level 3        Supine quad set     3 x 10     3 x 10  3 x 10    Claim shell          2 x 10; GTB      Bridge          2 x 10; GTB around knees   01/16/23: no abd. today   SAQ    3 x 10            Mini squat     3 x 10       3 x 10 Chair for cue, minimal contact   Calf stretch at stairs            3 x 30\"    LAQ            2 x 10; black TB    Seated hamstring stretch            3 x 30\"    Modified Zoltan stretch 3 x 30\" 3 x 30\"              Heel slide with strap 3 x 10 3 x 10                              Welsh squat   2 x 10; Black TB             Hamstring curl           1 x 15; #25     Eccentric SL hamstring curl   3 x 15; #30   3 x 10; #30 3x10 ;25# 3 x 10; #25 3 x 10; #25 3 x 8; #25 3 x 8; #25  Double leg concentric, SL lowering   Eccentric SL knee extension 2 x 14; #20  2 x 10; #20             Incline stretch 3 x 30\"  3 x 30\"  3 x 30\"  4 x 30\" 4 x 30\" 4x30\" 4 x 30\" 3 x 30\"  3 x 30\" 3 x 30\"     Step up   2 x 5; 18: box             Hamstring stretch at stairs      3 x 30\"           Hip hinge      1 x 10          Vietnamese deadlift      2 x 10; #20 KB 2 x 10; #20 KB   2 x 12       Leg press 3 x 12; #105    3 x 10; #90  1x10 85#  3x10 90# 4 x 8; #80   2 x 8 #80  With leg kicks   Swish ball wall squat   2 x 10             TKE - standing       YANELI 45# R/L  3x10 3 x 15; blue TB        YANELI - hip abduction 2 x 10; #45               Patient education      2 min     5 min 8 min Review of HEP exercises and the importance of working on knee extension and weighted knee extension hangs.                     Manual Intervention (53085)                Knee mobilizations for R knee extension    8 min  Grade II-IV 5 min  Grade II-IV 12 min   8 min 6 min 7 min; grades II-III 8 min; grades II-III    Hamstring stretch       4 x 30\"          Manual quadriceps stretch  3 min with P-A mobilization  5 x 30\" with P-A mobilization 5 x 30\" with P-A mobilization           Knee mobilizations for R knee flexion   8 min  Grade II-IV  5 min  Grade II-IV 5 min  Grade II-IV    - 4 min      Knee range of motion (flexion and extension)    1 min     5 min 3 min      STM to R quadriceps  8 min 8 min 10 min       7 min     Manual zoltan stretch  4 min 4 x 30\"                             NMR re-education (67150)                SL balance - firm eyes open           3 x 30\" each      Cone walking           4 x 15 yards  Cuing for slowing, CGA. Pt edu re swelling and relationship to quad tone and function, cont. To utilize compression as needed to keep swelling to minumum       5'           HEP:  Access Code: [email or web address]  URL: [email or web address]  Date: 02/27/2023  Prepared by: Lizzeth Pate    Exercises  Mini Squat with Counter Support - 1 x daily - 7 x weekly - 3 sets - 10-15 reps  Standing Soleus Stretch on Step - 1 x daily - 7 x weekly - 3-5 sets - 30 seconds hold  Sitting Knee Extension with Resistance - 1 x daily - 7 x weekly - 3 sets - 10-15 reps  Seated Hamstring Stretch - 1 x daily - 7 x weekly - 3-5 sets - 30 seconds hold  Supine Heel Slide with Strap - 1 x daily - 7 x weekly - 3 sets - 10 reps  Supine Quadriceps Stretch with Strap on Table - 1 x daily - 7 x weekly - 3-5 sets - 30 seconds hold  Prone Quadriceps Stretch with Strap - 1 x daily - 7 x weekly - 3-5 sets - 30 seconds hold        Therapeutic Exercise and NMR EXR  [x] (52451) Provided verbal/tactile cueing for activities related to strengthening, flexibility, endurance, ROM for improvements in LE, proximal hip, and core control with self care, mobility, lifting, ambulation.  [] (78564) Provided verbal/tactile cueing for activities related to improving balance, coordination, kinesthetic sense, posture, motor skill, proprioception  to assist with LE, proximal hip, and core control in self care, mobility, lifting, ambulation and eccentric single leg control.      NMR and Therapeutic Activities:    [] (31679 or 22272) Provided verbal/tactile cueing for activities related to improving balance, coordination, kinesthetic sense, posture, motor skill, proprioception and motor activation to allow for proper function of core, proximal hip and LE with self care and ADLs  [] (39297) Gait Re-education- Provided training and instruction to the patient for proper LE, core and proximal hip recruitment and positioning and eccentric body weight control with ambulation re-education including up and down stairs     Home Exercise Program:    [x] (05284) Reviewed/Progressed HEP activities related to strengthening, flexibility, endurance, ROM of core, proximal hip and LE for functional self-care, mobility, lifting and ambulation/stair navigation   [] (70830)Reviewed/Progressed HEP activities related to improving balance, coordination, kinesthetic sense, posture, motor skill, proprioception of core, proximal hip and LE for self care, mobility, lifting, and ambulation/stair navigation      Manual Treatments:  PROM / STM / Oscillations-Mobs:  G-I, II, III, IV (PA's, Inf., Post.)  [x] (52696) Provided manual therapy to mobilize LE, proximal hip and/or LS spine soft tissue/joints for the purpose of modulating pain, promoting relaxation,  increasing ROM, reducing/eliminating soft tissue swelling/inflammation/restriction, improving soft tissue extensibility and allowing for proper ROM for normal function with self care, mobility, lifting and ambulation. Modalities:     [x] GAME READY (VASO)- for significant edema, swelling, pain control. Charges  Timed Code Treatment Minutes: 35   Total Treatment Minutes: 35     Medicare total (add KX at $2000)         [] EVAL (LOW) 31582   [] EVAL (MOD) 93126  [] EVAL (HIGH) 01428   [] RE-EVAL     [x] CB(20057) x 1   [] IONTO  [] NMR (94130) x     [x] VASO  [x] Manual (75476) x 1   [] Other:  [] TA x      [] Mech Traction (00663)  [] ES(attended) (60893)     [] ES (un) (34028):       GOALS:  Patient stated goal: \"To walk without pain\". [x] Progressing: [] Met: [] Not Met: [] Adjusted     Therapist goals for Patient:   Short Term Goals: To be achieved in: 2 weeks  1. Independent in HEP and progression per patient tolerance, in order to prevent re-injury. [] Progressing: [x] Met: [] Not Met: [] Adjusted  2.  Patient will have a decrease in pain to facilitate improvement in movement, function, and ADLs as indicated by Functional Deficits. [] Progressing: [x] Met: [] Not Met: [] Adjusted     Long Term Goals: To be achieved in: 10 weeks  1. Disability index score of 90% or more per LEFS to assist with reaching prior level of function. [] Progressing: [x] Met: [] Not Met: [] Adjusted  2. Patient will demonstrate increased AROM to 120 degrees or more in knee flexion to allow for proper joint functioning as indicated by patients Functional Deficits. [] Progressing: [x] Met: [] Not Met: [] Adjusted  3. Patient will demonstrate an increase in Strength to 5/5 RLE MMTs to allow for proper functional mobility as indicated by patients Functional Deficits. [x] Progressing: [] Met: [] Not Met: [] Adjusted  4. Patient will return to work functional activities without increased symptoms or restriction. [x] Progressing: [] Met: [] Not Met: [] Adjusted  5. Patient will demonstrate increased ROM to 0 degrees or more in knee extension (resting) to allow for proper joint functioning as indicated by patients Functional Deficits. [] Progressing: [x] Met: [] Not Met: [] Adjusted       6. Patient will demonstrate increased AROM to 135 degrees or more in knee flexion to allow for proper joint functioning as indicated by patients Functional Deficits. [x] Progressing: [] Met: [] Not Met: [] Adjusted    Progression Towards Functional goals:  [x] Patient is progressing as expected towards functional goals listed. [] Progression is slowed due to complexities listed. [] Progression has been slowed due to co-morbidities. [] Plan just implemented, too soon to assess goals progression  [] Other:         Overall Progression Towards Functional goals/ Treatment Progress Update:  [x] Patient is progressing as expected towards functional goals listed. [] Progression is slowed due to complexities/Impairments listed. [] Progression has been slowed due to co-morbidities.   [] Plan just implemented, too soon to assess goals progression <30days   [] Goals require adjustment due to lack of progress  [] Patient is not progressing as expected and requires additional follow up with physician  [] Other    Prognosis for POC: [x] Good [] Fair  [] Poor      Patient requires continued skilled intervention: [x] Yes  [] No    Treatment/Activity Tolerance:  [x] Patient able to complete treatment  [] Patient limited by fatigue  [] Patient limited by pain     [] Patient limited by other medical complications  [] Other:     ASSESSMENT:  Patient treatment session was focused on increasing ROM in flexion as the patient reports increased soreness since last treatment. The patient demonstrates guarding and reported tenderness with manual STM techniques. The patient has decreased flexibility in their quadriceps. Patient reports feeling irritated in R quadriceps with Zoltan stretch, however patient increases AROM after manual. Will shift on increasing flexion ROM at home vs extension as patient demonstrates 0 degrees of knee extension. Cont. Skilled PT to further address deficits. Return to Play: (if applicable)   []  Stage 1: Intro to Strength   []  Stage 2: Return to Run and Strength   []  Stage 3: Return to Jump and Strength   []  Stage 4: Dynamic Strength and Agility   []  Stage 5: Sport Specific Training     []  Ready to Return to Play, Meets All Above Stages   []  Not Ready for Return to Sports   Comments:                               PLAN: 1-2 days per week for 10 weeks. Plan to do once a week this week and possibly doing once a week following. [x] Continue per plan of care [] Alter current plan (see comments above)  [] Plan of care initiated [] Hold pending MD visit [] Discharge      Electronically signed by:  Elena Calles, PT, DPT, CSCS      Note: If patient does not return for scheduled/ recommended follow up visits, this note will serve as a discharge from care along with most recent update on progress.

## 2023-03-07 ENCOUNTER — APPOINTMENT (OUTPATIENT)
Dept: PHYSICAL THERAPY | Age: 68
End: 2023-03-07
Payer: MEDICARE

## 2023-03-07 DIAGNOSIS — R00.2 PALPITATIONS: ICD-10-CM

## 2023-03-10 ENCOUNTER — HOSPITAL ENCOUNTER (OUTPATIENT)
Dept: PHYSICAL THERAPY | Age: 68
Setting detail: THERAPIES SERIES
Discharge: HOME | End: 2023-03-10
Payer: MEDICARE

## 2023-03-10 DIAGNOSIS — R00.2 PALPITATIONS: Primary | ICD-10-CM

## 2023-03-10 NOTE — FLOWSHEET NOTE
[facility],  [address]        Physical Therapy  Cancellation/No-show Note  Patient Name:  Charles Dumont  :  1955   Date:  3/10/2023  Cancelled visits to date: 2  No-shows to date: 0    For today's appointment patient:  [x]  Cancelled  [x]  Rescheduled appointment  []  No-show     Reason given by patient:  []  Patient ill  []  Conflicting appointment  []  No transportation    []  Conflict with work  [x]  No reason given  []  Other:     Comments:      Electronically signed by:  Yuly Wells, PT, DPT, CSCS

## 2023-03-20 DIAGNOSIS — G89.29 CHRONIC PAIN OF RIGHT KNEE: Primary | ICD-10-CM

## 2023-03-20 DIAGNOSIS — M25.561 CHRONIC PAIN OF RIGHT KNEE: Primary | ICD-10-CM

## 2023-03-20 DIAGNOSIS — G89.29 CHRONIC PAIN OF LEFT KNEE: ICD-10-CM

## 2023-03-20 DIAGNOSIS — M25.562 CHRONIC PAIN OF LEFT KNEE: ICD-10-CM

## 2023-03-20 RX ORDER — DICLOFENAC SODIUM 75 MG/1
75 TABLET, DELAYED RELEASE ORAL 2 TIMES DAILY
Qty: 60 TABLET | Refills: 3 | Status: SHIPPED | OUTPATIENT
Start: 2023-03-20 | End: 2023-03-22

## 2023-03-21 NOTE — PROGRESS NOTES
breath, and syncope. Cardiac monitor 1/31-2/14/23. Per Dr Adenike Pulliam: recorded multiple short episodes of atrial arrhythmia (longest 2.5 minutes) with rapid rates. Sometimes looks like AT but sometimes a bit irregular. Can't make a definitve diagnosis but also cannot exclude AF completely. Maybe need for longer monitoring depending on nature of her symptoms (and if real concern for AF, then maybe loop recorder). Today she is here for follow up discussion of monitor results. She reports that she did get her knee surgery on 11/17/22. She reports that she did wear the heart monitor and did not experience any symptoms(palpitations) like she did back in October. She reports that she has been taking cholesterol medication that was prescribed by the hospitalist a few years back. She denies chest pain sob dizziness syncope or edema. Office Visit ([address], 03/22/2023): Today she presents for evaluation of SVT. She describes episodes of palpitations that last for a few minutes in duration which are worsened with activity. She has associated chest discomfort and lightheadedness with episodes. She feels that symptoms are improved by resting. She has been told that she snores in the past. She stays active with walking her dog but doesn't get regular physical activity. Patient denies current edema, shortness of breath or syncope. She reports moderate (2-3 cups/day) caffeine usage. She denies alcohol consumption. She denies tobacco usage. She reports her mother has AF and her father had CAD with CABG. Review of Systems  Review of Systems   Constitutional: Negative for malaise/fatigue, weight gain and weight loss. HENT:  Negative for nosebleeds and stridor. Eyes:  Negative for vision loss in left eye and vision loss in right eye. Cardiovascular:  Positive for chest pain and palpitations. Negative for dyspnea on exertion, leg swelling and syncope.    Respiratory:  Negative for shortness of breath and

## 2023-03-22 ENCOUNTER — OFFICE VISIT (OUTPATIENT)
Dept: CARDIOLOGY CLINIC | Age: 68
End: 2023-03-22
Payer: MEDICARE

## 2023-03-22 ENCOUNTER — TELEPHONE (OUTPATIENT)
Dept: CARDIOLOGY CLINIC | Age: 68
End: 2023-03-22

## 2023-03-22 VITALS
SYSTOLIC BLOOD PRESSURE: 120 MMHG | DIASTOLIC BLOOD PRESSURE: 84 MMHG | HEIGHT: 68 IN | HEART RATE: 91 BPM | BODY MASS INDEX: 30.68 KG/M2 | WEIGHT: 202.4 LBS | OXYGEN SATURATION: 96 %

## 2023-03-22 DIAGNOSIS — I49.8 ATRIAL ARRHYTHMIA: Primary | ICD-10-CM

## 2023-03-22 DIAGNOSIS — R29.818 SUSPECTED SLEEP APNEA: ICD-10-CM

## 2023-03-22 DIAGNOSIS — I49.9 IRREGULAR HEART RATE: ICD-10-CM

## 2023-03-22 DIAGNOSIS — R06.83 SNORING: ICD-10-CM

## 2023-03-22 PROCEDURE — G8484 FLU IMMUNIZE NO ADMIN: HCPCS | Performed by: INTERNAL MEDICINE

## 2023-03-22 PROCEDURE — 93000 ELECTROCARDIOGRAM COMPLETE: CPT | Performed by: INTERNAL MEDICINE

## 2023-03-22 PROCEDURE — G8427 DOCREV CUR MEDS BY ELIG CLIN: HCPCS | Performed by: INTERNAL MEDICINE

## 2023-03-22 PROCEDURE — 99204 OFFICE O/P NEW MOD 45 MIN: CPT | Performed by: INTERNAL MEDICINE

## 2023-03-22 PROCEDURE — G8417 CALC BMI ABV UP PARAM F/U: HCPCS | Performed by: INTERNAL MEDICINE

## 2023-03-22 PROCEDURE — 1090F PRES/ABSN URINE INCON ASSESS: CPT | Performed by: INTERNAL MEDICINE

## 2023-03-22 ASSESSMENT — ENCOUNTER SYMPTOMS
HEMATOCHEZIA: 0
STRIDOR: 0
SHORTNESS OF BREATH: 0
LEFT EYE: 0
WHEEZING: 0
RIGHT EYE: 0
HEMATEMESIS: 0

## 2023-03-22 NOTE — TELEPHONE ENCOUNTER
Patient was seen in office 3/22/2023. ILR discussed and agreed upon by [facility] and patient. Medications NOT discussed. Patient will NOT need covid testing prior. Thank you!      Medications to hold:  None as patient does not need to be NPO for procedure

## 2023-03-22 NOTE — PATIENT INSTRUCTIONS
Plan:     Discussed risks and benefits of Implantable Loop Recorder (ILR) to monitor heart rate and rhythm.  -patient would like to proceed with this option. (Literature provided)  Start Metoprolol Tartrate 12.5mg (1/2 tablet) twice a day. Referral placed to Sleep Medicine for evaluation of sleep apnea. Follow up with me in 3-4 months.

## 2023-03-24 NOTE — TELEPHONE ENCOUNTER
Spoke with patient. Patient is scheduled with Dr. Harry Michael for Loop Implant with [employer] on 4/7/23 at 12pm [facility], arrival time of 10:30am to the Cath Lab. Please have patient arrive to the main entrance of [facility] and check in with the registration desk. Remind patient to be NPO after midnight (8 hours prior). Do not apply lotions/creams on skin the day of procedure.

## 2023-04-06 ENCOUNTER — TELEPHONE (OUTPATIENT)
Dept: ORTHOPEDIC SURGERY | Age: 68
End: 2023-04-06

## 2023-04-06 NOTE — TELEPHONE ENCOUNTER
Pt having teeth cleaned next week and needs to speak to someone about getting an abx prior.    [phone]

## 2023-04-06 NOTE — TELEPHONE ENCOUNTER
General Question     Subject: ANTIBIOTIC PAPERWORK AND LOOP RECORDER  Patient and /or Facility Request: Keila Brennen  Contact Number: [phone]      THE PT IS GOING TO HER DENTIST AND THEY NEED A PRE MED PAPER FAXED TO THEM -[phone], EXPLAINING THAT THE PT NEEDS AN ANTIBIOTIC AND THEN THE DENTIST WILL ORDER IT. ..... ALSO THE PT IS HAVING A LOOP RECORDER PLACED TOMORROW AND SHE DIDN'T KNOW IF SHE NEEDED AN ANTIBIOTIC FOR THAT AS WELL. PLEASE CALL HER BACK.

## 2023-04-06 NOTE — TELEPHONE ENCOUNTER
Per RBE no abx needed per his abx protocol.  Advised pt of policy -if dentist requires can send form but [facility]BE does not pre medicate with abx tx.  marc

## 2023-04-07 ENCOUNTER — HOSPITAL ENCOUNTER (OUTPATIENT)
Dept: CARDIAC CATH/INVASIVE PROCEDURES | Age: 68
Discharge: HOME | End: 2023-04-07
Attending: INTERNAL MEDICINE | Admitting: INTERNAL MEDICINE
Payer: MEDICARE

## 2023-04-07 PROBLEM — I49.8 ATRIAL ARRHYTHMIA: Status: ACTIVE | Noted: 2023-04-07

## 2023-04-07 PROCEDURE — 33285 INSJ SUBQ CAR RHYTHM MNTR: CPT

## 2023-04-07 PROCEDURE — C1764 EVENT RECORDER, CARDIAC: HCPCS

## 2023-04-07 PROCEDURE — 2500000003 HC RX 250 WO HCPCS

## 2023-04-07 RX ORDER — LORAZEPAM 0.5 MG/1
0.5 TABLET ORAL
Status: DISCONTINUED | OUTPATIENT
Start: 2023-04-07 | End: 2023-04-07 | Stop reason: HOSPADM

## 2023-04-07 NOTE — DISCHARGE INSTRUCTIONS
Cath Labs at [facility] Insertion Discharge Instructions     4/7/2023  Greer Lazaro   Date of Birth 1955       You will have a temporary ID card to keep in your wallet until you get a permanent card in the mail in 3 weeks. This is to use  when you go through metal detectors in [facility] or airports or if a provider ask if you have an implantable device. Patient Manual: is used for a reference. If you have any questions you can look at the manual or call the phone number provided on the manual     Patient Activator Remote:   Keep the remote near you at all times. If you have symptoms press the heart button and place the remote over the device. You will hear a beep and see a white checkmark light up beneath the heart button. The remote will turn off on its own. Incision:   Remove the outer dressing in 48 hours. You may shower today with dressing on. Call your provider for any signs or symptoms of infection such as: fever, drainage or swelling     Sedation Discharge Instructions: For the next 24 hours do not drive a car, operate machinery, power tools or kitchen appliances. Do not drink alcohol; including beer or wine. Do not make any important decisions or sign any important papers. For the next 24 hours you can expect drowsiness, light-headed or dizziness, nausea/ vomiting, inability to concentrate, fatigue and desire to sleep. We strongly suggest that a responsible adult be with for the next 24 hours, for your protection and safety. You are not allowed to drive yourself home, or take any type of public transportation. If any questions, please call your doctor. If you cannot reach your Doctor then call the Emergency Department at  [phone]. Explain to the Emergency Department the procedure you had performed and they will be able to assist you. If you seek Emergency Care, bring this form with you.

## 2023-04-07 NOTE — PROCEDURES
PROCEDURE PERFORMED: Implantation of a loop recorder (ILR)    SURGEON: Tanya Mcallister M.D. COMPLICATIONS: None. ESTIMATED BLOOD LOSS: <5 cc    ANESTHESIA: lidocaine (local)    OTHER MEDICATIONS: None    HISTORY: This is a 78 y.o. female with palpitations     DETAILS OF PROCEDURE:     The patient was brought to the electrophysiologic laboratory in stable condition. The patient was in a fasting, non-sedated state. The risks, benefits and alternatives of the procedure were discussed with her. The risks including, but not limited to, the risks of infection and bleeding were discussed with the patient. After considering the risks and alternatives of the procedure, the patient elected to proceed with the procedure. Written informed consent was obtained and placed on the chart. The patient was prepped and draped in a sterile fashion. Using the specialized blade from the device tool kit, a 0.5 cm incision was made in the left 4th intercostal space just lateral to the sternum. The small tunnelling tool was used to create a small pocket for the device. The device was inserted into the pocket. Electronic analysis of the device was performed indicating good sensing of cardiac electrical activity. The pocket was closed with 4.0 Vicryl suture material followed by DermaBond. A dressing was applied. The patient was transported to the holding area in stable condition. Implanted Materials:  Implant: [employer]    : View3    Model number: [id number]   Serial: [id number]        Settings:    Sim detection: 30 BPM  Tachy detection: 150 BPM       Summary: Successful implantation of a loop recorder.     Recommendations:  Bedrest x 30 minutes  Vital signs per protocol

## 2023-04-07 NOTE — H&P
I have reviewed seen, interviewed and examined the patient. There has been no change in the findings since our last office visit dated 3/22/2023.

## 2023-04-11 PROBLEM — Z45.09 ENCOUNTER FOR LOOP RECORDER CHECK: Status: ACTIVE | Noted: 2023-04-11

## 2023-04-18 ENCOUNTER — NURSE ONLY (OUTPATIENT)
Dept: CARDIOLOGY CLINIC | Age: 68
End: 2023-04-18

## 2023-04-18 NOTE — PATIENT INSTRUCTIONS
(recurring appointments that run parallel to in office checks). You do not need to initiate a transmission or be awake at the appointment time listed - this is solely for office purposes. Our office utilizes the \"no news is good news\" narrative regarding remote monitoring. A Device Specialist or RN will contact you with any critical findings from your remote monitoring. Going forward, if you experience issues with your in-home monitor, please verify that it is plugged in to the wall. If issues persist, please contact the Customer Service numbers provided below, as they can troubleshoot issues that may be happening with the monitor itself. The [employer] works closely with the remote monitoring websites - if we notice there is a disconnection we will contact you to inform you and ask you to contact the  of your device.     [employer])  [phone]

## 2023-04-25 ENCOUNTER — TELEPHONE (OUTPATIENT)
Dept: CARDIOLOGY CLINIC | Age: 68
End: 2023-04-25

## 2023-04-25 NOTE — TELEPHONE ENCOUNTER
Pt sts she knocked her scab lose and a stitch is hanging out from Loop placement on 4/7/23. Please advise.

## 2023-04-26 ENCOUNTER — NURSE ONLY (OUTPATIENT)
Dept: CARDIOLOGY CLINIC | Age: 68
End: 2023-04-26

## 2023-04-26 NOTE — PROGRESS NOTES
Patient presents to the [address] [place] [address] Drive today for site check w/ complaints of a suture exposed at incision. Suture string noted at incision site. Per Dr. Juan Pablo Woflf, suture removed.

## 2023-04-27 DIAGNOSIS — R06.02 SHORTNESS OF BREATH ON EXERTION: Primary | ICD-10-CM

## 2023-05-02 ENCOUNTER — OFFICE VISIT (OUTPATIENT)
Dept: PULMONOLOGY | Age: 68
End: 2023-05-02
Payer: MEDICARE

## 2023-05-02 VITALS
SYSTOLIC BLOOD PRESSURE: 102 MMHG | TEMPERATURE: 97.3 F | HEIGHT: 68 IN | BODY MASS INDEX: 30.83 KG/M2 | DIASTOLIC BLOOD PRESSURE: 68 MMHG | RESPIRATION RATE: 16 BRPM | WEIGHT: 203.4 LBS | HEART RATE: 61 BPM | OXYGEN SATURATION: 97 %

## 2023-05-02 DIAGNOSIS — G47.30 SLEEP APNEA, UNSPECIFIED TYPE: Primary | ICD-10-CM

## 2023-05-02 PROCEDURE — 1123F ACP DISCUSS/DSCN MKR DOCD: CPT | Performed by: STUDENT IN AN ORGANIZED HEALTH CARE EDUCATION/TRAINING PROGRAM

## 2023-05-02 PROCEDURE — 3017F COLORECTAL CA SCREEN DOC REV: CPT | Performed by: STUDENT IN AN ORGANIZED HEALTH CARE EDUCATION/TRAINING PROGRAM

## 2023-05-02 PROCEDURE — 1090F PRES/ABSN URINE INCON ASSESS: CPT | Performed by: STUDENT IN AN ORGANIZED HEALTH CARE EDUCATION/TRAINING PROGRAM

## 2023-05-02 PROCEDURE — 99204 OFFICE O/P NEW MOD 45 MIN: CPT | Performed by: STUDENT IN AN ORGANIZED HEALTH CARE EDUCATION/TRAINING PROGRAM

## 2023-05-02 PROCEDURE — G8417 CALC BMI ABV UP PARAM F/U: HCPCS | Performed by: STUDENT IN AN ORGANIZED HEALTH CARE EDUCATION/TRAINING PROGRAM

## 2023-05-02 PROCEDURE — 1036F TOBACCO NON-USER: CPT | Performed by: STUDENT IN AN ORGANIZED HEALTH CARE EDUCATION/TRAINING PROGRAM

## 2023-05-02 PROCEDURE — G8400 PT W/DXA NO RESULTS DOC: HCPCS | Performed by: STUDENT IN AN ORGANIZED HEALTH CARE EDUCATION/TRAINING PROGRAM

## 2023-05-02 PROCEDURE — G8427 DOCREV CUR MEDS BY ELIG CLIN: HCPCS | Performed by: STUDENT IN AN ORGANIZED HEALTH CARE EDUCATION/TRAINING PROGRAM

## 2023-05-02 ASSESSMENT — ENCOUNTER SYMPTOMS
TROUBLE SWALLOWING: 0
NAUSEA: 0
BACK PAIN: 0
DIARRHEA: 0
STRIDOR: 0
COLOR CHANGE: 0
CONSTIPATION: 0
EYE DISCHARGE: 0
SHORTNESS OF BREATH: 0
ABDOMINAL DISTENTION: 0
EYE PAIN: 0
EYE REDNESS: 0
EYE ITCHING: 0
COUGH: 0
SORE THROAT: 0
VOMITING: 0
ABDOMINAL PAIN: 0
WHEEZING: 0

## 2023-05-02 NOTE — PATIENT INSTRUCTIONS
Remember to bring a list of pulmonary medications and any CPAP or BiPAP machines to your next appointment with the office. Please keep all of your future appointments scheduled by [facility] office. Out of respect for other patients and providers, you may be asked to reschedule your appointment if you arrive later than your scheduled appointment time. Appointments cancelled less than 24hrs in advance will be considered a no show. Patients with three missed appointments within 1 year or four missed appointments within 2 years can be dismissed from the practice. Please be aware that our physicians are required to work in the Intensive Care Unit at [facility].  Your appointment may need to be rescheduled if they are designated to work during your appointment time. You may receive a survey regarding the care you received during your visit. Your input is valuable to us. We encourage you to complete and return your survey. We hope you will choose us in the future for your healthcare needs. Pt instructed of all future appointment dates & times, including radiology, labs, procedures & referrals. If procedures were scheduled preparation instructions provided. Instructions on future appointments with [facility] were given.       Sleep lab [phone]

## 2023-05-02 NOTE — PROGRESS NOTES
[address] Visit   [address] [facility] [address]  [facility], [address] [facility]  [phone]    Chief Complaint/Referring Provider:  Patient is being seen at the request of Dr. Mony Beck for a consultation for sleep apnea    Presenting HPI:   Patient is a 79-year-old female with significant past medical history of obesity, hyperlipidemia, atrial arrhythmia that presents to [facility] pulmonary clinic for evaluation of sleep apnea. Patient usually goes to bed at 11 PM and wakes up at either 5 AM to 8 AM depending on errands for the day. She wakes up 2 times at night to go to the bathroom, and most of the time is able to go back to bed. She does not feel refreshed in the morning, denies any morning headaches. She does not take any naps during the day, but has daytime sleepiness. Her  passed away in 2011, she currently does not sleep with a partner. Her  did tell her that she was snoring at night, she denies any apneic episodes. She does not take any medications for sleep. Patient quit smoking in 2000, she was smoking half a pack per day for 30 years. She denies any illicit drug use, rarely drinks alcohol. She worked at [employer] for 34 years and retired, she is worked other jobs in the past which include [employer] and [employer]. She denies any occupational exposures. She has 1 dog at home, no other pets/birds. She denies any household exposures. Patient was having atrial arrhythmias and was referred by cardiology for further work-up.     [place] Sleepiness Scale    Sitting and reading - 0    Watching TV - 0    Sitting, inactive, in a public place  (e.g., in a meeting, theater, or  dinner event) - 0    As a passenger in a car for an  hour or more without stopping  for a break - 0    Lying down to rest when  circumstances permit - 1    Sitting and talking to someone - 0    Sitting quietly after a meal  without alcohol - 0    In a car, while stopped for a few  minutes in traffic

## 2023-05-02 NOTE — PROGRESS NOTES
MA Communication:   The following orders are received by verbal communication from   Suri Romano MD    Orders include:  HST/Sleep lab to call       FU 2 mo

## 2023-05-03 ENCOUNTER — OFFICE VISIT (OUTPATIENT)
Dept: ORTHOPEDIC SURGERY | Age: 68
End: 2023-05-03

## 2023-05-03 VITALS — BODY MASS INDEX: 30.77 KG/M2 | WEIGHT: 203 LBS | HEIGHT: 68 IN

## 2023-05-03 DIAGNOSIS — M17.12 PRIMARY OSTEOARTHRITIS OF LEFT KNEE: ICD-10-CM

## 2023-05-03 DIAGNOSIS — M25.562 CHRONIC PAIN OF LEFT KNEE: ICD-10-CM

## 2023-05-03 DIAGNOSIS — G89.29 CHRONIC PAIN OF LEFT KNEE: ICD-10-CM

## 2023-05-03 DIAGNOSIS — M22.42 CHONDROMALACIA OF LEFT PATELLA: Primary | ICD-10-CM

## 2023-05-03 RX ORDER — BUPIVACAINE HYDROCHLORIDE 2.5 MG/ML
2 INJECTION, SOLUTION INFILTRATION; PERINEURAL ONCE
Status: COMPLETED | OUTPATIENT
Start: 2023-05-03 | End: 2023-05-03

## 2023-05-03 RX ORDER — BETAMETHASONE SODIUM PHOSPHATE AND BETAMETHASONE ACETATE 3; 3 MG/ML; MG/ML
12 INJECTION, SUSPENSION INTRA-ARTICULAR; INTRALESIONAL; INTRAMUSCULAR; SOFT TISSUE ONCE
Status: COMPLETED | OUTPATIENT
Start: 2023-05-03 | End: 2023-05-03

## 2023-05-03 RX ORDER — LIDOCAINE HYDROCHLORIDE 10 MG/ML
1 INJECTION, SOLUTION INFILTRATION; PERINEURAL ONCE
Status: COMPLETED | OUTPATIENT
Start: 2023-05-03 | End: 2023-05-03

## 2023-05-03 RX ADMIN — BETAMETHASONE SODIUM PHOSPHATE AND BETAMETHASONE ACETATE 12 MG: 3; 3 INJECTION, SUSPENSION INTRA-ARTICULAR; INTRALESIONAL; INTRAMUSCULAR; SOFT TISSUE at 10:49

## 2023-05-03 RX ADMIN — LIDOCAINE HYDROCHLORIDE 1 ML: 10 INJECTION, SOLUTION INFILTRATION; PERINEURAL at 10:50

## 2023-05-03 RX ADMIN — BUPIVACAINE HYDROCHLORIDE 5 MG: 2.5 INJECTION, SOLUTION INFILTRATION; PERINEURAL at 10:49

## 2023-05-03 NOTE — PROGRESS NOTES
have moderate pain with crepitation with lateral Jose's on the right knee and medial Jose's on the left. I do not sense a high-grade click. I do not sense high-grade instability of screening testing is fairly benign. Skin: There are no rashes, ulcerations or lesions. Distal neurovascular exam is intact. Gait: Reasonable gait with less pain with positional change. Reflex symmetrically preserved     Additional Comments:      Additional Examinations:  Right Lower Extremity: Examination of the right lower extremity does not show any tenderness, deformity or injury. Range of motion is unremarkable. There is no gross instability. There are no rashes, ulcerations or lesions. Strength and tone are normal.  She is status post right total knee arthroplasty  Left Lower Extremity: Examination of the left lower extremity does not show any tenderness, deformity or injury. Range of motion is unremarkable. There is no gross instability. There are no rashes, ulcerations or lesions. Strength and tone are normal.  Examination of the biateral hip reveals intact skin. The patient demonstrates full painless range of motion with regards to flexion, abduction, internal and external rotation. There is not tenderness about the greater trochanter. There is a negative straight leg raise against resistance. Strength is 5/5 thorough out all planes. Diagnostic Test Findings: bilateral knee AP and PA weight-bearing sunrise and lateral films were reviewed from 10/24/2022 and show fairly prominent nearly bone-on-bone changes to the lateral compartment of her right knee and medial compartment of her left knee with left greater than right knee patellofemoral arthropathy with spurring.      MRI right knee obtained at [facility] 11/15/2019 as listed above  Narrative   Site: [facility] #: [id number] #: [id number] Procedure: MR Right Knee w/o Contrast ; Reason for Exam: dx;MMT, OSTEOARTHRITIS,

## 2023-05-10 ENCOUNTER — OFFICE VISIT (OUTPATIENT)
Dept: ORTHOPEDIC SURGERY | Age: 68
End: 2023-05-10

## 2023-05-10 DIAGNOSIS — M17.12 PRIMARY OSTEOARTHRITIS OF LEFT KNEE: ICD-10-CM

## 2023-05-10 DIAGNOSIS — M22.42 CHONDROMALACIA OF LEFT PATELLA: Primary | ICD-10-CM

## 2023-05-10 DIAGNOSIS — G89.29 CHRONIC PAIN OF LEFT KNEE: ICD-10-CM

## 2023-05-10 DIAGNOSIS — M25.562 CHRONIC PAIN OF LEFT KNEE: ICD-10-CM

## 2023-05-10 RX ORDER — HYALURONATE SODIUM 10 MG/ML
20 SYRINGE (ML) INTRAARTICULAR ONCE
Status: COMPLETED | OUTPATIENT
Start: 2023-05-10 | End: 2023-05-10

## 2023-05-10 RX ADMIN — Medication 20 MG: at 15:10

## 2023-05-10 NOTE — PROGRESS NOTES
Chief Complaint  Knee Pain (FU L KNEE )      Orvel Rinne is a 60 y.o. 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 Caryle Compton is being seen by today for recurrent worsening of her left knee pain with underlying osteoarthritis. He is known to have grade IV chondromalacia to the weightbearing portion of her lateral compartment of her left knee as well as partial pseudo-extrusion of the lateral meniscus with a chronic complex flap tear with synovitis. Patient status post right total knee arthroplasty per Dr. Trudy Cruz [id number]/3839 doing well     History of Present Illness for Follow Up Patient:      Gómez Garcia is being seen in for follow up today to review MRI results for ongoing right knee pain. Gómez Garcia is 12 weeks out from aggravation of right knee after a twisting injury. The patient rates their current pain as a 5 out of 10 on the pain scale but can increase with activities such as going from a seated to standing position or having to go up and down stairs. Treatment to date includes: rest, ice, NSAID- Diclofeac, physical therapy, home exercises, knee bracing, cortisone injection on 4/1/2019 and her first attempt at visco supplementation completed on 7/17/2019 to treat this problem. Her MRI was obtained at [facility] on 11/15/2019 did show evidence of grade IV chondromalacia of the weightbearing and posterior nonweightbearing portion of the lateral compartment with full-thickness chondral loss and some stress edema but no evidence of insufficiency fracture. She did have a lateral meniscal pseudo-extrusion with complex flap tear to the anterior horn to posterior root with chronic notch synovitis and ACL inflammation likely related to her twisting injury about a month ago.   Denies locking catching or true instability symptoms and she would like to avoid surgery if at

## 2023-05-19 ENCOUNTER — HOSPITAL ENCOUNTER (OUTPATIENT)
Dept: SLEEP CENTER | Age: 68
End: 2023-05-19
Payer: MEDICARE

## 2023-05-19 DIAGNOSIS — G47.30 SLEEP APNEA, UNSPECIFIED TYPE: ICD-10-CM

## 2023-05-19 PROCEDURE — 95806 SLEEP STUDY UNATT&RESP EFFT: CPT | Performed by: INTERNAL MEDICINE

## 2023-05-19 PROCEDURE — 95806 SLEEP STUDY UNATT&RESP EFFT: CPT

## 2023-05-22 ENCOUNTER — OFFICE VISIT (OUTPATIENT)
Dept: ORTHOPEDIC SURGERY | Age: 68
End: 2023-05-22

## 2023-05-22 DIAGNOSIS — M17.12 PRIMARY OSTEOARTHRITIS OF LEFT KNEE: Primary | ICD-10-CM

## 2023-05-22 DIAGNOSIS — M22.42 CHONDROMALACIA OF LEFT PATELLA: ICD-10-CM

## 2023-05-22 DIAGNOSIS — M25.562 CHRONIC PAIN OF LEFT KNEE: ICD-10-CM

## 2023-05-22 DIAGNOSIS — G89.29 CHRONIC PAIN OF LEFT KNEE: ICD-10-CM

## 2023-05-22 RX ORDER — HYALURONATE SODIUM 10 MG/ML
20 SYRINGE (ML) INTRAARTICULAR ONCE
Status: COMPLETED | OUTPATIENT
Start: 2023-05-22 | End: 2023-05-22

## 2023-05-22 RX ADMIN — Medication 20 MG: at 09:38

## 2023-05-22 NOTE — PROGRESS NOTES
CC:  FU Knee Osteoarthritis with Viscosupplementation        Veronica Beebe is a 60 y.o. 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 Waqar Gonsalez is being seen by today for recurrent worsening of her right knee pain with underlying osteoarthritis. He is known to have grade IV chondromalacia to the weightbearing portion of her lateral compartment of her knee as well as partial pseudo-extrusion of the lateral meniscus with a chronic complex flap tear with synovitis. Patient status post completion of viscosupplementation bilateral knee 4/6/2022 pending right total knee arthroplasty per Dr. Sid Crenshaw 09/40/6175    History of Present Illness for Follow Up Patient:      Batool Bolaños is being seen in for follow up today to review MRI results for ongoing right knee pain. Batool Bolaños is 12 weeks out from aggravation of right knee after a twisting injury. The patient rates their current pain as a 5 out of 10 on the pain scale but can increase with activities such as going from a seated to standing position or having to go up and down stairs. Treatment to date includes: rest, ice, NSAID- Diclofeac, physical therapy, home exercises, knee bracing, cortisone injection on 4/1/2019 and her first attempt at visco supplementation completed on 7/17/2019 to treat this problem. Her MRI was obtained at [facility] on 11/15/2019 did show evidence of grade IV chondromalacia of the weightbearing and posterior nonweightbearing portion of the lateral compartment with full-thickness chondral loss and some stress edema but no evidence of insufficiency fracture. She did have a lateral meniscal pseudo-extrusion with complex flap tear to the anterior horn to posterior root with chronic notch synovitis and ACL inflammation likely related to her twisting injury about a month ago.   Denies locking catching or

## 2023-05-23 PROBLEM — G47.30 SLEEP APNEA: Status: ACTIVE | Noted: 2023-05-23

## 2023-05-24 ENCOUNTER — NURSE ONLY (OUTPATIENT)
Dept: CARDIOLOGY CLINIC | Age: 68
End: 2023-05-24
Payer: MEDICARE

## 2023-05-24 DIAGNOSIS — Z45.09 ENCOUNTER FOR LOOP RECORDER CHECK: ICD-10-CM

## 2023-05-24 DIAGNOSIS — R00.2 PALPITATIONS: ICD-10-CM

## 2023-05-24 DIAGNOSIS — I49.8 ATRIAL ARRHYTHMIA: ICD-10-CM

## 2023-05-24 PROCEDURE — 93298 REM INTERROG DEV EVAL SCRMS: CPT | Performed by: INTERNAL MEDICINE

## 2023-05-24 PROCEDURE — G2066 INTER DEVC REMOTE 30D: HCPCS | Performed by: INTERNAL MEDICINE

## 2023-05-24 NOTE — PROGRESS NOTES
We received a remote transmission from patient's monitor at home. Remote Linq report shows symptom recording showing SVT. Pt is on Lopressor. EP physician to review. We will continue to monitor remotely. Implanted for palpitations and atrial arrhythmias. End of 31-day monitoring period 5-24-23.

## 2023-06-05 ENCOUNTER — OFFICE VISIT (OUTPATIENT)
Dept: ORTHOPEDIC SURGERY | Age: 68
End: 2023-06-05

## 2023-06-05 DIAGNOSIS — G89.29 CHRONIC PAIN OF LEFT KNEE: ICD-10-CM

## 2023-06-05 DIAGNOSIS — M25.562 CHRONIC PAIN OF LEFT KNEE: ICD-10-CM

## 2023-06-05 DIAGNOSIS — M22.42 CHONDROMALACIA OF LEFT PATELLA: ICD-10-CM

## 2023-06-05 DIAGNOSIS — M17.12 PRIMARY OSTEOARTHRITIS OF LEFT KNEE: Primary | ICD-10-CM

## 2023-06-05 RX ORDER — HYALURONATE SODIUM 10 MG/ML
20 SYRINGE (ML) INTRAARTICULAR ONCE
Status: COMPLETED | OUTPATIENT
Start: 2023-06-05 | End: 2023-06-05

## 2023-06-05 RX ADMIN — Medication 20 MG: at 09:35

## 2023-06-05 NOTE — PROGRESS NOTES
She is here today for final Euflexxa injection is continue with her anti-inflammatories and denies true locking catching or instability symptoms. She is going on vacation in early August 2023 is inquiring about a steroid injection and then plans on scheduling with Dr. Cindi Frazier to consider knee arthroplasty on the left in November or December of this year. PE: no substantial change in exam.    Assessment:  Knee Osteoarthritis with viscosupplementation      PROCEDURE NOTE:    PRE-PROCEDURE DIAGNOSIS: DJD knee    POST-PROCEDURE DIAGNOSIS: DJD knee    Injection #3 of Euflexxa     PROCEDURE:  With the patient's permission, her left knee was prepped in standard sterile fashion with Betadine and Alcohol and the prefilled 2cc injection of Euflexxa was injected intra-articularly into the left knee via a superolateral approach without difficulty. The patient tolerated this well without difficulty. A band-aid was applied. POST-PROCEDURE INSTRUCTIONS GIVEN TO PATIENT: The patient was advised to ice the knee for 15-20 minutes to relieve any injection site related pain. FOLLOW-UP: as directed. She will continue with her diclofenac 75 mg 1 pill twice daily. Continue attempts at weight loss were recommended as well as continuation of her bracing exercise program.  Her right knee is doing much better after her knee replacement with Dr. Nicole Molina. She is aware it may take 6 to 8 weeks to see maximum efficacy with her injections and would like to consider steroid injection potentially prior to her vacation in early August 2023 and then will make an appointment to get on the schedule with Dr. Nicole Molina to consider left total knee arthroplasty in November or December of this year. She will contact us in the interim with questions or concerns.

## 2023-06-08 ENCOUNTER — TELEPHONE (OUTPATIENT)
Dept: CARDIOLOGY CLINIC | Age: 68
End: 2023-06-08

## 2023-06-08 NOTE — TELEPHONE ENCOUNTER
Pt had to call and r/s her kxa ov on 7/26/23 due to needing to watch her grandchildren. Pt is r/s to next available for 08/30. Pt wanted to know if this is ok to wait until then? Please advise.

## 2023-06-08 NOTE — TELEPHONE ENCOUNTER
KXA please advise-    Patient had ILR placed on 4/7/2023 and had to cancel 7/26/2023 follow up appointment. Is 8/30/2023 an appropriate date for follow up or do you need to see her as an overbook? If overbook, please advise when. Thanks.

## 2023-06-09 NOTE — TELEPHONE ENCOUNTER
Mauriec Aguayo MD  You; [facility] Staff 14 hours ago (5:22 PM)     Joao Castillo is fine

## 2023-06-23 ENCOUNTER — HOSPITAL ENCOUNTER (OUTPATIENT)
Dept: NUCLEAR MEDICINE | Age: 68
Discharge: HOME | End: 2023-06-23
Attending: FAMILY MEDICINE
Payer: MEDICARE

## 2023-06-23 VITALS — WEIGHT: 203 LBS | BODY MASS INDEX: 30.77 KG/M2 | HEIGHT: 68 IN

## 2023-06-23 DIAGNOSIS — R10.9 ABDOMINAL PAIN, UNSPECIFIED ABDOMINAL LOCATION: ICD-10-CM

## 2023-06-23 PROCEDURE — A9537 TC99M MEBROFENIN: HCPCS | Performed by: FAMILY MEDICINE

## 2023-06-23 PROCEDURE — 78227 HEPATOBIL SYST IMAGE W/DRUG: CPT

## 2023-06-23 PROCEDURE — 3430000000 HC RX DIAGNOSTIC RADIOPHARMACEUTICAL: Performed by: FAMILY MEDICINE

## 2023-06-23 RX ADMIN — Medication 6 MILLICURIE: at 10:05

## 2023-06-28 ENCOUNTER — NURSE ONLY (OUTPATIENT)
Dept: CARDIOLOGY CLINIC | Age: 68
End: 2023-06-28
Payer: MEDICARE

## 2023-06-28 DIAGNOSIS — I49.8 ATRIAL ARRHYTHMIA: ICD-10-CM

## 2023-06-28 DIAGNOSIS — R00.2 PALPITATIONS: ICD-10-CM

## 2023-06-28 DIAGNOSIS — Z45.09 ENCOUNTER FOR LOOP RECORDER CHECK: ICD-10-CM

## 2023-06-28 PROCEDURE — G2066 INTER DEVC REMOTE 30D: HCPCS | Performed by: INTERNAL MEDICINE

## 2023-06-28 PROCEDURE — 93298 REM INTERROG DEV EVAL SCRMS: CPT | Performed by: INTERNAL MEDICINE

## 2023-07-06 ENCOUNTER — NURSE ONLY (OUTPATIENT)
Dept: CARDIOLOGY CLINIC | Age: 68
End: 2023-07-06

## 2023-07-12 ENCOUNTER — TELEPHONE (OUTPATIENT)
Dept: ORTHOPEDIC SURGERY | Age: 68
End: 2023-07-12

## 2023-07-13 ENCOUNTER — OFFICE VISIT (OUTPATIENT)
Dept: PULMONOLOGY | Age: 68
End: 2023-07-13
Payer: MEDICARE

## 2023-07-13 VITALS
BODY MASS INDEX: 29.7 KG/M2 | WEIGHT: 196 LBS | SYSTOLIC BLOOD PRESSURE: 98 MMHG | HEART RATE: 63 BPM | DIASTOLIC BLOOD PRESSURE: 67 MMHG | HEIGHT: 68 IN | OXYGEN SATURATION: 96 % | RESPIRATION RATE: 16 BRPM | TEMPERATURE: 98.4 F

## 2023-07-13 DIAGNOSIS — G47.30 MILD SLEEP APNEA: Primary | ICD-10-CM

## 2023-07-13 PROCEDURE — 1123F ACP DISCUSS/DSCN MKR DOCD: CPT | Performed by: STUDENT IN AN ORGANIZED HEALTH CARE EDUCATION/TRAINING PROGRAM

## 2023-07-13 PROCEDURE — 3017F COLORECTAL CA SCREEN DOC REV: CPT | Performed by: STUDENT IN AN ORGANIZED HEALTH CARE EDUCATION/TRAINING PROGRAM

## 2023-07-13 PROCEDURE — 1036F TOBACCO NON-USER: CPT | Performed by: STUDENT IN AN ORGANIZED HEALTH CARE EDUCATION/TRAINING PROGRAM

## 2023-07-13 PROCEDURE — 99213 OFFICE O/P EST LOW 20 MIN: CPT | Performed by: STUDENT IN AN ORGANIZED HEALTH CARE EDUCATION/TRAINING PROGRAM

## 2023-07-13 PROCEDURE — 1090F PRES/ABSN URINE INCON ASSESS: CPT | Performed by: STUDENT IN AN ORGANIZED HEALTH CARE EDUCATION/TRAINING PROGRAM

## 2023-07-13 PROCEDURE — G8417 CALC BMI ABV UP PARAM F/U: HCPCS | Performed by: STUDENT IN AN ORGANIZED HEALTH CARE EDUCATION/TRAINING PROGRAM

## 2023-07-13 PROCEDURE — G8427 DOCREV CUR MEDS BY ELIG CLIN: HCPCS | Performed by: STUDENT IN AN ORGANIZED HEALTH CARE EDUCATION/TRAINING PROGRAM

## 2023-07-13 PROCEDURE — G8400 PT W/DXA NO RESULTS DOC: HCPCS | Performed by: STUDENT IN AN ORGANIZED HEALTH CARE EDUCATION/TRAINING PROGRAM

## 2023-07-13 RX ORDER — ONDANSETRON 4 MG/1
TABLET, FILM COATED ORAL
COMMUNITY
Start: 2023-06-07

## 2023-07-13 ASSESSMENT — ENCOUNTER SYMPTOMS
COLOR CHANGE: 0
ABDOMINAL PAIN: 0
VOMITING: 0
SORE THROAT: 0
ABDOMINAL DISTENTION: 0
CONSTIPATION: 0
EYE DISCHARGE: 0
STRIDOR: 0
DIARRHEA: 0
SHORTNESS OF BREATH: 0
COUGH: 0
EYE ITCHING: 0
BACK PAIN: 0
NAUSEA: 0
TROUBLE SWALLOWING: 0
EYE PAIN: 0
WHEEZING: 0
EYE REDNESS: 0

## 2023-07-13 ASSESSMENT — SLEEP AND FATIGUE QUESTIONNAIRES
HOW LIKELY ARE YOU TO NOD OFF OR FALL ASLEEP WHILE SITTING QUIETLY AFTER LUNCH WITHOUT ALCOHOL: 0
NECK CIRCUMFERENCE (INCHES): 13.5
HOW LIKELY ARE YOU TO NOD OFF OR FALL ASLEEP IN A CAR, WHILE STOPPED FOR A FEW MINUTES IN TRAFFIC: 0
HOW LIKELY ARE YOU TO NOD OFF OR FALL ASLEEP WHILE WATCHING TV: 0
HOW LIKELY ARE YOU TO NOD OFF OR FALL ASLEEP WHILE SITTING AND TALKING TO SOMEONE: 0
ESS TOTAL SCORE: 2
HOW LIKELY ARE YOU TO NOD OFF OR FALL ASLEEP WHILE SITTING AND READING: 1
HOW LIKELY ARE YOU TO NOD OFF OR FALL ASLEEP WHILE SITTING INACTIVE IN A PUBLIC PLACE: 0
HOW LIKELY ARE YOU TO NOD OFF OR FALL ASLEEP WHEN YOU ARE A PASSENGER IN A CAR FOR AN HOUR WITHOUT A BREAK: 1
HOW LIKELY ARE YOU TO NOD OFF OR FALL ASLEEP WHILE LYING DOWN TO REST IN THE AFTERNOON WHEN CIRCUMSTANCES PERMIT: 0

## 2023-07-13 NOTE — PATIENT INSTRUCTIONS
Remember to bring a list of pulmonary medications and any CPAP or BiPAP machines to your next appointment with the office. Please keep all of your future appointments scheduled by [facility] office. Out of respect for other patients and providers, you may be asked to reschedule your appointment if you arrive later than your scheduled appointment time. Appointments cancelled less than 24hrs in advance will be considered a no show. Patients with three missed appointments within 1 year or four missed appointments within 2 years can be dismissed from the practice. Please be aware that our physicians are required to work in the Intensive Care Unit at [facility].  Your appointment may need to be rescheduled if they are designated to work during your appointment time. You may receive a survey regarding the care you received during your visit. Your input is valuable to us. We encourage you to complete and return your survey. We hope you will choose us in the future for your healthcare needs. Pt instructed of all future appointment dates & times, including radiology, labs, procedures & referrals. If procedures were scheduled preparation instructions provided. Instructions on future appointments with [facility] Pul[facility] were given.

## 2023-07-13 NOTE — PROGRESS NOTES
MA Communication:   The following orders are received by verbal communication from   Dano Carrasco MD    Orders include:  4 mo sleep no cpap
Name Primary? Mild sleep apnea Yes     Plan:  - Diagnosed with mild sleep apnea from HST. Her ESS - 1, she does not have any daytime symptoms and cardiac symptoms improved with BB. She also has high caffeine/stimulant intake which I advised her to cut down. We will not treat her for mild sleep apnea based on symptoms/history.   - Discussed sleep hygiene  - Recommend exercise/diet/weight loss  - f/u in 4 months    Felton Cr MD

## 2023-07-17 ENCOUNTER — TELEPHONE (OUTPATIENT)
Dept: ORTHOPEDIC SURGERY | Age: 68
End: 2023-07-17

## 2023-07-21 ENCOUNTER — HOSPITAL ENCOUNTER (OUTPATIENT)
Dept: WOMENS IMAGING | Age: 68
Discharge: HOME | End: 2023-07-21
Attending: FAMILY MEDICINE
Payer: MEDICARE

## 2023-07-21 DIAGNOSIS — Z12.31 ENCOUNTER FOR SCREENING MAMMOGRAM FOR MALIGNANT NEOPLASM OF BREAST: ICD-10-CM

## 2023-07-21 PROCEDURE — 77063 BREAST TOMOSYNTHESIS BI: CPT

## 2023-07-31 ENCOUNTER — OFFICE VISIT (OUTPATIENT)
Dept: ORTHOPEDIC SURGERY | Age: 68
End: 2023-07-31

## 2023-07-31 VITALS — HEIGHT: 68 IN | BODY MASS INDEX: 29.7 KG/M2 | WEIGHT: 196 LBS

## 2023-07-31 DIAGNOSIS — M25.562 CHRONIC PAIN OF LEFT KNEE: ICD-10-CM

## 2023-07-31 DIAGNOSIS — M17.12 PRIMARY OSTEOARTHRITIS OF LEFT KNEE: Primary | ICD-10-CM

## 2023-07-31 DIAGNOSIS — G89.29 CHRONIC PAIN OF LEFT KNEE: ICD-10-CM

## 2023-07-31 DIAGNOSIS — M22.42 CHONDROMALACIA OF LEFT PATELLA: ICD-10-CM

## 2023-07-31 RX ORDER — LIDOCAINE HYDROCHLORIDE 10 MG/ML
1 INJECTION, SOLUTION INFILTRATION; PERINEURAL ONCE
Status: COMPLETED | OUTPATIENT
Start: 2023-07-31 | End: 2023-07-31

## 2023-07-31 RX ORDER — CELECOXIB 200 MG/1
200 CAPSULE ORAL DAILY
Qty: 30 CAPSULE | Refills: 3 | Status: SHIPPED | OUTPATIENT
Start: 2023-07-31

## 2023-07-31 RX ORDER — BETAMETHASONE SODIUM PHOSPHATE AND BETAMETHASONE ACETATE 3; 3 MG/ML; MG/ML
12 INJECTION, SUSPENSION INTRA-ARTICULAR; INTRALESIONAL; INTRAMUSCULAR; SOFT TISSUE ONCE
Status: COMPLETED | OUTPATIENT
Start: 2023-07-31 | End: 2023-07-31

## 2023-07-31 RX ORDER — BUPIVACAINE HYDROCHLORIDE 2.5 MG/ML
2 INJECTION, SOLUTION INFILTRATION; PERINEURAL ONCE
Status: COMPLETED | OUTPATIENT
Start: 2023-07-31 | End: 2023-07-31

## 2023-07-31 RX ORDER — ASPIRIN 81 MG/1
81 TABLET ORAL DAILY
COMMUNITY

## 2023-07-31 RX ORDER — PANTOPRAZOLE SODIUM 40 MG/1
TABLET, DELAYED RELEASE ORAL
COMMUNITY
Start: 2023-07-18

## 2023-07-31 RX ADMIN — BETAMETHASONE SODIUM PHOSPHATE AND BETAMETHASONE ACETATE 12 MG: 3; 3 INJECTION, SUSPENSION INTRA-ARTICULAR; INTRALESIONAL; INTRAMUSCULAR; SOFT TISSUE at 09:59

## 2023-07-31 RX ADMIN — BUPIVACAINE HYDROCHLORIDE 5 MG: 2.5 INJECTION, SOLUTION INFILTRATION; PERINEURAL at 10:00

## 2023-07-31 RX ADMIN — LIDOCAINE HYDROCHLORIDE 1 ML: 10 INJECTION, SOLUTION INFILTRATION; PERINEURAL at 10:00

## 2023-07-31 NOTE — PROGRESS NOTES
Chief Complaint  Knee Pain (CK LEFT KNEE)      Chago Augustine is a 60 y.o. 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 Aguilar Gal is being seen by today for recurrent worsening of her left knee pain with underlying osteoarthritis. He is known to have grade IV chondromalacia to the weightbearing portion of her lateral compartment of her left knee as well as partial pseudo-extrusion of the lateral meniscus with a chronic complex flap tear with synovitis. Patient status post right total knee arthroplasty per Dr. Jhony Mahoney 46/39/5192 doing well     History of Present Illness for Follow Up Patient:      Ann Cr is being seen in for follow up today to review MRI results for ongoing right knee pain. Ann Cr is 12 weeks out from aggravation of right knee after a twisting injury. The patient rates their current pain as a 5 out of 10 on the pain scale but can increase with activities such as going from a seated to standing position or having to go up and down stairs. Treatment to date includes: rest, ice, NSAID- Diclofeac, physical therapy, home exercises, knee bracing, cortisone injection on 4/1/2019 and her first attempt at visco supplementation completed on 7/17/2019 to treat this problem. Her MRI was obtained at [facility] on 11/15/2019 did show evidence of grade IV chondromalacia of the weightbearing and posterior nonweightbearing portion of the lateral compartment with full-thickness chondral loss and some stress edema but no evidence of insufficiency fracture. She did have a lateral meniscal pseudo-extrusion with complex flap tear to the anterior horn to posterior root with chronic notch synovitis and ACL inflammation likely related to her twisting injury about a month ago.   Denies locking catching or true instability symptoms and she would like to avoid surgery if

## 2023-08-02 ENCOUNTER — NURSE ONLY (OUTPATIENT)
Dept: CARDIOLOGY CLINIC | Age: 68
End: 2023-08-02

## 2023-08-23 ENCOUNTER — HOSPITAL ENCOUNTER (OUTPATIENT)
Age: 68
Discharge: HOME | End: 2023-08-23
Payer: MEDICARE

## 2023-08-23 DIAGNOSIS — Z79.899 MEDICATION MANAGEMENT: ICD-10-CM

## 2023-08-23 LAB
CHOLEST SERPL-MCNC: 216 MG/DL (ref 0–199)
HDLC SERPL-MCNC: 56 MG/DL (ref 40–60)
LDLC SERPL CALC-MCNC: 136 MG/DL
TRIGL SERPL-MCNC: 118 MG/DL (ref 0–150)
VLDLC SERPL CALC-MCNC: 24 MG/DL

## 2023-08-23 PROCEDURE — 80061 LIPID PANEL: CPT

## 2023-08-23 PROCEDURE — 36415 COLL VENOUS BLD VENIPUNCTURE: CPT

## 2023-08-24 ENCOUNTER — TELEPHONE (OUTPATIENT)
Dept: CARDIOLOGY CLINIC | Age: 68
End: 2023-08-24

## 2023-08-24 NOTE — TELEPHONE ENCOUNTER
Spoke with Marietta Haji, relayed message per [address] [facility],[address]. Pt v/u.

## 2023-08-24 NOTE — TELEPHONE ENCOUNTER
----- Message from Divya Dior MD sent at 8/24/2023 11:38 AM EDT -----  Please notify patient that their lab results show chol has gne up since stop lipitor but at this time do not recommend any meds  Work on diet and exercise and repeat lipids next year - can be done thru PCP

## 2023-08-30 ENCOUNTER — NURSE ONLY (OUTPATIENT)
Dept: CARDIOLOGY CLINIC | Age: 68
End: 2023-08-30

## 2023-08-30 ENCOUNTER — OFFICE VISIT (OUTPATIENT)
Dept: CARDIOLOGY CLINIC | Age: 68
End: 2023-08-30
Payer: MEDICARE

## 2023-08-30 VITALS
BODY MASS INDEX: 29.22 KG/M2 | OXYGEN SATURATION: 95 % | DIASTOLIC BLOOD PRESSURE: 60 MMHG | WEIGHT: 192.8 LBS | SYSTOLIC BLOOD PRESSURE: 106 MMHG | HEIGHT: 68 IN | HEART RATE: 70 BPM

## 2023-08-30 VITALS
HEART RATE: 54 BPM | HEIGHT: 68 IN | OXYGEN SATURATION: 95 % | WEIGHT: 192 LBS | BODY MASS INDEX: 29.1 KG/M2 | SYSTOLIC BLOOD PRESSURE: 114 MMHG | DIASTOLIC BLOOD PRESSURE: 64 MMHG

## 2023-08-30 DIAGNOSIS — E78.00 PURE HYPERCHOLESTEROLEMIA: Primary | ICD-10-CM

## 2023-08-30 DIAGNOSIS — Z45.09 ENCOUNTER FOR LOOP RECORDER CHECK: ICD-10-CM

## 2023-08-30 DIAGNOSIS — R00.2 PALPITATION: ICD-10-CM

## 2023-08-30 DIAGNOSIS — I47.1 PAROXYSMAL ATRIAL TACHYCARDIA (HCC): Primary | ICD-10-CM

## 2023-08-30 DIAGNOSIS — Z01.810 PREOPERATIVE CARDIOVASCULAR EXAMINATION: ICD-10-CM

## 2023-08-30 DIAGNOSIS — G47.33 OSA (OBSTRUCTIVE SLEEP APNEA): ICD-10-CM

## 2023-08-30 DIAGNOSIS — Z45.09 ENCOUNTER FOR LOOP RECORDER CHECK: Primary | ICD-10-CM

## 2023-08-30 PROCEDURE — 1090F PRES/ABSN URINE INCON ASSESS: CPT | Performed by: INTERNAL MEDICINE

## 2023-08-30 PROCEDURE — G8400 PT W/DXA NO RESULTS DOC: HCPCS | Performed by: INTERNAL MEDICINE

## 2023-08-30 PROCEDURE — G8427 DOCREV CUR MEDS BY ELIG CLIN: HCPCS | Performed by: INTERNAL MEDICINE

## 2023-08-30 PROCEDURE — 1036F TOBACCO NON-USER: CPT | Performed by: INTERNAL MEDICINE

## 2023-08-30 PROCEDURE — 3017F COLORECTAL CA SCREEN DOC REV: CPT | Performed by: INTERNAL MEDICINE

## 2023-08-30 PROCEDURE — G8417 CALC BMI ABV UP PARAM F/U: HCPCS | Performed by: INTERNAL MEDICINE

## 2023-08-30 PROCEDURE — 1123F ACP DISCUSS/DSCN MKR DOCD: CPT | Performed by: INTERNAL MEDICINE

## 2023-08-30 PROCEDURE — 99214 OFFICE O/P EST MOD 30 MIN: CPT | Performed by: INTERNAL MEDICINE

## 2023-08-30 ASSESSMENT — ENCOUNTER SYMPTOMS
RIGHT EYE: 0
HEMATEMESIS: 0
WHEEZING: 0
LEFT EYE: 0
HEMATOCHEZIA: 0
STRIDOR: 0
SHORTNESS OF BREATH: 0

## 2023-08-30 NOTE — PROGRESS NOTES
12/2015  Summary   Global ejection fraction is normal and estimated from 55 % to 60 %. Mild concentric left ventricular hypertrophy is present. Systolic pulmonary artery pressure (SPAP) is normal and estimated at 21 mmHg   (RA pressure 3 mm Hg). Stress test 11/4/2022  Summary  Small, fixed, anteroapical/apical cap perfusion defect with subtle apical hypokinesis. Findings likely represent distal LAD territory scar from prior infarction though breast attenuation remains possible. No inducible  ischemia. Post-stress LVEF is normal at 58%. TID< 1.3. Overall findings represent a low-intermediate risk scan. Echocardiogram 11/10/2022  Summary   -- Normal left ventricle systolic function with an estimated ejection   fraction of 55%. No regional wall motion abnormalities are seen. Normal left   ventricular diastolic filling pressure. Trace aortic, mitral and tricuspid regurgitation. Systolic pulmonary artery pressure (SPAP) is normal and estimated at 21 mmHg   (right atrial pressure 3 mmHg). Cardiac Monitor: 1/31-2/14/23  Read by Dr. Dianne Arredondo  Per Dr. Dianne Arredondo: recorded multiple short episodes of atrial arrhythmia (longest 2.5 minutes) with rapid rates. Sometimes looks like AT but sometimes a bit irregular. Can't make a definitve diagnosis but also cannot exclude AF completely. Maybe need for longer monitoring depending on nature of her symptoms (and if real concern for AF, then maybe loop recorder)         Loop recorder implant 4/7/2023- Summary: Successful implantation of a loop recorder.        Device check 8/17/2023       Latest Reference Range & Units 08/23/23 11:02   Cholesterol, Total 0 - 199 mg/dL 216 (H)   HDL Cholesterol 40 - 60 mg/dL 56   LDL Calculated <100 mg/dL 136 (H)   Triglycerides 0 - 150 mg/dL 118   VLDL Cholesterol Calculated Not Established mg/dL 24         Assessment:   Preoperative cardiac risk assessment for knee surgery - no cardiac contraindication   Palpitations -

## 2023-08-30 NOTE — PATIENT INSTRUCTIONS
Plan:     Monthly remote device checks. Strongly recommend CPAP use. Discuss with pulmonologist at follow up. Continue metoprolol. Ok to proceed with knee surgery. Letter provided. Follow up in 6 months.

## 2023-08-30 NOTE — PROGRESS NOTES
if real concern for AF, then maybe loop recorder). Today she is here for follow up discussion of monitor results. She reports that she did get her knee surgery on 11/17/22. She reports that she did wear the heart monitor and did not experience any symptoms(palpitations) like she did back in October. She reports that she has been taking cholesterol medication that was prescribed by the hospitalist a few years back. She denies chest pain sob dizziness syncope or edema. Office Visit ([address] [place] [address], 03/22/2023): Today she presents for evaluation of SVT. She describes episodes of palpitations that last for a few minutes in duration which are worsened with activity. She has associated chest discomfort and lightheadedness with episodes. She feels that symptoms are improved by resting. She has been told that she snores in the past. She stays active with walking her dog but doesn't get regular physical activity. Patient denies current edema, shortness of breath or syncope. Interval History since last office visit: Patient underwent ILR implantation on 4/7/2023. Office Visit ([address] [place] [address], 08/30/2023): She reports that she has not started using CPAP, her pulmonologist told her she did not need to. She is taking her medications as prescribed. Patient denies current edema, chest pain, shortness of breath, palpitations, dizziness or syncope. She reports moderate (2-3 cups/day) caffeine usage. She denies alcohol consumption. She denies tobacco usage. She reports her mother has AF and her father had CAD with CABG. Review of Systems  Review of Systems   Constitutional: Negative for malaise/fatigue, weight gain and weight loss. HENT:  Negative for nosebleeds and stridor. Eyes:  Negative for vision loss in left eye and vision loss in right eye. Cardiovascular:  Negative for chest pain, dyspnea on exertion, leg swelling, palpitations and syncope.    Respiratory:  Negative for shortness of breath and

## 2023-08-30 NOTE — PATIENT INSTRUCTIONS
Plan:  [facility] for knee surgery   ~Repeat fasting lipids through PCP next year   Cardiac medications reviewed including indications and pertinent side effects. Medication list updated at this visit. Check blood pressure and heart rate at home a few times per week- keep a log with dates and times and bring to office visit   Regular exercise and following a healthy diet encouraged   ~Recommend the Mediterranean diet for a heart healthy option. For weight loss recommend counting calories with a program like Weight Watchers.    Follow up with me as needed   ~Follow up with the EP team in 6 months

## 2023-09-01 ENCOUNTER — HOSPITAL ENCOUNTER (EMERGENCY)
Age: 68
Discharge: HOME | End: 2023-09-01
Payer: MEDICARE

## 2023-09-01 ENCOUNTER — APPOINTMENT (OUTPATIENT)
Dept: CT IMAGING | Age: 68
End: 2023-09-01
Payer: MEDICARE

## 2023-09-01 VITALS
DIASTOLIC BLOOD PRESSURE: 74 MMHG | SYSTOLIC BLOOD PRESSURE: 103 MMHG | RESPIRATION RATE: 18 BRPM | TEMPERATURE: 98 F | HEART RATE: 76 BPM | OXYGEN SATURATION: 95 %

## 2023-09-01 DIAGNOSIS — N39.0 URINARY TRACT INFECTION WITHOUT HEMATURIA, SITE UNSPECIFIED: ICD-10-CM

## 2023-09-01 DIAGNOSIS — R10.9 LEFT FLANK PAIN: Primary | ICD-10-CM

## 2023-09-01 LAB
ALBUMIN SERPL-MCNC: 4 G/DL (ref 3.4–5)
ALBUMIN/GLOB SERPL: 1.5 {RATIO} (ref 1.1–2.2)
ALP SERPL-CCNC: 67 U/L (ref 40–129)
ALT SERPL-CCNC: 11 U/L (ref 10–40)
ANION GAP SERPL CALCULATED.3IONS-SCNC: 10 MMOL/L (ref 3–16)
AST SERPL-CCNC: 13 U/L (ref 15–37)
BACTERIA URNS QL MICRO: ABNORMAL /HPF
BASOPHILS # BLD: 0.1 K/UL (ref 0–0.2)
BASOPHILS NFR BLD: 0.9 %
BILIRUB SERPL-MCNC: 0.3 MG/DL (ref 0–1)
BILIRUB UR QL STRIP.AUTO: NEGATIVE
BUN SERPL-MCNC: 13 MG/DL (ref 7–20)
CALCIUM SERPL-MCNC: 8.9 MG/DL (ref 8.3–10.6)
CHLORIDE SERPL-SCNC: 105 MMOL/L (ref 99–110)
CLARITY UR: CLEAR
CO2 SERPL-SCNC: 26 MMOL/L (ref 21–32)
COLOR UR: YELLOW
CREAT SERPL-MCNC: <0.5 MG/DL (ref 0.6–1.2)
DEPRECATED RDW RBC AUTO: 14.2 % (ref 12.4–15.4)
EOSINOPHIL # BLD: 0.1 K/UL (ref 0–0.6)
EOSINOPHIL NFR BLD: 1.4 %
EPI CELLS #/AREA URNS HPF: ABNORMAL /HPF (ref 0–5)
GFR SERPLBLD CREATININE-BSD FMLA CKD-EPI: >60 ML/MIN/{1.73_M2}
GLUCOSE SERPL-MCNC: 120 MG/DL (ref 70–99)
GLUCOSE UR STRIP.AUTO-MCNC: NEGATIVE MG/DL
HCT VFR BLD AUTO: 36 % (ref 36–48)
HGB BLD-MCNC: 12.2 G/DL (ref 12–16)
HGB UR QL STRIP.AUTO: NEGATIVE
KETONES UR STRIP.AUTO-MCNC: NEGATIVE MG/DL
LEUKOCYTE ESTERASE UR QL STRIP.AUTO: ABNORMAL
LYMPHOCYTES # BLD: 1.9 K/UL (ref 1–5.1)
LYMPHOCYTES NFR BLD: 27.2 %
MCH RBC QN AUTO: 32.5 PG (ref 26–34)
MCHC RBC AUTO-ENTMCNC: 33.8 G/DL (ref 31–36)
MCV RBC AUTO: 96.2 FL (ref 80–100)
MONOCYTES # BLD: 0.7 K/UL (ref 0–1.3)
MONOCYTES NFR BLD: 10.4 %
MUCOUS THREADS #/AREA URNS LPF: ABNORMAL /LPF
NEUTROPHILS # BLD: 4.2 K/UL (ref 1.7–7.7)
NEUTROPHILS NFR BLD: 60.1 %
NITRITE UR QL STRIP.AUTO: NEGATIVE
PH UR STRIP.AUTO: 6 [PH] (ref 5–8)
PLATELET # BLD AUTO: 231 K/UL (ref 135–450)
PMV BLD AUTO: 8.6 FL (ref 5–10.5)
POTASSIUM SERPL-SCNC: 3.9 MMOL/L (ref 3.5–5.1)
PROT SERPL-MCNC: 6.6 G/DL (ref 6.4–8.2)
PROT UR STRIP.AUTO-MCNC: NEGATIVE MG/DL
RBC # BLD AUTO: 3.75 M/UL (ref 4–5.2)
RBC #/AREA URNS HPF: ABNORMAL /HPF (ref 0–4)
SODIUM SERPL-SCNC: 141 MMOL/L (ref 136–145)
SP GR UR STRIP.AUTO: 1.02 (ref 1–1.03)
UA COMPLETE W REFLEX CULTURE PNL UR: YES
UA DIPSTICK W REFLEX MICRO PNL UR: YES
URN SPEC COLLECT METH UR: ABNORMAL
UROBILINOGEN UR STRIP-ACNC: 0.2 E.U./DL
WBC # BLD AUTO: 7 K/UL (ref 4–11)
WBC #/AREA URNS HPF: ABNORMAL /HPF (ref 0–5)

## 2023-09-01 PROCEDURE — 80053 COMPREHEN METABOLIC PANEL: CPT

## 2023-09-01 PROCEDURE — 6360000002 HC RX W HCPCS: Performed by: PHYSICIAN ASSISTANT

## 2023-09-01 PROCEDURE — 6370000000 HC RX 637 (ALT 250 FOR IP): Performed by: PHYSICIAN ASSISTANT

## 2023-09-01 PROCEDURE — 74176 CT ABD & PELVIS W/O CONTRAST: CPT

## 2023-09-01 PROCEDURE — 96375 TX/PRO/DX INJ NEW DRUG ADDON: CPT

## 2023-09-01 PROCEDURE — 87086 URINE CULTURE/COLONY COUNT: CPT

## 2023-09-01 PROCEDURE — 99284 EMERGENCY DEPT VISIT MOD MDM: CPT

## 2023-09-01 PROCEDURE — 96374 THER/PROPH/DIAG INJ IV PUSH: CPT

## 2023-09-01 PROCEDURE — 85025 COMPLETE CBC W/AUTO DIFF WBC: CPT

## 2023-09-01 PROCEDURE — 81001 URINALYSIS AUTO W/SCOPE: CPT

## 2023-09-01 PROCEDURE — 96376 TX/PRO/DX INJ SAME DRUG ADON: CPT

## 2023-09-01 RX ORDER — KETOROLAC TROMETHAMINE 30 MG/ML
15 INJECTION, SOLUTION INTRAMUSCULAR; INTRAVENOUS ONCE
Status: COMPLETED | OUTPATIENT
Start: 2023-09-01 | End: 2023-09-01

## 2023-09-01 RX ORDER — ORPHENADRINE CITRATE 30 MG/ML
60 INJECTION INTRAMUSCULAR; INTRAVENOUS ONCE
Status: COMPLETED | OUTPATIENT
Start: 2023-09-01 | End: 2023-09-01

## 2023-09-01 RX ORDER — CEFDINIR 300 MG/1
300 CAPSULE ORAL 2 TIMES DAILY
Qty: 14 CAPSULE | Refills: 0 | Status: SHIPPED | OUTPATIENT
Start: 2023-09-01 | End: 2023-09-08

## 2023-09-01 RX ORDER — CEFDINIR 300 MG/1
300 CAPSULE ORAL ONCE
Status: COMPLETED | OUTPATIENT
Start: 2023-09-01 | End: 2023-09-01

## 2023-09-01 RX ORDER — LIDOCAINE 4 G/G
1 PATCH TOPICAL DAILY
Status: DISCONTINUED | OUTPATIENT
Start: 2023-09-01 | End: 2023-09-01 | Stop reason: HOSPADM

## 2023-09-01 RX ADMIN — KETOROLAC TROMETHAMINE 15 MG: 30 INJECTION, SOLUTION INTRAMUSCULAR; INTRAVENOUS at 18:19

## 2023-09-01 RX ADMIN — KETOROLAC TROMETHAMINE 15 MG: 30 INJECTION, SOLUTION INTRAMUSCULAR; INTRAVENOUS at 17:21

## 2023-09-01 RX ADMIN — ORPHENADRINE CITRATE 60 MG: 60 INJECTION INTRAMUSCULAR; INTRAVENOUS at 17:20

## 2023-09-01 RX ADMIN — CEFDINIR 300 MG: 300 CAPSULE ORAL at 18:20

## 2023-09-01 ASSESSMENT — PAIN - FUNCTIONAL ASSESSMENT: PAIN_FUNCTIONAL_ASSESSMENT: 0-10

## 2023-09-01 ASSESSMENT — PAIN DESCRIPTION - LOCATION: LOCATION: BACK

## 2023-09-01 ASSESSMENT — PAIN SCALES - GENERAL
PAINLEVEL_OUTOF10: 8
PAINLEVEL_OUTOF10: 10
PAINLEVEL_OUTOF10: 10

## 2023-09-01 NOTE — ED PROVIDER NOTES
[address] [place] [address]  ED  EMERGENCY DEPARTMENT ENCOUNTER        Pt Name: Tacos Spicer  MRN: [id number]  [address] [facility] 1955  Date of evaluation: 9/1/2023  Provider: Dennis Dickinson PA-C  PCP: Aamir Durán DO  ED Attending: Elma Jones MD      ADRIANA. I have evaluated this patient. CHIEF COMPLAINT:     Chief Complaint   Patient presents with    Back Pain     Left mid back pain, bruising noted, sts nki,        HISTORY OF PRESENT ILLNESS:      History provided by the patient. No limitations. Tacos Spicer is a 78 y.o. female who arrives to the ED by private vehicle. This patient is here complaining of left flank pain that started a couple of days ago. She states she has had sciatic type pain in the past and this is a different type of pain. She reports inability to get comfortable and describes the pain as sharp, stabbing pain. At times it radiates around her left side. She denies any associated chest pain or shortness of breath. She denies abdominal pain, nausea or vomiting. She has no change in urinary symptoms but reports essentially chronic urinary frequency. She denies blood in her urine or burning with urination. No fevers or chills. Family noticed a small bruise to the left flank in the area of her pain and were not sure if it was related. There was no injury or trauma, no fall leading up to this back pain. Nursing Notes were reviewed     REVIEW OF SYSTEMS:     Review of Systems  Positives and pertinent negatives as per HPI.       PAST MEDICAL HISTORY:     Past Medical History:   Diagnosis Date    Anesthesia complication     emotional as soon as meds are given and awakes very emotional    Arthritis     Cancer ([address])     skin-back,. melanoma    DVT (deep venous thrombosis) ([address]) 2015 11/2015 - on Eliquis right calf    Hearing decreased     Hypoglycemia     Melanoma ([address])     Overactive bladder     Reflux     Wears glasses        SURGICAL HISTORY:      Past Surgical History: None  Social determinants: None  Chronic conditions: Overactive bladder, GERD, arthritis, hypoglycemia, DVT, history of skin cancer-melanoma  Records reviewed: None    Disposition considerations/Plan: This patient describes left flank pain for couple of days. No other real symptoms. She has some chronic urinary frequency that is unchanged. No fevers or GI upset. Her pain is somewhat reproducible to palpate and she does seem to have CVA tenderness on exam.  Her work-up shows a very obvious urinary tract infection for which we will start her on cefdinir. I do not see any other abnormalities on work-up that would warrant further testing or other treatment. Patient hemodynamically stable in the ED. She has good primary care follow-up. Return precautions discussed. Employed shared decision making. FINAL IMPRESSION:      1. Left flank pain    2.  Urinary tract infection without hematuria, site unspecified          DISPOSITION/PLAN:   DISPOSITION Decision To Discharge      PATIENT REFERRED TO:  Wesley Norris DO  [address] [facility]  [phone]            DISCHARGE MEDICATIONS:  New Prescriptions    CEFDINIR (OMNICEF) 300 MG CAPSULE    Take 1 capsule by mouth 2 times daily for 7 days                  (Please note thatportions of this note were completed with a voice recognition program.  Efforts were made to edit the dictations, but occasionally words are mis-transcribed.)    Dusty Feliz PA-C (electronicallysigned)              Dusty Feliz, [address] [place] [address]  09/01/23 9715

## 2023-09-02 LAB — BACTERIA UR CULT: NORMAL

## 2023-09-06 ENCOUNTER — TELEPHONE (OUTPATIENT)
Dept: ORTHOPEDIC SURGERY | Age: 68
End: 2023-09-06

## 2023-09-06 DIAGNOSIS — S80.02XA CONTUSION OF LEFT KNEE, INITIAL ENCOUNTER: ICD-10-CM

## 2023-09-06 DIAGNOSIS — R73.03 PREDIABETES: ICD-10-CM

## 2023-09-06 DIAGNOSIS — M17.12 PRIMARY OSTEOARTHRITIS OF LEFT KNEE: Primary | ICD-10-CM

## 2023-09-06 DIAGNOSIS — M25.562 ACUTE PAIN OF LEFT KNEE: ICD-10-CM

## 2023-09-06 PROCEDURE — 93298 REM INTERROG DEV EVAL SCRMS: CPT | Performed by: INTERNAL MEDICINE

## 2023-09-06 PROCEDURE — G2066 INTER DEVC REMOTE 30D: HCPCS | Performed by: INTERNAL MEDICINE

## 2023-10-04 ENCOUNTER — TELEPHONE (OUTPATIENT)
Dept: ORTHOPEDIC SURGERY | Age: 68
End: 2023-10-04

## 2023-10-11 PROCEDURE — G2066 INTER DEVC REMOTE 30D: HCPCS | Performed by: INTERNAL MEDICINE

## 2023-10-11 PROCEDURE — 93298 REM INTERROG DEV EVAL SCRMS: CPT | Performed by: INTERNAL MEDICINE

## 2023-10-26 ENCOUNTER — OFFICE VISIT (OUTPATIENT)
Dept: PULMONOLOGY | Age: 68
End: 2023-10-26
Payer: MEDICARE

## 2023-10-26 VITALS
OXYGEN SATURATION: 98 % | RESPIRATION RATE: 16 BRPM | DIASTOLIC BLOOD PRESSURE: 79 MMHG | TEMPERATURE: 97.3 F | WEIGHT: 194.38 LBS | BODY MASS INDEX: 30.51 KG/M2 | SYSTOLIC BLOOD PRESSURE: 123 MMHG | HEIGHT: 67 IN | HEART RATE: 68 BPM

## 2023-10-26 DIAGNOSIS — G47.30 MILD SLEEP APNEA: Primary | ICD-10-CM

## 2023-10-26 PROCEDURE — G8400 PT W/DXA NO RESULTS DOC: HCPCS | Performed by: STUDENT IN AN ORGANIZED HEALTH CARE EDUCATION/TRAINING PROGRAM

## 2023-10-26 PROCEDURE — 3017F COLORECTAL CA SCREEN DOC REV: CPT | Performed by: STUDENT IN AN ORGANIZED HEALTH CARE EDUCATION/TRAINING PROGRAM

## 2023-10-26 PROCEDURE — G8484 FLU IMMUNIZE NO ADMIN: HCPCS | Performed by: STUDENT IN AN ORGANIZED HEALTH CARE EDUCATION/TRAINING PROGRAM

## 2023-10-26 PROCEDURE — 1123F ACP DISCUSS/DSCN MKR DOCD: CPT | Performed by: STUDENT IN AN ORGANIZED HEALTH CARE EDUCATION/TRAINING PROGRAM

## 2023-10-26 PROCEDURE — 99213 OFFICE O/P EST LOW 20 MIN: CPT | Performed by: STUDENT IN AN ORGANIZED HEALTH CARE EDUCATION/TRAINING PROGRAM

## 2023-10-26 PROCEDURE — G8427 DOCREV CUR MEDS BY ELIG CLIN: HCPCS | Performed by: STUDENT IN AN ORGANIZED HEALTH CARE EDUCATION/TRAINING PROGRAM

## 2023-10-26 PROCEDURE — 1090F PRES/ABSN URINE INCON ASSESS: CPT | Performed by: STUDENT IN AN ORGANIZED HEALTH CARE EDUCATION/TRAINING PROGRAM

## 2023-10-26 PROCEDURE — G8417 CALC BMI ABV UP PARAM F/U: HCPCS | Performed by: STUDENT IN AN ORGANIZED HEALTH CARE EDUCATION/TRAINING PROGRAM

## 2023-10-26 PROCEDURE — 1036F TOBACCO NON-USER: CPT | Performed by: STUDENT IN AN ORGANIZED HEALTH CARE EDUCATION/TRAINING PROGRAM

## 2023-10-26 ASSESSMENT — ENCOUNTER SYMPTOMS
TROUBLE SWALLOWING: 0
ABDOMINAL PAIN: 0
EYE ITCHING: 0
WHEEZING: 0
ABDOMINAL DISTENTION: 0
SORE THROAT: 0
EYE REDNESS: 0
DIARRHEA: 0
EYE PAIN: 0
CONSTIPATION: 0
COLOR CHANGE: 0
STRIDOR: 0
COUGH: 0
BACK PAIN: 0
SHORTNESS OF BREATH: 0
VOMITING: 0
EYE DISCHARGE: 0
NAUSEA: 0

## 2023-10-31 ENCOUNTER — TELEPHONE (OUTPATIENT)
Dept: ORTHOPEDIC SURGERY | Age: 68
End: 2023-10-31

## 2023-10-31 ENCOUNTER — HOSPITAL ENCOUNTER (OUTPATIENT)
Age: 68
Discharge: HOME | End: 2023-10-31
Payer: MEDICARE

## 2023-10-31 DIAGNOSIS — R73.03 PREDIABETES: ICD-10-CM

## 2023-10-31 DIAGNOSIS — M17.12 PRIMARY OSTEOARTHRITIS OF LEFT KNEE: ICD-10-CM

## 2023-10-31 DIAGNOSIS — M25.562 ACUTE PAIN OF LEFT KNEE: ICD-10-CM

## 2023-10-31 DIAGNOSIS — S80.02XA CONTUSION OF LEFT KNEE, INITIAL ENCOUNTER: ICD-10-CM

## 2023-10-31 LAB
25(OH)D3 SERPL-MCNC: 43.3 NG/ML
ALBUMIN SERPL-MCNC: 4.6 G/DL (ref 3.4–5)
ALBUMIN/GLOB SERPL: 2 {RATIO} (ref 1.1–2.2)
ALP SERPL-CCNC: 56 U/L (ref 40–129)
ALT SERPL-CCNC: 10 U/L (ref 10–40)
ANION GAP SERPL CALCULATED.3IONS-SCNC: 10 MMOL/L (ref 3–16)
APTT BLD: 30 SEC (ref 22.7–35.9)
AST SERPL-CCNC: 15 U/L (ref 15–37)
BASOPHILS # BLD: 0 K/UL (ref 0–0.2)
BASOPHILS NFR BLD: 0.9 %
BILIRUB SERPL-MCNC: 0.4 MG/DL (ref 0–1)
BILIRUB UR QL STRIP.AUTO: NEGATIVE
BUN SERPL-MCNC: 14 MG/DL (ref 7–20)
CALCIUM SERPL-MCNC: 9.7 MG/DL (ref 8.3–10.6)
CHLORIDE SERPL-SCNC: 103 MMOL/L (ref 99–110)
CLARITY UR: CLEAR
CO2 SERPL-SCNC: 29 MMOL/L (ref 21–32)
COLOR UR: NORMAL
CREAT SERPL-MCNC: <0.5 MG/DL (ref 0.6–1.2)
DEPRECATED RDW RBC AUTO: 14.1 % (ref 12.4–15.4)
EOSINOPHIL # BLD: 0.1 K/UL (ref 0–0.6)
EOSINOPHIL NFR BLD: 1.5 %
GFR SERPLBLD CREATININE-BSD FMLA CKD-EPI: >60 ML/MIN/{1.73_M2}
GLUCOSE SERPL-MCNC: 87 MG/DL (ref 70–99)
GLUCOSE UR STRIP.AUTO-MCNC: NEGATIVE MG/DL
HCT VFR BLD AUTO: 40.4 % (ref 36–48)
HGB BLD-MCNC: 13.7 G/DL (ref 12–16)
HGB UR QL STRIP.AUTO: NEGATIVE
INR PPP: 0.96 (ref 0.84–1.16)
KETONES UR STRIP.AUTO-MCNC: NEGATIVE MG/DL
LEUKOCYTE ESTERASE UR QL STRIP.AUTO: NEGATIVE
LYMPHOCYTES # BLD: 2.1 K/UL (ref 1–5.1)
LYMPHOCYTES NFR BLD: 41.1 %
MCH RBC QN AUTO: 31.8 PG (ref 26–34)
MCHC RBC AUTO-ENTMCNC: 33.8 G/DL (ref 31–36)
MCV RBC AUTO: 93.8 FL (ref 80–100)
MONOCYTES # BLD: 0.5 K/UL (ref 0–1.3)
MONOCYTES NFR BLD: 9.5 %
NEUTROPHILS # BLD: 2.4 K/UL (ref 1.7–7.7)
NEUTROPHILS NFR BLD: 47 %
NITRITE UR QL STRIP.AUTO: NEGATIVE
PH UR STRIP.AUTO: 7 [PH] (ref 5–8)
PLATELET # BLD AUTO: 245 K/UL (ref 135–450)
PMV BLD AUTO: 9 FL (ref 5–10.5)
POTASSIUM SERPL-SCNC: 4.2 MMOL/L (ref 3.5–5.1)
PREALB SERPL-MCNC: 26.3 MG/DL (ref 20–40)
PROT SERPL-MCNC: 6.9 G/DL (ref 6.4–8.2)
PROT UR STRIP.AUTO-MCNC: NEGATIVE MG/DL
PROTHROMBIN TIME: 12.8 SEC (ref 11.5–14.8)
RBC # BLD AUTO: 4.31 M/UL (ref 4–5.2)
SODIUM SERPL-SCNC: 142 MMOL/L (ref 136–145)
SP GR UR STRIP.AUTO: 1.01 (ref 1–1.03)
TRANSFERRIN SERPL-MCNC: 244 MG/DL (ref 200–360)
UA DIPSTICK W REFLEX MICRO PNL UR: NORMAL
URN SPEC COLLECT METH UR: NORMAL
UROBILINOGEN UR STRIP-ACNC: 0.2 E.U./DL
WBC # BLD AUTO: 5 K/UL (ref 4–11)

## 2023-10-31 PROCEDURE — 36415 COLL VENOUS BLD VENIPUNCTURE: CPT

## 2023-10-31 PROCEDURE — 87081 CULTURE SCREEN ONLY: CPT

## 2023-10-31 PROCEDURE — 80053 COMPREHEN METABOLIC PANEL: CPT

## 2023-10-31 PROCEDURE — 87086 URINE CULTURE/COLONY COUNT: CPT

## 2023-10-31 PROCEDURE — 81003 URINALYSIS AUTO W/O SCOPE: CPT

## 2023-10-31 PROCEDURE — 84134 ASSAY OF PREALBUMIN: CPT

## 2023-10-31 PROCEDURE — 85025 COMPLETE CBC W/AUTO DIFF WBC: CPT

## 2023-10-31 PROCEDURE — 82306 VITAMIN D 25 HYDROXY: CPT

## 2023-10-31 PROCEDURE — 85730 THROMBOPLASTIN TIME PARTIAL: CPT

## 2023-10-31 PROCEDURE — 84466 ASSAY OF TRANSFERRIN: CPT

## 2023-10-31 PROCEDURE — 85610 PROTHROMBIN TIME: CPT

## 2023-10-31 PROCEDURE — 83036 HEMOGLOBIN GLYCOSYLATED A1C: CPT

## 2023-10-31 NOTE — TELEPHONE ENCOUNTER
Auth: NPR  Date: 11/16/23   Reference # None  Spoke with: None  Type of SX: Outpatient  Location: [facility]  CPT: 92768   DX: M17.12  SX area: Lt knee  Insurance: Medicare A&B

## 2023-11-01 LAB
BACTERIA UR CULT: NORMAL
EST. AVERAGE GLUCOSE BLD GHB EST-MCNC: 119.8 MG/DL
HBA1C MFR BLD: 5.8 %

## 2023-11-02 ENCOUNTER — ANESTHESIA EVENT (OUTPATIENT)
Dept: OPERATING ROOM | Age: 68
End: 2023-11-02
Payer: MEDICARE

## 2023-11-03 LAB — MRSA SPEC QL CULT: NORMAL

## 2023-11-06 ENCOUNTER — TELEPHONE (OUTPATIENT)
Dept: ORTHOPEDIC SURGERY | Age: 68
End: 2023-11-06

## 2023-11-15 PROCEDURE — 93298 REM INTERROG DEV EVAL SCRMS: CPT | Performed by: INTERNAL MEDICINE

## 2023-11-15 PROCEDURE — G2066 INTER DEVC REMOTE 30D: HCPCS | Performed by: INTERNAL MEDICINE

## 2023-11-16 ENCOUNTER — HOSPITAL ENCOUNTER (INPATIENT)
Age: 68
LOS: 3 days | Discharge: NURSING, REHAB OR LONG-TERM CARE | DRG: 470 | End: 2023-11-19
Attending: STUDENT IN AN ORGANIZED HEALTH CARE EDUCATION/TRAINING PROGRAM | Admitting: STUDENT IN AN ORGANIZED HEALTH CARE EDUCATION/TRAINING PROGRAM
Payer: MEDICARE

## 2023-11-16 ENCOUNTER — ANESTHESIA (OUTPATIENT)
Dept: OPERATING ROOM | Age: 68
End: 2023-11-16
Payer: MEDICARE

## 2023-11-16 ENCOUNTER — APPOINTMENT (OUTPATIENT)
Dept: GENERAL RADIOLOGY | Age: 68
DRG: 470 | End: 2023-11-16
Attending: STUDENT IN AN ORGANIZED HEALTH CARE EDUCATION/TRAINING PROGRAM
Payer: MEDICARE

## 2023-11-16 DIAGNOSIS — Z96.659 S/P TOTAL KNEE ARTHROPLASTY, UNSPECIFIED LATERALITY: Primary | ICD-10-CM

## 2023-11-16 PROBLEM — Z96.652 S/P TOTAL KNEE ARTHROPLASTY, LEFT: Status: ACTIVE | Noted: 2023-11-16

## 2023-11-16 LAB
ABO + RH BLD: NORMAL
BLD GP AB SCN SERPL QL: NORMAL
GLUCOSE BLD-MCNC: 107 MG/DL (ref 70–99)
PERFORMED ON: ABNORMAL

## 2023-11-16 PROCEDURE — 6360000002 HC RX W HCPCS: Performed by: NURSE ANESTHETIST, CERTIFIED REGISTERED

## 2023-11-16 PROCEDURE — 2720000010 HC SURG SUPPLY STERILE: Performed by: STUDENT IN AN ORGANIZED HEALTH CARE EDUCATION/TRAINING PROGRAM

## 2023-11-16 PROCEDURE — 86850 RBC ANTIBODY SCREEN: CPT

## 2023-11-16 PROCEDURE — 86901 BLOOD TYPING SEROLOGIC RH(D): CPT

## 2023-11-16 PROCEDURE — C1713 ANCHOR/SCREW BN/BN,TIS/BN: HCPCS | Performed by: STUDENT IN AN ORGANIZED HEALTH CARE EDUCATION/TRAINING PROGRAM

## 2023-11-16 PROCEDURE — C1776 JOINT DEVICE (IMPLANTABLE): HCPCS | Performed by: STUDENT IN AN ORGANIZED HEALTH CARE EDUCATION/TRAINING PROGRAM

## 2023-11-16 PROCEDURE — G0378 HOSPITAL OBSERVATION PER HR: HCPCS

## 2023-11-16 PROCEDURE — 97110 THERAPEUTIC EXERCISES: CPT

## 2023-11-16 PROCEDURE — 6360000002 HC RX W HCPCS: Performed by: STUDENT IN AN ORGANIZED HEALTH CARE EDUCATION/TRAINING PROGRAM

## 2023-11-16 PROCEDURE — 6360000002 HC RX W HCPCS: Performed by: ANESTHESIOLOGY

## 2023-11-16 PROCEDURE — 1200000000 HC SEMI PRIVATE

## 2023-11-16 PROCEDURE — 2700000000 HC OXYGEN THERAPY PER DAY

## 2023-11-16 PROCEDURE — 73560 X-RAY EXAM OF KNEE 1 OR 2: CPT

## 2023-11-16 PROCEDURE — 3700000001 HC ADD 15 MINUTES (ANESTHESIA): Performed by: STUDENT IN AN ORGANIZED HEALTH CARE EDUCATION/TRAINING PROGRAM

## 2023-11-16 PROCEDURE — 36415 COLL VENOUS BLD VENIPUNCTURE: CPT

## 2023-11-16 PROCEDURE — 0SRD0J9 REPLACEMENT OF LEFT KNEE JOINT WITH SYNTHETIC SUBSTITUTE, CEMENTED, OPEN APPROACH: ICD-10-PCS | Performed by: STUDENT IN AN ORGANIZED HEALTH CARE EDUCATION/TRAINING PROGRAM

## 2023-11-16 PROCEDURE — 2580000003 HC RX 258: Performed by: STUDENT IN AN ORGANIZED HEALTH CARE EDUCATION/TRAINING PROGRAM

## 2023-11-16 PROCEDURE — 27447 TOTAL KNEE ARTHROPLASTY: CPT | Performed by: STUDENT IN AN ORGANIZED HEALTH CARE EDUCATION/TRAINING PROGRAM

## 2023-11-16 PROCEDURE — 94761 N-INVAS EAR/PLS OXIMETRY MLT: CPT

## 2023-11-16 PROCEDURE — 6370000000 HC RX 637 (ALT 250 FOR IP): Performed by: ANESTHESIOLOGY

## 2023-11-16 PROCEDURE — 2580000003 HC RX 258: Performed by: NURSE ANESTHETIST, CERTIFIED REGISTERED

## 2023-11-16 PROCEDURE — 2500000003 HC RX 250 WO HCPCS: Performed by: NURSE ANESTHETIST, CERTIFIED REGISTERED

## 2023-11-16 PROCEDURE — 86900 BLOOD TYPING SEROLOGIC ABO: CPT

## 2023-11-16 PROCEDURE — 64447 NJX AA&/STRD FEMORAL NRV IMG: CPT | Performed by: ANESTHESIOLOGY

## 2023-11-16 PROCEDURE — 3600000004 HC SURGERY LEVEL 4 BASE: Performed by: STUDENT IN AN ORGANIZED HEALTH CARE EDUCATION/TRAINING PROGRAM

## 2023-11-16 PROCEDURE — 3700000000 HC ANESTHESIA ATTENDED CARE: Performed by: STUDENT IN AN ORGANIZED HEALTH CARE EDUCATION/TRAINING PROGRAM

## 2023-11-16 PROCEDURE — 6370000000 HC RX 637 (ALT 250 FOR IP): Performed by: STUDENT IN AN ORGANIZED HEALTH CARE EDUCATION/TRAINING PROGRAM

## 2023-11-16 PROCEDURE — 2580000003 HC RX 258: Performed by: ANESTHESIOLOGY

## 2023-11-16 PROCEDURE — 2709999900 HC NON-CHARGEABLE SUPPLY: Performed by: STUDENT IN AN ORGANIZED HEALTH CARE EDUCATION/TRAINING PROGRAM

## 2023-11-16 PROCEDURE — 2500000003 HC RX 250 WO HCPCS: Performed by: STUDENT IN AN ORGANIZED HEALTH CARE EDUCATION/TRAINING PROGRAM

## 2023-11-16 PROCEDURE — 97161 PT EVAL LOW COMPLEX 20 MIN: CPT

## 2023-11-16 PROCEDURE — 3600000014 HC SURGERY LEVEL 4 ADDTL 15MIN: Performed by: STUDENT IN AN ORGANIZED HEALTH CARE EDUCATION/TRAINING PROGRAM

## 2023-11-16 PROCEDURE — 7100000001 HC PACU RECOVERY - ADDTL 15 MIN: Performed by: STUDENT IN AN ORGANIZED HEALTH CARE EDUCATION/TRAINING PROGRAM

## 2023-11-16 PROCEDURE — 97530 THERAPEUTIC ACTIVITIES: CPT

## 2023-11-16 PROCEDURE — 7100000000 HC PACU RECOVERY - FIRST 15 MIN: Performed by: STUDENT IN AN ORGANIZED HEALTH CARE EDUCATION/TRAINING PROGRAM

## 2023-11-16 PROCEDURE — A4217 STERILE WATER/SALINE, 500 ML: HCPCS | Performed by: STUDENT IN AN ORGANIZED HEALTH CARE EDUCATION/TRAINING PROGRAM

## 2023-11-16 PROCEDURE — 3E0T3BZ INTRODUCTION OF ANESTHETIC AGENT INTO PERIPHERAL NERVES AND PLEXI, PERCUTANEOUS APPROACH: ICD-10-PCS | Performed by: STUDENT IN AN ORGANIZED HEALTH CARE EDUCATION/TRAINING PROGRAM

## 2023-11-16 DEVICE — BASEPLATE TIB KEELED 0 DEG F LT KNEE SPIKE OSSEOTI PERSONA: Type: IMPLANTABLE DEVICE | Site: KNEE | Status: FUNCTIONAL

## 2023-11-16 DEVICE — IMPLANTABLE DEVICE
Type: IMPLANTABLE DEVICE | Site: KNEE | Status: FUNCTIONAL
Brand: PERSONA® VIVACIT-E®

## 2023-11-16 DEVICE — IMPLANTABLE DEVICE: Type: IMPLANTABLE DEVICE | Site: KNEE | Status: FUNCTIONAL

## 2023-11-16 RX ORDER — SODIUM CHLORIDE 0.9 % (FLUSH) 0.9 %
5-40 SYRINGE (ML) INJECTION PRN
Status: DISCONTINUED | OUTPATIENT
Start: 2023-11-16 | End: 2023-11-16 | Stop reason: HOSPADM

## 2023-11-16 RX ORDER — SODIUM CHLORIDE 0.9 % (FLUSH) 0.9 %
5-40 SYRINGE (ML) INJECTION EVERY 12 HOURS SCHEDULED
Status: DISCONTINUED | OUTPATIENT
Start: 2023-11-16 | End: 2023-11-16 | Stop reason: HOSPADM

## 2023-11-16 RX ORDER — PHENYLEPHRINE HCL IN 0.9% NACL 1 MG/10 ML
SYRINGE (ML) INTRAVENOUS PRN
Status: DISCONTINUED | OUTPATIENT
Start: 2023-11-16 | End: 2023-11-16 | Stop reason: SDUPTHER

## 2023-11-16 RX ORDER — SENNOSIDES A AND B 8.6 MG/1
1 TABLET, FILM COATED ORAL DAILY PRN
Status: DISCONTINUED | OUTPATIENT
Start: 2023-11-16 | End: 2023-11-19 | Stop reason: HOSPADM

## 2023-11-16 RX ORDER — MEPERIDINE HYDROCHLORIDE 50 MG/ML
12.5 INJECTION INTRAMUSCULAR; INTRAVENOUS; SUBCUTANEOUS EVERY 5 MIN PRN
Status: DISCONTINUED | OUTPATIENT
Start: 2023-11-16 | End: 2023-11-16 | Stop reason: HOSPADM

## 2023-11-16 RX ORDER — OXYCODONE HYDROCHLORIDE 5 MG/1
10 TABLET ORAL EVERY 4 HOURS PRN
Status: DISCONTINUED | OUTPATIENT
Start: 2023-11-16 | End: 2023-11-19 | Stop reason: HOSPADM

## 2023-11-16 RX ORDER — SODIUM CHLORIDE 0.9 % (FLUSH) 0.9 %
5-40 SYRINGE (ML) INJECTION EVERY 12 HOURS SCHEDULED
Status: DISCONTINUED | OUTPATIENT
Start: 2023-11-16 | End: 2023-11-19 | Stop reason: HOSPADM

## 2023-11-16 RX ORDER — VANCOMYCIN HYDROCHLORIDE 1 G/20ML
INJECTION, POWDER, LYOPHILIZED, FOR SOLUTION INTRAVENOUS PRN
Status: DISCONTINUED | OUTPATIENT
Start: 2023-11-16 | End: 2023-11-16 | Stop reason: ALTCHOICE

## 2023-11-16 RX ORDER — PROPOFOL 10 MG/ML
INJECTION, EMULSION INTRAVENOUS PRN
Status: DISCONTINUED | OUTPATIENT
Start: 2023-11-16 | End: 2023-11-16 | Stop reason: SDUPTHER

## 2023-11-16 RX ORDER — SODIUM CHLORIDE 9 MG/ML
INJECTION, SOLUTION INTRAVENOUS PRN
Status: DISCONTINUED | OUTPATIENT
Start: 2023-11-16 | End: 2023-11-19 | Stop reason: HOSPADM

## 2023-11-16 RX ORDER — DEXAMETHASONE SODIUM PHOSPHATE 4 MG/ML
INJECTION, SOLUTION INTRA-ARTICULAR; INTRALESIONAL; INTRAMUSCULAR; INTRAVENOUS; SOFT TISSUE PRN
Status: DISCONTINUED | OUTPATIENT
Start: 2023-11-16 | End: 2023-11-16 | Stop reason: SDUPTHER

## 2023-11-16 RX ORDER — SODIUM CHLORIDE, SODIUM LACTATE, POTASSIUM CHLORIDE, CALCIUM CHLORIDE 600; 310; 30; 20 MG/100ML; MG/100ML; MG/100ML; MG/100ML
INJECTION, SOLUTION INTRAVENOUS CONTINUOUS
Status: DISCONTINUED | OUTPATIENT
Start: 2023-11-16 | End: 2023-11-16 | Stop reason: HOSPADM

## 2023-11-16 RX ORDER — CEFAZOLIN SODIUM IN 0.9 % NACL 2 G/100 ML
2000 PLASTIC BAG, INJECTION (ML) INTRAVENOUS EVERY 8 HOURS
Status: COMPLETED | OUTPATIENT
Start: 2023-11-16 | End: 2023-11-17

## 2023-11-16 RX ORDER — ONDANSETRON 2 MG/ML
4 INJECTION INTRAMUSCULAR; INTRAVENOUS
Status: DISCONTINUED | OUTPATIENT
Start: 2023-11-16 | End: 2023-11-16 | Stop reason: HOSPADM

## 2023-11-16 RX ORDER — LIDOCAINE HYDROCHLORIDE 10 MG/ML
1 INJECTION, SOLUTION EPIDURAL; INFILTRATION; INTRACAUDAL; PERINEURAL
Status: DISCONTINUED | OUTPATIENT
Start: 2023-11-16 | End: 2023-11-16 | Stop reason: HOSPADM

## 2023-11-16 RX ORDER — LABETALOL HYDROCHLORIDE 5 MG/ML
10 INJECTION, SOLUTION INTRAVENOUS
Status: DISCONTINUED | OUTPATIENT
Start: 2023-11-16 | End: 2023-11-16 | Stop reason: HOSPADM

## 2023-11-16 RX ORDER — CELECOXIB 100 MG/1
200 CAPSULE ORAL DAILY
Status: DISCONTINUED | OUTPATIENT
Start: 2023-11-16 | End: 2023-11-19 | Stop reason: HOSPADM

## 2023-11-16 RX ORDER — LIDOCAINE HYDROCHLORIDE 10 MG/ML
2 INJECTION, SOLUTION INFILTRATION; PERINEURAL
Status: DISCONTINUED | OUTPATIENT
Start: 2023-11-16 | End: 2023-11-16 | Stop reason: HOSPADM

## 2023-11-16 RX ORDER — ASPIRIN 81 MG/1
81 TABLET ORAL 2 TIMES DAILY
Status: DISCONTINUED | OUTPATIENT
Start: 2023-11-16 | End: 2023-11-19 | Stop reason: HOSPADM

## 2023-11-16 RX ORDER — SODIUM CHLORIDE 0.9 % (FLUSH) 0.9 %
5-40 SYRINGE (ML) INJECTION PRN
Status: DISCONTINUED | OUTPATIENT
Start: 2023-11-16 | End: 2023-11-19 | Stop reason: HOSPADM

## 2023-11-16 RX ORDER — MAGNESIUM HYDROXIDE/ALUMINUM HYDROXICE/SIMETHICONE 120; 1200; 1200 MG/30ML; MG/30ML; MG/30ML
30 SUSPENSION ORAL ONCE
Status: COMPLETED | OUTPATIENT
Start: 2023-11-16 | End: 2023-11-16

## 2023-11-16 RX ORDER — SODIUM CHLORIDE 9 MG/ML
INJECTION, SOLUTION INTRAVENOUS PRN
Status: DISCONTINUED | OUTPATIENT
Start: 2023-11-16 | End: 2023-11-16 | Stop reason: HOSPADM

## 2023-11-16 RX ORDER — DIPHENHYDRAMINE HYDROCHLORIDE 50 MG/ML
12.5 INJECTION INTRAMUSCULAR; INTRAVENOUS
Status: DISCONTINUED | OUTPATIENT
Start: 2023-11-16 | End: 2023-11-16 | Stop reason: HOSPADM

## 2023-11-16 RX ORDER — SODIUM CHLORIDE, SODIUM LACTATE, POTASSIUM CHLORIDE, CALCIUM CHLORIDE 600; 310; 30; 20 MG/100ML; MG/100ML; MG/100ML; MG/100ML
INJECTION, SOLUTION INTRAVENOUS CONTINUOUS
Status: DISCONTINUED | OUTPATIENT
Start: 2023-11-16 | End: 2023-11-18

## 2023-11-16 RX ORDER — CELECOXIB 100 MG/1
200 CAPSULE ORAL ONCE
Status: COMPLETED | OUTPATIENT
Start: 2023-11-16 | End: 2023-11-16

## 2023-11-16 RX ORDER — MORPHINE SULFATE 2 MG/ML
2 INJECTION, SOLUTION INTRAMUSCULAR; INTRAVENOUS
Status: DISCONTINUED | OUTPATIENT
Start: 2023-11-16 | End: 2023-11-19 | Stop reason: HOSPADM

## 2023-11-16 RX ORDER — CEFAZOLIN SODIUM IN 0.9 % NACL 2 G/100 ML
2000 PLASTIC BAG, INJECTION (ML) INTRAVENOUS
Status: COMPLETED | OUTPATIENT
Start: 2023-11-16 | End: 2023-11-16

## 2023-11-16 RX ORDER — MAGNESIUM HYDROXIDE 1200 MG/15ML
LIQUID ORAL CONTINUOUS PRN
Status: COMPLETED | OUTPATIENT
Start: 2023-11-16 | End: 2023-11-16

## 2023-11-16 RX ORDER — ACETAMINOPHEN 500 MG
1000 TABLET ORAL ONCE
Status: COMPLETED | OUTPATIENT
Start: 2023-11-16 | End: 2023-11-16

## 2023-11-16 RX ORDER — OXYCODONE HYDROCHLORIDE 5 MG/1
10 TABLET ORAL PRN
Status: COMPLETED | OUTPATIENT
Start: 2023-11-16 | End: 2023-11-16

## 2023-11-16 RX ORDER — TRANEXAMIC ACID 100 MG/ML
INJECTION, SOLUTION INTRAVENOUS PRN
Status: DISCONTINUED | OUTPATIENT
Start: 2023-11-16 | End: 2023-11-16 | Stop reason: SDUPTHER

## 2023-11-16 RX ORDER — ACETAMINOPHEN 325 MG/1
650 TABLET ORAL EVERY 6 HOURS
Status: DISCONTINUED | OUTPATIENT
Start: 2023-11-16 | End: 2023-11-19 | Stop reason: HOSPADM

## 2023-11-16 RX ORDER — FENTANYL CITRATE 50 UG/ML
INJECTION, SOLUTION INTRAMUSCULAR; INTRAVENOUS PRN
Status: DISCONTINUED | OUTPATIENT
Start: 2023-11-16 | End: 2023-11-16 | Stop reason: SDUPTHER

## 2023-11-16 RX ORDER — BUPIVACAINE HYDROCHLORIDE 5 MG/ML
INJECTION, SOLUTION EPIDURAL; INTRACAUDAL PRN
Status: DISCONTINUED | OUTPATIENT
Start: 2023-11-16 | End: 2023-11-16 | Stop reason: SDUPTHER

## 2023-11-16 RX ORDER — PANTOPRAZOLE SODIUM 40 MG/1
40 TABLET, DELAYED RELEASE ORAL DAILY
Status: DISCONTINUED | OUTPATIENT
Start: 2023-11-16 | End: 2023-11-19 | Stop reason: HOSPADM

## 2023-11-16 RX ORDER — DEXAMETHASONE SODIUM PHOSPHATE 4 MG/ML
4 INJECTION, SOLUTION INTRA-ARTICULAR; INTRALESIONAL; INTRAMUSCULAR; INTRAVENOUS; SOFT TISSUE EVERY 6 HOURS
Status: DISCONTINUED | OUTPATIENT
Start: 2023-11-16 | End: 2023-11-19 | Stop reason: HOSPADM

## 2023-11-16 RX ORDER — MAGNESIUM SULFATE IN WATER 40 MG/ML
2000 INJECTION, SOLUTION INTRAVENOUS ONCE
Status: COMPLETED | OUTPATIENT
Start: 2023-11-16 | End: 2023-11-16

## 2023-11-16 RX ORDER — OXYCODONE HYDROCHLORIDE 5 MG/1
5 TABLET ORAL PRN
Status: COMPLETED | OUTPATIENT
Start: 2023-11-16 | End: 2023-11-16

## 2023-11-16 RX ORDER — OXYCODONE HYDROCHLORIDE 5 MG/1
5 TABLET ORAL EVERY 4 HOURS PRN
Status: DISCONTINUED | OUTPATIENT
Start: 2023-11-16 | End: 2023-11-19 | Stop reason: HOSPADM

## 2023-11-16 RX ORDER — OXYBUTYNIN CHLORIDE 5 MG/1
10 TABLET, EXTENDED RELEASE ORAL EVERY EVENING
Status: DISCONTINUED | OUTPATIENT
Start: 2023-11-16 | End: 2023-11-19 | Stop reason: HOSPADM

## 2023-11-16 RX ORDER — LIDOCAINE HYDROCHLORIDE 20 MG/ML
INJECTION, SOLUTION EPIDURAL; INFILTRATION; INTRACAUDAL; PERINEURAL PRN
Status: DISCONTINUED | OUTPATIENT
Start: 2023-11-16 | End: 2023-11-16 | Stop reason: SDUPTHER

## 2023-11-16 RX ORDER — ROCURONIUM BROMIDE 10 MG/ML
INJECTION, SOLUTION INTRAVENOUS PRN
Status: DISCONTINUED | OUTPATIENT
Start: 2023-11-16 | End: 2023-11-16 | Stop reason: SDUPTHER

## 2023-11-16 RX ORDER — POLYETHYLENE GLYCOL 3350 17 G/17G
17 POWDER, FOR SOLUTION ORAL DAILY
Status: DISCONTINUED | OUTPATIENT
Start: 2023-11-16 | End: 2023-11-19 | Stop reason: HOSPADM

## 2023-11-16 RX ORDER — MORPHINE SULFATE 4 MG/ML
4 INJECTION, SOLUTION INTRAMUSCULAR; INTRAVENOUS
Status: DISCONTINUED | OUTPATIENT
Start: 2023-11-16 | End: 2023-11-19 | Stop reason: HOSPADM

## 2023-11-16 RX ORDER — ONDANSETRON 2 MG/ML
INJECTION INTRAMUSCULAR; INTRAVENOUS PRN
Status: DISCONTINUED | OUTPATIENT
Start: 2023-11-16 | End: 2023-11-16 | Stop reason: SDUPTHER

## 2023-11-16 RX ORDER — MIDAZOLAM HYDROCHLORIDE 1 MG/ML
INJECTION INTRAMUSCULAR; INTRAVENOUS PRN
Status: DISCONTINUED | OUTPATIENT
Start: 2023-11-16 | End: 2023-11-16 | Stop reason: SDUPTHER

## 2023-11-16 RX ADMIN — MIDAZOLAM 4 MG: 1 INJECTION INTRAMUSCULAR; INTRAVENOUS at 07:00

## 2023-11-16 RX ADMIN — PROPOFOL 120 MG: 10 INJECTION, EMULSION INTRAVENOUS at 07:32

## 2023-11-16 RX ADMIN — LIDOCAINE HYDROCHLORIDE 60 MG: 20 INJECTION, SOLUTION EPIDURAL; INFILTRATION; INTRACAUDAL; PERINEURAL at 07:32

## 2023-11-16 RX ADMIN — TRANEXAMIC ACID 1000 MG: 100 INJECTION, SOLUTION INTRAVENOUS at 07:45

## 2023-11-16 RX ADMIN — Medication 100 MCG: at 07:45

## 2023-11-16 RX ADMIN — SODIUM CHLORIDE, SODIUM LACTATE, POTASSIUM CHLORIDE, AND CALCIUM CHLORIDE: .6; .31; .03; .02 INJECTION, SOLUTION INTRAVENOUS at 07:25

## 2023-11-16 RX ADMIN — POLYETHYLENE GLYCOL 3350 17 G: 17 POWDER, FOR SOLUTION ORAL at 15:57

## 2023-11-16 RX ADMIN — ROCURONIUM BROMIDE 20 MG: 50 INJECTION, SOLUTION INTRAVENOUS at 08:00

## 2023-11-16 RX ADMIN — FENTANYL CITRATE 50 MCG: 50 INJECTION, SOLUTION INTRAMUSCULAR; INTRAVENOUS at 07:53

## 2023-11-16 RX ADMIN — Medication 10 ML: at 20:19

## 2023-11-16 RX ADMIN — BUPIVACAINE HYDROCHLORIDE 20 ML: 5 INJECTION, SOLUTION EPIDURAL; INTRACAUDAL; PERINEURAL at 07:00

## 2023-11-16 RX ADMIN — ACETAMINOPHEN 1000 MG: 500 TABLET ORAL at 06:24

## 2023-11-16 RX ADMIN — ROCURONIUM BROMIDE 50 MG: 50 INJECTION, SOLUTION INTRAVENOUS at 07:32

## 2023-11-16 RX ADMIN — DEXAMETHASONE SODIUM PHOSPHATE 4 MG: 4 INJECTION, SOLUTION INTRAMUSCULAR; INTRAVENOUS at 20:17

## 2023-11-16 RX ADMIN — HYDROMORPHONE HYDROCHLORIDE 0.5 MG: 1 INJECTION, SOLUTION INTRAMUSCULAR; INTRAVENOUS; SUBCUTANEOUS at 09:20

## 2023-11-16 RX ADMIN — ACETAMINOPHEN 650 MG: 325 TABLET ORAL at 15:57

## 2023-11-16 RX ADMIN — ACETAMINOPHEN 650 MG: 325 TABLET ORAL at 20:17

## 2023-11-16 RX ADMIN — DEXAMETHASONE SODIUM PHOSPHATE 8 MG: 4 INJECTION, SOLUTION INTRAMUSCULAR; INTRAVENOUS at 07:56

## 2023-11-16 RX ADMIN — FENTANYL CITRATE 50 MCG: 50 INJECTION, SOLUTION INTRAMUSCULAR; INTRAVENOUS at 08:42

## 2023-11-16 RX ADMIN — SUGAMMADEX 200 MG: 100 INJECTION, SOLUTION INTRAVENOUS at 08:38

## 2023-11-16 RX ADMIN — OXYBUTYNIN CHLORIDE 10 MG: 5 TABLET, EXTENDED RELEASE ORAL at 17:27

## 2023-11-16 RX ADMIN — OXYCODONE 10 MG: 5 TABLET ORAL at 20:23

## 2023-11-16 RX ADMIN — MAGNESIUM SULFATE IN WATER 2000 MG: 40 INJECTION, SOLUTION INTRAVENOUS at 07:50

## 2023-11-16 RX ADMIN — DEXAMETHASONE SODIUM PHOSPHATE 4 MG: 4 INJECTION, SOLUTION INTRAMUSCULAR; INTRAVENOUS at 15:57

## 2023-11-16 RX ADMIN — ONDANSETRON 4 MG: 2 INJECTION INTRAMUSCULAR; INTRAVENOUS at 07:56

## 2023-11-16 RX ADMIN — Medication 2000 MG: at 07:40

## 2023-11-16 RX ADMIN — FENTANYL CITRATE 50 MCG: 50 INJECTION, SOLUTION INTRAMUSCULAR; INTRAVENOUS at 07:32

## 2023-11-16 RX ADMIN — DEXMEDETOMIDINE HYDROCHLORIDE 5 MCG: 100 INJECTION, SOLUTION INTRAVENOUS at 08:00

## 2023-11-16 RX ADMIN — DEXMEDETOMIDINE HYDROCHLORIDE 5 MCG: 100 INJECTION, SOLUTION INTRAVENOUS at 07:55

## 2023-11-16 RX ADMIN — DEXMEDETOMIDINE HYDROCHLORIDE 5 MCG: 100 INJECTION, SOLUTION INTRAVENOUS at 07:50

## 2023-11-16 RX ADMIN — Medication 2000 MG: at 15:57

## 2023-11-16 RX ADMIN — OXYCODONE 5 MG: 5 TABLET ORAL at 16:12

## 2023-11-16 RX ADMIN — SODIUM CHLORIDE, POTASSIUM CHLORIDE, SODIUM LACTATE AND CALCIUM CHLORIDE: 600; 310; 30; 20 INJECTION, SOLUTION INTRAVENOUS at 15:55

## 2023-11-16 RX ADMIN — CELECOXIB 200 MG: 100 CAPSULE ORAL at 06:24

## 2023-11-16 RX ADMIN — SODIUM CHLORIDE, SODIUM LACTATE, POTASSIUM CHLORIDE, AND CALCIUM CHLORIDE: .6; .31; .03; .02 INJECTION, SOLUTION INTRAVENOUS at 08:38

## 2023-11-16 RX ADMIN — OXYCODONE 10 MG: 5 TABLET ORAL at 12:29

## 2023-11-16 RX ADMIN — Medication 100 MCG: at 07:47

## 2023-11-16 RX ADMIN — ASPIRIN 81 MG: 81 TABLET, COATED ORAL at 20:17

## 2023-11-16 RX ADMIN — ALUMINUM HYDROXIDE, MAGNESIUM HYDROXIDE, AND SIMETHICONE 30 ML: 200; 200; 20 SUSPENSION ORAL at 12:35

## 2023-11-16 RX ADMIN — PANTOPRAZOLE SODIUM 40 MG: 40 TABLET, DELAYED RELEASE ORAL at 15:57

## 2023-11-16 RX ADMIN — FENTANYL CITRATE 50 MCG: 50 INJECTION, SOLUTION INTRAMUSCULAR; INTRAVENOUS at 08:54

## 2023-11-16 ASSESSMENT — PAIN DESCRIPTION - ORIENTATION
ORIENTATION: LEFT

## 2023-11-16 ASSESSMENT — PAIN DESCRIPTION - LOCATION
LOCATION: KNEE

## 2023-11-16 ASSESSMENT — PAIN DESCRIPTION - ONSET: ONSET: ON-GOING

## 2023-11-16 ASSESSMENT — PAIN SCALES - GENERAL
PAINLEVEL_OUTOF10: 0
PAINLEVEL_OUTOF10: 8
PAINLEVEL_OUTOF10: 0
PAINLEVEL_OUTOF10: 3
PAINLEVEL_OUTOF10: 5
PAINLEVEL_OUTOF10: 6
PAINLEVEL_OUTOF10: 2
PAINLEVEL_OUTOF10: 8
PAINLEVEL_OUTOF10: 0
PAINLEVEL_OUTOF10: 0
PAINLEVEL_OUTOF10: 5

## 2023-11-16 ASSESSMENT — PAIN DESCRIPTION - FREQUENCY: FREQUENCY: INTERMITTENT

## 2023-11-16 ASSESSMENT — PAIN DESCRIPTION - DESCRIPTORS
DESCRIPTORS: ACHING
DESCRIPTORS: SORE;ACHING
DESCRIPTORS: ACHING

## 2023-11-16 ASSESSMENT — PAIN DESCRIPTION - PAIN TYPE: TYPE: SURGICAL PAIN

## 2023-11-16 ASSESSMENT — PAIN - FUNCTIONAL ASSESSMENT: PAIN_FUNCTIONAL_ASSESSMENT: PREVENTS OR INTERFERES SOME ACTIVE ACTIVITIES AND ADLS

## 2023-11-16 ASSESSMENT — ENCOUNTER SYMPTOMS: SHORTNESS OF BREATH: 1

## 2023-11-16 NOTE — OP NOTE
Operative Note      Patient: Alicia Preston  YOB: 1955  MRN: [id number]    Date of Procedure: 11/16/2023    Pre-Op Diagnosis Codes:     * Primary osteoarthritis of left knee [M17.12]    Post-Op Diagnosis: Same       Procedure(s):  LEFT TOTAL KNEE ARTHROPLASTY                    TOM NOTE:  ADDUCTOR CANAL BLOCK    Surgeon(s):  Antoinette Crain MD    Assistant:   Surgical Assistant: Marco Destinee    Anesthesia: General    Estimated Blood Loss (mL): less than 827     Complications: None    Specimens:   * No specimens in log *    Implants:  Implant Name Type Inv. Item Serial No.  Lot No. LRB No. Used Action   PSN MC VE ASF L 16MM 6-7/EF - [id number]  PSN MC VE ASF L 16MM 6-7/EF  [employer]- [id number] Left 1 Implanted   BASEPLATE TIB KEELED 0 DEG F LT KNEE [employer]KE [employer]ERSON[employer] - [id number]  BASEPLATE TIB KEELED 0 DEG F LT KNEE [employer]- [id number] Left 1 Implanted   COMPONENT FEM CR STD 7 LT KNEE BHASKAR COCR STRL PERSONA LTX - [id number]  COMPONENT FEM CR STD 7 LT KNEE BHASKAR COCR STRL PERSONA LTX  [employer]- [id number] Left 1 Implanted         Drains: * No LDAs found *    Findings: severe OA, bicompartmental      Detailed Description of Procedure:     Clinical Indications: This is a 81 y/o F that was evaluated for L knee OA by myself, noted to have failed extensive conservative measures and desired to have surgical intervention. Risks benefits limitations and alternatives to L TKA were discussed with the patient who wished to proceed. The patient was met in the preoperative holding area last-minute questions were answered, the L knee was marked, consent was verified. An adductor canal block was performed per anesthesia. The patient was taken to the OR where general anesthesia was administered. They were positioned in the supine position. The operative extremity was prepped and draped in standard orthopedic fashion.

## 2023-11-16 NOTE — DISCHARGE INSTRUCTIONS
discharge you to a high quality SNF sooner than the three days Medicare usually requires to cover a SNF stay. Medicare will monitor your care to ensure you and others are receiving high quality care. It's your choice which hospital, doctor, or other providers you use. You have the right to choose which hospital, doctor, or other post-hospital stay health care provider you use. If you believe that your care is adversely affected or have concerns about substandard care, you may call 1-800-MEDICARE or contact your [facility] Quality Improvement Organization by going to: Liv.gaby. To find a different doctor, visit [facility] Physician Compare website, REEL [email or web address], or call 1-800-MEDICARE (2-360.783.2806). TTY users should call 2-416.275.9458. To find a different hospital, visit [employer]ixConnect, or  call 1-800-MEDICARE (1- 762.638.9021). TTY users should call  8-556.214.1946. To find a different skilled nursing facility, visit [address] [place] [address] website, [email or web address]/, or call  1-800-MEDICARE (4-417.329.3995). TTY users should call 6-012-124- 0267. To find a different home health agency, visit [employer] website, Zumobi.co.uk, or call 1-800-MEDICARE (3-363.453.3346). TTY users should call 0-198-047- 9876. For an explanation of how patients can access their health care records and beneficiary claims data, please visit InterviewAlert.dk- families/blue-button/about-blue-button    Get more information     If you have questions or want more information about the Comprehensive Care for Joint Replacement (CJR) model, call [facility] at 926-128-8096 or call 1-800-MEDICARE. You can also find additional information at https://innovation.cms.gov/initiatives/cjr.

## 2023-11-16 NOTE — ANESTHESIA PROCEDURE NOTES
Peripheral Block    Patient location during procedure: pre-op  Reason for block: post-op pain management and at surgeon's request  Start time: 11/16/2023 7:00 AM  End time: 11/16/2023 7:06 AM  Staffing  Performed: anesthesiologist   Anesthesiologist: Marilyn Mcneill MD  Performed by: Marilyn Mcneill MD  Authorized by: Marilyn Mcneill MD    Preanesthetic Checklist  Completed: patient identified, IV checked, site marked, risks and benefits discussed, surgical/procedural consents, equipment checked, pre-op evaluation, timeout performed, anesthesia consent given, oxygen available and monitors applied/VS acknowledged  Peripheral Block   Patient position: supine  Prep: ChloraPrep  Provider prep: sterile gloves and mask  Patient monitoring: cardiac monitor, continuous pulse ox, frequent blood pressure checks and IV access  Block type: Femoral  Adductor canal (Low Femoral)  Laterality: left  Injection technique: single-shot  Guidance: ultrasound guided  Infiltration strength: 1 %  Dose: 3 mL    Needle   Needle type: insulated echogenic nerve stimulator needle   Needle gauge: 21 G  Needle localization: ultrasound guidance  Needle length: 10 cm  Assessment   Injection assessment: negative aspiration for heme, no paresthesia on injection and local visualized surrounding nerve on ultrasound  Paresthesia pain: none  Slow fractionated injection: yes  Hemodynamics: stable  Real-time US image taken/store: yes  Outcomes: uncomplicated and patient tolerated procedure well    Additional Notes  Immediately prior to procedure a \"time out\" was called to verify the correct patient, allergies, laterality, procedure and equipment. Time out performed with  RN    Local Anesthetic: 0.5 %  Bupivacaine  plus epi 1 to 200K Amount: 20 ml  in 5 ml increments after negative aspiration each time.

## 2023-11-16 NOTE — CARE COORDINATION
Case Management Assessment  Initial Evaluation    Date/Time of Evaluation: 11/16/2023 4:40 PM  Assessment Completed by: Socrates Guerrier RN    If patient is discharged prior to next notation, then this note serves as note for discharge by case management. Patient Name: Vincent Bullock                   YOB: 1955  Diagnosis: Primary osteoarthritis of left knee [M17.12]  S/P total knee arthroplasty, left [Z96.652]  S/P total knee arthroplasty, unspecified laterality [Z96.659]                   Date / Time: 11/16/2023  5:37 AM    Patient Admission Status: Inpatient   Readmission Risk (Low < 19, Mod (19-27), High > 27): No data recorded  Current PCP: Haley Banda, DO  PCP verified by CM? Yes    Chart Reviewed: Yes      History Provided by: Patient  Patient Orientation: Alert and Oriented, Person, Place, Situation, Self    Patient Cognition: Alert    Hospitalization in the last 30 days (Readmission):  No    If yes, Readmission Assessment in CM Navigator will be completed. Advance Directives:      Code Status: Full Code   Patient's Primary Decision Maker is: Legal Next of Kin      Discharge Planning:    Patient lives with: Alone Type of Home: Apartment  Primary Care Giver: Self  Patient Support Systems include: Children, Family Members   Current Financial resources: Medicare  Current community resources: None  Current services prior to admission: C-pap            Current DME:              Type of Home Care services:  None    ADLS  Prior functional level: Independent in ADLs/IADLs  Current functional level: Assistance with the following:, Housework, Shopping, Mobility    PT AM-PAC:   /24  OT AM-PAC:   /24    Family can provide assistance at DC: Other (comment) (unknown)  Would you like Case Management to discuss the discharge plan with any other family members/significant others, and if so, who?  No  Plans to Return to Present Housing: Yes  Other Identified Issues/Barriers to RETURNING to current

## 2023-11-16 NOTE — H&P
Update History & Physical    The patient's History and Physical of November 2, 2023 was reviewed with the patient and I examined the patient. There was no change. The surgical site was confirmed by the patient and me. Plan: The risks, benefits, expected outcome, and alternative to the recommended procedure have been discussed with the patient. Patient understands and wants to proceed with the procedure.      Electronically signed by Che Paz MD on 83/41/3794 at 7:20 AM

## 2023-11-16 NOTE — ANESTHESIA POSTPROCEDURE EVALUATION
Department of Anesthesiology  Postprocedure Note    Patient: Dayron Antonio  MRN: [id number]  YOB: 1955  Date of evaluation: 11/16/2023      Procedure Summary       Date: 11/16/23 Room / Location: [address] [place] [address]    Anesthesia Start: Iwona Mujica Anesthesia Stop: 6626    Procedure: LEFT TOTAL KNEE ARTHROPLASTY                    Yeny Garcia NOTE:  ADDUCTOR CANAL BLOCK (Left: Knee) Diagnosis:       Primary osteoarthritis of left knee      (Primary osteoarthritis of left knee [M17.12])    Surgeons: Che Paz MD Responsible Provider: Jayant Echols MD    Anesthesia Type: general ASA Status: 2            Anesthesia Type: No value filed. Cherry Phase I: Cherry Score: 9    Cherry Phase II:        Anesthesia Post Evaluation    Patient location during evaluation: PACU  Patient participation: complete - patient participated  Level of consciousness: awake and alert  Airway patency: patent  Nausea & Vomiting: no nausea and no vomiting  Complications: no  Cardiovascular status: blood pressure returned to baseline  Respiratory status: acceptable  Hydration status: euvolemic  Comments: VSS on transfer to phase 2 recovery. No anesthetic complications.   Pain management: adequate

## 2023-11-17 LAB
ANION GAP SERPL CALCULATED.3IONS-SCNC: 9 MMOL/L (ref 3–16)
BASOPHILS # BLD: 0 K/UL (ref 0–0.2)
BASOPHILS NFR BLD: 0 %
BUN SERPL-MCNC: 14 MG/DL (ref 7–20)
CALCIUM SERPL-MCNC: 8.6 MG/DL (ref 8.3–10.6)
CHLORIDE SERPL-SCNC: 104 MMOL/L (ref 99–110)
CO2 SERPL-SCNC: 26 MMOL/L (ref 21–32)
CREAT SERPL-MCNC: <0.5 MG/DL (ref 0.6–1.2)
DEPRECATED RDW RBC AUTO: 14.1 % (ref 12.4–15.4)
EOSINOPHIL # BLD: 0 K/UL (ref 0–0.6)
EOSINOPHIL NFR BLD: 0 %
GFR SERPLBLD CREATININE-BSD FMLA CKD-EPI: >60 ML/MIN/{1.73_M2}
GLUCOSE SERPL-MCNC: 152 MG/DL (ref 70–99)
HCT VFR BLD AUTO: 32.1 % (ref 36–48)
HGB BLD-MCNC: 10.9 G/DL (ref 12–16)
LYMPHOCYTES # BLD: 0.8 K/UL (ref 1–5.1)
LYMPHOCYTES NFR BLD: 9.9 %
MCH RBC QN AUTO: 32.2 PG (ref 26–34)
MCHC RBC AUTO-ENTMCNC: 33.9 G/DL (ref 31–36)
MCV RBC AUTO: 95 FL (ref 80–100)
MONOCYTES # BLD: 0.7 K/UL (ref 0–1.3)
MONOCYTES NFR BLD: 8.4 %
NEUTROPHILS # BLD: 6.9 K/UL (ref 1.7–7.7)
NEUTROPHILS NFR BLD: 81.7 %
PLATELET # BLD AUTO: 154 K/UL (ref 135–450)
PMV BLD AUTO: 8.4 FL (ref 5–10.5)
POTASSIUM SERPL-SCNC: 3.8 MMOL/L (ref 3.5–5.1)
RBC # BLD AUTO: 3.38 M/UL (ref 4–5.2)
SODIUM SERPL-SCNC: 139 MMOL/L (ref 136–145)
WBC # BLD AUTO: 8.5 K/UL (ref 4–11)

## 2023-11-17 PROCEDURE — 97535 SELF CARE MNGMENT TRAINING: CPT

## 2023-11-17 PROCEDURE — 2700000000 HC OXYGEN THERAPY PER DAY

## 2023-11-17 PROCEDURE — 85025 COMPLETE CBC W/AUTO DIFF WBC: CPT

## 2023-11-17 PROCEDURE — 80048 BASIC METABOLIC PNL TOTAL CA: CPT

## 2023-11-17 PROCEDURE — 99024 POSTOP FOLLOW-UP VISIT: CPT | Performed by: SPECIALIST/TECHNOLOGIST

## 2023-11-17 PROCEDURE — APPNB45 APP NON BILLABLE 31-45 MINUTES: Performed by: SPECIALIST/TECHNOLOGIST

## 2023-11-17 PROCEDURE — 6370000000 HC RX 637 (ALT 250 FOR IP): Performed by: SPECIALIST/TECHNOLOGIST

## 2023-11-17 PROCEDURE — 97110 THERAPEUTIC EXERCISES: CPT

## 2023-11-17 PROCEDURE — 94761 N-INVAS EAR/PLS OXIMETRY MLT: CPT

## 2023-11-17 PROCEDURE — 6370000000 HC RX 637 (ALT 250 FOR IP): Performed by: STUDENT IN AN ORGANIZED HEALTH CARE EDUCATION/TRAINING PROGRAM

## 2023-11-17 PROCEDURE — 6360000002 HC RX W HCPCS: Performed by: STUDENT IN AN ORGANIZED HEALTH CARE EDUCATION/TRAINING PROGRAM

## 2023-11-17 PROCEDURE — 2580000003 HC RX 258: Performed by: STUDENT IN AN ORGANIZED HEALTH CARE EDUCATION/TRAINING PROGRAM

## 2023-11-17 PROCEDURE — 97165 OT EVAL LOW COMPLEX 30 MIN: CPT

## 2023-11-17 PROCEDURE — 36415 COLL VENOUS BLD VENIPUNCTURE: CPT

## 2023-11-17 PROCEDURE — 97116 GAIT TRAINING THERAPY: CPT

## 2023-11-17 PROCEDURE — 1200000000 HC SEMI PRIVATE

## 2023-11-17 PROCEDURE — 97530 THERAPEUTIC ACTIVITIES: CPT

## 2023-11-17 RX ORDER — METHOCARBAMOL 500 MG/1
500 TABLET, FILM COATED ORAL 3 TIMES DAILY
Status: DISCONTINUED | OUTPATIENT
Start: 2023-11-17 | End: 2023-11-19 | Stop reason: HOSPADM

## 2023-11-17 RX ADMIN — PSYLLIUM HUSK 1 PACKET: 3.4 POWDER ORAL at 10:27

## 2023-11-17 RX ADMIN — DEXAMETHASONE SODIUM PHOSPHATE 4 MG: 4 INJECTION, SOLUTION INTRAMUSCULAR; INTRAVENOUS at 20:08

## 2023-11-17 RX ADMIN — METOPROLOL TARTRATE 12.5 MG: 25 TABLET, FILM COATED ORAL at 20:08

## 2023-11-17 RX ADMIN — CELECOXIB 200 MG: 100 CAPSULE ORAL at 08:47

## 2023-11-17 RX ADMIN — DEXAMETHASONE SODIUM PHOSPHATE 4 MG: 4 INJECTION, SOLUTION INTRAMUSCULAR; INTRAVENOUS at 08:47

## 2023-11-17 RX ADMIN — Medication 2000 MG: at 00:13

## 2023-11-17 RX ADMIN — METHOCARBAMOL 500 MG: 500 TABLET ORAL at 20:07

## 2023-11-17 RX ADMIN — ACETAMINOPHEN 650 MG: 325 TABLET ORAL at 13:55

## 2023-11-17 RX ADMIN — OXYCODONE 10 MG: 5 TABLET ORAL at 08:51

## 2023-11-17 RX ADMIN — OXYCODONE 10 MG: 5 TABLET ORAL at 21:20

## 2023-11-17 RX ADMIN — DEXAMETHASONE SODIUM PHOSPHATE 4 MG: 4 INJECTION, SOLUTION INTRAMUSCULAR; INTRAVENOUS at 04:18

## 2023-11-17 RX ADMIN — ACETAMINOPHEN 650 MG: 325 TABLET ORAL at 04:18

## 2023-11-17 RX ADMIN — OXYBUTYNIN CHLORIDE 10 MG: 5 TABLET, EXTENDED RELEASE ORAL at 17:01

## 2023-11-17 RX ADMIN — ASPIRIN 81 MG: 81 TABLET, COATED ORAL at 08:47

## 2023-11-17 RX ADMIN — METHOCARBAMOL 500 MG: 500 TABLET ORAL at 13:55

## 2023-11-17 RX ADMIN — ASPIRIN 81 MG: 81 TABLET, COATED ORAL at 20:07

## 2023-11-17 RX ADMIN — PANTOPRAZOLE SODIUM 40 MG: 40 TABLET, DELAYED RELEASE ORAL at 08:47

## 2023-11-17 RX ADMIN — ACETAMINOPHEN 650 MG: 325 TABLET ORAL at 08:47

## 2023-11-17 RX ADMIN — OXYCODONE 10 MG: 5 TABLET ORAL at 17:10

## 2023-11-17 RX ADMIN — ACETAMINOPHEN 650 MG: 325 TABLET ORAL at 20:07

## 2023-11-17 RX ADMIN — OXYCODONE 10 MG: 5 TABLET ORAL at 12:42

## 2023-11-17 RX ADMIN — DEXAMETHASONE SODIUM PHOSPHATE 4 MG: 4 INJECTION, SOLUTION INTRAMUSCULAR; INTRAVENOUS at 13:55

## 2023-11-17 RX ADMIN — POLYETHYLENE GLYCOL 3350 17 G: 17 POWDER, FOR SOLUTION ORAL at 08:47

## 2023-11-17 RX ADMIN — METOPROLOL TARTRATE 12.5 MG: 25 TABLET, FILM COATED ORAL at 08:47

## 2023-11-17 RX ADMIN — Medication 10 ML: at 20:08

## 2023-11-17 ASSESSMENT — PAIN DESCRIPTION - DESCRIPTORS
DESCRIPTORS: DISCOMFORT
DESCRIPTORS: ACHING
DESCRIPTORS: ACHING

## 2023-11-17 ASSESSMENT — PAIN SCALES - GENERAL
PAINLEVEL_OUTOF10: 2
PAINLEVEL_OUTOF10: 10
PAINLEVEL_OUTOF10: 4
PAINLEVEL_OUTOF10: 7
PAINLEVEL_OUTOF10: 10
PAINLEVEL_OUTOF10: 4
PAINLEVEL_OUTOF10: 7
PAINLEVEL_OUTOF10: 7

## 2023-11-17 ASSESSMENT — PAIN DESCRIPTION - FREQUENCY: FREQUENCY: INTERMITTENT

## 2023-11-17 ASSESSMENT — PAIN DESCRIPTION - LOCATION
LOCATION: KNEE

## 2023-11-17 ASSESSMENT — PAIN DESCRIPTION - ORIENTATION
ORIENTATION: LEFT

## 2023-11-17 ASSESSMENT — PAIN DESCRIPTION - ONSET: ONSET: ON-GOING

## 2023-11-17 ASSESSMENT — PAIN - FUNCTIONAL ASSESSMENT: PAIN_FUNCTIONAL_ASSESSMENT: PREVENTS OR INTERFERES SOME ACTIVE ACTIVITIES AND ADLS

## 2023-11-17 ASSESSMENT — PAIN DESCRIPTION - PAIN TYPE: TYPE: SURGICAL PAIN

## 2023-11-17 NOTE — PLAN OF CARE
Problem: Discharge Planning  Goal: Discharge to home or other facility with appropriate resources  11/16/2023 2311 by Catrachito Isaac RN  Outcome: Progressing  11/16/2023 1635 by Sabrina Ojeda RN  Outcome: Progressing     Problem: Pain  Goal: Verbalizes/displays adequate comfort level or baseline comfort level  11/16/2023 2311 by Catrachito Isaac RN  Outcome: Progressing  11/16/2023 1635 by Sabrina Ojeda RN  Outcome: Progressing     Problem: Safety - Adult  Goal: Free from fall injury  11/16/2023 2311 by Catrachito Isaac RN  Outcome: Progressing  11/16/2023 1635 by Sabrina Ojeda RN  Outcome: Progressing     Problem: ABCDS Injury Assessment  Goal: Absence of physical injury  11/16/2023 2311 by Catrachito Isaac RN  Outcome: Progressing  11/16/2023 1635 by Sabrina Ojeda RN  Outcome: Progressing

## 2023-11-17 NOTE — CARE COORDINATION
LOS 1. Care managed by Katie Pacheco. S/p TKR- Plan [facility]- accepted. Needs 3 MN. From home alone and 14 DEAN. CM following.  Jong Zarate RN

## 2023-11-18 LAB
ANION GAP SERPL CALCULATED.3IONS-SCNC: 11 MMOL/L (ref 3–16)
BASOPHILS # BLD: 0 K/UL (ref 0–0.2)
BASOPHILS NFR BLD: 0.1 %
BUN SERPL-MCNC: 12 MG/DL (ref 7–20)
CALCIUM SERPL-MCNC: 8.9 MG/DL (ref 8.3–10.6)
CHLORIDE SERPL-SCNC: 105 MMOL/L (ref 99–110)
CO2 SERPL-SCNC: 25 MMOL/L (ref 21–32)
CREAT SERPL-MCNC: <0.5 MG/DL (ref 0.6–1.2)
DEPRECATED RDW RBC AUTO: 14.3 % (ref 12.4–15.4)
EOSINOPHIL # BLD: 0 K/UL (ref 0–0.6)
EOSINOPHIL NFR BLD: 0 %
GFR SERPLBLD CREATININE-BSD FMLA CKD-EPI: >60 ML/MIN/{1.73_M2}
GLUCOSE SERPL-MCNC: 139 MG/DL (ref 70–99)
HCT VFR BLD AUTO: 31.1 % (ref 36–48)
HGB BLD-MCNC: 10.6 G/DL (ref 12–16)
LYMPHOCYTES # BLD: 1 K/UL (ref 1–5.1)
LYMPHOCYTES NFR BLD: 10.9 %
MCH RBC QN AUTO: 32.4 PG (ref 26–34)
MCHC RBC AUTO-ENTMCNC: 33.9 G/DL (ref 31–36)
MCV RBC AUTO: 95.6 FL (ref 80–100)
MONOCYTES # BLD: 0.6 K/UL (ref 0–1.3)
MONOCYTES NFR BLD: 6.7 %
NEUTROPHILS # BLD: 7.2 K/UL (ref 1.7–7.7)
NEUTROPHILS NFR BLD: 82.3 %
PLATELET # BLD AUTO: 153 K/UL (ref 135–450)
PMV BLD AUTO: 9.1 FL (ref 5–10.5)
POTASSIUM SERPL-SCNC: 4.4 MMOL/L (ref 3.5–5.1)
RBC # BLD AUTO: 3.25 M/UL (ref 4–5.2)
SODIUM SERPL-SCNC: 141 MMOL/L (ref 136–145)
WBC # BLD AUTO: 8.7 K/UL (ref 4–11)

## 2023-11-18 PROCEDURE — 2580000003 HC RX 258: Performed by: STUDENT IN AN ORGANIZED HEALTH CARE EDUCATION/TRAINING PROGRAM

## 2023-11-18 PROCEDURE — 1200000000 HC SEMI PRIVATE

## 2023-11-18 PROCEDURE — 6370000000 HC RX 637 (ALT 250 FOR IP): Performed by: STUDENT IN AN ORGANIZED HEALTH CARE EDUCATION/TRAINING PROGRAM

## 2023-11-18 PROCEDURE — 97535 SELF CARE MNGMENT TRAINING: CPT

## 2023-11-18 PROCEDURE — 97110 THERAPEUTIC EXERCISES: CPT

## 2023-11-18 PROCEDURE — 85025 COMPLETE CBC W/AUTO DIFF WBC: CPT

## 2023-11-18 PROCEDURE — 80048 BASIC METABOLIC PNL TOTAL CA: CPT

## 2023-11-18 PROCEDURE — 6360000002 HC RX W HCPCS: Performed by: STUDENT IN AN ORGANIZED HEALTH CARE EDUCATION/TRAINING PROGRAM

## 2023-11-18 PROCEDURE — 99024 POSTOP FOLLOW-UP VISIT: CPT | Performed by: PHYSICIAN ASSISTANT

## 2023-11-18 PROCEDURE — 6370000000 HC RX 637 (ALT 250 FOR IP): Performed by: SPECIALIST/TECHNOLOGIST

## 2023-11-18 PROCEDURE — 36415 COLL VENOUS BLD VENIPUNCTURE: CPT

## 2023-11-18 PROCEDURE — 97116 GAIT TRAINING THERAPY: CPT

## 2023-11-18 RX ADMIN — METOPROLOL TARTRATE 12.5 MG: 25 TABLET, FILM COATED ORAL at 08:03

## 2023-11-18 RX ADMIN — MORPHINE SULFATE 4 MG: 4 INJECTION, SOLUTION INTRAMUSCULAR; INTRAVENOUS at 08:04

## 2023-11-18 RX ADMIN — POLYETHYLENE GLYCOL 3350 17 G: 17 POWDER, FOR SOLUTION ORAL at 08:03

## 2023-11-18 RX ADMIN — PSYLLIUM HUSK 1 PACKET: 3.4 POWDER ORAL at 08:03

## 2023-11-18 RX ADMIN — ACETAMINOPHEN 650 MG: 325 TABLET ORAL at 13:47

## 2023-11-18 RX ADMIN — DEXAMETHASONE SODIUM PHOSPHATE 4 MG: 4 INJECTION, SOLUTION INTRAMUSCULAR; INTRAVENOUS at 08:08

## 2023-11-18 RX ADMIN — ACETAMINOPHEN 650 MG: 325 TABLET ORAL at 21:39

## 2023-11-18 RX ADMIN — Medication 10 ML: at 21:47

## 2023-11-18 RX ADMIN — Medication 10 ML: at 08:08

## 2023-11-18 RX ADMIN — METHOCARBAMOL 500 MG: 500 TABLET ORAL at 13:47

## 2023-11-18 RX ADMIN — METHOCARBAMOL 500 MG: 500 TABLET ORAL at 08:04

## 2023-11-18 RX ADMIN — ACETAMINOPHEN 650 MG: 325 TABLET ORAL at 03:14

## 2023-11-18 RX ADMIN — METHOCARBAMOL 500 MG: 500 TABLET ORAL at 21:40

## 2023-11-18 RX ADMIN — ASPIRIN 81 MG: 81 TABLET, COATED ORAL at 08:03

## 2023-11-18 RX ADMIN — OXYCODONE 10 MG: 5 TABLET ORAL at 01:14

## 2023-11-18 RX ADMIN — OXYCODONE 10 MG: 5 TABLET ORAL at 23:30

## 2023-11-18 RX ADMIN — OXYCODONE 10 MG: 5 TABLET ORAL at 06:25

## 2023-11-18 RX ADMIN — ACETAMINOPHEN 650 MG: 325 TABLET ORAL at 08:04

## 2023-11-18 RX ADMIN — DEXAMETHASONE SODIUM PHOSPHATE 4 MG: 4 INJECTION, SOLUTION INTRAMUSCULAR; INTRAVENOUS at 13:47

## 2023-11-18 RX ADMIN — CELECOXIB 200 MG: 100 CAPSULE ORAL at 08:03

## 2023-11-18 RX ADMIN — DEXAMETHASONE SODIUM PHOSPHATE 4 MG: 4 INJECTION, SOLUTION INTRAMUSCULAR; INTRAVENOUS at 21:40

## 2023-11-18 RX ADMIN — OXYBUTYNIN CHLORIDE 10 MG: 5 TABLET, EXTENDED RELEASE ORAL at 17:42

## 2023-11-18 RX ADMIN — DEXAMETHASONE SODIUM PHOSPHATE 4 MG: 4 INJECTION, SOLUTION INTRAMUSCULAR; INTRAVENOUS at 03:14

## 2023-11-18 RX ADMIN — OXYCODONE 10 MG: 5 TABLET ORAL at 17:42

## 2023-11-18 RX ADMIN — ASPIRIN 81 MG: 81 TABLET, COATED ORAL at 21:40

## 2023-11-18 RX ADMIN — PANTOPRAZOLE SODIUM 40 MG: 40 TABLET, DELAYED RELEASE ORAL at 08:03

## 2023-11-18 RX ADMIN — SENNOSIDES 8.6 MG: 8.6 TABLET, FILM COATED ORAL at 08:03

## 2023-11-18 RX ADMIN — OXYCODONE 10 MG: 5 TABLET ORAL at 11:48

## 2023-11-18 RX ADMIN — MORPHINE SULFATE 4 MG: 4 INJECTION, SOLUTION INTRAMUSCULAR; INTRAVENOUS at 21:45

## 2023-11-18 ASSESSMENT — PAIN SCALES - GENERAL
PAINLEVEL_OUTOF10: 9
PAINLEVEL_OUTOF10: 9
PAINLEVEL_OUTOF10: 2
PAINLEVEL_OUTOF10: 10
PAINLEVEL_OUTOF10: 10
PAINLEVEL_OUTOF10: 2
PAINLEVEL_OUTOF10: 8
PAINLEVEL_OUTOF10: 7
PAINLEVEL_OUTOF10: 9
PAINLEVEL_OUTOF10: 9

## 2023-11-18 ASSESSMENT — PAIN DESCRIPTION - LOCATION
LOCATION: LEG
LOCATION: KNEE
LOCATION: LEG
LOCATION: LEG
LOCATION: KNEE
LOCATION: LEG
LOCATION: KNEE

## 2023-11-18 ASSESSMENT — PAIN - FUNCTIONAL ASSESSMENT
PAIN_FUNCTIONAL_ASSESSMENT: PREVENTS OR INTERFERES SOME ACTIVE ACTIVITIES AND ADLS
PAIN_FUNCTIONAL_ASSESSMENT: ACTIVITIES ARE NOT PREVENTED
PAIN_FUNCTIONAL_ASSESSMENT: ACTIVITIES ARE NOT PREVENTED
PAIN_FUNCTIONAL_ASSESSMENT: PREVENTS OR INTERFERES SOME ACTIVE ACTIVITIES AND ADLS
PAIN_FUNCTIONAL_ASSESSMENT: ACTIVITIES ARE NOT PREVENTED

## 2023-11-18 ASSESSMENT — PAIN DESCRIPTION - DESCRIPTORS
DESCRIPTORS: ACHING;SORE
DESCRIPTORS: ACHING
DESCRIPTORS: SORE
DESCRIPTORS: ACHING
DESCRIPTORS: ACHING;SORE;SHARP
DESCRIPTORS: SORE

## 2023-11-18 ASSESSMENT — PAIN DESCRIPTION - ORIENTATION
ORIENTATION: LEFT

## 2023-11-18 ASSESSMENT — PAIN DESCRIPTION - PAIN TYPE
TYPE: SURGICAL PAIN

## 2023-11-18 ASSESSMENT — PAIN DESCRIPTION - ONSET: ONSET: ON-GOING

## 2023-11-18 ASSESSMENT — PAIN DESCRIPTION - FREQUENCY: FREQUENCY: INTERMITTENT

## 2023-11-18 NOTE — PLAN OF CARE
Problem: Pain  Goal: Verbalizes/displays adequate comfort level or baseline comfort level  Outcome: Progressing   Pt verbalized pain goal, pain managed with cold therapy, scheduled, and PRN pain medications.

## 2023-11-19 VITALS
RESPIRATION RATE: 16 BRPM | DIASTOLIC BLOOD PRESSURE: 75 MMHG | HEIGHT: 68 IN | TEMPERATURE: 98 F | WEIGHT: 198 LBS | BODY MASS INDEX: 30.01 KG/M2 | HEART RATE: 77 BPM | SYSTOLIC BLOOD PRESSURE: 139 MMHG | OXYGEN SATURATION: 92 %

## 2023-11-19 LAB
ANION GAP SERPL CALCULATED.3IONS-SCNC: 9 MMOL/L (ref 3–16)
BASOPHILS # BLD: 0 K/UL (ref 0–0.2)
BASOPHILS NFR BLD: 0 %
BUN SERPL-MCNC: 16 MG/DL (ref 7–20)
CALCIUM SERPL-MCNC: 8.9 MG/DL (ref 8.3–10.6)
CHLORIDE SERPL-SCNC: 102 MMOL/L (ref 99–110)
CO2 SERPL-SCNC: 29 MMOL/L (ref 21–32)
CREAT SERPL-MCNC: <0.5 MG/DL (ref 0.6–1.2)
DEPRECATED RDW RBC AUTO: 14.7 % (ref 12.4–15.4)
EOSINOPHIL # BLD: 0 K/UL (ref 0–0.6)
EOSINOPHIL NFR BLD: 0 %
GFR SERPLBLD CREATININE-BSD FMLA CKD-EPI: >60 ML/MIN/{1.73_M2}
GLUCOSE SERPL-MCNC: 137 MG/DL (ref 70–99)
HCT VFR BLD AUTO: 30 % (ref 36–48)
HGB BLD-MCNC: 10.2 G/DL (ref 12–16)
LYMPHOCYTES # BLD: 0.9 K/UL (ref 1–5.1)
LYMPHOCYTES NFR BLD: 12.9 %
MCH RBC QN AUTO: 32.4 PG (ref 26–34)
MCHC RBC AUTO-ENTMCNC: 34 G/DL (ref 31–36)
MCV RBC AUTO: 95.1 FL (ref 80–100)
MONOCYTES # BLD: 0.6 K/UL (ref 0–1.3)
MONOCYTES NFR BLD: 8.6 %
NEUTROPHILS # BLD: 5.7 K/UL (ref 1.7–7.7)
NEUTROPHILS NFR BLD: 78.5 %
PLATELET # BLD AUTO: 159 K/UL (ref 135–450)
PMV BLD AUTO: 9.2 FL (ref 5–10.5)
POTASSIUM SERPL-SCNC: 4.4 MMOL/L (ref 3.5–5.1)
RBC # BLD AUTO: 3.15 M/UL (ref 4–5.2)
SODIUM SERPL-SCNC: 140 MMOL/L (ref 136–145)
WBC # BLD AUTO: 7.3 K/UL (ref 4–11)

## 2023-11-19 PROCEDURE — 6370000000 HC RX 637 (ALT 250 FOR IP): Performed by: STUDENT IN AN ORGANIZED HEALTH CARE EDUCATION/TRAINING PROGRAM

## 2023-11-19 PROCEDURE — 85025 COMPLETE CBC W/AUTO DIFF WBC: CPT

## 2023-11-19 PROCEDURE — 36415 COLL VENOUS BLD VENIPUNCTURE: CPT

## 2023-11-19 PROCEDURE — 6360000002 HC RX W HCPCS: Performed by: STUDENT IN AN ORGANIZED HEALTH CARE EDUCATION/TRAINING PROGRAM

## 2023-11-19 PROCEDURE — 6370000000 HC RX 637 (ALT 250 FOR IP): Performed by: SPECIALIST/TECHNOLOGIST

## 2023-11-19 PROCEDURE — 2580000003 HC RX 258: Performed by: STUDENT IN AN ORGANIZED HEALTH CARE EDUCATION/TRAINING PROGRAM

## 2023-11-19 PROCEDURE — 99024 POSTOP FOLLOW-UP VISIT: CPT | Performed by: PHYSICIAN ASSISTANT

## 2023-11-19 PROCEDURE — 80048 BASIC METABOLIC PNL TOTAL CA: CPT

## 2023-11-19 RX ORDER — OXYCODONE HYDROCHLORIDE 5 MG/1
5 TABLET ORAL EVERY 6 HOURS PRN
Qty: 28 TABLET | Refills: 0 | Status: SHIPPED | OUTPATIENT
Start: 2023-11-19 | End: 2023-11-26

## 2023-11-19 RX ORDER — ASPIRIN 81 MG/1
81 TABLET, CHEWABLE ORAL 2 TIMES DAILY
Qty: 60 TABLET | Refills: 0 | Status: SHIPPED | OUTPATIENT
Start: 2023-11-19

## 2023-11-19 RX ORDER — METHOCARBAMOL 500 MG/1
500 TABLET, FILM COATED ORAL 3 TIMES DAILY
Qty: 30 TABLET | Refills: 0 | Status: SHIPPED | OUTPATIENT
Start: 2023-11-19 | End: 2023-11-29

## 2023-11-19 RX ORDER — POLYETHYLENE GLYCOL 3350 17 G/17G
17 POWDER, FOR SOLUTION ORAL DAILY
Qty: 30 PACKET | Refills: 0 | Status: SHIPPED | OUTPATIENT
Start: 2023-11-19 | End: 2023-12-19

## 2023-11-19 RX ORDER — SENNOSIDES A AND B 8.6 MG/1
1 TABLET, FILM COATED ORAL DAILY PRN
Qty: 30 TABLET | Refills: 0 | Status: SHIPPED | OUTPATIENT
Start: 2023-11-19 | End: 2023-12-19

## 2023-11-19 RX ORDER — ASPIRIN 81 MG/1
81 TABLET ORAL 2 TIMES DAILY
Qty: 30 TABLET | Refills: 3 | Status: SHIPPED | OUTPATIENT
Start: 2023-11-19

## 2023-11-19 RX ADMIN — DEXAMETHASONE SODIUM PHOSPHATE 4 MG: 4 INJECTION, SOLUTION INTRAMUSCULAR; INTRAVENOUS at 03:50

## 2023-11-19 RX ADMIN — METOPROLOL TARTRATE 12.5 MG: 25 TABLET, FILM COATED ORAL at 09:22

## 2023-11-19 RX ADMIN — ACETAMINOPHEN 650 MG: 325 TABLET ORAL at 09:21

## 2023-11-19 RX ADMIN — PSYLLIUM HUSK 1 PACKET: 3.4 POWDER ORAL at 09:20

## 2023-11-19 RX ADMIN — Medication 10 ML: at 09:23

## 2023-11-19 RX ADMIN — ACETAMINOPHEN 650 MG: 325 TABLET ORAL at 03:49

## 2023-11-19 RX ADMIN — OXYCODONE 10 MG: 5 TABLET ORAL at 11:52

## 2023-11-19 RX ADMIN — ASPIRIN 81 MG: 81 TABLET, COATED ORAL at 09:21

## 2023-11-19 RX ADMIN — OXYCODONE 10 MG: 5 TABLET ORAL at 07:17

## 2023-11-19 RX ADMIN — PANTOPRAZOLE SODIUM 40 MG: 40 TABLET, DELAYED RELEASE ORAL at 09:21

## 2023-11-19 RX ADMIN — METHOCARBAMOL 500 MG: 500 TABLET ORAL at 09:22

## 2023-11-19 RX ADMIN — POLYETHYLENE GLYCOL 3350 17 G: 17 POWDER, FOR SOLUTION ORAL at 09:20

## 2023-11-19 RX ADMIN — DEXAMETHASONE SODIUM PHOSPHATE 4 MG: 4 INJECTION, SOLUTION INTRAMUSCULAR; INTRAVENOUS at 09:22

## 2023-11-19 RX ADMIN — CELECOXIB 200 MG: 100 CAPSULE ORAL at 09:21

## 2023-11-19 RX ADMIN — OXYCODONE 10 MG: 5 TABLET ORAL at 03:49

## 2023-11-19 ASSESSMENT — PAIN DESCRIPTION - DESCRIPTORS
DESCRIPTORS: ACHING;SORE
DESCRIPTORS: ACHING
DESCRIPTORS: ACHING;SHARP
DESCRIPTORS: ACHING

## 2023-11-19 ASSESSMENT — PAIN DESCRIPTION - ORIENTATION
ORIENTATION: LEFT

## 2023-11-19 ASSESSMENT — PAIN SCALES - GENERAL
PAINLEVEL_OUTOF10: 8

## 2023-11-19 ASSESSMENT — PAIN - FUNCTIONAL ASSESSMENT
PAIN_FUNCTIONAL_ASSESSMENT: PREVENTS OR INTERFERES SOME ACTIVE ACTIVITIES AND ADLS
PAIN_FUNCTIONAL_ASSESSMENT: PREVENTS OR INTERFERES SOME ACTIVE ACTIVITIES AND ADLS

## 2023-11-19 ASSESSMENT — PAIN DESCRIPTION - LOCATION
LOCATION: LEG
LOCATION: LEG
LOCATION: KNEE
LOCATION: KNEE

## 2023-11-19 NOTE — CARE COORDINATION
CASE MANAGEMENT DISCHARGE SUMMARY      Discharge to: [facility]    Precertification completed: [facility] Exemption Notification (HENS) completed: yes    IMM given: (date)     New Durable Medical Equipment ordered/agency: na    Transportation:    Family/car: sister      Confirmed discharge plan with: [facility]NTed[facility]     Patient: yes     Family:  yes/no    Name: Contact number:     Facility/Agency, name:  [facility] faxed [phone]   Phone number for report to facility: [phone] [place] [phone] [phone]     RN, name: Carroll Perry    Note: Discharging nurse to complete BAYRON, reconcile [facility], and place final copy with patient's discharge packet. RN to ensure that written prescriptions for  Level II medications are sent with patient to the facility as per protocol.

## 2023-11-19 NOTE — PLAN OF CARE
Problem: Discharge Planning  Goal: Discharge to home or other facility with appropriate resources  11/18/2023 2202 by Ellyn Dash  Outcome: Progressing  11/18/2023 1054 by Gloria Fong RN  Outcome: Progressing     Problem: Pain  Goal: Verbalizes/displays adequate comfort level or baseline comfort level  11/18/2023 2202 by Ellyn Dash  Outcome: Progressing  Flowsheets (Taken 11/18/2023 1909)  Verbalizes/displays adequate comfort level or baseline comfort level: Encourage patient to monitor pain and request assistance  11/18/2023 1054 by Gloria Fong RN  Outcome: Progressing     Problem: Safety - Adult  Goal: Free from fall injury  11/18/2023 2202 by Ellyn Dash  Outcome: Progressing  [address] (Taken 11/18/2023 2041)  Free From Fall Injury:   Instruct family/caregiver on patient safety   Based on caregiver fall risk screen, instruct family/caregiver to ask for assistance with transferring infant if caregiver noted to have fall risk factors  11/18/2023 1054 by Gloria Fong RN  Outcome: Progressing     Problem: ABCDS Injury Assessment  Goal: Absence of physical injury  11/18/2023 2202 by Ellyn Dash  Outcome: Progressing  11/18/2023 1054 by Gloria Fong RN  Outcome: Progressing

## 2023-11-19 NOTE — DISCHARGE INSTR - COC
Continuity of Care Form    Patient Name: Benson Dick   :  1955  MRN:  [id number]    Admit date:  2023  Discharge date:  23    Code Status Order: Full Code   Advance Directives:   [address] Documentation       Date/Time Healthcare Directive Type of Healthcare Directive Copy in [address] Agent's Name Healthcare Agent's Phone Number    [phone] No, patient does not have an advance directive for healthcare treatment -- -- -- -- --            Admitting Physician:  Pepe Singh MD  PCP: Jorge Esposito DO    Discharging Nurse: [facility] Unit/Room#: 6112/4926-22  Discharging Unit Phone Number: [phone]    Emergency Contact:   Extended Emergency Contact Information  Primary Emergency Contact: Bruce Salazar [facility] of [address] Phone: [phone]  Relation: Child    Past Surgical History:  Past Surgical History:   Procedure Laterality Date    APPENDECTOMY      CERVICAL SPINE SURGERY  2018    discectomy c3,4,5    HYSTEROSCOPY  2015    d and c    KNEE ARTHROSCOPY Left 2011    meniscectomy    NECK SURGERY      benign tumor, catch scratch fever    PAIN MANAGEMENT PROCEDURE Right 2022    RIGHT LUMBAR FIVE SACRAL ONE EPIDURAL STEROID INJECTION SITE CONFIRMED BY FLUOROSCOPY performed by Guy Morfin MD at [address] [place] [address] Right 2022    RIGHT TOTAL KNEE ARTHROPLASTY WITH ADDUCTOR CANAL BLOCK AND Rachel Lords performed by Pepe Singh MD at [address] [facility] Left 2023    LEFT TOTAL KNEE ARTHROPLASTY                    TOM NOTE:  ADDUCTOR CANAL BLOCK performed by Pepe Singh MD at [facility]         Immunization History:   Immunization History   Administered Date(s) Administered    COVID-19, PFIZER PURPLE top, DILUTE for use, (age 15 y+),

## 2023-11-19 NOTE — DISCHARGE SUMMARY
arthroplasty. The hardware appears properly placed without complication. Status post left knee arthroplasty without complication       Consults:     IP CONSULT TO HOSPITALIST    Labs:     Recent Labs     11/17/23  0541 11/18/23  0554 11/19/23  0548   WBC 8.5 8.7 7.3   HGB 10.9* 10.6* 10.2*   HCT 32.1* 31.1* 30.0*    153 159     Recent Labs     11/17/23  0541 11/18/23  0554 11/19/23  0548    141 140   K 3.8 4.4 4.4    105 102   CO2 26 25 29   BUN 14 12 16   CREATININE <0.5* <0.5* <0.5*   CALCIUM 8.6 8.9 8.9     No results for input(s): \"PROBNP\", \"TROPHS\" in the last 72 hours. No results for input(s): \"LABA1C\" in the last 72 hours. No results for input(s): \"AST\", \"ALT\", \"BILIDIR\", \"BILITOT\", \"ALKPHOS\" in the last 72 hours. No results for input(s): \"INR\", \"LACTA\", \"TSH\" in the last 72 hours. Urine Cultures:   Lab Results   Component Value Date/Time    LABURIN  10/31/2023 01:01 PM     <50,000 CFU/ml mixed skin/urogenital deni.  No further workup     Blood Cultures: No results found for: \"BC\"  No results found for: \"BLOODCULT2\"  Organism: No results found for: \"ORG\"    Signed:    Phyllis Ta, APRN - CNP

## 2023-11-28 ENCOUNTER — TELEPHONE (OUTPATIENT)
Dept: ORTHOPEDIC SURGERY | Age: 68
End: 2023-11-28

## 2023-11-28 ENCOUNTER — CARE COORDINATION (OUTPATIENT)
Dept: CASE MANAGEMENT | Age: 68
End: 2023-11-28

## 2023-11-28 DIAGNOSIS — Z96.652 S/P REVISION OF TOTAL KNEE, LEFT: Primary | ICD-10-CM

## 2023-11-28 DIAGNOSIS — Z96.652 S/P TOTAL KNEE ARTHROPLASTY, LEFT: Primary | ICD-10-CM

## 2023-11-28 RX ORDER — OXYCODONE HYDROCHLORIDE 5 MG/1
5 TABLET ORAL EVERY 6 HOURS PRN
Qty: 28 TABLET | Refills: 0 | Status: SHIPPED | OUTPATIENT
Start: 2023-11-28 | End: 2023-12-05

## 2023-11-28 NOTE — CARE COORDINATION
Spoke with patient. Incision status: States dressing dry and intact. Edema/Swelling: States swelling continues. Pain level and status: States pain is tolerable. Use of pain medications: States pain meds ran out Sunday. Messaged physician office for refill and patient has contacted office. Bowels: States bowels are moving. Home Care Agency active: States Teddy nurse started on Saturday but therapy has not started. Call placed to [facility] for follow up with PT start date. Home Health agency states that PT will start tomorrow. Do you have all of your medications: Yes, states taking aspirin 81mg twice a day.     Changes in medications: No    Follow up appointments:    Future Appointments   Date Time Provider [address]   12/9/3113  9:43 PM Brenda De La Cruz MD AND ORTHO MMA   2/26/2024 11:40 AM Lele Cameron MD AND PULM [address] [facility] BSN  Care Transition Nurse for ortho bundle  [phone]

## 2023-11-28 NOTE — TELEPHONE ENCOUNTER
Prescription Refill     Medication Name:  OXYCODONE 5MG  Pharmacy: [address] ON [facility]  Patient Contact Number:  [phone]    PT CALLED REQUESTING REFILL OF OXYCODONE 5MG. PT USES [facility] IN [place] [facility]. PLEASE CALL PT BACK AT THE ABOVE NUMBER.

## 2023-11-28 NOTE — TELEPHONE ENCOUNTER
Other PATIENT IS CALLING IN REQUESTING A CALL BACK. SHE IS WANTING ASSISTANCE WITH STARTING PHYSICAL THERAPY.  [phone]

## 2023-12-05 ENCOUNTER — CARE COORDINATION (OUTPATIENT)
Dept: CARE COORDINATION | Age: 68
End: 2023-12-05

## 2023-12-05 NOTE — CARE COORDINATION
Spoke with patient. Incision status: States dressing dry and intact with no drainage. Edema/Swelling: States swelling has increased over the last day or two but she has been doing a lot more on her feet. States it looks better in the am.    Pain level and status: States pain continues but tolerable. Use of pain medications: States taking pain meds with relief. Bowels: No complaints. Home Care Agency active: Continues with therapy and discharging tomorrow. Outpatient therapy: Starts on 12/8/23.     Do you have all of your medications: Yes    Changes in medications: No    Follow up appointments:    Future Appointments   Date Time Provider [address] [place] [address]   86/7/6680  0:71 PM Frances Pruitt MD AND ORTHO [facility]   12/8/2023  2:20 PM Rocky Corbett, ANTONIA [facility] AND PT Herbert Kelley   2/26/2024 11:40 AM Adonis Clinton MD AND [facility] AGATA Mcclellan BSN  Care Transition Nurse for ortho bundle  [phone]

## 2023-12-06 DIAGNOSIS — Z96.652 S/P TOTAL KNEE ARTHROPLASTY, LEFT: Primary | ICD-10-CM

## 2023-12-06 RX ORDER — METHOCARBAMOL 750 MG/1
750 TABLET, FILM COATED ORAL 4 TIMES DAILY
Qty: 40 TABLET | Refills: 0 | Status: SHIPPED | OUTPATIENT
Start: 2023-12-06 | End: 2023-12-16

## 2023-12-06 RX ORDER — OXYCODONE HYDROCHLORIDE 5 MG/1
5 TABLET ORAL EVERY 6 HOURS PRN
Qty: 28 TABLET | Refills: 0 | Status: SHIPPED | OUTPATIENT
Start: 2023-12-06 | End: 2023-12-13

## 2023-12-08 ENCOUNTER — HOSPITAL ENCOUNTER (OUTPATIENT)
Dept: PHYSICAL THERAPY | Age: 68
Setting detail: THERAPIES SERIES
Discharge: HOME | End: 2023-12-08
Payer: MEDICARE

## 2023-12-08 DIAGNOSIS — M25.462 EFFUSION OF LEFT KNEE: ICD-10-CM

## 2023-12-08 DIAGNOSIS — M25.562 LEFT KNEE PAIN, UNSPECIFIED CHRONICITY: Primary | ICD-10-CM

## 2023-12-08 DIAGNOSIS — M25.662 KNEE STIFFNESS, LEFT: ICD-10-CM

## 2023-12-08 PROCEDURE — 97161 PT EVAL LOW COMPLEX 20 MIN: CPT | Performed by: PHYSICAL THERAPIST

## 2023-12-08 PROCEDURE — 97110 THERAPEUTIC EXERCISES: CPT | Performed by: PHYSICAL THERAPIST

## 2023-12-08 PROCEDURE — 97112 NEUROMUSCULAR REEDUCATION: CPT | Performed by: PHYSICAL THERAPIST

## 2023-12-08 NOTE — PLAN OF CARE
128        Ext (0) 0 0                   ANKLE DF (20)          PF (50)          Inversion (30)          Eversion (20)             MMT L                MMT  R Notes     LUMBAR Flexion      Extension      Lateral flexion       Rotation          MMT L MMT R Notes       HIP Flexion 4- 4     Abduction       ER       IR       Extension             KNEE Flexion 4- 4 Quad tone 4-/5 L, IND with SLR    Extension 4- 4             ANKLE DF       PF       Inversion       Eversion             GIRTH MP 45.0 cm 42.8 cm     SP 48.0 cm 44.5 cm             Repeated Movements: [] Normal  [] Abnormal [x] N/A    Palpation:   Patient reported tenderness with palpation  Location:medial and lateral joint line. Posture:   decreased WB on L and genu valgum Bilateral    Bandages/Dressings/Incisions:  Patients wound/incision appears clean, dry and intact    Dermatomes: Abnormal findings listed below  All WNL    Myotomes: Abnormal findings listed below  All WNL    Reflexes: Abnormal findings listed below  Not tested    Specific Joint Mobility Testing/Accessory Motions:      N/A    Special Tests:  - Rc's sign    Gait:    Pattern: antalgic pattern, decreased step length, and lack heel strike on left  Assistive Device Used: Single point cane on right    Balance:  [x] WNL      [] NT       [] Dysfunction noted  Comment:     Falls Risk Assessment (30 days): Falls Risk assessed and no intervention required. Time Up and Go (TUG):   12 seconds or faster (0% functional limitation)     ASSESSMENT   Assessment:   Hardy Harper is a 80 y.o. female presenting today to Outpatient PT with signs and symptoms consistent with s/p L TKR on 11/16/23. Pt. presents with the functional impairments and activity limitations listed below and would benefit from Outpatient PT to address the below impairments as well as improve pain, and restore function.      Functional Impairments:   Decreased LE functional ROM  Decreased core/proximal hip strength and

## 2023-12-08 NOTE — FLOWSHEET NOTE
squatting, light home activities, and up/down 1 flight of stairs without increased symptoms or restriction to work towards return to prior level of function. Status: [] Progressing: [] Met: [] Not Met: [] Adjusted  Patient will perform normal ADLs without symptoms 90% of the time                                                                                                                  Status: [] Progressing: [] Met: [] Not Met: [] Adjusted    Overall Progression Towards Functional goals/ Treatment Progress Update:  [] Patient is progressing as expected towards functional goals listed. [] Progression is slowed due to complexities/Impairments listed. [] Progression has been slowed due to co-morbidities. [x] Plan just implemented, too soon (<30days) to assess goals progression   [] Goals require adjustment due to lack of progress  [] Patient is not progressing as expected and requires additional follow up with physician  [] Other:     CHARGE CAPTURE     PT CHARGE GRID   CPT Code (TIMED) minutes # CPT Code (UNTIMED) #     Therex [phone])  15' 1  EVAL:LOW (70614 - Typically 20 minutes face-to-face) 1    Neuromusc. Re-ed (60589) 10' 1  Re-Eval (78878)     Manual ([id number])    Estim Unattended (76796)     Ther. Act (70380)    [facility]. Traction ([id number])     Gait (66476)    Dry Needle 1-2 muscle (21481)     Aquatic Therex (20330)    Dry Needle 3+ muscle (39818)     Iontophoresis (24586)    VASO (54594)     Ultrasound (98594)    Group Therapy (49616)     Estim Attended (79841)    Canalith Repositioning (35447)     Other:    Other: Total Timed Code Tx Minutes 25' 2  20'     Total Treatment Minutes 45'        Charge Justification:  (88436) THERAPEUTIC EXERCISE - Provided verbal/tactile cueing for activities related to strengthening, flexibility, endurance, ROM performed to prevent loss of range of motion, maintain or improve muscular strength or increase flexibility, following either an injury or surgery.    95 [id number]

## 2023-12-11 ENCOUNTER — HOSPITAL ENCOUNTER (OUTPATIENT)
Dept: PHYSICAL THERAPY | Age: 68
Setting detail: THERAPIES SERIES
Discharge: HOME | End: 2023-12-11
Payer: MEDICARE

## 2023-12-11 PROCEDURE — 97016 VASOPNEUMATIC DEVICE THERAPY: CPT | Performed by: PHYSICAL THERAPIST

## 2023-12-11 PROCEDURE — 97112 NEUROMUSCULAR REEDUCATION: CPT | Performed by: PHYSICAL THERAPIST

## 2023-12-11 PROCEDURE — 97110 THERAPEUTIC EXERCISES: CPT | Performed by: PHYSICAL THERAPIST

## 2023-12-11 NOTE — FLOWSHEET NOTE
code:    HEP instruction: Refer to HEP instructions outlined on the flowsheet on 12/08/2023. Access Code: [email or web address]  URL: [email or web address]  Date: 12/08/2023  Prepared by: [email or web address] Sitting Quad Set  - 5-7 x daily - 7 x weekly - 1 sets - 10 reps - 10 hold  - Long Sitting Calf Stretch with Strap  - 2 x daily - 7 x weekly - 1 sets - 5 reps - 30\" hold  - Seated Table Hamstring Stretch  - 2 x daily - 7 x weekly - 1 sets - 5 reps - 30\" hold  - Supine Straight Leg Raises  - 2 x daily - 7 x weekly - 2 sets - 10 reps  - Supine Heel Slide with Strap  - 2 x daily - 7 x weekly - 3 sets - 10 reps - 10\" hold    ASSESSMENT     Today's Assessment: During therapy this date, patient required verbal cueing, muscle facilitation, progression of exercises and program, and manual interventions for exercise progression, improving proper muscle recruitment and activation/motor control patterns, modulating pain, and increasing ROM. Patient will continue to benefit from ongoing evaluation and advanced clinical decision from a Physical Therapist to address and improve pain control, ROM, muscle strength, neuromuscular control, and normalization of gait to safely return to PLOF and ADLs/IADLs without symptoms or restrictions. Increased AAROM noted this visit with knee flexion. Increased pain level today due to not taking pain meds this visit. Medical Necessity Documentation:  I certify that this patient meets the below criteria necessary for medical necessity for care and/or justification of therapy services:   The patient has functional impairments and/or activity limitations and would benefit from continued outpatient therapy services to address the deficits outlined in the patients goals    Treatment/Activity Tolerance:  [x] Patient tolerated treatment well [] Patient limited by fatique  [] Patient limited by pain  [] Patient limited by other medical complications  [] Other:     Return to Play:

## 2023-12-12 ENCOUNTER — CARE COORDINATION (OUTPATIENT)
Dept: CARE COORDINATION | Age: 68
End: 2023-12-12

## 2023-12-12 NOTE — CARE COORDINATION
Spoke with Abbie Ricardo. Incision status: States the incision looks good, free from drainage or redness. Edema/Swelling: States it's still swelling quite a bit. Continues to ice and elevate. Utilizing compression wrap during the day. Pain level and status: Continues to have pian especially with/after therapy. Use of pain medications: Reports taking oxycodone about 2-3 times/day with tylenol in between. Bowels: Regular BMs per pt.      Outpatient therapy: Ongoing    Do you have all of your medications: Yes    Changes in medications: No    Follow up appointments:    Future Appointments   Date Time Provider [address]   12/15/2023  3:40 PM Gwen Moots, PT [facility] AND PT Acey Keel HOD   12/18/2023  9:20 AM Gwen Moots, PT [facility] AND PT Harlon Po   12/20/2023 12:40 PM Gwen Moots, PT [facility] AND PT Acey Keel HOD   12/27/2023 10:00 AM Gwen Moots, PT [facility] AND PT Acey Keel HOD   12/29/2023 11:00 AM Laurie Michael, PT [facility] AND PT Harlon Po   53/06/7017 33:79 PM Hong Denton MD AND ORTHO [facility]   1/3/2024  2:00 PM Gwen Moots, PT [facility] AND PT Acey Keel HOD   1/5/2024 11:40 AM Gwen Moots, PT [facility] AND PT Acey Keel HOD   1/8/2024 11:20 AM Gwen Moots, PT [facility] AND PT [place] [facility]   1/10/2024  9:20 AM Gwen Moots, PT [facility] AND PT Acey Keel HOD   1/17/2024 12:00 PM Gwen Moots, PT [facility] AND PT Acey Keel HOD   1/19/2024 11:40 AM Gwen Moots, PT [facility] AND PT Harlon Po   1/22/2024 12:00 PM Gwen Moots, PT [facility] AND PT Harlon Po   1/26/2024 11:40 AM Gwen Moots, PT [facility] AND PT Harlon Po   1/29/2024 12:00 PM Gwen Preciado, PT [facility] [place] AND PT Adelina Po   2/2/2024 11:40 AM Gwen Preciado, PT [facility] AND PT Adelina Po   2/26/2024 11:40 AM Guillermo Graves MD AND JEWELL PARMAR

## 2023-12-13 ENCOUNTER — APPOINTMENT (OUTPATIENT)
Dept: PHYSICAL THERAPY | Age: 68
End: 2023-12-13
Payer: MEDICARE

## 2023-12-15 ENCOUNTER — HOSPITAL ENCOUNTER (OUTPATIENT)
Dept: PHYSICAL THERAPY | Age: 68
Setting detail: THERAPIES SERIES
Discharge: HOME | End: 2023-12-15
Payer: MEDICARE

## 2023-12-15 PROCEDURE — 97110 THERAPEUTIC EXERCISES: CPT | Performed by: PHYSICAL THERAPIST

## 2023-12-15 PROCEDURE — 97016 VASOPNEUMATIC DEVICE THERAPY: CPT | Performed by: PHYSICAL THERAPIST

## 2023-12-15 PROCEDURE — 97112 NEUROMUSCULAR REEDUCATION: CPT | Performed by: PHYSICAL THERAPIST

## 2023-12-15 NOTE — FLOWSHEET NOTE
[address] and Therapy  [address] [place] [address] office: [phone] fax: [phone]      Physical Therapy: TREATMENT/PROGRESS NOTE   Patient: Gonzalez Cintron (59 y.o. female)   Treatment Date: 12/15/2023   :  1955 MRN: [id number]   Visit #: 3   Insurance Allowable Auth Needed   BMN []Yes    [x]No    Insurance: Payor: MEDICARE / Plan: MEDICARE PART A AND B / Product Type: *No Product type* /   Insurance ID: [id number] - (Medicare)  Secondary Insurance (if applicable): [employer]ARM   Treatment Diagnosis:     ICD-10-CM    1. Left knee pain, unspecified chronicity  M25.562       2. Effusion of left knee  M25.462       3. 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 Cervical spine discectomy C3-5 , R TKA 2022. Preferred Language for Healthcare:   [x]English       []other:    SUBJECTIVE EXAMINATION     Patient Report/Comments: Drove to PT today, so didn't take pain meds today. Pain level is increased today upon arrival. Just takes the pain pills at night and first thing in am.       see eval. Patient s/p L TKR on 2023 at [facility]. Stayed for 3 days in hospital. Kellen Martin to [facility] rehab for 1 week. D/C to home and had Home PT 1-2 weeks.   Currently using Daphine Laughter for

## 2023-12-20 PROCEDURE — 93298 REM INTERROG DEV EVAL SCRMS: CPT | Performed by: INTERNAL MEDICINE

## 2023-12-20 PROCEDURE — G2066 INTER DEVC REMOTE 30D: HCPCS | Performed by: INTERNAL MEDICINE

## 2023-12-27 ENCOUNTER — HOSPITAL ENCOUNTER (OUTPATIENT)
Dept: PHYSICAL THERAPY | Age: 68
Setting detail: THERAPIES SERIES
Discharge: HOME | End: 2023-12-27
Payer: MEDICARE

## 2023-12-27 ENCOUNTER — CARE COORDINATION (OUTPATIENT)
Dept: CARE COORDINATION | Age: 68
End: 2023-12-27

## 2023-12-27 PROCEDURE — 97016 VASOPNEUMATIC DEVICE THERAPY: CPT | Performed by: PHYSICAL THERAPIST

## 2023-12-27 PROCEDURE — 97112 NEUROMUSCULAR REEDUCATION: CPT | Performed by: PHYSICAL THERAPIST

## 2023-12-27 PROCEDURE — 97110 THERAPEUTIC EXERCISES: CPT | Performed by: PHYSICAL THERAPIST

## 2023-12-27 NOTE — FLOWSHEET NOTE
[address] and Therapy  [address] office: [phone] fax: [phone]      Physical Therapy: TREATMENT/PROGRESS NOTE   Patient: Jazmin Mims (52 y.o. female)   Treatment Date: 2023   :  1955 MRN: [id number]   Visit #: 6   Insurance Allowable Auth Needed   BMN []Yes    [x]No    Insurance: Payor: MEDICARE / Plan: MEDICARE PART A AND B / Product Type: *No Product type* /   Insurance ID: [id number] - (Medicare)  Secondary Insurance (if applicable): [employer]ARM   Treatment Diagnosis:     ICD-10-CM    1. Left knee pain, unspecified chronicity  M25.562       2. Effusion of left knee  M25.462       3. Knee stiffness, left  M25.662          Medical Diagnosis:    S/P revision of total knee, left [B81.849]   Referring Physician: Wallace Munguia MD  PCP: Neah Albarran DO                             Plan of care signed (Y/N):     Date of Patient follow up with Physician: 23     Progress Report/POC: NO  POC update due: (10 visits /OR [address], whichever is less)  2024   Precautions/ Contra-indications:                                                                                          Latex allergy:  NO  Pacemaker:    NO  Contraindications for Manipulation: NA  Date of Surgery: 23  Other:      Red Flags:  None  Relevant Medical History: OA, Hyperlipidemia, HTN, Hx of blood clots. Cervical spine discectomy C3-5 , R TKA 2022. Preferred Language for Healthcare:   [x]English       []other:    SUBJECTIVE EXAMINATION     Patient Report/Comments: Reports the next day after PT, she is sore in general. Pain level going down slightly. No new issues to report. Sees MD this week. see judy. Patient s/p L TKR on 2023 at [facility]. Stayed for 3 days in hospital. Beufjessica Contes to [facility] rehab for 1 week. D/C to home and had Home PT 1-2 weeks. Currently using [address]. for ambulation.  Has HEP from

## 2023-12-27 NOTE — CARE COORDINATION
Spoke with patient. Incision status: States free from redness or drainage. Edema/Swelling: States swelling increases throughout the day from knee to ankle. Pain level and status: States pain is tolerable, but increases after outpatient therapy. Use of pain medications: States takes tylenol during the day. Bowels: No complaints. Outpatient therapy: Continues twice a week.     Do you have all of your medications: Yes    Changes in medications: No    Follow up appointments:    Future Appointments   Date Time Provider 4600  [facility]   12/29/2023 11:00 AM Learta Margarette, PT [facility] [place] AND PT Dior Dangelo   99/84/0379 74:19 PM Geeta Marsh MD AND ORTHO MMA   1/3/2024  2:00 PM Le[facility] Margarette, PT [facility]   1/5/2024 11:40 AM Learta Margarette, PT [facility] AND PT Dior Dangelo   1/8/2024 11:20 AM Learta Margarette, PT [facility] AND PT Dior Dangelo   1/10/2024  9:20 AM Learta Margarette, PT [facility] AND PT Luane Lambing HOD   1/17/2024 12:00 PM Learta Margarette, PT [facility] [place] AND PT Luane Lambing HOD   1/19/2024 11:40 AM Learta Margarette, PT [facility] [place] AND PT Luane Lambing HOD   1/22/2024 12:00 PM Learta Margarette, PT [facility] [place] AND PT Luane Lambing HOD   1/26/2024 11:40 AM Learta Margarette, PT [facility] [place] AND PT Dior Dangelo   1/29/2024 12:00 PM Learta Margarette, PT [facility] [place] AND PT Dior Dangelo   2/2/2024 11:40 AM Learta Margarette, PT [facility] AND PT Dior Dangelo   2/26/2024 11:40 AM Robin Zapata MD AND PULM MMA     Weber Henry BSN  Care Transition Nurse for ortho bundle  [phone]

## 2023-12-29 ENCOUNTER — HOSPITAL ENCOUNTER (OUTPATIENT)
Dept: PHYSICAL THERAPY | Age: 68
Setting detail: THERAPIES SERIES
Discharge: HOME | End: 2023-12-29
Payer: MEDICARE

## 2023-12-29 ENCOUNTER — OFFICE VISIT (OUTPATIENT)
Dept: ORTHOPEDIC SURGERY | Age: 68
End: 2023-12-29

## 2023-12-29 VITALS — HEIGHT: 68 IN | BODY MASS INDEX: 30.01 KG/M2 | WEIGHT: 198 LBS

## 2023-12-29 DIAGNOSIS — Z96.652 S/P TOTAL KNEE ARTHROPLASTY, LEFT: Primary | ICD-10-CM

## 2023-12-29 PROCEDURE — 97110 THERAPEUTIC EXERCISES: CPT | Performed by: PHYSICAL THERAPIST

## 2023-12-29 PROCEDURE — MISC250 COMPRESSION STOCKING: Performed by: STUDENT IN AN ORGANIZED HEALTH CARE EDUCATION/TRAINING PROGRAM

## 2023-12-29 PROCEDURE — 99024 POSTOP FOLLOW-UP VISIT: CPT | Performed by: STUDENT IN AN ORGANIZED HEALTH CARE EDUCATION/TRAINING PROGRAM

## 2023-12-29 PROCEDURE — 97016 VASOPNEUMATIC DEVICE THERAPY: CPT | Performed by: PHYSICAL THERAPIST

## 2023-12-29 PROCEDURE — 97112 NEUROMUSCULAR REEDUCATION: CPT | Performed by: PHYSICAL THERAPIST

## 2023-12-29 NOTE — FLOWSHEET NOTE
[address] and Therapy  [address] office: [phone] fax: [phone]      Physical Therapy: TREATMENT/PROGRESS NOTE   Patient: Donald Millard (29 y.o. female)   Treatment Date: 2023   :  1955 MRN: [id number]   Visit #: 7   Insurance Allowable Auth Needed   BMN []Yes    [x]No    Insurance: Payor: MEDICARE / Plan: MEDICARE PART A AND B / Product Type: *No Product type* /   Insurance ID: [id number] - (Medicare)  Secondary Insurance (if applicable): [employer]ARM   Treatment Diagnosis:     ICD-10-CM    1. Left knee pain, unspecified chronicity  M25.562       2. Effusion of left knee  M25.462       3. Knee stiffness, left  M25.662          Medical Diagnosis:    S/P revision of total knee, left [U24.674]   Referring Physician: Kirt Zhang MD  PCP: Unique Shannon DO                             Plan of care signed (Y/N):     Date of Patient follow up with Physician: 23     Progress Report/POC: NO  POC update due: (10 visits /OR [address], whichever is less)  2024   Precautions/ Contra-indications:                                                                                          Latex allergy:  NO  Pacemaker:    NO  Contraindications for Manipulation: NA  Date of Surgery: 23  Other:      Red Flags:  None  Relevant Medical History: OA, Hyperlipidemia, HTN, Hx of blood clots. Cervical spine discectomy C3-5 , R TKA 2022. Preferred Language for Healthcare:   [x]English       []other:    SUBJECTIVE EXAMINATION     Patient Report/Comments: Reports more sore today in L knee. Went to work for 2 hours yesterday. Sees MD today. Not sure if the massaging ITB helped much last visit. see judy. Patient s/p L T[facility] on 2023 at hospitals. Stayed for 3 days in hospital. [facility] to [facility] rehab for 1 week. D/C to home and had Home PT 1-2 weeks. Currently using [address] [facility]. for ambulation.  Has

## 2023-12-29 NOTE — PROGRESS NOTES
History: 79 y/o F presents for f/u s/p L TKA 11/16. Having some dependent edema. Pain controlled without opioids. Able to get back to work for one day. No new issues otherwsie    Exam: Incision healing well without erythema or drainage. ROM 0-115. Well controlled effusion and soft tissue swelling. No ligamentous instability and No neurovascular compromise distally.      Imaging: No new    Assessment: 79 y/o F s/p L TKA 11/16, doing well    Plan: Continue current post op course, provided with work letter and  f/u 3-4 weeks    Geeta Marsh MD

## 2024-01-03 ENCOUNTER — HOSPITAL ENCOUNTER (OUTPATIENT)
Dept: PHYSICAL THERAPY | Age: 69
Setting detail: THERAPIES SERIES
Discharge: HOME | End: 2024-01-03
Payer: MEDICARE

## 2024-01-03 PROCEDURE — 97016 VASOPNEUMATIC DEVICE THERAPY: CPT | Performed by: PHYSICAL THERAPIST

## 2024-01-03 PROCEDURE — 97110 THERAPEUTIC EXERCISES: CPT | Performed by: PHYSICAL THERAPIST

## 2024-01-03 PROCEDURE — 97112 NEUROMUSCULAR REEDUCATION: CPT | Performed by: PHYSICAL THERAPIST

## 2024-01-03 NOTE — FLOWSHEET NOTE
[facility]- Outpatient Rehabilitation and Therapy  [address], [place] [address] office: [phone] fax: [phone]      Physical Therapy: TREATMENT/PROGRESS NOTE   Patient: Carmelita Maldonado (68 y.o. female)   Treatment Date: 2024   :  1955 MRN: [id number]   Visit #: 8   Insurance Allowable Auth Needed   BMN []Yes    [x]No    Insurance: Payor: MEDICARE / Plan: MEDICARE PART A AND B / Product Type: *No Product type* /   Insurance ID: [id number] - (Medicare)  Secondary Insurance (if applicable): [employer]ARM   Treatment Diagnosis:     ICD-10-CM    1. Left knee pain, unspecified chronicity  M25.562       2. Effusion of left knee  M25.462       3. Knee stiffness, left  M25.662          Medical Diagnosis:    S/P revision of total knee, left [Z96.652]   Referring Physician: Hakan Ingram MD  PCP: Shun Franklin DO                             Plan of care signed (Y/N):     Date of Patient follow up with Physician: 24     Progress Report/POC: NO   POC update due: (10 visits /OR AUTH LIMITS, whichever is less)  2024   Precautions/ Contra-indications:                                                                                          Latex allergy:  NO  Pacemaker:    NO  Contraindications for Manipulation: NA  Date of Surgery: 23  Other:      Red Flags:  None  Relevant Medical History: OA, Hyperlipidemia, HTN, Hx of blood clots. Cervical spine discectomy C3-5 , R TKA 2022.   Preferred Language for Healthcare:   [x]English       []other:    SUBJECTIVE EXAMINATION     Patient Report/Comments: Reports seeing MD. Wants her to work on strength more. Pleased with ROM. Was on feel a lot yesterday with working 2 hours.       see judy. Patient s/p L TKR on 2023 at [facility]. Stayed for 3 days in hospital. Went to [place] rehab for 1 week.  D/C to home and had Home PT 1-2 weeks.  Currently using [facility] for ambulation. Has HEP from

## 2024-01-04 ENCOUNTER — CARE COORDINATION (OUTPATIENT)
Dept: CARE COORDINATION | Age: 69
End: 2024-01-04

## 2024-01-04 NOTE — CARE COORDINATION
Called patient for follow up with [facility] program. Left message for return call.  Courtney Peters,RN BSN  Care Transition Nurse for ortho bundle  [phone]

## 2024-01-05 ENCOUNTER — HOSPITAL ENCOUNTER (OUTPATIENT)
Dept: PHYSICAL THERAPY | Age: 69
Setting detail: THERAPIES SERIES
Discharge: HOME | End: 2024-01-05
Payer: MEDICARE

## 2024-01-05 PROCEDURE — 97016 VASOPNEUMATIC DEVICE THERAPY: CPT | Performed by: PHYSICAL THERAPIST

## 2024-01-05 PROCEDURE — 97112 NEUROMUSCULAR REEDUCATION: CPT | Performed by: PHYSICAL THERAPIST

## 2024-01-05 PROCEDURE — 97110 THERAPEUTIC EXERCISES: CPT | Performed by: PHYSICAL THERAPIST

## 2024-01-05 NOTE — FLOWSHEET NOTE
[facility]- Outpatient Rehabilitation and Therapy  [address], [place] [address] office: [phone] fax: [phone]  Physical Therapy Re-Certification Plan of Care    Dear Hakan Ingram MD  ,    We had the pleasure of treating the following patient for physical therapy services at [facility]. A summary of our findings can be found in the updated assessment below.  This includes our plan of care.  If you have any questions or concerns regarding these findings, please do not hesitate to contact me at the office phone number checked above.  Thank you for the referral.     Physician Signature:________________________________Date:__________________  By signing above (or electronic signature), therapist's plan is approved by physician      Functional Outcome: LEFS:   Carmelita Maldonado 1955 continues to present with functional deficits in ROM, strength symmetry, and flexibility  limiting ability with managing community ambulation, walking up/down stairs, and light home activity .  During therapy this date, patient required verbal cueing, tactile cueing, modification of technique, and progression of exercises and program for exercise progression, improving proper muscle recruitment and activation/motor control patterns, and increasing ROM. Patient will continue to benefit from ongoing evaluation and advanced clinical decision from a Physical Therapist to improve pain control, ROM, muscle strength, neuromuscular control, and normalization of gait to safely return to PLOF, ADLs/IADLs, and work/work related tasks without symptoms or restrictions.    Overall Response to Treatment:   [x]Patient is responding well to treatment and improvement is noted with regards to goals   []Patient should continue to improve in reasonable time if they continue HEP   []Patient has plateaued and is no longer responding to skilled PT intervention    []Patient is getting worse

## 2024-01-08 ENCOUNTER — HOSPITAL ENCOUNTER (OUTPATIENT)
Dept: PHYSICAL THERAPY | Age: 69
Setting detail: THERAPIES SERIES
Discharge: HOME | End: 2024-01-08
Payer: MEDICARE

## 2024-01-08 ENCOUNTER — TELEPHONE (OUTPATIENT)
Dept: CARDIOLOGY CLINIC | Age: 69
End: 2024-01-08

## 2024-01-08 PROCEDURE — 97016 VASOPNEUMATIC DEVICE THERAPY: CPT | Performed by: PHYSICAL THERAPIST

## 2024-01-08 PROCEDURE — 97112 NEUROMUSCULAR REEDUCATION: CPT | Performed by: PHYSICAL THERAPIST

## 2024-01-08 PROCEDURE — 97110 THERAPEUTIC EXERCISES: CPT | Performed by: PHYSICAL THERAPIST

## 2024-01-08 NOTE — TELEPHONE ENCOUNTER
1/8- called pt @[phone]. Unable to make contact. LVM informing pt to call back to schedule apt w/KXA. Pt needs 1-2M apt to f/u recordings from loop recorder + device check.    Onset: September 26, 2020 @ 9 PM  Location/description: Patient's mother report patient swallowed the tip of a plastic spoon while eating ice cream. The missing part of spoon was about 1.5 cm wide and 0.4 cm long. Patient initially said her throat hurted a little bit at first and it no longer hurts now. Patient is able to swallow liquids. Patient denies vomiting, bloody stools, coughing, and difficulty breathing.    Associated Symptoms: No  What improves/worsens symptoms: Nothing  Symptom specific medications: Nothing  Intake and Output: Not addressed.   Activity level: within normal limits  Temperature (route and time): Not addressed  Weight:   Wt Readings from Last 1 Encounters:   01/28/20 15.8 kg (49 %, Z= -0.01)*     * Growth percentiles are based on CDC (Girls, 2-20 Years) data.        Recent Care: None  Did the patient have a positive coronavirus screening?: No    PLAN:  Home Care Advice provided    Caller agrees to follow recommendations.    Reason for Disposition  • Harmless swallowed FB and no symptoms (all triage questions negative)    Protocols used: SWALLOWED FOREIGN BODY-P-AH

## 2024-01-08 NOTE — TELEPHONE ENCOUNTER
----- Message from Quirino Lawson MD sent at 1/7/2024  8:52 PM EST -----  Regarding: follow up needed  Please arrange follow up (not urgent) in next 1-2 months to follow up recordings form loop recorder.    Thank you

## 2024-01-08 NOTE — FLOWSHEET NOTE
[facility]- Outpatient Rehabilitation and Therapy  [address], [place] [address] office: [phone] fax: [phone]      Physical Therapy: TREATMENT/PROGRESS NOTE   Patient: Carmelita Maldonado (68 y.o. female)   Treatment Date: 2024   :  1955 MRN: [id number]   Visit #: 10   Insurance Allowable Auth Needed   BMN []Yes    [x]No    Insurance: Payor: MEDICARE / Plan: MEDICARE PART A AND B / Product Type: *No Product type* /   Insurance ID: [id number] - (Medicare)  Secondary Insurance (if applicable): STATE FARM   Treatment Diagnosis:     ICD-10-CM    1. Left knee pain, unspecified chronicity  M25.562       2. Effusion of left knee  M25.462       3. Knee stiffness, left  M25.662          Medical Diagnosis:    S/P revision of total knee, left [Z96.652]   Referring Physician: Hakan Ingram MD  PCP: Shun Franklin DO                             Plan of care signed (Y/N):     Date of Patient follow up with Physician: 24     Progress Report/POC: NO   POC update due: (10 visits /OR AUTH LIMITS, whichever is less)  24  Precautions/ Contra-indications:                                                                                          Latex allergy:  NO  Pacemaker:    NO  Contraindications for Manipulation: NA  Date of Surgery: 23  Other:      Red Flags:  None  Relevant Medical History: OA, Hyperlipidemia, HTN, Hx of blood clots. Cervical spine discectomy C3-5 , R TKA 2022.   Preferred Language for Healthcare:   [x]English       []other:    SUBJECTIVE EXAMINATION     Patient Report/Comments: In general feels stiff. Has been working and on feet a lot more lately. Overall feeling like she is 60% improved since initial visit. Struggles with sciatic pain R LE with the clams/SL hip abd exercises.      see eval. Patient s/p L TKR on 2023 at [facility]. Stayed for 3 days in hospital. Went to [place] rehab for 1 week.  D/C to home and had

## 2024-01-10 ENCOUNTER — HOSPITAL ENCOUNTER (OUTPATIENT)
Dept: PHYSICAL THERAPY | Age: 69
Setting detail: THERAPIES SERIES
Discharge: HOME | End: 2024-01-10
Payer: MEDICARE

## 2024-01-10 DIAGNOSIS — Z96.652 S/P TOTAL KNEE ARTHROPLASTY, LEFT: Primary | ICD-10-CM

## 2024-01-10 PROCEDURE — 97112 NEUROMUSCULAR REEDUCATION: CPT | Performed by: PHYSICAL THERAPIST

## 2024-01-10 PROCEDURE — 97016 VASOPNEUMATIC DEVICE THERAPY: CPT | Performed by: PHYSICAL THERAPIST

## 2024-01-10 PROCEDURE — 97110 THERAPEUTIC EXERCISES: CPT | Performed by: PHYSICAL THERAPIST

## 2024-01-10 RX ORDER — OXYCODONE HYDROCHLORIDE 5 MG/1
5 TABLET ORAL EVERY 6 HOURS PRN
Qty: 28 TABLET | Refills: 0 | Status: SHIPPED | OUTPATIENT
Start: 2024-01-10 | End: 2024-01-17

## 2024-01-10 RX ORDER — METHOCARBAMOL 750 MG/1
750 TABLET, FILM COATED ORAL 4 TIMES DAILY
Qty: 40 TABLET | Refills: 0 | Status: SHIPPED | OUTPATIENT
Start: 2024-01-10 | End: 2024-01-20

## 2024-01-10 NOTE — FLOWSHEET NOTE
EVAL:LOW (79433 - Typically 20 minutes face-to-face)     Neuromusc. Re-ed (73828) 10' 1  Re-Eval (70927)     Manual (64126)    Estim Unattended (32875)     Ther. Act (30340)    Mech. Traction (94385)     Gait (52100)    Dry Needle 1-2 muscle (39928)     Aquatic Therex (62068)    Dry Needle 3+ muscle (20561)     Iontophoresis (36908)    VASO (08522) 1    Ultrasound (51694)    Group Therapy (67181)     Estim Attended (46011)    Canalith Repositioning (19022)     Other:    Other:    Total Timed Code Tx Minutes 40 3  15'     Total Treatment Minutes 55'        Charge Justification:  (04037) THERAPEUTIC EXERCISE - Provided verbal/tactile cueing for activities related to strengthening, flexibility, endurance, ROM performed to prevent loss of range of motion, maintain or improve muscular strength or increase flexibility, following either an injury or surgery.   (10497) HOME EXERCISE PROGRAM - Reviewed/Progressed HEP activities related to strengthening, flexibility, endurance, ROM performed to prevent loss of range of motion, maintain or improve muscular strength or increase flexibility, following either an injury or surgery.  (42709) NEUROMUSCULAR RE-EDUCATION - Therapeutic procedure, 1 or more areas, each 15 minutes; neuromuscular reeducation of movement, balance, coordination, kinesthetic sense, posture, and/or proprioception for sitting and/or standing activities  (08262) MANUAL THERAPY -  Manual therapy techniques, 1 or more regions, each 15 minutes (Mobilization/manipulation, manual lymphatic drainage, manual traction) for the purpose of modulating pain, promoting relaxation,  increasing ROM, reducing/eliminating soft tissue swelling/inflammation/restriction, improving soft tissue extensibility and allowing for proper ROM for normal function with self care, mobility, lifting and ambulation  (58100) VASOPNEUMATIC    TREATMENT PLAN   Plan: Cont POC- Continue emphasis/focus on exercise progression, improving proper muscle

## 2024-01-10 NOTE — TELEPHONE ENCOUNTER
1/10- called pt @[phone]. Unable to make contact. LVM informing pt to call back to scheduled f/u w/KXA. Pt needs 1/2M f/u to discuss recordings from loop recorder + device check. Next step is to send letter.

## 2024-01-11 ENCOUNTER — CARE COORDINATION (OUTPATIENT)
Dept: CARE COORDINATION | Age: 69
End: 2024-01-11

## 2024-01-11 NOTE — CARE COORDINATION
Spoke with patient.    Incision status: States free from redness or drainage.    Edema/Swelling:States swelling has improved.    Pain level and status: States pain is tolerable, but increased yesterday, but did a lot during the day.    Use of pain medications: States taking tylenol during the day, and muscle relaxer, and takes 1/2 pain pill at night.    Bowels: No complaints.    Outpatient therapy: Continues.    Do you have all of your medications: Yes    Changes in medications: No    Follow up appointments:    Future Appointments   Date Time Provider Department Center   1/17/2024 12:00 PM Cristiana Watkins PT [facility]   1/19/2024 11:40 AM Cristiana Watkins PT [facility]   1/22/2024 12:00 PM Cristiana Watkins PT [facility]   1/26/2024 10:40 AM Hakan Ingram MD AND [facility]THO [facility]   1/26/2024 11:40 AM Cristiana Watkins PT [facility]   1/29/2024 12:00 PM Cristiana Watkins PT [facility]   2/2/2024 11:40 AM Cristiana Watkins PT [facility]   2/26/2024 11:40 AM Harmeet Dotson MD AND [facility]     Courtney PetersRN BSN  Care Transition Nurse for ortho [facility]  [phone]

## 2024-01-12 NOTE — TELEPHONE ENCOUNTER
1/12- Third attempt. called pt @[phone]. Unable to make contact. LVM informing pt to call back to scheduled f/u w/KXA. Pt needs 1/2M f/u to discuss recordings from loop recorder + device check. Sent letter.

## 2024-01-15 ENCOUNTER — TELEPHONE (OUTPATIENT)
Dept: ORTHOPEDIC SURGERY | Age: 69
End: 2024-01-15

## 2024-01-15 NOTE — TELEPHONE ENCOUNTER
Other PATIENT HAS BEEN SUMMONED TO JURY DUTY BEGINNING 3/4/24    PATIENT WOULD LIKE TO SPEAK TO NURSE IN REGARDS TO THIS    SHE STATES SHE DOES NPT THINK SHE CAN SIT THROUGH COURT FOR 3+ WEEKS     PLEASE CALL BACK [phone]

## 2024-01-17 ENCOUNTER — HOSPITAL ENCOUNTER (OUTPATIENT)
Dept: PHYSICAL THERAPY | Age: 69
Setting detail: THERAPIES SERIES
Discharge: HOME | End: 2024-01-17
Payer: MEDICARE

## 2024-01-17 PROCEDURE — 97110 THERAPEUTIC EXERCISES: CPT | Performed by: PHYSICAL THERAPIST

## 2024-01-17 PROCEDURE — 97016 VASOPNEUMATIC DEVICE THERAPY: CPT | Performed by: PHYSICAL THERAPIST

## 2024-01-17 PROCEDURE — 97112 NEUROMUSCULAR REEDUCATION: CPT | Performed by: PHYSICAL THERAPIST

## 2024-01-17 NOTE — FLOWSHEET NOTE
[facility]- Outpatient Rehabilitation and Therapy  [address], [place] [address] office: [phone] fax: [phone]      Physical Therapy: TREATMENT/PROGRESS NOTE   Patient: Carmelita Maldonado (68 y.o. female)   Treatment Date: 2024   :  1955 MRN: [id number]   Visit #: 12   Insurance Allowable Auth Needed   BMN []Yes    [x]No    Insurance: Payor: MEDICARE / Plan: MEDICARE PART A AND B / Product Type: *No Product type* /   Insurance ID: [id number] - (Medicare)  Secondary Insurance (if applicable): STATE FARM   Treatment Diagnosis:     ICD-10-CM    1. Left knee pain, unspecified chronicity  M25.562       2. Effusion of left knee  M25.462       3. Knee stiffness, left  M25.662          Medical Diagnosis:    S/P revision of total knee, left [Z96.652]   Referring Physician: Hakan Ingram MD  PCP: Shun Franklin DO                             Plan of care signed (Y/N):     Date of Patient follow up with Physician: 24     Progress Report/POC: NO   POC update due: (10 visits /OR AUTH LIMITS, whichever is less)  24  Precautions/ Contra-indications:                                                                                          Latex allergy:  NO  Pacemaker:    NO  Contraindications for Manipulation: NA  Date of Surgery: 23  Other:      Red Flags:  None  Relevant Medical History: OA, Hyperlipidemia, HTN, Hx of blood clots. Cervical spine discectomy C3-5 , R TKA 2022.   Preferred Language for Healthcare:   [x]English       []other:    SUBJECTIVE EXAMINATION     Patient Report/Comments: In general feels stiff. Has been working and on feet a lot more lately. Overall feeling like she is 60% improved since initial visit. Struggles with sciatic pain R LE with the clams/SL hip abd exercises. After last visit, sciatic pain flared up since last Thursday. Has had difficulty bending, walking and getting up out of the car due to back pain.       see

## 2024-01-19 ENCOUNTER — APPOINTMENT (OUTPATIENT)
Dept: PHYSICAL THERAPY | Age: 69
End: 2024-01-19
Payer: MEDICARE

## 2024-01-22 ENCOUNTER — TELEPHONE (OUTPATIENT)
Dept: ORTHOPEDIC SURGERY | Age: 69
End: 2024-01-22

## 2024-01-22 ENCOUNTER — HOSPITAL ENCOUNTER (OUTPATIENT)
Dept: PHYSICAL THERAPY | Age: 69
Setting detail: THERAPIES SERIES
Discharge: HOME | End: 2024-01-22
Payer: MEDICARE

## 2024-01-22 PROCEDURE — 97016 VASOPNEUMATIC DEVICE THERAPY: CPT | Performed by: PHYSICAL THERAPIST

## 2024-01-22 PROCEDURE — 97110 THERAPEUTIC EXERCISES: CPT | Performed by: PHYSICAL THERAPIST

## 2024-01-22 PROCEDURE — 97112 NEUROMUSCULAR REEDUCATION: CPT | Performed by: PHYSICAL THERAPIST

## 2024-01-22 NOTE — FLOWSHEET NOTE
procedure, 1 or more areas, each 15 minutes; neuromuscular reeducation of movement, balance, coordination, kinesthetic sense, posture, and/or proprioception for sitting and/or standing activities  (24954) MANUAL THERAPY -  Manual therapy techniques, 1 or more regions, each 15 minutes (Mobilization/manipulation, manual lymphatic drainage, manual traction) for the purpose of modulating pain, promoting relaxation,  increasing ROM, reducing/eliminating soft tissue swelling/inflammation/restriction, improving soft tissue extensibility and allowing for proper ROM for normal function with self care, mobility, lifting and ambulation  (23841) VASOPNEUMATIC    TREATMENT PLAN   Plan: Cont POC- Continue emphasis/focus on exercise progression, improving proper muscle recruitment and activation/motor control patterns, modulating pain, and increasing ROM. Next visit plan to progress weights, progress reps, add new exercises, and progress balance     Electronically Signed by Cristiana Watkins PT              Date: 01/22/2024     Note: If patient does not return for scheduled/recommended follow up visits, this note will serve as a discharge from care along with the most recent update on progress.

## 2024-01-22 NOTE — TELEPHONE ENCOUNTER
Tripped last week Wednesday landed on both knees and ankle.   (Noticed bruise on r ankle) wants to know if she shoulder come in sooner.   Appt Friday 1/26.  Has been trying to do exercised and ice.

## 2024-01-24 ENCOUNTER — CARE COORDINATION (OUTPATIENT)
Dept: CARE COORDINATION | Age: 69
End: 2024-01-24

## 2024-01-24 PROCEDURE — 93298 REM INTERROG DEV EVAL SCRMS: CPT | Performed by: INTERNAL MEDICINE

## 2024-01-26 ENCOUNTER — OFFICE VISIT (OUTPATIENT)
Dept: ORTHOPEDIC SURGERY | Age: 69
End: 2024-01-26

## 2024-01-26 ENCOUNTER — TELEPHONE (OUTPATIENT)
Dept: ORTHOPEDIC SURGERY | Age: 69
End: 2024-01-26

## 2024-01-26 ENCOUNTER — HOSPITAL ENCOUNTER (OUTPATIENT)
Dept: PHYSICAL THERAPY | Age: 69
Setting detail: THERAPIES SERIES
Discharge: HOME | End: 2024-01-26
Payer: MEDICARE

## 2024-01-26 VITALS — BODY MASS INDEX: 30.01 KG/M2 | WEIGHT: 198 LBS | HEIGHT: 68 IN

## 2024-01-26 DIAGNOSIS — Z96.652 S/P TOTAL KNEE ARTHROPLASTY, LEFT: ICD-10-CM

## 2024-01-26 DIAGNOSIS — M51.36 DDD (DEGENERATIVE DISC DISEASE), LUMBAR: Primary | ICD-10-CM

## 2024-01-26 PROCEDURE — 97110 THERAPEUTIC EXERCISES: CPT | Performed by: PHYSICAL THERAPIST

## 2024-01-26 PROCEDURE — 97016 VASOPNEUMATIC DEVICE THERAPY: CPT | Performed by: PHYSICAL THERAPIST

## 2024-01-26 PROCEDURE — 97140 MANUAL THERAPY 1/> REGIONS: CPT | Performed by: PHYSICAL THERAPIST

## 2024-01-26 RX ORDER — METHYLPREDNISOLONE 4 MG/1
TABLET ORAL
Qty: 1 KIT | Refills: 0 | Status: SHIPPED | OUTPATIENT
Start: 2024-01-26 | End: 2024-02-21

## 2024-01-26 NOTE — PROGRESS NOTES
History: 67 y/o F presents for f/u s/p L TKA 11/16.  Continues to do well.  She unfortunately had a fall trying to lift a heavy box and landed mostly on her right side but wants to make sure that her left knee replacement is okay.  Her biggest symptom at this time is been low back pain which is affecting her ability to stand up straight and do physical therapy.  She thinks this is gotten a little worse from physical therapy.    Exam: Incision healing well without erythema or drainage.  Range of motion 0-1 20 with no ligamentous instability or neurovascular compromise distally.    Imaging: 3 views left knee were obtained  reviewed show stabilization of implants  No radiographic abnormalities after fall.    Assessment: 67 y/o F s/p L TKA 11/16, doing well    Plan: 68-year-old female recovering well from left knee replacement biggest musculoskeletal issue at this time seems to be her low back pain which is affecting her ability to work and exercise.  Recommend referral to Dr. Coyne for evaluation of possible epidural steroid injections.  Follow-up with me as needed.    Hakan Ingram MD

## 2024-01-26 NOTE — TELEPHONE ENCOUNTER
Prescription Refill     Medication Name:  STEROID PACK   Pharm[facility]: [facility]  Patient Contact Number: [phone]    PATIENT WANTED TO SPEAK WITH CLINICAL TO ENSURE THAT STEROID PACK WAS SENT TO PHARMACY.    RX IS TO BE FORWARDED TO [facility]-[address] [place] [address] [phone]    PLEASE ADVISE

## 2024-01-26 NOTE — FLOWSHEET NOTE
incident. Has had increased pain L knee since incident. Low back pain also noted. Sees MD on Friday.. Did not get X-rays since incident but able to walk and WB, just increased pain since incident. Sees MD on Friday.    1/17/24 In general feels stiff. Has been working and on feet a lot more lately. Overall feeling like she is 60% improved since initial visit. Struggles with sciatic pain R LE with the clams/SL hip abd exercises. After last visit, sciatic pain flared up since last Thursday. Has had difficulty bending, walking and getting up out of the car due to back pain.       see eval. [place] [facility]ayed for 3 days in hospital. Went to [facility] for 1 week.  D/C to home and had Home PT 1-2 weeks.  Currently using [facility] [facility] for ambulation. Has HEP from previous PT that she has been working on at home.      Test used Initial score  12/8/23 01/05/2024   Pain Summary VAS 4-9 5/10   Functional questionnaire LEFS 47/80 41% 44/80 45%   Other: Knee ext 0 0    Knee flex 110 128         Girth  MP 45.0 cm 44.4 cm    SP 48.0 cm 47.0cm   Strength Hip flex L/R 4-/4 4/4    Knee ext L/R 4-/4 4/4    Hip abduction NT 3+/3+     Sport/ Recreation/ Leisure/ Hobbies: Lives in [facility] on second floor with 14 steps with 1 HR. Babysit grandkids 2-6 years old. Walking / Plays golf.      OBJECTIVE EXAMINATION   Modified treatment today  due to recent fall on knees. Held weight machines today. Discussed with patient to call MD office to notify them of fall and to see if they wanted to do an X-ray earlier than Friday. ( Patient has been ambulatory with slight increased pain but nothing severe).     Observation: Bruising noted L knee /patella area and increased bruising noted R lower leg.     Test measurements: see eval. Discussed using the tennis ball to roll the R piriformis muscle before hip strengthening exercises to see if it helps. Modified treatment due to back pain.     Exercises/Interventions:

## 2024-01-29 ENCOUNTER — HOSPITAL ENCOUNTER (OUTPATIENT)
Dept: PHYSICAL THERAPY | Age: 69
Setting detail: THERAPIES SERIES
Discharge: HOME | End: 2024-01-29
Payer: MEDICARE

## 2024-01-29 PROCEDURE — 97016 VASOPNEUMATIC DEVICE THERAPY: CPT | Performed by: PHYSICAL THERAPIST

## 2024-01-29 PROCEDURE — 97110 THERAPEUTIC EXERCISES: CPT | Performed by: PHYSICAL THERAPIST

## 2024-01-29 PROCEDURE — 97140 MANUAL THERAPY 1/> REGIONS: CPT | Performed by: PHYSICAL THERAPIST

## 2024-01-29 NOTE — FLOWSHEET NOTE
[facility]- Outpatient Rehabilitation and Therapy  [address], [place] [address] office: [phone] fax: [phone]      Physical Therapy: TREATMENT/PROGRESS NOTE   Patient: Carm[facility]ita Maldonado (68 y.o. female)   Treatment Date: 2024   :  1955 MRN: [id number]   Visit #: 15   Insurance Allowable Auth Needed   BMN []Yes    [x]No    Insurance: Payor: MEDICARE / Plan: MEDICARE PART A AND B / Product Type: *No Product type* /   Insurance ID: [id number] - (Medicare)  Secondary Insurance (if applicable): STATE FARM   Treatment Diagnosis:     ICD-10-CM    1. Left knee pain, unspecified chronicity  M25.562       2. Effusion of left knee  M25.462       3. Knee stiffness, left  M25.662          Medical Diagnosis:    S/P revision of total knee, left [Z96.652]   Referring Physician: Hakan Ingram MD  PCP: Shun Franklin DO                             Plan of care signed (Y/N):     Date of Patient follow up with Physician: none scheduled.     Progress Report/POC: NO   POC update due: (10 visits /OR AUTH LIMITS, whichever is less)  24  Precautions/ Contra-indications:                                                                                          Latex allergy:  NO  Pacemaker:    NO  Contraindications for Manipulation: NA  Date of Surgery: 23  Other:      Red Flags:  None  Relevant Medical History: OA, Hyperlipidemia, HTN, Hx of blood clots. Cervical spine discectomy C3-5 , R TKA 2022.   Preferred Language for Healthcare:   [x]English       []other:    SUBJECTIVE EXAMINATION     Patient Report/Comments: Reports since taking the steroids , the back pain has been a little better. Has been on them a few days.No new issues to report with the knees in general. To finish up PT this week and cont with HEP.      Reports seeing MD today. Had X-rays and no issues with prosthesis. Alignment looks good since fall. Given steroid pack  to help with back and

## 2024-02-02 ENCOUNTER — HOSPITAL ENCOUNTER (OUTPATIENT)
Dept: PHYSICAL THERAPY | Age: 69
Setting detail: THERAPIES SERIES
Discharge: HOME | End: 2024-02-02
Payer: MEDICARE

## 2024-02-02 PROCEDURE — 97110 THERAPEUTIC EXERCISES: CPT | Performed by: PHYSICAL THERAPIST

## 2024-02-02 PROCEDURE — 97112 NEUROMUSCULAR REEDUCATION: CPT | Performed by: PHYSICAL THERAPIST

## 2024-02-02 PROCEDURE — 97016 VASOPNEUMATIC DEVICE THERAPY: CPT | Performed by: PHYSICAL THERAPIST

## 2024-02-02 NOTE — DISCHARGE SUMMARY
[facility]- Outpatient Rehabilitation and Therapy  [address], [place] [address] office: [phone] fax: [phone]   Physical Therapy Discharge Summary    Dear Hakan Ingram MD  ,    We had the pleasure of treating the following patient for physical therapy services at [facility].  A summary of our findings can be found in the discharge summary below.  If you have any questions or concerns regarding these findings, please do not hesitate to contact me at the office phone number checked above.  Thank you for the referral.     Physician Signature:________________________________Date:__________________  By signing above (or electronic signature), therapist’s plan is approved by physician      Functional Outcome: LEFS    : raw score: 60/80  % disability: 25%       Overall Response to Treatment:   [x]Patient is responding well to treatment and improvement is noted with regards  to goals   []Patient should continue to improve in reasonable time if they continue HEP   []Patient has plateaued and is no longer responding to skilled PT intervention    []Patient is getting worse and would benefit from return to referring MD   []Patient unable to adhere to initial POC   []Other:     Total Visits: 16     Recommendation:    [x] Discharge to HEP. Follow up with PT or MD PRN.          Physical Therapy: TREATMENT/PROGRESS NOTE   Patient: Carmelita Maldonado (68 y.o. female)   Treatment Date: 2024   :  1955 MRN: [id number]   Visit #: 16   Insurance Allowable Auth Needed   BMN []Yes    [x]No    Insurance: Payor: MEDICARE / Plan: MEDICARE PART A AND B / Product Type: *No Product type* /   Insurance ID: [id number] - (Medicare)  Secondary Insurance (if applicable): STATE FARM   Treatment Diagnosis:     ICD-10-CM    1. Left knee pain, unspecified chronicity  M25.562       2. Effusion of left knee  M25.462       3. Knee stiffness, left  M25.662          Medical

## 2024-02-05 ENCOUNTER — CARE COORDINATION (OUTPATIENT)
Dept: CARE COORDINATION | Age: 69
End: 2024-02-05

## 2024-02-05 NOTE — CARE COORDINATION
Called patient for follow up with [facility] program. Left message that bundle program and follow up phone calls are ending. Left message to follow up with Dr. Ingram for any complications/concerns.  Courtney Peters,RN BSN  Care Transition Nurse for ortho bundle  [phone]

## 2024-02-06 ASSESSMENT — ENCOUNTER SYMPTOMS
STRIDOR: 0
HEMATOCHEZIA: 0
LEFT EYE: 0
WHEEZING: 0
HEMATEMESIS: 0
RIGHT EYE: 0
SHORTNESS OF BREATH: 0

## 2024-02-06 NOTE — PROGRESS NOTES
Assessment:     1. Palpitations and atrial arrhythmia: patient initially presented with recurrent palpitations over the prior almost 6 months. Episodes lasted minutes and once lasted much longer (one hour or more). She had associated nervousness, dizziness, chest discomfort and some shortness of breath. Resting ECG OK. Event monitor showed brief episodes of atrial arrhythmias likely atrial tachycardia but could not exclude atrial fibrillation. She has family history of atrial fibrillation.    To monitor long-term (as it would make a difference in terms especially of anticoagulation), we proceeded with a loop recorder implant.  It has not shown any sustained atrial or other arrhythmia.  She has brief atrial runs with low burden of ectopy. She did activate the device on a couple of occasions coinciding with what appears to be atrial tachycardia just below the detection rate (meaning device may be underestimating burden of arrhythmia). However, patient with improved symptoms and less palpitations compared to before. Will continue low-dose metoprolol.  No reported side effects.    2. Good overall blood pressure control    3. Obstructive sleep apnea: Patient with recently diagnosed obstructive sleep apnea.  She was referred to sleep medicine and found to have, on home sleep study, an AHI of 11.  She is struggling with CPAP (started about one month ago). Encouraged her to keep working with the sleep medicine team and to try to keep the treatment going. Explained benefits from a cardiac standpoint.     Plan:     The current medical regimen is effective;  continue present plan and medications.  Monthly remote device checks.   Follow up with me in 6 months.     Subjective:     Patient ID: Carmelita Maldonado is a 68 y.o. female.    Chief Complaint:  Chief Complaint   Patient presents with    Follow-up    Palpitations     HPI    Patient is a pleasant 68 y.o. female who presents for evaluation of SVT. The patient has a past medical

## 2024-02-07 ENCOUNTER — OFFICE VISIT (OUTPATIENT)
Dept: CARDIOLOGY CLINIC | Age: 69
End: 2024-02-07
Payer: MEDICARE

## 2024-02-07 ENCOUNTER — NURSE ONLY (OUTPATIENT)
Dept: CARDIOLOGY CLINIC | Age: 69
End: 2024-02-07

## 2024-02-07 VITALS
HEIGHT: 68 IN | DIASTOLIC BLOOD PRESSURE: 62 MMHG | WEIGHT: 206.25 LBS | BODY MASS INDEX: 31.26 KG/M2 | OXYGEN SATURATION: 97 % | SYSTOLIC BLOOD PRESSURE: 124 MMHG | HEART RATE: 73 BPM

## 2024-02-07 DIAGNOSIS — I10 ESSENTIAL HYPERTENSION: ICD-10-CM

## 2024-02-07 DIAGNOSIS — Z79.899 ENCOUNTER FOR MONITORING BETA BLOCKER THERAPY: ICD-10-CM

## 2024-02-07 DIAGNOSIS — G47.33 OSA (OBSTRUCTIVE SLEEP APNEA): ICD-10-CM

## 2024-02-07 DIAGNOSIS — Z51.81 ENCOUNTER FOR MONITORING BETA BLOCKER THERAPY: ICD-10-CM

## 2024-02-07 DIAGNOSIS — I49.8 ATRIAL ARRHYTHMIA: ICD-10-CM

## 2024-02-07 DIAGNOSIS — R00.2 PALPITATION: Primary | ICD-10-CM

## 2024-02-07 DIAGNOSIS — R00.2 PALPITATIONS: ICD-10-CM

## 2024-02-07 DIAGNOSIS — Z45.09 ENCOUNTER FOR LOOP RECORDER CHECK: Primary | ICD-10-CM

## 2024-02-07 PROCEDURE — 3078F DIAST BP <80 MM HG: CPT | Performed by: INTERNAL MEDICINE

## 2024-02-07 PROCEDURE — G8417 CALC BMI ABV UP PARAM F/U: HCPCS | Performed by: INTERNAL MEDICINE

## 2024-02-07 PROCEDURE — G8400 PT W/DXA NO RESULTS DOC: HCPCS | Performed by: INTERNAL MEDICINE

## 2024-02-07 PROCEDURE — 3017F COLORECTAL CA SCREEN DOC REV: CPT | Performed by: INTERNAL MEDICINE

## 2024-02-07 PROCEDURE — 99214 OFFICE O/P EST MOD 30 MIN: CPT | Performed by: INTERNAL MEDICINE

## 2024-02-07 PROCEDURE — G8484 FLU IMMUNIZE NO ADMIN: HCPCS | Performed by: INTERNAL MEDICINE

## 2024-02-07 PROCEDURE — 1123F ACP DISCUSS/DSCN MKR DOCD: CPT | Performed by: INTERNAL MEDICINE

## 2024-02-07 PROCEDURE — 1036F TOBACCO NON-USER: CPT | Performed by: INTERNAL MEDICINE

## 2024-02-07 PROCEDURE — G8427 DOCREV CUR MEDS BY ELIG CLIN: HCPCS | Performed by: INTERNAL MEDICINE

## 2024-02-07 PROCEDURE — 1090F PRES/ABSN URINE INCON ASSESS: CPT | Performed by: INTERNAL MEDICINE

## 2024-02-07 PROCEDURE — 3074F SYST BP LT 130 MM HG: CPT | Performed by: INTERNAL MEDICINE

## 2024-02-07 NOTE — PATIENT INSTRUCTIONS
Plan:     The current medical regimen is effective;  continue present plan and medications.  Monthly remote device checks.   Follow up with me in 6 months.

## 2024-02-21 ENCOUNTER — OFFICE VISIT (OUTPATIENT)
Dept: PULMONOLOGY | Age: 69
End: 2024-02-21
Payer: MEDICARE

## 2024-02-21 VITALS
HEIGHT: 68 IN | SYSTOLIC BLOOD PRESSURE: 93 MMHG | TEMPERATURE: 97.8 F | DIASTOLIC BLOOD PRESSURE: 68 MMHG | RESPIRATION RATE: 16 BRPM | OXYGEN SATURATION: 94 % | BODY MASS INDEX: 31.39 KG/M2 | HEART RATE: 69 BPM | WEIGHT: 207.13 LBS

## 2024-02-21 DIAGNOSIS — G47.30 MILD SLEEP APNEA: Primary | ICD-10-CM

## 2024-02-21 PROCEDURE — 1123F ACP DISCUSS/DSCN MKR DOCD: CPT | Performed by: STUDENT IN AN ORGANIZED HEALTH CARE EDUCATION/TRAINING PROGRAM

## 2024-02-21 PROCEDURE — 1090F PRES/ABSN URINE INCON ASSESS: CPT | Performed by: STUDENT IN AN ORGANIZED HEALTH CARE EDUCATION/TRAINING PROGRAM

## 2024-02-21 PROCEDURE — G8427 DOCREV CUR MEDS BY ELIG CLIN: HCPCS | Performed by: STUDENT IN AN ORGANIZED HEALTH CARE EDUCATION/TRAINING PROGRAM

## 2024-02-21 PROCEDURE — G8417 CALC BMI ABV UP PARAM F/U: HCPCS | Performed by: STUDENT IN AN ORGANIZED HEALTH CARE EDUCATION/TRAINING PROGRAM

## 2024-02-21 PROCEDURE — 1036F TOBACCO NON-USER: CPT | Performed by: STUDENT IN AN ORGANIZED HEALTH CARE EDUCATION/TRAINING PROGRAM

## 2024-02-21 PROCEDURE — 3017F COLORECTAL CA SCREEN DOC REV: CPT | Performed by: STUDENT IN AN ORGANIZED HEALTH CARE EDUCATION/TRAINING PROGRAM

## 2024-02-21 PROCEDURE — G8400 PT W/DXA NO RESULTS DOC: HCPCS | Performed by: STUDENT IN AN ORGANIZED HEALTH CARE EDUCATION/TRAINING PROGRAM

## 2024-02-21 PROCEDURE — 99214 OFFICE O/P EST MOD 30 MIN: CPT | Performed by: STUDENT IN AN ORGANIZED HEALTH CARE EDUCATION/TRAINING PROGRAM

## 2024-02-21 PROCEDURE — G8484 FLU IMMUNIZE NO ADMIN: HCPCS | Performed by: STUDENT IN AN ORGANIZED HEALTH CARE EDUCATION/TRAINING PROGRAM

## 2024-02-21 ASSESSMENT — ENCOUNTER SYMPTOMS
EYE REDNESS: 0
VOMITING: 0
EYE PAIN: 0
WHEEZING: 0
SHORTNESS OF BREATH: 0
COLOR CHANGE: 0
ABDOMINAL DISTENTION: 0
STRIDOR: 0
CONSTIPATION: 0
ABDOMINAL PAIN: 0
TROUBLE SWALLOWING: 0
DIARRHEA: 0
EYE ITCHING: 0
SORE THROAT: 0
COUGH: 0
NAUSEA: 0
EYE DISCHARGE: 0
BACK PAIN: 0

## 2024-02-21 NOTE — PATIENT INSTRUCTIONS
Remember to bring a list of pulmonary medications and any CPAP or BiPAP machines to your next appointment with the office.     Please keep all of your future appointments scheduled by [facility] Pulmonary office. Out of respect for other patients and providers, you may be asked to reschedule your appointment if you arrive later than your scheduled appointment time. Appointments cancelled less than 24hrs in advance will be considered a no show. Patients with three missed appointments within 1 year or four missed appointments within 2 years can be dismissed from the practice.     Please be aware that our physicians are required to work in the Intensive Care Unit at [facility].  Your appointment may need to be rescheduled if they are designated to work during your appointment time.      You may receive a survey regarding the care you received during your visit.  Your input is valuable to us.  We encourage you to complete and return your survey.  We hope you will choose us in the future for your healthcare needs.     Pt instructed of all future appointment dates & times, including radiology, labs, procedures & referrals. If procedures were scheduled preparation instructions provided. Instructions on future appointments with [facility] were given.      In the next few weeks, you will be receiving a survey from [facility] regarding your visit today.  We would greatly appreciate it if you would take just a few minutes to fill that out.  It is very important to us that our patients receive top notch care and our surveys help keep us accountable.   In addition, if you would specifically mention individuals (MD, Medical Assistant, Registration)  that personally assisted you today, it would help lift morale.  We may even be rewarded for the great care you received.  However, if your experience was not a good one, we want to hear about that as well.  It is the only way we an

## 2024-02-21 NOTE — PROGRESS NOTES
[facility] Pulmonary Follow-up  [address] [place] [address]  [phone]        Carmelita Maldonado (: 1955 ) is a 68 y.o. female here for an evaluation of   Chief Complaint   Patient presents with    Follow-up              SUBJECTIVE/OBJECTIVE:    Patient is a 68-year-old female with significant past medical history of obesity, hyperlipidemia, atrial arrhythmia that presents to [facility] pulmonary clinic for follow-up visit.  Patient complains that her mask is causing poor sleep.  She is unable to be comfortable at night because she gets wrapped up in her tubing.  She wants to stop using her AutoPap.  She has no other complaints today.     Patient quit smoking in , she was smoking half a pack per day for 30 years.  She denies any illicit drug use, rarely drinks alcohol.  She worked at [employer] for 34 years and retired, she is worked other jobs in the past which include [employer] and [employer].  She denies any occupational exposures.  She has 1 dog at home, no other pets/birds.  She denies any household exposures.      Review of Systems   Constitutional:  Negative for activity change, appetite change, chills, diaphoresis and fatigue.   HENT:  Negative for congestion, sore throat and trouble swallowing.    Eyes:  Negative for pain, discharge, redness and itching.   Respiratory:  Negative for cough, shortness of breath, wheezing and stridor.    Cardiovascular:  Negative for chest pain, palpitations and leg swelling.   Gastrointestinal:  Negative for abdominal distention, abdominal pain, constipation, diarrhea, nausea and vomiting.   Endocrine: Negative for polydipsia, polyphagia and polyuria.   Genitourinary:  Negative for difficulty urinating.   Musculoskeletal:  Negative for back pain, myalgias and neck pain.   Skin:  Negative for color change.   Neurological:  Negative for dizziness, weakness and light-headedness.   Psychiatric/Behavioral:  Positive for sleep disturbance.

## 2024-02-21 NOTE — PROGRESS NOTES
MA Communication:  The following orders are received by verbal communication from Harmeet Dotson MD    Orders include:    6 month

## 2024-03-03 ENCOUNTER — APPOINTMENT (OUTPATIENT)
Dept: CT IMAGING | Age: 69
End: 2024-03-03
Payer: MEDICARE

## 2024-03-03 ENCOUNTER — HOSPITAL ENCOUNTER (EMERGENCY)
Age: 69
Discharge: HOME | End: 2024-03-03
Payer: MEDICARE

## 2024-03-03 VITALS
WEIGHT: 205 LBS | BODY MASS INDEX: 31.07 KG/M2 | DIASTOLIC BLOOD PRESSURE: 81 MMHG | RESPIRATION RATE: 22 BRPM | OXYGEN SATURATION: 97 % | HEART RATE: 74 BPM | TEMPERATURE: 98.7 F | HEIGHT: 68 IN | SYSTOLIC BLOOD PRESSURE: 121 MMHG

## 2024-03-03 DIAGNOSIS — R10.9 RIGHT FLANK PAIN: Primary | ICD-10-CM

## 2024-03-03 LAB
ALBUMIN SERPL-MCNC: 4.2 G/DL (ref 3.4–5)
ALBUMIN/GLOB SERPL: 2 {RATIO} (ref 1.1–2.2)
ALP SERPL-CCNC: 67 U/L (ref 40–129)
ALT SERPL-CCNC: 12 U/L (ref 10–40)
ANION GAP SERPL CALCULATED.3IONS-SCNC: 10 MMOL/L (ref 3–16)
AST SERPL-CCNC: 13 U/L (ref 15–37)
BASOPHILS # BLD: 0.1 K/UL (ref 0–0.2)
BASOPHILS NFR BLD: 1.2 %
BILIRUB SERPL-MCNC: <0.2 MG/DL (ref 0–1)
BILIRUB UR QL STRIP.AUTO: NEGATIVE
BUN SERPL-MCNC: 17 MG/DL (ref 7–20)
CALCIUM SERPL-MCNC: 9.1 MG/DL (ref 8.3–10.6)
CHLORIDE SERPL-SCNC: 105 MMOL/L (ref 99–110)
CLARITY UR: CLEAR
CO2 SERPL-SCNC: 26 MMOL/L (ref 21–32)
COLOR UR: YELLOW
CREAT SERPL-MCNC: <0.5 MG/DL (ref 0.6–1.2)
DEPRECATED RDW RBC AUTO: 13.7 % (ref 12.4–15.4)
EOSINOPHIL # BLD: 0.1 K/UL (ref 0–0.6)
EOSINOPHIL NFR BLD: 1.7 %
GFR SERPLBLD CREATININE-BSD FMLA CKD-EPI: >60 ML/MIN/{1.73_M2}
GLUCOSE SERPL-MCNC: 93 MG/DL (ref 70–99)
GLUCOSE UR STRIP.AUTO-MCNC: NEGATIVE MG/DL
HCT VFR BLD AUTO: 38.9 % (ref 36–48)
HGB BLD-MCNC: 13 G/DL (ref 12–16)
HGB UR QL STRIP.AUTO: NEGATIVE
KETONES UR STRIP.AUTO-MCNC: NEGATIVE MG/DL
LEUKOCYTE ESTERASE UR QL STRIP.AUTO: NEGATIVE
LIPASE SERPL-CCNC: 25 U/L (ref 13–60)
LYMPHOCYTES # BLD: 1.8 K/UL (ref 1–5.1)
LYMPHOCYTES NFR BLD: 36.8 %
MCH RBC QN AUTO: 31.8 PG (ref 26–34)
MCHC RBC AUTO-ENTMCNC: 33.3 G/DL (ref 31–36)
MCV RBC AUTO: 95.4 FL (ref 80–100)
MONOCYTES # BLD: 0.5 K/UL (ref 0–1.3)
MONOCYTES NFR BLD: 11 %
NEUTROPHILS # BLD: 2.4 K/UL (ref 1.7–7.7)
NEUTROPHILS NFR BLD: 49.3 %
NITRITE UR QL STRIP.AUTO: NEGATIVE
PH UR STRIP.AUTO: 6 [PH] (ref 5–8)
PLATELET # BLD AUTO: 200 K/UL (ref 135–450)
PMV BLD AUTO: 8.8 FL (ref 5–10.5)
POTASSIUM SERPL-SCNC: 3.7 MMOL/L (ref 3.5–5.1)
PROT SERPL-MCNC: 6.3 G/DL (ref 6.4–8.2)
PROT UR STRIP.AUTO-MCNC: NEGATIVE MG/DL
RBC # BLD AUTO: 4.08 M/UL (ref 4–5.2)
SODIUM SERPL-SCNC: 141 MMOL/L (ref 136–145)
SP GR UR STRIP.AUTO: 1.02 (ref 1–1.03)
UA COMPLETE W REFLEX CULTURE PNL UR: NORMAL
UA DIPSTICK W REFLEX MICRO PNL UR: NORMAL
URN SPEC COLLECT METH UR: NORMAL
UROBILINOGEN UR STRIP-ACNC: 0.2 E.U./DL
WBC # BLD AUTO: 4.8 K/UL (ref 4–11)

## 2024-03-03 PROCEDURE — 99285 EMERGENCY DEPT VISIT HI MDM: CPT

## 2024-03-03 PROCEDURE — 96375 TX/PRO/DX INJ NEW DRUG ADDON: CPT

## 2024-03-03 PROCEDURE — 83690 ASSAY OF LIPASE: CPT

## 2024-03-03 PROCEDURE — 6360000002 HC RX W HCPCS: Performed by: PHYSICIAN ASSISTANT

## 2024-03-03 PROCEDURE — 96374 THER/PROPH/DIAG INJ IV PUSH: CPT

## 2024-03-03 PROCEDURE — 6370000000 HC RX 637 (ALT 250 FOR IP): Performed by: PHYSICIAN ASSISTANT

## 2024-03-03 PROCEDURE — 85025 COMPLETE CBC W/AUTO DIFF WBC: CPT

## 2024-03-03 PROCEDURE — 74177 CT ABD & PELVIS W/CONTRAST: CPT

## 2024-03-03 PROCEDURE — 81003 URINALYSIS AUTO W/O SCOPE: CPT

## 2024-03-03 PROCEDURE — 96376 TX/PRO/DX INJ SAME DRUG ADON: CPT

## 2024-03-03 PROCEDURE — 6360000004 HC RX CONTRAST MEDICATION: Performed by: PHYSICIAN ASSISTANT

## 2024-03-03 PROCEDURE — 80053 COMPREHEN METABOLIC PANEL: CPT

## 2024-03-03 RX ORDER — HYDROCODONE BITARTRATE AND ACETAMINOPHEN 5; 325 MG/1; MG/1
1 TABLET ORAL EVERY 6 HOURS PRN
Qty: 10 TABLET | Refills: 0 | Status: SHIPPED | OUTPATIENT
Start: 2024-03-03 | End: 2024-03-06

## 2024-03-03 RX ORDER — LIDOCAINE 4 G/G
1 PATCH TOPICAL DAILY
Status: DISCONTINUED | OUTPATIENT
Start: 2024-03-03 | End: 2024-03-03 | Stop reason: HOSPADM

## 2024-03-03 RX ORDER — METHOCARBAMOL 750 MG/1
750 TABLET, FILM COATED ORAL 4 TIMES DAILY PRN
Qty: 20 TABLET | Refills: 0 | Status: SHIPPED | OUTPATIENT
Start: 2024-03-03 | End: 2024-03-10

## 2024-03-03 RX ORDER — KETOROLAC TROMETHAMINE 30 MG/ML
15 INJECTION, SOLUTION INTRAMUSCULAR; INTRAVENOUS ONCE
Status: COMPLETED | OUTPATIENT
Start: 2024-03-03 | End: 2024-03-03

## 2024-03-03 RX ORDER — ORPHENADRINE CITRATE 30 MG/ML
60 INJECTION INTRAMUSCULAR; INTRAVENOUS ONCE
Status: COMPLETED | OUTPATIENT
Start: 2024-03-03 | End: 2024-03-03

## 2024-03-03 RX ADMIN — IOPAMIDOL 75 ML: 755 INJECTION, SOLUTION INTRAVENOUS at 19:35

## 2024-03-03 RX ADMIN — KETOROLAC TROMETHAMINE 15 MG: 30 INJECTION INTRAMUSCULAR; INTRAVENOUS at 18:28

## 2024-03-03 RX ADMIN — KETOROLAC TROMETHAMINE 15 MG: 30 INJECTION, SOLUTION INTRAMUSCULAR; INTRAVENOUS at 20:39

## 2024-03-03 RX ADMIN — ORPHENADRINE CITRATE 60 MG: 60 INJECTION INTRAMUSCULAR; INTRAVENOUS at 18:28

## 2024-03-03 ASSESSMENT — PAIN - FUNCTIONAL ASSESSMENT: PAIN_FUNCTIONAL_ASSESSMENT: 0-10

## 2024-03-03 ASSESSMENT — PAIN DESCRIPTION - PAIN TYPE: TYPE: ACUTE PAIN

## 2024-03-03 ASSESSMENT — PAIN DESCRIPTION - LOCATION: LOCATION: FLANK;BACK

## 2024-03-03 ASSESSMENT — PAIN DESCRIPTION - ORIENTATION: ORIENTATION: LOWER;LEFT

## 2024-03-03 ASSESSMENT — PAIN SCALES - GENERAL: PAINLEVEL_OUTOF10: 8

## 2024-03-04 NOTE — ED PROVIDER NOTES
Given 3/3/24 1828)   orphenadrine (NORFLEX) injection 60 mg (60 mg IntraVENous Given 3/3/24 1828)   iopamidol (ISOVUE-370) 76 % injection 75 mL (75 mLs IntraVENous Given 3/3/24 1935)   ketorolac (TORADOL) injection 15 mg (15 mg IntraVENous Given 3/3/24 2039)       CC/HPI Summary, DDx, ED course, and Reassessment: Patient here with right low back/right flank pain for 3 days.  She describes concerns for UTI but admits to some recent issues with back pain and admits it could be musculoskeletal.  She is not experiencing any GI upset or  symptoms.  Differential diagnoses: Musculoskeletal back pain, shingles, UTI, pyelonephritis, ureteral calculi, gallbladder disease, AAA    Patient evaluated in room 25.  She is mildly hypertensive but with otherwise normal vital signs.  Based on her symptoms she is ordered Toradol 15 mg IV with Norflex 60 mg IV.    CBC normal  CMP normal  Lipase normal  UA normal  CT abdomen and pelvis with IV contrast shows no acute abdominal or pelvic abnormality.    Patient was reassessed and has mild improvement with meds given.  I reviewed results with her and will give her another 15 mg of Toradol IV and a Lidoderm patch to her back.  In the end, I believe it is likely musculoskeletal pain.    Again CT imaging shows no acute intra-abdominal abnormality.  No sign of kidney stone, gallbladder inflammation.  It is reported that the aorta is normal in caliber.    Upon considering possible  cause, her urine is normal appearing without sign of infection.  There is no blood.  Again, no ureteral stone seen on CT.    Exclusion criteria - the patient is NOT to be included for SEP-1 Core Measure due to:  Infection is not suspected     Consults/discussion with other professionals: None  Social determinants: None  Chronic conditions: Obese, arthritis, GERD, history of DVT, hypertension, hyperlipidemia  Records reviewed: None    Disposition considerations/Plan (include 1 test not done- why not, d/c vs

## 2024-03-07 ENCOUNTER — HOSPITAL ENCOUNTER (OUTPATIENT)
Dept: GENERAL RADIOLOGY | Age: 69
Discharge: HOME | End: 2024-03-07
Payer: MEDICARE

## 2024-03-07 ENCOUNTER — HOSPITAL ENCOUNTER (OUTPATIENT)
Age: 69
Discharge: HOME | End: 2024-03-07
Payer: MEDICARE

## 2024-03-07 DIAGNOSIS — R52 PAIN: ICD-10-CM

## 2024-03-07 PROCEDURE — 72072 X-RAY EXAM THORAC SPINE 3VWS: CPT

## 2024-03-14 ENCOUNTER — OFFICE VISIT (OUTPATIENT)
Dept: ORTHOPEDIC SURGERY | Age: 69
End: 2024-03-14
Payer: MEDICARE

## 2024-03-14 VITALS — HEIGHT: 68 IN | WEIGHT: 205.03 LBS | BODY MASS INDEX: 31.07 KG/M2

## 2024-03-14 DIAGNOSIS — M48.062 SPINAL STENOSIS OF LUMBAR REGION WITH NEUROGENIC CLAUDICATION: Primary | ICD-10-CM

## 2024-03-14 PROCEDURE — G8400 PT W/DXA NO RESULTS DOC: HCPCS | Performed by: ORTHOPAEDIC SURGERY

## 2024-03-14 PROCEDURE — G8427 DOCREV CUR MEDS BY ELIG CLIN: HCPCS | Performed by: ORTHOPAEDIC SURGERY

## 2024-03-14 PROCEDURE — 1090F PRES/ABSN URINE INCON ASSESS: CPT | Performed by: ORTHOPAEDIC SURGERY

## 2024-03-14 PROCEDURE — 1123F ACP DISCUSS/DSCN MKR DOCD: CPT | Performed by: ORTHOPAEDIC SURGERY

## 2024-03-14 PROCEDURE — G8417 CALC BMI ABV UP PARAM F/U: HCPCS | Performed by: ORTHOPAEDIC SURGERY

## 2024-03-14 PROCEDURE — 99214 OFFICE O/P EST MOD 30 MIN: CPT | Performed by: ORTHOPAEDIC SURGERY

## 2024-03-14 PROCEDURE — 3017F COLORECTAL CA SCREEN DOC REV: CPT | Performed by: ORTHOPAEDIC SURGERY

## 2024-03-14 PROCEDURE — 1036F TOBACCO NON-USER: CPT | Performed by: ORTHOPAEDIC SURGERY

## 2024-03-14 PROCEDURE — G8484 FLU IMMUNIZE NO ADMIN: HCPCS | Performed by: ORTHOPAEDIC SURGERY

## 2024-03-14 RX ORDER — GABAPENTIN 300 MG/1
300 CAPSULE ORAL 4 TIMES DAILY
Qty: 120 CAPSULE | Refills: 1 | Status: SHIPPED | OUTPATIENT
Start: 2024-03-14 | End: 2024-05-13

## 2024-03-14 NOTE — PROGRESS NOTES
instability. There are no rashes, ulcerations or lesions.  Strength and tone are normal.    Diagnostic Testing:    I reviewed AP and lateral x-rays of the thoracic spine from 3/7/2024 in the office today.  Those show thoracic spondylosis    This reviewed CT images of the abdomen from 3/4/2024 in the office today.  Those show lumbar scoliosis and spondylosis with severe multilevel foraminal stenosis on the    I reviewed MRI images of her lumbar spine from 10/24/2020 office today.  Those show spondylosis with degenerative scoliosis and mild bilateral multilevel foraminal stenosis    I reviewed MRI images of thoracic spine from 5/22/2018 in the office today.  Those show spondylosis without significant cord or nerve compression    Impression:   Degenerative lumbar scoliosis with right-sided foraminal stenosis    Plan:    Discussed treatment options including observation, physical therapy, epidural injection, and additional imaging. She would like to proceed with lumbar MRI, gabapentin and referral to Dr. Luis Love for thoracic and lumbar ESIs if her symptoms persist after gabapentin.  We discussed the risk and benefits of gabapentin including the risk of lethargy and dizziness.

## 2024-03-19 ENCOUNTER — TELEPHONE (OUTPATIENT)
Dept: ORTHOPEDIC SURGERY | Age: 69
End: 2024-03-19

## 2024-03-19 ENCOUNTER — TELEPHONE (OUTPATIENT)
Dept: CARDIOLOGY CLINIC | Age: 69
End: 2024-03-19

## 2024-03-19 NOTE — TELEPHONE ENCOUNTER
PENNY Lowery from [employer] [facility] called regarding needing more informing regarding pt's ILR to be able to conduct an MRI. I faxed over the ILR procedure note from 4/7/2023.     Fax number: [phone]

## 2024-03-19 NOTE — TELEPHONE ENCOUNTER
General Question     Subject: MRI-THORACIC/LUMBAR SPINE  Patient and /or Facility Request: HIRAM FRANKIE  Contact Number: [phone]    PATIENT CALLED TO SPEAK WITH CLINICAL TO INQUIRE OF MRI FOR THORACIC/LUMBAR SPINE-LOOKING TO SUBMIT TO [facility].     SHE STATED THAT THE OFFICE HAS NOT YET RECEIVED THE ORDER.     SHE PROVIDED NUMBER TO OFFICE [phone]    PLEASE ADVISE

## 2024-04-03 PROCEDURE — 93298 REM INTERROG DEV EVAL SCRMS: CPT | Performed by: INTERNAL MEDICINE

## 2024-04-04 ENCOUNTER — HOSPITAL ENCOUNTER (OUTPATIENT)
Age: 69
Setting detail: OUTPATIENT SURGERY
Discharge: HOME | End: 2024-04-04
Attending: ANESTHESIOLOGY | Admitting: ANESTHESIOLOGY
Payer: MEDICARE

## 2024-04-04 VITALS
HEART RATE: 65 BPM | SYSTOLIC BLOOD PRESSURE: 125 MMHG | HEIGHT: 68 IN | OXYGEN SATURATION: 95 % | RESPIRATION RATE: 16 BRPM | TEMPERATURE: 97.7 F | WEIGHT: 209 LBS | DIASTOLIC BLOOD PRESSURE: 75 MMHG | BODY MASS INDEX: 31.67 KG/M2

## 2024-04-04 PROCEDURE — 2580000003 HC RX 258: Performed by: ANESTHESIOLOGY

## 2024-04-04 PROCEDURE — 7100000010 HC PHASE II RECOVERY - FIRST 15 MIN: Performed by: ANESTHESIOLOGY

## 2024-04-04 PROCEDURE — 2500000003 HC RX 250 WO HCPCS: Performed by: ANESTHESIOLOGY

## 2024-04-04 PROCEDURE — 3600000002 HC SURGERY LEVEL 2 BASE: Performed by: ANESTHESIOLOGY

## 2024-04-04 PROCEDURE — 62323 NJX INTERLAMINAR LMBR/SAC: CPT | Performed by: ANESTHESIOLOGY

## 2024-04-04 PROCEDURE — 6360000002 HC RX W HCPCS: Performed by: ANESTHESIOLOGY

## 2024-04-04 PROCEDURE — 6360000004 HC RX CONTRAST MEDICATION: Performed by: ANESTHESIOLOGY

## 2024-04-04 RX ORDER — LIDOCAINE HYDROCHLORIDE 10 MG/ML
INJECTION, SOLUTION EPIDURAL; INFILTRATION; INTRACAUDAL; PERINEURAL PRN
Status: DISCONTINUED | OUTPATIENT
Start: 2024-04-04 | End: 2024-04-04 | Stop reason: ALTCHOICE

## 2024-04-04 RX ORDER — BETAMETHASONE SODIUM PHOSPHATE AND BETAMETHASONE ACETATE 3; 3 MG/ML; MG/ML
INJECTION, SUSPENSION INTRA-ARTICULAR; INTRALESIONAL; INTRAMUSCULAR; SOFT TISSUE
Status: DISCONTINUED
Start: 2024-04-04 | End: 2024-04-04 | Stop reason: HOSPADM

## 2024-04-04 ASSESSMENT — PAIN - FUNCTIONAL ASSESSMENT
PAIN_FUNCTIONAL_ASSESSMENT: PREVENTS OR INTERFERES WITH ALL ACTIVE AND SOME PASSIVE ACTIVITIES
PAIN_FUNCTIONAL_ASSESSMENT: 0-10

## 2024-04-04 NOTE — PROCEDURES
Procedure:  Right L5-S1 interlaminar steroid injection under fluoroscopic guidance  Lumbar/sacral or caudal interlaminar 05316  Omnipaque 240 mOsm  Betamethasone 6mg/ml    Diagnosis: Lumbar radiculopathy M54.16    Informed Consent  Informed consent was obtained after the risks and benefits of the procedure were discussed in detail with the patient; to include but not limited to infection and or bleeding at the injection site, soreness at the injection site, nerve injury, incomplete pain control, worsening of pain, and headache.  All questions were answered and treatment options discussed.  Patient agreed to continue with planned procedure.    Time-out  A time-out was completed prior to starting the procedure.  Staff in the procedure suite as well as myself reviewed and confirmed the patient name, type or procedure and procedure location, and the presurgical questionnaire including blood thinners, allergies, and infections risks.  Time-out was completed properly.      Procedure  The patient was placed prone on the operating room table.  They were prepped and draped in the usual sterile fashion.  Strict aseptic technique was used throughout the procedure and the procedure was performed in the usual manner.    Using sterile fluoroscopic images, detailed  images were utilized to visualize the lumbar spine.  The appropriate level and location for needle placement was identified. Then via a 27 gauge needle, 2% lidocaine was infiltrated intradermally just to the right of midline at the L5-S1 interspace as identified by fluoroscopy.  An 18 gauge 3.5 inch tuohy needle was inserted through the skin and advanced slowly under fluoroscopic guidance until the tip of the needle contacted the upper edge of the S1 lamina.  The needle was \"walked\" over the edge of the lamina, and it was advanced to into the ligamentum flavum.  At this time, the epidural space was accessed using a loss of resistance technique with preservative

## 2024-04-04 NOTE — DISCHARGE INSTRUCTIONS
[facility]                    [phone]                        Post Pain Management Injection                                          1)  PATIENT INSTRUCTIONS: - Resume Normal Diet         - Other                         2)  ACTIVITY: * No driving or operating machinery for 8 hours post procedure without sedation      and 24 hours with sedation.  If you are seen driving during this time the        proper authorities will be notified.        * Do not stay alone for 4-6 hours after the procedure       * Do not sign legal documents, make any major decisions, or be involved in       work decisions for the remainder of the day.       - May shower or bathe          - Resume normal activity when full movement/sensation has       returned in extremities                   3)  SITE CARE: - Observe puncture site for signs of infection (redness, warmth       swelling, drainage with a foul odor, fever or increased tenderness)                4)  EXPECTED SIDE EFFECTS: * Numbness/tingling/weakness in extremities, if this lasts more       than 6 hours notify Dr. Love        - Muscle stiffness, soreness at puncture site (soreness may       last 2-4 days)                    5)  DIABETIC PATIENTS ONLY    - Increased glucose levels in all diabetic patients who have received       a steroid injection.               - Monitor blood sugars frequently for the first 5 days following procedure.          Adjust medication accordingly.                  6)  TO REACH DR. LOVE: - Call [phone]                    7)  ADDITIONAL INSTRUCTIONS: - Follow-up as scheduled or call for appointment if not already done.        - Patients taking blood thinners may resume as prescribed. Coumadin may resume this evening, all other blood thinners can be restarted tomorrow.

## 2024-04-04 NOTE — H&P
Nursing History and Physical reviewed and agreed upon.      Additional findings:    Allergies and medications have been reviewed.    HENT: Airway patent and reviewed  Cardiovascular: Normal rate, regular rhythm, normal heart sounds.   Pulmonary/Chest: No wheezes. No rhonchi. No rales.   Abdominal: Soft. Bowel sounds are normal. No distension.    Mallampati: 2    ASA CLASS:         []   I. Normal, healthy adult           [x]   II.  Mild systemic disease            []   III.  Severe systemic disease      Sedation plan:   [x]  Local/MINIMAL     []  Minimal    MALLAMPATI:           []   I.   Complete visualization of the soft palate           [x]   II.  Complete visualization of the uvula            []   III.  Visualization of only the base of the uvula           []   IV. Soft palate is not visibl    Patient's condition acceptable for planned procedure/sedation.   Post Procedure Plan   Return to same level of care   ______________________     The risks and benefits as well as alternatives to the procedure have been discussed with the patient and or family.  The patient and or next of kin understands and agrees to proceed.    Luis Love MD

## 2024-04-11 DIAGNOSIS — M17.12 PRIMARY OSTEOARTHRITIS OF LEFT KNEE: Primary | ICD-10-CM

## 2024-04-11 RX ORDER — CELECOXIB 200 MG/1
200 CAPSULE ORAL DAILY
Qty: 30 CAPSULE | Refills: 0 | Status: SHIPPED | OUTPATIENT
Start: 2024-04-11

## 2024-05-21 ENCOUNTER — TELEPHONE (OUTPATIENT)
Dept: ORTHOPEDIC SURGERY | Age: 69
End: 2024-05-21

## 2024-05-21 PROBLEM — M54.6 THORACIC SPINE PAIN: Status: ACTIVE | Noted: 2024-05-21

## 2024-05-21 PROBLEM — M46.1 SACROILIITIS, NOT ELSEWHERE CLASSIFIED (HCC): Status: ACTIVE | Noted: 2024-05-21

## 2024-05-21 NOTE — TELEPHONE ENCOUNTER
Prescription Refill     Medication Name:  GABAPENTIN  Pharmacy: [facility] [place]  Patient Contact Number:  [phone]

## 2024-05-22 RX ORDER — GABAPENTIN 300 MG/1
300 CAPSULE ORAL 4 TIMES DAILY
Qty: 120 CAPSULE | Refills: 0 | Status: SHIPPED | OUTPATIENT
Start: 2024-05-22 | End: 2024-07-21

## 2024-06-16 PROCEDURE — 93298 REM INTERROG DEV EVAL SCRMS: CPT | Performed by: INTERNAL MEDICINE

## 2024-06-24 ENCOUNTER — TELEPHONE (OUTPATIENT)
Dept: ORTHOPEDIC SURGERY | Age: 69
End: 2024-06-24

## 2024-06-24 NOTE — TELEPHONE ENCOUNTER
Unable to reach the patient about refill, have left a few messages about Neurontin. No return call. Unsure if Dr. Love is refilling it.

## 2024-08-13 ASSESSMENT — ENCOUNTER SYMPTOMS
WHEEZING: 0
LEFT EYE: 0
STRIDOR: 0
RIGHT EYE: 0
HEMATEMESIS: 0
SHORTNESS OF BREATH: 0
HEMATOCHEZIA: 0

## 2024-08-13 NOTE — PROGRESS NOTES
Assessment:     1. Palpitations and atrial arrhythmia: patient initially presented with recurrent palpitations over the prior almost 6 months. Episodes lasted minutes and once lasted much longer (one hour or more). She had associated nervousness, dizziness, chest discomfort and some shortness of breath. Resting ECG OK. Event monitor showed brief episodes of atrial arrhythmias likely atrial tachycardia but could not exclude atrial fibrillation. She has family history of atrial fibrillation.    To monitor long-term (as it would make a difference in terms especially of anticoagulation), we proceeded with a loop recorder implant.  It has not shown any sustained atrial or other arrhythmia.  She has brief atrial runs with low burden of ectopy. At times, longer episodes were recorded but are most suggestive of sinus tachycardia.     She previously did activate the device on a couple of occasions coinciding with what appears to be atrial tachycardia just below the detection rate (meaning device may be underestimating burden of arrhythmia). However, patient with improved symptoms and less palpitations compared to before. Will continue low-dose metoprolol.  No reported side effects. Strongly recommended restarting CPAP therapy or discussing alternative therapy with sleep medicine (has appointment next week).     2. Good overall blood pressure control    3. Obstructive sleep apnea: Patient with relatively recent diagnosis of obstructive sleep apnea.  She was referred to sleep medicine and found to have, on home sleep study, an AHI of 11.  She struggled with CPAP and eventually stopped using it. Encouraged her to keep working with the sleep medicine team and to try to keep the treatment going. Explained benefits from a cardiac standpoint.     4. Weight gain/obesity: recommended regular exercise with goal of weight loss.     Plan:     Recommend pursuing alternatives for sleep apnea treatment.   Continue taking Metoprolol as

## 2024-08-14 ENCOUNTER — OFFICE VISIT (OUTPATIENT)
Dept: CARDIOLOGY CLINIC | Age: 69
End: 2024-08-14
Payer: MEDICARE

## 2024-08-14 ENCOUNTER — NURSE ONLY (OUTPATIENT)
Dept: CARDIOLOGY CLINIC | Age: 69
End: 2024-08-14

## 2024-08-14 VITALS
WEIGHT: 213.2 LBS | OXYGEN SATURATION: 92 % | HEART RATE: 62 BPM | SYSTOLIC BLOOD PRESSURE: 108 MMHG | HEIGHT: 67 IN | BODY MASS INDEX: 33.46 KG/M2 | DIASTOLIC BLOOD PRESSURE: 68 MMHG

## 2024-08-14 DIAGNOSIS — I49.1 PAC (PREMATURE ATRIAL CONTRACTION): ICD-10-CM

## 2024-08-14 DIAGNOSIS — I47.19 PAROXYSMAL ATRIAL TACHYCARDIA (HCC): ICD-10-CM

## 2024-08-14 DIAGNOSIS — Z45.09 ENCOUNTER FOR LOOP RECORDER CHECK: Primary | ICD-10-CM

## 2024-08-14 DIAGNOSIS — I49.8 ATRIAL ARRHYTHMIA: ICD-10-CM

## 2024-08-14 DIAGNOSIS — G47.33 OSA (OBSTRUCTIVE SLEEP APNEA): ICD-10-CM

## 2024-08-14 DIAGNOSIS — I49.9 IRREGULAR HEART RATE: Primary | ICD-10-CM

## 2024-08-14 DIAGNOSIS — E66.9 OBESITY (BMI 30.0-34.9): ICD-10-CM

## 2024-08-14 PROCEDURE — G8417 CALC BMI ABV UP PARAM F/U: HCPCS | Performed by: INTERNAL MEDICINE

## 2024-08-14 PROCEDURE — 99214 OFFICE O/P EST MOD 30 MIN: CPT | Performed by: INTERNAL MEDICINE

## 2024-08-14 PROCEDURE — 1090F PRES/ABSN URINE INCON ASSESS: CPT | Performed by: INTERNAL MEDICINE

## 2024-08-14 PROCEDURE — 3017F COLORECTAL CA SCREEN DOC REV: CPT | Performed by: INTERNAL MEDICINE

## 2024-08-14 PROCEDURE — G8427 DOCREV CUR MEDS BY ELIG CLIN: HCPCS | Performed by: INTERNAL MEDICINE

## 2024-08-14 PROCEDURE — 1123F ACP DISCUSS/DSCN MKR DOCD: CPT | Performed by: INTERNAL MEDICINE

## 2024-08-14 PROCEDURE — G8400 PT W/DXA NO RESULTS DOC: HCPCS | Performed by: INTERNAL MEDICINE

## 2024-08-14 PROCEDURE — 1036F TOBACCO NON-USER: CPT | Performed by: INTERNAL MEDICINE

## 2024-08-14 PROCEDURE — 93000 ELECTROCARDIOGRAM COMPLETE: CPT | Performed by: INTERNAL MEDICINE

## 2024-08-14 ASSESSMENT — ENCOUNTER SYMPTOMS
SLEEP DISTURBANCES DUE TO BREATHING: 1
SNORING: 1

## 2024-08-14 NOTE — PATIENT INSTRUCTIONS
Plan:     Recommend pursuing alternatives for sleep apnea treatment.   Continue taking Metoprolol as prescribed.   Monthly remote device interrogations.   Follow up with my nurse practitioner in 6 months.

## 2024-08-17 PROCEDURE — 93298 REM INTERROG DEV EVAL SCRMS: CPT | Performed by: INTERNAL MEDICINE

## 2024-08-19 ENCOUNTER — OFFICE VISIT (OUTPATIENT)
Dept: PULMONOLOGY | Age: 69
End: 2024-08-19
Payer: MEDICARE

## 2024-08-19 VITALS
DIASTOLIC BLOOD PRESSURE: 72 MMHG | OXYGEN SATURATION: 91 % | TEMPERATURE: 98.3 F | RESPIRATION RATE: 16 BRPM | HEART RATE: 53 BPM | WEIGHT: 212.5 LBS | HEIGHT: 68 IN | BODY MASS INDEX: 32.21 KG/M2 | SYSTOLIC BLOOD PRESSURE: 118 MMHG

## 2024-08-19 DIAGNOSIS — G47.30 MILD SLEEP APNEA: Primary | ICD-10-CM

## 2024-08-19 PROCEDURE — G8427 DOCREV CUR MEDS BY ELIG CLIN: HCPCS | Performed by: STUDENT IN AN ORGANIZED HEALTH CARE EDUCATION/TRAINING PROGRAM

## 2024-08-19 PROCEDURE — 1090F PRES/ABSN URINE INCON ASSESS: CPT | Performed by: STUDENT IN AN ORGANIZED HEALTH CARE EDUCATION/TRAINING PROGRAM

## 2024-08-19 PROCEDURE — G8417 CALC BMI ABV UP PARAM F/U: HCPCS | Performed by: STUDENT IN AN ORGANIZED HEALTH CARE EDUCATION/TRAINING PROGRAM

## 2024-08-19 PROCEDURE — G8400 PT W/DXA NO RESULTS DOC: HCPCS | Performed by: STUDENT IN AN ORGANIZED HEALTH CARE EDUCATION/TRAINING PROGRAM

## 2024-08-19 PROCEDURE — 1123F ACP DISCUSS/DSCN MKR DOCD: CPT | Performed by: STUDENT IN AN ORGANIZED HEALTH CARE EDUCATION/TRAINING PROGRAM

## 2024-08-19 PROCEDURE — 1036F TOBACCO NON-USER: CPT | Performed by: STUDENT IN AN ORGANIZED HEALTH CARE EDUCATION/TRAINING PROGRAM

## 2024-08-19 PROCEDURE — 3017F COLORECTAL CA SCREEN DOC REV: CPT | Performed by: STUDENT IN AN ORGANIZED HEALTH CARE EDUCATION/TRAINING PROGRAM

## 2024-08-19 PROCEDURE — 99213 OFFICE O/P EST LOW 20 MIN: CPT | Performed by: STUDENT IN AN ORGANIZED HEALTH CARE EDUCATION/TRAINING PROGRAM

## 2024-08-19 ASSESSMENT — ENCOUNTER SYMPTOMS
COLOR CHANGE: 0
EYE DISCHARGE: 0
NAUSEA: 0
ABDOMINAL DISTENTION: 0
STRIDOR: 0
VOMITING: 0
ABDOMINAL PAIN: 0
EYE PAIN: 0
TROUBLE SWALLOWING: 0
BACK PAIN: 0
DIARRHEA: 0
EYE REDNESS: 0
WHEEZING: 0
CONSTIPATION: 0
SHORTNESS OF BREATH: 0
COUGH: 0
SORE THROAT: 0
EYE ITCHING: 0

## 2024-08-19 NOTE — PATIENT INSTRUCTIONS
Remember to bring a list of pulmonary medications and any CPAP or BiPAP machines to your next appointment with the office.     Please keep all of your future appointments scheduled by [facility] office. Out of respect for other patients and providers, you may be asked to reschedule your appointment if you arrive later than your scheduled appointment time. Appointments cancelled less than 24hrs in advance will be considered a no show. Patients with three missed appointments within 1 year or four missed appointments within 2 years can be dismissed from the practice.     Please be aware that our physicians are required to work in the Intensive Care Unit at [facility].  Your appointment may need to be rescheduled if they are designated to work during your appointment time.      You may receive a survey regarding the care you received during your visit.  Your input is valuable to us.  We encourage you to complete and return your survey.  We hope you will choose us in the future for your healthcare needs.     Pt instructed of all future appointment dates & times, including radiology, labs, procedures & referrals. If procedures were scheduled preparation instructions provided. Instructions on future appointments with [facility] were given.      In the next few weeks, you will be receiving a survey from [facility] regarding your visit today.  We would greatly appreciate it if you would take just a few minutes to fill that out.  It is very important to us that our patients receive top notch care and our surveys help keep us accountable. However, if your experience was not a good one, we want to hear about that as well. This is a key way we can keep track of problems and strive to correct any for future visits.    Again, we appreciate your time and thank you for choosing [facility]!    Lelia VELA

## 2024-08-19 NOTE — PROGRESS NOTES
MA Communication:  The following orders are received by verbal communication from   Harmeet Dotson MD    Orders include:  Allison ref.       6 mo fu    
light-headedness.   Psychiatric/Behavioral:  Negative for agitation and behavioral problems.          Vitals:    08/19/24 1307   BP: 118/72   Pulse: 53   Resp: 16   Temp: 98.3 °F (36.8 °C)   TempSrc: Oral   SpO2: 91%   Weight: 96.4 kg (212 lb 8 oz)   Height: 1.715 m (5' 7.5\")        Physical Exam  Constitutional:       General: She is not in acute distress.     Appearance: She is not toxic-appearing.   HENT:      Head: Normocephalic and atraumatic.      Nose: Nose normal.      Mouth/Throat:      Pharynx: No oropharyngeal exudate.   Eyes:      General: No scleral icterus.        Right eye: No discharge.         Left eye: No discharge.   Cardiovascular:      Rate and Rhythm: Normal rate and regular rhythm.      Heart sounds: No murmur heard.     No friction rub. No gallop.   Pulmonary:      Effort: Pulmonary effort is normal. No respiratory distress.      Breath sounds: No wheezing or rales.   Abdominal:      General: Abdomen is flat. Bowel sounds are normal.      Palpations: Abdomen is soft.   Musculoskeletal:         General: Normal range of motion.      Cervical back: Normal range of motion.   Skin:     General: Skin is warm and dry.   Neurological:      General: No focal deficit present.      Mental Status: She is alert and oriented to person, place, and time.   Psychiatric:         Mood and Affect: Mood normal.            Data  [place] Sleepiness Scale     Sitting and reading - 0     Watching TV - 0     Sitting, inactive, in a public place  (e.g., in a meeting, theater, or  dinner event) - 0     As a passenger in a car for an  hour or more without stopping  for a break - 0     Lying down to rest when  circumstances permit - 1     Sitting and talking to someone - 0     Sitting quietly after a meal  without alcohol - 0     In a car, while stopped for a few  minutes in traffic or at a light - 0        0 - Would never nod off  1 - Slight chance of nodding off  2 - Moderate chance of nodding off  3 - High chance of

## 2024-11-20 ENCOUNTER — HOSPITAL ENCOUNTER (OUTPATIENT)
Dept: WOMENS IMAGING | Age: 69
Discharge: HOME | End: 2024-11-20
Payer: MEDICARE

## 2024-11-20 VITALS — WEIGHT: 206 LBS | BODY MASS INDEX: 31.22 KG/M2 | HEIGHT: 68 IN

## 2024-11-20 DIAGNOSIS — Z12.31 ENCOUNTER FOR SCREENING MAMMOGRAM FOR MALIGNANT NEOPLASM OF BREAST: ICD-10-CM

## 2024-11-20 PROCEDURE — 77063 BREAST TOMOSYNTHESIS BI: CPT

## 2024-12-24 RX ORDER — METOPROLOL TARTRATE 25 MG/1
12.5 TABLET, FILM COATED ORAL 2 TIMES DAILY
Qty: 90 TABLET | Refills: 2 | Status: SHIPPED | OUTPATIENT
Start: 2024-12-24

## 2025-02-06 NOTE — PROGRESS NOTES
reference ranges have  changed for PT and INR Testing.       INR   Date Value Ref Range Status   10/31/2023 0.96 0.84 - 1.16 Final     Comment:     Effective 3/28/23 at 09:00am EST    Normal: 0.84 - 1.16  Therapeutic: 2.0 - 3.0  Pros. Valve: 2.5 - 3.5  AMI: 2.0 - 3.0     11/14/2015 0.99 0.85 - 1.16 Final     Comment:     Effective 07/29/2014 at 10am EST    Normal: 0.85 - 1.16  Therapeutic: 2.0 - 3.0  Pros. Valve: 2.5 - 3.5  AMI: 2.0 - 3.0       APTT: No components found for: \"PT2T\"  FASTING LIPID PANEL:   Lab Results   Component Value Date/Time    HDL 56 08/23/2023 11:02 AM    TRIG 118 08/23/2023 11:02 AM     LIVER PROFILE:No results for input(s): \"AST\", \"ALT\" in the last 72 hours.    Invalid input(s): \"ALB\"    IMPRESSION:    Patient Active Problem List   Diagnosis    Chest pain on exertion    Shortness of breath on exertion    DVT (deep venous thrombosis) ([facility])    Acute chest pain    Chronic pain of left knee    Bilateral primary osteoarthritis of knee    Chondromalacia of both patellae    Lumbar pain    Bilateral leg pain    Lumbar spondylosis    DDD (degenerative disc disease), lumbar    Lumbar radiculopathy    Right foot pain    Rotator cuff strain, right, initial encounter    Right shoulder pain    Primary osteoarthritis of right shoulder    Chondromalacia of left patella    S/P total knee arthroplasty, right    Atrial arrhythmia    Encounter for loop recorder check    Sleep apnea    S/P total knee arthroplasty, left    S/P total knee arthroplasty, unspecified laterality    Sacroiliitis, not elsewhere classified (HCC)    Thoracic spine pain       Assessment:   SVT  Atrial tachycardia  ILR 4/7/2023  CARLYN--noncompliant with CPAP  Obesity  History of DVT    Plan:   Continue metoprolol  tartrate 12.5 mg twice daily  Continue aspirin 81 mg daily  Continue with monthly remote device checks  Follow up with Dr Dotson from pulmonology     Follow up in 6 months Katey MCKENZIE     MDM moderate        Katey Felipe

## 2025-02-12 ENCOUNTER — OFFICE VISIT (OUTPATIENT)
Dept: CARDIOLOGY CLINIC | Age: 70
End: 2025-02-12
Payer: MEDICARE

## 2025-02-12 ENCOUNTER — NURSE ONLY (OUTPATIENT)
Dept: CARDIOLOGY CLINIC | Age: 70
End: 2025-02-12

## 2025-02-12 VITALS
SYSTOLIC BLOOD PRESSURE: 118 MMHG | BODY MASS INDEX: 33.56 KG/M2 | OXYGEN SATURATION: 96 % | WEIGHT: 221.4 LBS | DIASTOLIC BLOOD PRESSURE: 74 MMHG | HEIGHT: 68 IN | HEART RATE: 64 BPM

## 2025-02-12 DIAGNOSIS — Z45.09 ENCOUNTER FOR LOOP RECORDER CHECK: Primary | ICD-10-CM

## 2025-02-12 DIAGNOSIS — I47.10 SVT (SUPRAVENTRICULAR TACHYCARDIA) (HCC): ICD-10-CM

## 2025-02-12 DIAGNOSIS — I49.8 ATRIAL ARRHYTHMIA: Primary | ICD-10-CM

## 2025-02-12 DIAGNOSIS — I49.9 IRREGULAR HEART RATE: ICD-10-CM

## 2025-02-12 DIAGNOSIS — G47.33 OSA (OBSTRUCTIVE SLEEP APNEA): ICD-10-CM

## 2025-02-12 PROCEDURE — 3017F COLORECTAL CA SCREEN DOC REV: CPT

## 2025-02-12 PROCEDURE — 93000 ELECTROCARDIOGRAM COMPLETE: CPT

## 2025-02-12 PROCEDURE — G8400 PT W/DXA NO RESULTS DOC: HCPCS

## 2025-02-12 PROCEDURE — 99214 OFFICE O/P EST MOD 30 MIN: CPT

## 2025-02-12 PROCEDURE — G2211 COMPLEX E/M VISIT ADD ON: HCPCS

## 2025-02-12 PROCEDURE — 1160F RVW MEDS BY RX/DR IN RCRD: CPT

## 2025-02-12 PROCEDURE — 1123F ACP DISCUSS/DSCN MKR DOCD: CPT

## 2025-02-12 PROCEDURE — G8417 CALC BMI ABV UP PARAM F/U: HCPCS

## 2025-02-12 PROCEDURE — 1090F PRES/ABSN URINE INCON ASSESS: CPT

## 2025-02-12 PROCEDURE — 1159F MED LIST DOCD IN RCRD: CPT

## 2025-02-12 PROCEDURE — G8427 DOCREV CUR MEDS BY ELIG CLIN: HCPCS

## 2025-02-12 PROCEDURE — 1036F TOBACCO NON-USER: CPT

## 2025-02-12 NOTE — PATIENT INSTRUCTIONS
Continue metoprolol  tartrate 12.5 mg twice daily  Continue aspirin 81 mg daily  Continue with monthly remote device checks  Follow up with Dr Dotson from pulmonology     Follow up in 6 months Katey MCKENZIE

## 2025-03-04 ENCOUNTER — OFFICE VISIT (OUTPATIENT)
Dept: PULMONOLOGY | Age: 70
End: 2025-03-04
Payer: MEDICARE

## 2025-03-04 VITALS
HEART RATE: 72 BPM | RESPIRATION RATE: 16 BRPM | TEMPERATURE: 97.5 F | BODY MASS INDEX: 33.4 KG/M2 | OXYGEN SATURATION: 93 % | HEIGHT: 68 IN | WEIGHT: 220.38 LBS

## 2025-03-04 DIAGNOSIS — G47.30 MILD SLEEP APNEA: Primary | ICD-10-CM

## 2025-03-04 PROCEDURE — 1090F PRES/ABSN URINE INCON ASSESS: CPT | Performed by: STUDENT IN AN ORGANIZED HEALTH CARE EDUCATION/TRAINING PROGRAM

## 2025-03-04 PROCEDURE — G8427 DOCREV CUR MEDS BY ELIG CLIN: HCPCS | Performed by: STUDENT IN AN ORGANIZED HEALTH CARE EDUCATION/TRAINING PROGRAM

## 2025-03-04 PROCEDURE — 99213 OFFICE O/P EST LOW 20 MIN: CPT | Performed by: STUDENT IN AN ORGANIZED HEALTH CARE EDUCATION/TRAINING PROGRAM

## 2025-03-04 PROCEDURE — G8400 PT W/DXA NO RESULTS DOC: HCPCS | Performed by: STUDENT IN AN ORGANIZED HEALTH CARE EDUCATION/TRAINING PROGRAM

## 2025-03-04 PROCEDURE — 1159F MED LIST DOCD IN RCRD: CPT | Performed by: STUDENT IN AN ORGANIZED HEALTH CARE EDUCATION/TRAINING PROGRAM

## 2025-03-04 PROCEDURE — 3017F COLORECTAL CA SCREEN DOC REV: CPT | Performed by: STUDENT IN AN ORGANIZED HEALTH CARE EDUCATION/TRAINING PROGRAM

## 2025-03-04 PROCEDURE — 1036F TOBACCO NON-USER: CPT | Performed by: STUDENT IN AN ORGANIZED HEALTH CARE EDUCATION/TRAINING PROGRAM

## 2025-03-04 PROCEDURE — 1123F ACP DISCUSS/DSCN MKR DOCD: CPT | Performed by: STUDENT IN AN ORGANIZED HEALTH CARE EDUCATION/TRAINING PROGRAM

## 2025-03-04 PROCEDURE — G8417 CALC BMI ABV UP PARAM F/U: HCPCS | Performed by: STUDENT IN AN ORGANIZED HEALTH CARE EDUCATION/TRAINING PROGRAM

## 2025-03-04 PROCEDURE — G2211 COMPLEX E/M VISIT ADD ON: HCPCS | Performed by: STUDENT IN AN ORGANIZED HEALTH CARE EDUCATION/TRAINING PROGRAM

## 2025-03-04 ASSESSMENT — ENCOUNTER SYMPTOMS
EYE REDNESS: 0
TROUBLE SWALLOWING: 0
SORE THROAT: 0
COLOR CHANGE: 0
WHEEZING: 0
STRIDOR: 0
EYE ITCHING: 0
VOMITING: 0
EYE DISCHARGE: 0
ABDOMINAL DISTENTION: 0
CONSTIPATION: 0
NAUSEA: 0
EYE PAIN: 0
DIARRHEA: 0
SHORTNESS OF BREATH: 0
COUGH: 0
BACK PAIN: 0
ABDOMINAL PAIN: 0

## 2025-03-04 NOTE — PATIENT INSTRUCTIONS
Remember to bring a list of pulmonary medications and any CPAP or BiPAP machines to your next appointment with the office.     Please keep all of your future appointments scheduled by [facility] office. Out of respect for other patients and providers, you may be asked to reschedule your appointment if you arrive later than your scheduled appointment time. Appointments cancelled less than 24hrs in advance will be considered a no show. Patients with three missed appointments within 1 year or four missed appointments within 2 years can be dismissed from the practice.     Please be aware that our physicians are required to work in the Intensive Care Unit at [facility].  Your appointment may need to be rescheduled if they are designated to work during your appointment time.      You may receive a survey regarding the care you received during your visit.  Your input is valuable to us.  We encourage you to complete and return your survey.  We hope you will choose us in the future for your healthcare needs.     Pt instructed of all future appointment dates & times, including radiology, labs, procedures & referrals. If procedures were scheduled preparation instructions provided. Instructions on future appointments with [facility] were given.

## 2025-03-04 NOTE — PROGRESS NOTES
MA Communication:  The following orders are received by verbal communication from Harmeet Dotson MD    Orders include:    PRN

## 2025-03-04 NOTE — PROGRESS NOTES
[facility] Follow-up  [address] [place] [address]  [phone]        Carmelita Maldonado (: 1955 ) is a 69 y.o. female here for an evaluation of   Chief Complaint   Patient presents with    Follow-up     6 mo    Sleep Apnea     Did not do anything with Allison         SUBJECTIVE/OBJECTIVE:  Patient is a 69-year-old female with significant past medical history of obesity, hyperlipidemia, atrial arrhythmia that presents to [facility] pulmonary clinic for follow-up visit.  Patient is still unable to wear AutoPap mask for mild sleep apnea.  She also went and saw the dentist for mandibular device for mild sleep apnea, but due to cost, she did not get mandibular device.  She has no complaints with her sleep.     Patient quit smoking in , she was smoking half a pack per day for 30 years.  She denies any illicit drug use, rarely drinks alcohol.  She worked at [employer] for 34 years and retired, she is worked other jobs in the past which include [employer] and [employer].  She denies any occupational exposures.  She has 1 dog at home, no other pets/birds.  She denies any household exposures.      Review of Systems   Constitutional:  Negative for activity change, appetite change, chills, diaphoresis and fatigue.   HENT:  Negative for congestion, sore throat and trouble swallowing.    Eyes:  Negative for pain, discharge, redness and itching.   Respiratory:  Negative for cough, shortness of breath, wheezing and stridor.    Cardiovascular:  Negative for chest pain, palpitations and leg swelling.   Gastrointestinal:  Negative for abdominal distention, abdominal pain, constipation, diarrhea, nausea and vomiting.   Endocrine: Negative for polydipsia, polyphagia and polyuria.   Genitourinary:  Negative for difficulty urinating.   Musculoskeletal:  Negative for back pain, myalgias and neck pain.   Skin:  Negative for color change.   Neurological:  Negative for dizziness, weakness and

## 2025-07-07 ENCOUNTER — HOSPITAL ENCOUNTER (OUTPATIENT)
Dept: GENERAL RADIOLOGY | Age: 70
Discharge: HOME | End: 2025-07-07
Payer: MEDICARE

## 2025-07-07 ENCOUNTER — TRANSCRIBE ORDERS (OUTPATIENT)
Dept: GENERAL RADIOLOGY | Age: 70
End: 2025-07-07

## 2025-07-07 DIAGNOSIS — M72.2 PLANTAR FASCIAL FIBROMATOSIS: Primary | ICD-10-CM

## 2025-07-07 DIAGNOSIS — M72.2 PLANTAR FASCIAL FIBROMATOSIS: ICD-10-CM

## 2025-07-07 PROCEDURE — 73620 X-RAY EXAM OF FOOT: CPT

## 2025-08-25 ENCOUNTER — OFFICE VISIT (OUTPATIENT)
Dept: CARDIOLOGY CLINIC | Age: 70
End: 2025-08-25
Payer: MEDICARE

## 2025-08-25 ENCOUNTER — CLINICAL SUPPORT (OUTPATIENT)
Dept: CARDIOLOGY CLINIC | Age: 70
End: 2025-08-25

## 2025-08-25 VITALS
WEIGHT: 207.01 LBS | HEART RATE: 56 BPM | DIASTOLIC BLOOD PRESSURE: 78 MMHG | BODY MASS INDEX: 31.37 KG/M2 | OXYGEN SATURATION: 99 % | SYSTOLIC BLOOD PRESSURE: 122 MMHG | HEIGHT: 68 IN

## 2025-08-25 DIAGNOSIS — E66.811 OBESITY (BMI 30.0-34.9): ICD-10-CM

## 2025-08-25 DIAGNOSIS — I49.1 PAC (PREMATURE ATRIAL CONTRACTION): ICD-10-CM

## 2025-08-25 DIAGNOSIS — R00.2 PALPITATIONS: ICD-10-CM

## 2025-08-25 DIAGNOSIS — I47.19 PAROXYSMAL ATRIAL TACHYCARDIA: ICD-10-CM

## 2025-08-25 DIAGNOSIS — Z45.09 ENCOUNTER FOR LOOP RECORDER CHECK: Primary | ICD-10-CM

## 2025-08-25 DIAGNOSIS — I47.10 SVT (SUPRAVENTRICULAR TACHYCARDIA): Primary | ICD-10-CM

## 2025-08-25 DIAGNOSIS — Z86.718 HISTORY OF DVT (DEEP VEIN THROMBOSIS): ICD-10-CM

## 2025-08-25 PROCEDURE — 1159F MED LIST DOCD IN RCRD: CPT

## 2025-08-25 PROCEDURE — 1090F PRES/ABSN URINE INCON ASSESS: CPT

## 2025-08-25 PROCEDURE — 1036F TOBACCO NON-USER: CPT

## 2025-08-25 PROCEDURE — 3017F COLORECTAL CA SCREEN DOC REV: CPT

## 2025-08-25 PROCEDURE — 1160F RVW MEDS BY RX/DR IN RCRD: CPT

## 2025-08-25 PROCEDURE — 1123F ACP DISCUSS/DSCN MKR DOCD: CPT

## 2025-08-25 PROCEDURE — G8400 PT W/DXA NO RESULTS DOC: HCPCS

## 2025-08-25 PROCEDURE — 99214 OFFICE O/P EST MOD 30 MIN: CPT

## 2025-08-25 PROCEDURE — G8427 DOCREV CUR MEDS BY ELIG CLIN: HCPCS

## 2025-08-25 PROCEDURE — G2211 COMPLEX E/M VISIT ADD ON: HCPCS

## 2025-08-25 PROCEDURE — 93000 ELECTROCARDIOGRAM COMPLETE: CPT

## 2025-08-25 PROCEDURE — G8417 CALC BMI ABV UP PARAM F/U: HCPCS

## (undated) DEVICE — SUTURE STRATAFIX SPRL SZ 3-0 L12IN ABSRB UD FS-1 L30X30CM SXMP2B410

## (undated) DEVICE — SUTURE STRATAFIX SPRL SZ 1 L14IN ABSRB VLT L48CM CTX 1/2 SXPD2B405

## (undated) DEVICE — COVER,TABLE,HEAVY DUTY,50"X90",STRL: Brand: MEDLINE

## (undated) DEVICE — BANDAGE COMPR W6INXL12FT SMOOTH FOR LIMB EXSANG ESMARCH

## (undated) DEVICE — SAFEDGE 2108 SERIES SAGITTAL BLADE STERNUM REVISION (40.3 X 0.64 X 48.4MM): Brand: SAFEDGE

## (undated) DEVICE — 3.5 X 48MM HEX HEADED SCREW

## (undated) DEVICE — STERILE PVP: Brand: MEDLINE INDUSTRIES, INC.

## (undated) DEVICE — SOLUTION IRRIG 2000ML 0.9% SOD CHL USP UROMATIC PLAS CONT

## (undated) DEVICE — BLADE SURG SAW SAG S STL AGG 905MM LEN 185MM W 127MM THCK

## (undated) DEVICE — APPLICATOR PREP 26ML 0.7% IOD POVACRYLEX 74% ISO ALC ST

## (undated) DEVICE — OPTIFOAM GENTLE SA, POSTOP, 4X10: Brand: MEDLINE

## (undated) DEVICE — SCREW BNE L25MM DIA2.5MM KNEE FULL THRD HEX FEM PERSONA (NOT IMPLANTED)

## (undated) DEVICE — SYSTEM SKIN CLSR 22CM DERMBND PRINEO

## (undated) DEVICE — HOOD: Brand: FLYTE

## (undated) DEVICE — NEEDLE HYPO 20GA L1.5IN YEL POLYPR HUB S STL REG BVL STR

## (undated) DEVICE — TUBE SUCT L10IN MINI FLTR CRV KAMVAC

## (undated) DEVICE — COVADERM: Brand: DEROYAL

## (undated) DEVICE — HEADLESS TROCHAR PIN 75MM: Brand: ZUK

## (undated) DEVICE — TOWEL OR BLUEE 16X26IN ST 8 PACK ORB08 16X26ORTWL

## (undated) DEVICE — ADHESIVE SKIN CLOSURE WND 8.661X1.5 IN 22 CM LIQUIBAND SECUR

## (undated) DEVICE — STERILE PATIENT PROTECTIVE PAD FOR IMP® KNEE POSITIONERS & COHESIVE WRAP (10 / CASE): Brand: DE MAYO KNEE POSITIONER®

## (undated) DEVICE — ZIMMER® A.T.S. CYCLINDRICAL TOURNIQUET CUFF, SINGLE PORT, SINGLE BLADDER 30 IN. 76 CM)

## (undated) DEVICE — GLOVE ORTHO 7 1/2   MSG9475

## (undated) DEVICE — HANDPIECE SET WITH HIGH FLOW TIP AND SUCTION TUBE: Brand: INTERPULSE

## (undated) DEVICE — Device

## (undated) DEVICE — HOOD, PEEL-AWAY: Brand: FLYTE

## (undated) DEVICE — 2108 SERIES SAGITTAL BLADE FLARED, GROUND  (18.5 X 1.27 X 90.5MM)

## (undated) DEVICE — GAUZE,SPONGE,4"X4",16PLY,STRL,LF,10/TRAY: Brand: MEDLINE

## (undated) DEVICE — GLOVE SURG SZ 75 L12IN FNGR THK79MIL GRN LTX FREE

## (undated) DEVICE — BOWL AND CEMENT CARTRIDGE WITH BREAKAWAY FEMORAL NOZZLE AND MEDIUM PRESSURIZER: Brand: ACM

## (undated) DEVICE — UNIVERSAL BLOCK TRAY: Brand: MEDLINE INDUSTRIES, INC.

## (undated) DEVICE — PENCIL SMK EVAC ALL IN 1 DSGN ENH VISIBILITY IMPROVED AIR

## (undated) DEVICE — SUTURE STRATAFIX SPRL SZ 2 0 L14IN ABSRB UD MH L36MM 1 2 CIR SXMD2B401

## (undated) DEVICE — SYRINGE 30ML MEDICAL LEUR LOCK TIP WO

## (undated) DEVICE — STERILE POLYISOPRENE POWDER-FREE SURGICAL GLOVES: Brand: PROTEXIS

## (undated) DEVICE — ALCOHOL RUBBING 16OZ 70% ISO

## (undated) DEVICE — 3 BONE CEMENT MIXER: Brand: MIXEVAC

## (undated) DEVICE — GAUZE,SPONGE,4"X4",8PLY,STRL,LF,10/TRAY: Brand: MEDLINE